# Patient Record
Sex: FEMALE | Race: WHITE | NOT HISPANIC OR LATINO | Employment: PART TIME | RURAL
[De-identification: names, ages, dates, MRNs, and addresses within clinical notes are randomized per-mention and may not be internally consistent; named-entity substitution may affect disease eponyms.]

---

## 2019-01-31 ENCOUNTER — HISTORICAL (OUTPATIENT)
Dept: ADMINISTRATIVE | Facility: HOSPITAL | Age: 64
End: 2019-01-31

## 2019-02-01 LAB
LAB AP CLINICAL INFORMATION: NORMAL
LAB AP COMMENTS: NORMAL
LAB AP DIAGNOSIS - HISTORICAL: NORMAL
LAB AP GROSS PATHOLOGY - HISTORICAL: NORMAL
LAB AP SPECIMEN SUBMITTED - HISTORICAL: NORMAL

## 2019-04-12 LAB — CRC RECOMMENDATION EXT: NORMAL

## 2019-08-07 ENCOUNTER — HISTORICAL (OUTPATIENT)
Dept: ADMINISTRATIVE | Facility: HOSPITAL | Age: 64
End: 2019-08-07

## 2019-08-08 LAB
LAB AP CLINICAL INFORMATION: NORMAL
LAB AP DIAGNOSIS - HISTORICAL: NORMAL
LAB AP GROSS PATHOLOGY - HISTORICAL: NORMAL
LAB AP SPECIMEN SUBMITTED - HISTORICAL: NORMAL

## 2020-06-24 ENCOUNTER — HISTORICAL (OUTPATIENT)
Dept: ADMINISTRATIVE | Facility: HOSPITAL | Age: 65
End: 2020-06-24

## 2020-09-17 ENCOUNTER — HISTORICAL (OUTPATIENT)
Dept: ADMINISTRATIVE | Facility: HOSPITAL | Age: 65
End: 2020-09-17

## 2020-12-20 ENCOUNTER — HISTORICAL (OUTPATIENT)
Dept: ADMINISTRATIVE | Facility: HOSPITAL | Age: 65
End: 2020-12-20

## 2021-02-13 ENCOUNTER — HISTORICAL (OUTPATIENT)
Dept: ADMINISTRATIVE | Facility: HOSPITAL | Age: 66
End: 2021-02-13

## 2021-03-31 ENCOUNTER — TELEPHONE (OUTPATIENT)
Dept: FAMILY MEDICINE | Facility: CLINIC | Age: 66
End: 2021-03-31

## 2021-03-31 RX ORDER — MECLIZINE HYDROCHLORIDE 25 MG/1
25 TABLET ORAL 3 TIMES DAILY PRN
Qty: 90 TABLET | Refills: 5 | Status: SHIPPED | OUTPATIENT
Start: 2021-03-31 | End: 2021-10-21 | Stop reason: SDUPTHER

## 2021-04-06 DIAGNOSIS — M54.9 DORSALGIA, UNSPECIFIED: ICD-10-CM

## 2021-04-07 ENCOUNTER — HOSPITAL ENCOUNTER (OUTPATIENT)
Dept: RADIOLOGY | Facility: HOSPITAL | Age: 66
Discharge: HOME | End: 2021-04-07
Attending: ORTHOPAEDIC SURGERY
Payer: MEDICARE

## 2021-04-07 ENCOUNTER — OFFICE VISIT (OUTPATIENT)
Dept: SPINE | Facility: CLINIC | Age: 66
End: 2021-04-07
Payer: MEDICARE

## 2021-04-07 VITALS — WEIGHT: 165 LBS | HEIGHT: 64 IN | BODY MASS INDEX: 28.17 KG/M2

## 2021-04-07 DIAGNOSIS — M54.9 DORSALGIA, UNSPECIFIED: ICD-10-CM

## 2021-04-07 DIAGNOSIS — M43.16 SPONDYLOLISTHESIS, LUMBAR REGION: ICD-10-CM

## 2021-04-07 DIAGNOSIS — M54.12 RADICULOPATHY, CERVICAL REGION: ICD-10-CM

## 2021-04-07 DIAGNOSIS — M50.30 DDD (DEGENERATIVE DISC DISEASE), CERVICAL: Primary | ICD-10-CM

## 2021-04-07 DIAGNOSIS — M51.36 DDD (DEGENERATIVE DISC DISEASE), LUMBAR: ICD-10-CM

## 2021-04-07 DIAGNOSIS — S32.010D CLOSED WEDGE COMPRESSION FRACTURE OF L1 VERTEBRA WITH ROUTINE HEALING, SUBSEQUENT ENCOUNTER: ICD-10-CM

## 2021-04-07 PROCEDURE — 72100 XR LUMBAR SPINE AP AND LATERAL: ICD-10-PCS | Mod: 26,,, | Performed by: ORTHOPAEDIC SURGERY

## 2021-04-07 PROCEDURE — 99214 PR OFFICE/OUTPT VISIT, EST, LEVL IV, 30-39 MIN: ICD-10-PCS | Mod: S$PBB,,, | Performed by: ORTHOPAEDIC SURGERY

## 2021-04-07 PROCEDURE — 72050 X-RAY EXAM NECK SPINE 4/5VWS: CPT | Mod: PBBFAC | Performed by: ORTHOPAEDIC SURGERY

## 2021-04-07 PROCEDURE — 99999 PR PBB SHADOW E&M-EST. PATIENT-LVL III: ICD-10-PCS | Mod: PBBFAC,,, | Performed by: ORTHOPAEDIC SURGERY

## 2021-04-07 PROCEDURE — 72100 X-RAY EXAM L-S SPINE 2/3 VWS: CPT | Mod: PBBFAC | Performed by: ORTHOPAEDIC SURGERY

## 2021-04-07 PROCEDURE — 99214 OFFICE O/P EST MOD 30 MIN: CPT | Mod: S$PBB,,, | Performed by: ORTHOPAEDIC SURGERY

## 2021-04-07 PROCEDURE — 72100 X-RAY EXAM L-S SPINE 2/3 VWS: CPT | Mod: 26,,, | Performed by: ORTHOPAEDIC SURGERY

## 2021-04-07 PROCEDURE — 72050 X-RAY EXAM NECK SPINE 4/5VWS: CPT | Mod: 26,,, | Performed by: ORTHOPAEDIC SURGERY

## 2021-04-07 PROCEDURE — 99213 OFFICE O/P EST LOW 20 MIN: CPT | Mod: PBBFAC,25 | Performed by: ORTHOPAEDIC SURGERY

## 2021-04-07 PROCEDURE — 72050 XR CERVICAL SPINE AP LAT WITH FLEX EXTEN: ICD-10-PCS | Mod: 26,,, | Performed by: ORTHOPAEDIC SURGERY

## 2021-04-07 PROCEDURE — 99999 PR PBB SHADOW E&M-EST. PATIENT-LVL III: CPT | Mod: PBBFAC,,, | Performed by: ORTHOPAEDIC SURGERY

## 2021-04-07 PROCEDURE — 72100 X-RAY EXAM L-S SPINE 2/3 VWS: CPT | Mod: TC

## 2021-04-07 PROCEDURE — 72050 X-RAY EXAM NECK SPINE 4/5VWS: CPT | Mod: TC

## 2021-04-13 RX ORDER — GLIMEPIRIDE 4 MG/1
4 TABLET ORAL DAILY
COMMUNITY
Start: 2021-02-03 | End: 2021-04-29 | Stop reason: SDUPTHER

## 2021-04-13 RX ORDER — CLOPIDOGREL BISULFATE 75 MG/1
75 TABLET ORAL DAILY
COMMUNITY
Start: 2021-02-03 | End: 2021-10-01 | Stop reason: SDUPTHER

## 2021-04-13 RX ORDER — CLOPIDOGREL BISULFATE 75 MG/1
75 TABLET ORAL
COMMUNITY
Start: 2020-09-09 | End: 2021-09-15

## 2021-04-13 RX ORDER — ASPIRIN 81 MG/1
81 TABLET ORAL
COMMUNITY

## 2021-04-13 RX ORDER — TRAMADOL HYDROCHLORIDE 50 MG/1
50 TABLET ORAL
COMMUNITY
Start: 2020-07-09 | End: 2021-11-19

## 2021-04-13 RX ORDER — PAROXETINE HYDROCHLORIDE 20 MG/1
20 TABLET, FILM COATED ORAL DAILY
COMMUNITY
Start: 2021-02-03 | End: 2021-04-29 | Stop reason: SDUPTHER

## 2021-04-13 RX ORDER — SIMVASTATIN 20 MG/1
20 TABLET, FILM COATED ORAL DAILY
COMMUNITY
Start: 2021-02-03 | End: 2021-04-29

## 2021-04-13 RX ORDER — MONTELUKAST SODIUM 4 MG/1
1 TABLET, CHEWABLE ORAL
COMMUNITY
End: 2022-02-14

## 2021-04-13 RX ORDER — PROPRANOLOL HYDROCHLORIDE 80 MG/1
80 CAPSULE, EXTENDED RELEASE ORAL DAILY
COMMUNITY
Start: 2021-02-03 | End: 2021-10-01 | Stop reason: SDUPTHER

## 2021-04-13 RX ORDER — SIMVASTATIN 20 MG/1
20 TABLET, FILM COATED ORAL
COMMUNITY
End: 2021-04-29

## 2021-04-13 RX ORDER — ISOSORBIDE MONONITRATE 30 MG/1
30 TABLET, EXTENDED RELEASE ORAL DAILY
COMMUNITY
Start: 2021-02-03 | End: 2021-04-29 | Stop reason: SDUPTHER

## 2021-04-13 RX ORDER — FUROSEMIDE 20 MG/1
20 TABLET ORAL DAILY
COMMUNITY
Start: 2021-02-03 | End: 2021-08-25 | Stop reason: SDUPTHER

## 2021-04-14 ENCOUNTER — OFFICE VISIT (OUTPATIENT)
Dept: SPINE | Facility: CLINIC | Age: 66
End: 2021-04-14
Payer: MEDICARE

## 2021-04-14 DIAGNOSIS — M51.36 DDD (DEGENERATIVE DISC DISEASE), LUMBAR: ICD-10-CM

## 2021-04-14 DIAGNOSIS — S32.010D CLOSED WEDGE COMPRESSION FRACTURE OF L1 VERTEBRA WITH ROUTINE HEALING, SUBSEQUENT ENCOUNTER: ICD-10-CM

## 2021-04-14 DIAGNOSIS — M50.30 DDD (DEGENERATIVE DISC DISEASE), CERVICAL: ICD-10-CM

## 2021-04-14 DIAGNOSIS — M54.12 RADICULOPATHY, CERVICAL REGION: Primary | ICD-10-CM

## 2021-04-14 DIAGNOSIS — M43.16 SPONDYLOLISTHESIS, LUMBAR REGION: ICD-10-CM

## 2021-04-14 PROCEDURE — 99213 OFFICE O/P EST LOW 20 MIN: CPT | Mod: PBBFAC | Performed by: ORTHOPAEDIC SURGERY

## 2021-04-14 PROCEDURE — 99214 PR OFFICE/OUTPT VISIT, EST, LEVL IV, 30-39 MIN: ICD-10-PCS | Mod: S$PBB,,, | Performed by: ORTHOPAEDIC SURGERY

## 2021-04-14 PROCEDURE — 99999 PR PBB SHADOW E&M-EST. PATIENT-LVL III: ICD-10-PCS | Mod: PBBFAC,,, | Performed by: ORTHOPAEDIC SURGERY

## 2021-04-14 PROCEDURE — 99999 PR PBB SHADOW E&M-EST. PATIENT-LVL III: CPT | Mod: PBBFAC,,, | Performed by: ORTHOPAEDIC SURGERY

## 2021-04-14 PROCEDURE — 99214 OFFICE O/P EST MOD 30 MIN: CPT | Mod: S$PBB,,, | Performed by: ORTHOPAEDIC SURGERY

## 2021-04-21 ENCOUNTER — OFFICE VISIT (OUTPATIENT)
Dept: INTERNAL MEDICINE | Facility: CLINIC | Age: 66
End: 2021-04-21
Payer: MEDICARE

## 2021-04-21 ENCOUNTER — HOSPITAL ENCOUNTER (OUTPATIENT)
Dept: RADIOLOGY | Facility: HOSPITAL | Age: 66
Discharge: HOME | End: 2021-04-21
Attending: NURSE PRACTITIONER
Payer: MEDICARE

## 2021-04-21 VITALS
RESPIRATION RATE: 16 BRPM | DIASTOLIC BLOOD PRESSURE: 80 MMHG | HEIGHT: 63 IN | HEART RATE: 67 BPM | WEIGHT: 173 LBS | BODY MASS INDEX: 30.65 KG/M2 | SYSTOLIC BLOOD PRESSURE: 140 MMHG | OXYGEN SATURATION: 94 %

## 2021-04-21 DIAGNOSIS — M80.00XS AGE-RELATED OSTEOPOROSIS WITH CURRENT PATHOLOGICAL FRACTURE, SEQUELA: ICD-10-CM

## 2021-04-21 DIAGNOSIS — Z13.820 ENCOUNTER FOR SCREENING FOR OSTEOPOROSIS: ICD-10-CM

## 2021-04-21 DIAGNOSIS — S32.009A UNSPECIFIED FRACTURE OF UNSPECIFIED LUMBAR VERTEBRA, INITIAL ENCOUNTER FOR CLOSED FRACTURE: ICD-10-CM

## 2021-04-21 DIAGNOSIS — M81.0 OSTEOPOROSIS, UNSPECIFIED OSTEOPOROSIS TYPE, UNSPECIFIED PATHOLOGICAL FRACTURE PRESENCE: Primary | ICD-10-CM

## 2021-04-21 DIAGNOSIS — M54.50 LOW BACK PAIN: ICD-10-CM

## 2021-04-21 PROBLEM — M80.00XA AGE-RELATED OSTEOPOROSIS WITH CURRENT PATHOLOGICAL FRACTURE: Status: ACTIVE | Noted: 2021-04-21

## 2021-04-21 PROCEDURE — 99213 PR OFFICE/OUTPT VISIT, EST, LEVL III, 20-29 MIN: ICD-10-PCS | Mod: S$PBB,,, | Performed by: NURSE PRACTITIONER

## 2021-04-21 PROCEDURE — 99999 PR PBB SHADOW E&M-EST. PATIENT-LVL V: CPT | Mod: PBBFAC,,, | Performed by: NURSE PRACTITIONER

## 2021-04-21 PROCEDURE — 99999 PR PBB SHADOW E&M-EST. PATIENT-LVL V: ICD-10-PCS | Mod: PBBFAC,,, | Performed by: NURSE PRACTITIONER

## 2021-04-21 PROCEDURE — 99213 OFFICE O/P EST LOW 20 MIN: CPT | Mod: S$PBB,,, | Performed by: NURSE PRACTITIONER

## 2021-04-21 PROCEDURE — 77080 DXA BONE DENSITY AXIAL: CPT | Mod: TC

## 2021-04-21 PROCEDURE — 77080 DEXA BONE DENSITY SPINE HIP: ICD-10-PCS | Mod: 26,,, | Performed by: RADIOLOGY

## 2021-04-21 PROCEDURE — 77080 DXA BONE DENSITY AXIAL: CPT | Mod: 26,,, | Performed by: RADIOLOGY

## 2021-04-21 PROCEDURE — 99215 OFFICE O/P EST HI 40 MIN: CPT | Mod: PBBFAC,25 | Performed by: NURSE PRACTITIONER

## 2021-04-21 RX ORDER — GABAPENTIN 300 MG/1
300 CAPSULE ORAL 3 TIMES DAILY
COMMUNITY
End: 2021-08-11 | Stop reason: SDUPTHER

## 2021-04-22 DIAGNOSIS — E55.9 VITAMIN D DEFICIENCY: Primary | ICD-10-CM

## 2021-04-22 RX ORDER — METFORMIN HYDROCHLORIDE 1000 MG/1
1000 TABLET ORAL 2 TIMES DAILY
COMMUNITY
End: 2022-02-10 | Stop reason: ALTCHOICE

## 2021-04-22 RX ORDER — ORAL SEMAGLUTIDE 3 MG/1
3 TABLET ORAL DAILY
COMMUNITY
End: 2021-11-19

## 2021-04-22 RX ORDER — PANTOPRAZOLE SODIUM 40 MG/1
40 TABLET, DELAYED RELEASE ORAL DAILY
COMMUNITY
End: 2022-05-26 | Stop reason: SDUPTHER

## 2021-04-22 RX ORDER — ERGOCALCIFEROL 1.25 MG/1
50000 CAPSULE ORAL
Qty: 4 CAPSULE | Refills: 1 | Status: SHIPPED | OUTPATIENT
Start: 2021-04-22 | End: 2022-10-27

## 2021-04-22 RX ORDER — ICOSAPENT ETHYL 1000 MG/1
2 CAPSULE ORAL DAILY
COMMUNITY
End: 2022-02-14

## 2021-04-22 RX ORDER — FLUTICASONE PROPIONATE 50 MCG
2 SPRAY, SUSPENSION (ML) NASAL DAILY
COMMUNITY

## 2021-04-22 RX ORDER — CYCLOBENZAPRINE HCL 10 MG
10 TABLET ORAL EVERY 12 HOURS PRN
COMMUNITY
End: 2022-10-27 | Stop reason: SDUPTHER

## 2021-04-29 ENCOUNTER — OFFICE VISIT (OUTPATIENT)
Dept: FAMILY MEDICINE | Facility: CLINIC | Age: 66
End: 2021-04-29
Payer: MEDICARE

## 2021-04-29 VITALS
HEART RATE: 74 BPM | DIASTOLIC BLOOD PRESSURE: 78 MMHG | RESPIRATION RATE: 18 BRPM | WEIGHT: 170 LBS | SYSTOLIC BLOOD PRESSURE: 136 MMHG | HEIGHT: 64 IN | TEMPERATURE: 97 F | BODY MASS INDEX: 29.02 KG/M2

## 2021-04-29 DIAGNOSIS — E11.9 TYPE 2 DIABETES MELLITUS WITHOUT COMPLICATION, WITHOUT LONG-TERM CURRENT USE OF INSULIN: Primary | ICD-10-CM

## 2021-04-29 DIAGNOSIS — I10 ESSENTIAL HYPERTENSION: ICD-10-CM

## 2021-04-29 DIAGNOSIS — I25.10 CORONARY ARTERIOSCLEROSIS: ICD-10-CM

## 2021-04-29 DIAGNOSIS — Z79.899 ENCOUNTER FOR LONG-TERM (CURRENT) USE OF OTHER MEDICATIONS: ICD-10-CM

## 2021-04-29 DIAGNOSIS — E55.9 VITAMIN D DEFICIENCY: ICD-10-CM

## 2021-04-29 DIAGNOSIS — I63.9 CEREBROVASCULAR ACCIDENT (CVA), UNSPECIFIED MECHANISM: ICD-10-CM

## 2021-04-29 LAB
ALBUMIN SERPL BCP-MCNC: 3.7 G/DL (ref 3.5–5)
ALBUMIN/GLOB SERPL: 1 {RATIO}
ALP SERPL-CCNC: 72 U/L (ref 55–142)
ALT SERPL W P-5'-P-CCNC: 29 U/L (ref 13–56)
ANION GAP SERPL CALCULATED.3IONS-SCNC: 10 MMOL/L (ref 7–16)
AST SERPL W P-5'-P-CCNC: 24 U/L (ref 15–37)
BASOPHILS # BLD AUTO: 0.12 K/UL (ref 0–0.2)
BASOPHILS NFR BLD AUTO: 1.3 % (ref 0–1)
BILIRUB SERPL-MCNC: 0.3 MG/DL (ref 0–1.2)
BUN SERPL-MCNC: 16 MG/DL (ref 7–18)
BUN/CREAT SERPL: 12 (ref 6–20)
CALCIUM SERPL-MCNC: 9.9 MG/DL (ref 8.5–10.1)
CHLORIDE SERPL-SCNC: 107 MMOL/L (ref 98–107)
CO2 SERPL-SCNC: 30 MMOL/L (ref 21–32)
CREAT SERPL-MCNC: 1.3 MG/DL (ref 0.55–1.02)
DIFFERENTIAL METHOD BLD: ABNORMAL
EOSINOPHIL # BLD AUTO: 0.42 K/UL (ref 0–0.5)
EOSINOPHIL NFR BLD AUTO: 4.4 % (ref 1–4)
ERYTHROCYTE [DISTWIDTH] IN BLOOD BY AUTOMATED COUNT: 14 % (ref 11.5–14.5)
EST. AVERAGE GLUCOSE BLD GHB EST-MCNC: 157 MG/DL
GLOBULIN SER-MCNC: 3.6 G/DL (ref 2–4)
GLUCOSE SERPL-MCNC: 89 MG/DL (ref 74–106)
HBA1C MFR BLD HPLC: 7.3 % (ref 4.5–6.6)
HCT VFR BLD AUTO: 38.3 % (ref 38–47)
HGB BLD-MCNC: 11.9 G/DL (ref 12–16)
IMM GRANULOCYTES # BLD AUTO: 0.05 K/UL (ref 0–0.04)
IMM GRANULOCYTES NFR BLD: 0.5 % (ref 0–0.4)
LYMPHOCYTES # BLD AUTO: 2.34 K/UL (ref 1–4.8)
LYMPHOCYTES NFR BLD AUTO: 24.4 % (ref 27–41)
MCH RBC QN AUTO: 27.5 PG (ref 27–31)
MCHC RBC AUTO-ENTMCNC: 31.1 G/DL (ref 32–36)
MCV RBC AUTO: 88.5 FL (ref 80–96)
MONOCYTES # BLD AUTO: 0.72 K/UL (ref 0–0.8)
MONOCYTES NFR BLD AUTO: 7.5 % (ref 2–6)
MPC BLD CALC-MCNC: 12 FL (ref 9.4–12.4)
NEUTROPHILS # BLD AUTO: 5.93 K/UL (ref 1.8–7.7)
NEUTROPHILS NFR BLD AUTO: 61.9 % (ref 53–65)
NRBC # BLD AUTO: 0 X10E3/UL
NRBC, AUTO (.00): 0 %
PLATELET # BLD AUTO: 326 K/UL (ref 150–400)
POTASSIUM SERPL-SCNC: 4.8 MMOL/L (ref 3.5–5.1)
PROT SERPL-MCNC: 7.3 G/DL (ref 6.4–8.2)
RBC # BLD AUTO: 4.33 M/UL (ref 4.2–5.4)
SODIUM SERPL-SCNC: 142 MMOL/L (ref 136–145)
WBC # BLD AUTO: 9.58 K/UL (ref 4.5–11)

## 2021-04-29 PROCEDURE — 80053 COMPREHENSIVE METABOLIC PANEL: ICD-10-PCS | Mod: ,,, | Performed by: CLINICAL MEDICAL LABORATORY

## 2021-04-29 PROCEDURE — 85025 COMPLETE CBC W/AUTO DIFF WBC: CPT | Mod: ,,, | Performed by: CLINICAL MEDICAL LABORATORY

## 2021-04-29 PROCEDURE — 83036 HEMOGLOBIN GLYCOSYLATED A1C: CPT | Mod: ,,, | Performed by: CLINICAL MEDICAL LABORATORY

## 2021-04-29 PROCEDURE — 80053 COMPREHEN METABOLIC PANEL: CPT | Mod: ,,, | Performed by: CLINICAL MEDICAL LABORATORY

## 2021-04-29 PROCEDURE — 99214 PR OFFICE/OUTPT VISIT, EST, LEVL IV, 30-39 MIN: ICD-10-PCS | Mod: ,,, | Performed by: FAMILY MEDICINE

## 2021-04-29 PROCEDURE — 99214 OFFICE O/P EST MOD 30 MIN: CPT | Mod: ,,, | Performed by: FAMILY MEDICINE

## 2021-04-29 PROCEDURE — 85025 CBC WITH DIFFERENTIAL: ICD-10-PCS | Mod: ,,, | Performed by: CLINICAL MEDICAL LABORATORY

## 2021-04-29 PROCEDURE — 83036 HEMOGLOBIN A1C: ICD-10-PCS | Mod: ,,, | Performed by: CLINICAL MEDICAL LABORATORY

## 2021-04-29 RX ORDER — PAROXETINE HYDROCHLORIDE 20 MG/1
10 TABLET, FILM COATED ORAL DAILY
Qty: 30 TABLET | Refills: 11 | Status: SHIPPED | OUTPATIENT
Start: 2021-04-29 | End: 2022-02-14 | Stop reason: ALTCHOICE

## 2021-04-29 RX ORDER — ISOSORBIDE MONONITRATE 30 MG/1
30 TABLET, EXTENDED RELEASE ORAL DAILY
Qty: 30 TABLET | Refills: 11 | Status: SHIPPED | OUTPATIENT
Start: 2021-04-29 | End: 2022-05-26 | Stop reason: SDUPTHER

## 2021-04-29 RX ORDER — SIMVASTATIN 20 MG/1
20 TABLET, FILM COATED ORAL DAILY
Qty: 30 TABLET | Refills: 11 | Status: SHIPPED | OUTPATIENT
Start: 2021-04-29 | End: 2023-12-08

## 2021-04-29 RX ORDER — GLIMEPIRIDE 4 MG/1
TABLET ORAL
Qty: 60 TABLET | Refills: 11 | Status: SHIPPED | OUTPATIENT
Start: 2021-04-29 | End: 2022-05-23 | Stop reason: SDUPTHER

## 2021-05-21 ENCOUNTER — TELEPHONE (OUTPATIENT)
Dept: FAMILY MEDICINE | Facility: CLINIC | Age: 66
End: 2021-05-21

## 2021-05-21 DIAGNOSIS — R11.2 NAUSEA AND VOMITING, INTRACTABILITY OF VOMITING NOT SPECIFIED, UNSPECIFIED VOMITING TYPE: Primary | ICD-10-CM

## 2021-05-21 DIAGNOSIS — R19.7 DIARRHEA, UNSPECIFIED TYPE: ICD-10-CM

## 2021-06-30 ENCOUNTER — HOSPITAL ENCOUNTER (OUTPATIENT)
Dept: RADIOLOGY | Facility: HOSPITAL | Age: 66
Discharge: HOME | End: 2021-06-30
Attending: RADIOLOGY
Payer: MEDICARE

## 2021-06-30 VITALS — WEIGHT: 170 LBS | BODY MASS INDEX: 29.02 KG/M2 | HEIGHT: 64 IN

## 2021-06-30 DIAGNOSIS — Z12.31 BREAST CANCER SCREENING BY MAMMOGRAM: ICD-10-CM

## 2021-06-30 PROCEDURE — 77067 SCR MAMMO BI INCL CAD: CPT | Mod: TC

## 2021-07-14 ENCOUNTER — OFFICE VISIT (OUTPATIENT)
Dept: SPINE | Facility: CLINIC | Age: 66
End: 2021-07-14
Payer: MEDICARE

## 2021-07-14 DIAGNOSIS — M54.12 RADICULOPATHY, CERVICAL REGION: Primary | ICD-10-CM

## 2021-07-14 DIAGNOSIS — M50.30 DDD (DEGENERATIVE DISC DISEASE), CERVICAL: ICD-10-CM

## 2021-07-14 DIAGNOSIS — S32.010D CLOSED WEDGE COMPRESSION FRACTURE OF L1 VERTEBRA WITH ROUTINE HEALING, SUBSEQUENT ENCOUNTER: ICD-10-CM

## 2021-07-14 DIAGNOSIS — M43.16 SPONDYLOLISTHESIS, LUMBAR REGION: ICD-10-CM

## 2021-07-14 DIAGNOSIS — M51.36 DDD (DEGENERATIVE DISC DISEASE), LUMBAR: ICD-10-CM

## 2021-07-14 PROCEDURE — 99213 OFFICE O/P EST LOW 20 MIN: CPT | Mod: PBBFAC | Performed by: ORTHOPAEDIC SURGERY

## 2021-07-14 PROCEDURE — 99214 OFFICE O/P EST MOD 30 MIN: CPT | Mod: S$PBB,,, | Performed by: ORTHOPAEDIC SURGERY

## 2021-07-14 PROCEDURE — 99214 PR OFFICE/OUTPT VISIT, EST, LEVL IV, 30-39 MIN: ICD-10-PCS | Mod: S$PBB,,, | Performed by: ORTHOPAEDIC SURGERY

## 2021-08-11 RX ORDER — GABAPENTIN 300 MG/1
300 CAPSULE ORAL 3 TIMES DAILY
Qty: 90 CAPSULE | Refills: 11 | Status: SHIPPED | OUTPATIENT
Start: 2021-08-11 | End: 2022-05-26 | Stop reason: SDUPTHER

## 2021-08-13 ENCOUNTER — TELEPHONE (OUTPATIENT)
Dept: FAMILY MEDICINE | Facility: CLINIC | Age: 66
End: 2021-08-13

## 2021-08-13 RX ORDER — PROMETHAZINE HYDROCHLORIDE 25 MG/1
25 TABLET ORAL EVERY 6 HOURS PRN
Qty: 30 TABLET | Refills: 2 | Status: SHIPPED | OUTPATIENT
Start: 2021-08-13 | End: 2023-02-17 | Stop reason: SDUPTHER

## 2021-08-25 ENCOUNTER — TELEPHONE (OUTPATIENT)
Dept: FAMILY MEDICINE | Facility: CLINIC | Age: 66
End: 2021-08-25

## 2021-08-25 RX ORDER — FUROSEMIDE 20 MG/1
20 TABLET ORAL DAILY
Qty: 30 TABLET | Refills: 11 | Status: SHIPPED | OUTPATIENT
Start: 2021-08-25 | End: 2022-02-14

## 2021-08-25 RX ORDER — SULFAMETHOXAZOLE AND TRIMETHOPRIM 800; 160 MG/1; MG/1
1 TABLET ORAL 2 TIMES DAILY
Qty: 14 TABLET | Refills: 0 | Status: SHIPPED | OUTPATIENT
Start: 2021-08-25 | End: 2021-11-19

## 2021-10-01 RX ORDER — PROPRANOLOL HYDROCHLORIDE 80 MG/1
80 CAPSULE, EXTENDED RELEASE ORAL DAILY
Qty: 90 CAPSULE | Refills: 3 | Status: SHIPPED | OUTPATIENT
Start: 2021-10-01 | End: 2022-05-26 | Stop reason: SDUPTHER

## 2021-10-01 RX ORDER — CLOPIDOGREL BISULFATE 75 MG/1
75 TABLET ORAL DAILY
Qty: 90 TABLET | Refills: 3 | Status: SHIPPED | OUTPATIENT
Start: 2021-10-01 | End: 2022-10-27

## 2021-10-21 ENCOUNTER — CLINICAL SUPPORT (OUTPATIENT)
Dept: FAMILY MEDICINE | Facility: CLINIC | Age: 66
End: 2021-10-21
Payer: MEDICARE

## 2021-10-21 DIAGNOSIS — Z23 NEED FOR VACCINATION: Primary | ICD-10-CM

## 2021-10-21 PROCEDURE — G0008 ADMIN INFLUENZA VIRUS VAC: HCPCS | Mod: ,,, | Performed by: FAMILY MEDICINE

## 2021-10-21 PROCEDURE — G0008 FLU VACCINE - QUADRIVALENT - HIGH DOSE (65+) PRESERVATIVE FREE IM: ICD-10-PCS | Mod: ,,, | Performed by: FAMILY MEDICINE

## 2021-10-21 PROCEDURE — 90662 FLU VACCINE - QUADRIVALENT - HIGH DOSE (65+) PRESERVATIVE FREE IM: ICD-10-PCS | Mod: ,,, | Performed by: FAMILY MEDICINE

## 2021-10-21 PROCEDURE — 90662 IIV NO PRSV INCREASED AG IM: CPT | Mod: ,,, | Performed by: FAMILY MEDICINE

## 2021-10-21 RX ORDER — MECLIZINE HYDROCHLORIDE 25 MG/1
25 TABLET ORAL 3 TIMES DAILY PRN
Qty: 90 TABLET | Refills: 5 | Status: SHIPPED | OUTPATIENT
Start: 2021-10-21 | End: 2021-12-10 | Stop reason: SDUPTHER

## 2021-11-17 DIAGNOSIS — Z79.899 ENCOUNTER FOR LONG-TERM (CURRENT) USE OF OTHER MEDICATIONS: ICD-10-CM

## 2021-11-17 DIAGNOSIS — E11.9 TYPE 2 DIABETES MELLITUS WITHOUT COMPLICATION, WITHOUT LONG-TERM CURRENT USE OF INSULIN: ICD-10-CM

## 2021-11-17 DIAGNOSIS — I25.10 CORONARY ARTERIOSCLEROSIS: ICD-10-CM

## 2021-11-17 DIAGNOSIS — I10 ESSENTIAL HYPERTENSION: Primary | ICD-10-CM

## 2021-11-18 ENCOUNTER — OFFICE VISIT (OUTPATIENT)
Dept: FAMILY MEDICINE | Facility: CLINIC | Age: 66
End: 2021-11-18
Payer: MEDICARE

## 2021-11-18 VITALS
HEIGHT: 64 IN | RESPIRATION RATE: 16 BRPM | TEMPERATURE: 97 F | HEART RATE: 80 BPM | WEIGHT: 181 LBS | SYSTOLIC BLOOD PRESSURE: 128 MMHG | DIASTOLIC BLOOD PRESSURE: 80 MMHG | BODY MASS INDEX: 30.9 KG/M2 | OXYGEN SATURATION: 95 %

## 2021-11-18 DIAGNOSIS — Z79.899 ENCOUNTER FOR LONG-TERM (CURRENT) USE OF OTHER MEDICATIONS: ICD-10-CM

## 2021-11-18 DIAGNOSIS — I10 PRIMARY HYPERTENSION: ICD-10-CM

## 2021-11-18 DIAGNOSIS — I25.10 CORONARY ARTERIOSCLEROSIS: ICD-10-CM

## 2021-11-18 DIAGNOSIS — E11.9 TYPE 2 DIABETES MELLITUS WITHOUT COMPLICATION, WITHOUT LONG-TERM CURRENT USE OF INSULIN: Primary | ICD-10-CM

## 2021-11-18 PROCEDURE — 99213 PR OFFICE/OUTPT VISIT, EST, LEVL III, 20-29 MIN: ICD-10-PCS | Mod: ,,, | Performed by: FAMILY MEDICINE

## 2021-11-18 PROCEDURE — 99213 OFFICE O/P EST LOW 20 MIN: CPT | Mod: ,,, | Performed by: FAMILY MEDICINE

## 2021-12-04 ENCOUNTER — OFFICE VISIT (OUTPATIENT)
Dept: FAMILY MEDICINE | Facility: CLINIC | Age: 66
End: 2021-12-04
Payer: MEDICARE

## 2021-12-04 VITALS
HEIGHT: 64 IN | BODY MASS INDEX: 30.9 KG/M2 | TEMPERATURE: 97 F | HEART RATE: 70 BPM | WEIGHT: 181 LBS | OXYGEN SATURATION: 96 %

## 2021-12-04 DIAGNOSIS — J20.9 ACUTE BRONCHITIS, UNSPECIFIED ORGANISM: ICD-10-CM

## 2021-12-04 DIAGNOSIS — Z20.828 CONTACT WITH AND (SUSPECTED) EXPOSURE TO OTHER VIRAL COMMUNICABLE DISEASES: Primary | ICD-10-CM

## 2021-12-04 LAB
CTP QC/QA: YES
FLUAV AG NPH QL: NEGATIVE
FLUBV AG NPH QL: NEGATIVE
SARS-COV-2 AG RESP QL IA.RAPID: NEGATIVE

## 2021-12-04 PROCEDURE — 99213 PR OFFICE/OUTPT VISIT, EST, LEVL III, 20-29 MIN: ICD-10-PCS | Mod: 25,,, | Performed by: FAMILY MEDICINE

## 2021-12-04 PROCEDURE — 99213 OFFICE O/P EST LOW 20 MIN: CPT | Mod: 25,,, | Performed by: FAMILY MEDICINE

## 2021-12-04 PROCEDURE — 87428 SARSCOV & INF VIR A&B AG IA: CPT | Mod: RHCUB | Performed by: FAMILY MEDICINE

## 2021-12-04 PROCEDURE — 96372 PR INJECTION,THERAP/PROPH/DIAG2ST, IM OR SUBCUT: ICD-10-PCS | Mod: ,,, | Performed by: FAMILY MEDICINE

## 2021-12-04 PROCEDURE — 96372 THER/PROPH/DIAG INJ SC/IM: CPT | Mod: ,,, | Performed by: FAMILY MEDICINE

## 2021-12-04 RX ORDER — AZITHROMYCIN 250 MG/1
TABLET, FILM COATED ORAL
Qty: 6 TABLET | Refills: 0 | Status: SHIPPED | OUTPATIENT
Start: 2021-12-04 | End: 2022-02-10

## 2021-12-04 RX ORDER — DEXAMETHASONE SODIUM PHOSPHATE 4 MG/ML
6 INJECTION, SOLUTION INTRA-ARTICULAR; INTRALESIONAL; INTRAMUSCULAR; INTRAVENOUS; SOFT TISSUE
Status: COMPLETED | OUTPATIENT
Start: 2021-12-04 | End: 2021-12-04

## 2021-12-04 RX ADMIN — DEXAMETHASONE SODIUM PHOSPHATE 6 MG: 4 INJECTION, SOLUTION INTRA-ARTICULAR; INTRALESIONAL; INTRAMUSCULAR; INTRAVENOUS; SOFT TISSUE at 01:12

## 2021-12-07 ENCOUNTER — OFFICE VISIT (OUTPATIENT)
Dept: NEUROLOGY | Facility: CLINIC | Age: 66
End: 2021-12-07
Payer: MEDICARE

## 2021-12-07 VITALS
HEIGHT: 64 IN | SYSTOLIC BLOOD PRESSURE: 130 MMHG | WEIGHT: 180 LBS | BODY MASS INDEX: 30.73 KG/M2 | RESPIRATION RATE: 18 BRPM | OXYGEN SATURATION: 98 % | HEART RATE: 83 BPM | DIASTOLIC BLOOD PRESSURE: 82 MMHG

## 2021-12-07 DIAGNOSIS — I63.9 CEREBROVASCULAR ACCIDENT (CVA), UNSPECIFIED MECHANISM: Primary | ICD-10-CM

## 2021-12-07 DIAGNOSIS — R26.9 GAIT DIFFICULTY: ICD-10-CM

## 2021-12-07 DIAGNOSIS — I10 PRIMARY HYPERTENSION: ICD-10-CM

## 2021-12-07 DIAGNOSIS — R29.6 FALLS FREQUENTLY: ICD-10-CM

## 2021-12-07 PROCEDURE — 99214 OFFICE O/P EST MOD 30 MIN: CPT | Mod: S$PBB,,, | Performed by: NURSE PRACTITIONER

## 2021-12-07 PROCEDURE — 99215 OFFICE O/P EST HI 40 MIN: CPT | Mod: PBBFAC | Performed by: NURSE PRACTITIONER

## 2021-12-07 PROCEDURE — 99214 PR OFFICE/OUTPT VISIT, EST, LEVL IV, 30-39 MIN: ICD-10-PCS | Mod: S$PBB,,, | Performed by: NURSE PRACTITIONER

## 2021-12-10 RX ORDER — MECLIZINE HYDROCHLORIDE 25 MG/1
25 TABLET ORAL 3 TIMES DAILY PRN
Qty: 90 TABLET | Refills: 5 | Status: SHIPPED | OUTPATIENT
Start: 2021-12-10 | End: 2022-05-26 | Stop reason: SDUPTHER

## 2021-12-16 ENCOUNTER — CLINICAL SUPPORT (OUTPATIENT)
Dept: REHABILITATION | Facility: HOSPITAL | Age: 66
End: 2021-12-16
Payer: MEDICARE

## 2021-12-16 DIAGNOSIS — R29.6 FALLS FREQUENTLY: ICD-10-CM

## 2021-12-16 DIAGNOSIS — R26.9 GAIT DIFFICULTY: ICD-10-CM

## 2021-12-16 PROCEDURE — 97161 PT EVAL LOW COMPLEX 20 MIN: CPT

## 2022-01-05 ENCOUNTER — CLINICAL SUPPORT (OUTPATIENT)
Dept: REHABILITATION | Facility: HOSPITAL | Age: 67
End: 2022-01-05
Payer: MEDICARE

## 2022-01-05 DIAGNOSIS — R29.6 FALLS FREQUENTLY: ICD-10-CM

## 2022-01-05 DIAGNOSIS — R26.9 GAIT DIFFICULTY: ICD-10-CM

## 2022-01-05 PROCEDURE — 97110 THERAPEUTIC EXERCISES: CPT

## 2022-01-05 NOTE — PROGRESS NOTES
Physical Therapy Treatment Note     Name: Karen Renteria  Clinic Number: [id number]    Therapy Diagnosis: frequent falls; gait difficulty  Physician: Douglas Fountain FNP    Visit Date: 1/5/2022    Physician Orders: PT Eval and Treat  Medical Diagnosis from Referral: R29.6 (ICD-10-CM) - Falls frequently; R26.9 (ICD-10-CM) - Gait difficulty  Evaluation Date: 12/16/2021  Authorization Period Expiration: 12/31/2021  Plan of Care Expiration: 12/16/2021 to 1/28/2021  Updated Plan of Care Due: 1/15/2021  Visit # / Visits authorized: 2/$2,110 Medicare physical therapy allowance    Time In: 0703  Time Out: 0750  Total Billable Time: 47 minutes    Precautions: Standard, Diabetes, blood thinners, Fall and CHF  Functional Level Prior to Evaluation: independent with all functional mobility without assistive device    Subjective     Pt reports: She has fallen once (off of a step stool at home) since her physical therapy initial evaluation. Patient reports she has mild bruising to the left knee. Patient reports generalized soreness from moving into a new home over the past week.     Patient will be issued a home exercise program during today's treatment session.   Response to previous treatment: This is patient's first treatment session following initial evaluation.     Pain: 0/10  Location: N/A    Objective     Karen received therapeutic exercises to develop strength, endurance, flexibility, posture and core stabilization for 47 minutes including:    NuStep: level 5; 5 minutes  Calf stretch on slant board: 3 x 30 second holds  Hamstring stretch on step: 3 x 30 second holds  Bridges: x 20 reps  Straight leg raises: x 20 reps each  Standing hip abduction: x 20 reps each  Rail squats: x 20 reps each  Cybex rows: 2 plates; x 20 reps  Cybex shoulder press: 1 plate; x 20 reps    Home Exercises Provided and Patient Education Provided     Education provided: Patient educated on the importance of completing skilled physical therapy  treatment and home exercise program as prescribed to maximize functional gains.     Written Home Exercises Provided: yes.  Exercises were reviewed and Karen was able to demonstrate them prior to the end of the session.  Karen demonstrated good  understanding of the education provided.     See EMR under Patient Instructions for exercises provided 1/5/2022.    Assessment     Patient with good effort throughout treatment. Patient required minimal verbal/tactile/visual cues for proper completion of therapeutic exercise. Physical Therapist will continue to progress upper and lower extremity therapeutic exercise and neuromuscular as able.     Karen Is progressing well towards her goals.   Pt prognosis is Good.     Pt will continue to benefit from skilled outpatient physical therapy to address the deficits listed in the problem list box on initial evaluation, provide pt/family education, and to maximize pt's level of independence in the home and community environment.     Pt's spiritual, cultural, and educational needs considered and pt agreeable to plan of care and goals.     Anticipated barriers to physical therapy: compliance with home exercise program     Goals:    Short Term Goals:  1. Patient will demonstrate independence with home exercise program to ensure carryover of treatment.  2. Patient will perform sit to/from stand with standby assist without upper extremity support to improve independence and safety with transfers.  3. Patient will improve bilateral upper and lower extremity strength to 4/5 to improve independence and safety with bed mobility, transfers, and gait.  4. Patient will improve Campbell Balance Scale to 41/56 to improve dynamic standing balance and lower fall risk.   5. Patient will ambulate 150 feet without assistive device with standby assist with increased step lengths, decreased upper extremity guarding to improve independence and safety with gait.      Long Term Goals:  1. Patient will  perform sit to/from stand with independence without upper extremity support to improve independence and safety with transfers.  2. Patient will improve bilateral upper and lower extremity strength to 4+/5 to improve independence and safety with bed mobility, transfers, and gait.  3. Patient will improve Campbell Balance Scale to 45/56 to improve dynamic standing balance and lower fall risk.   4. Patient will ambulate 300 feet without assistive device with independence with increased step lengths and decreased upper extremity guarding including up/down curbs and ramps to improve independence and safety with community ambulation.  5. Patient will demonstrate ability to get up from the floor without upper extremity support to improve ability to interact with grandchildren and to get up from the ground should she experience a fall.       Plan     Patient will continue to benefit from skilled physical therapy treatment as prescribed working towards goals listed above to maximize functional potential.     Willie Dennison, PT, DPT  1/5/2022

## 2022-01-17 ENCOUNTER — CLINICAL SUPPORT (OUTPATIENT)
Dept: REHABILITATION | Facility: HOSPITAL | Age: 67
End: 2022-01-17
Payer: MEDICARE

## 2022-01-17 DIAGNOSIS — R29.6 FALLS FREQUENTLY: Primary | ICD-10-CM

## 2022-01-17 DIAGNOSIS — R26.9 GAIT DIFFICULTY: ICD-10-CM

## 2022-01-17 PROCEDURE — 97110 THERAPEUTIC EXERCISES: CPT | Mod: CQ

## 2022-01-17 NOTE — PROGRESS NOTES
Physical Therapy Treatment Note     Name: Karen Renteria  Clinic Number: [id number]    Therapy Diagnosis: frequent falls; gait difficulty  Physician: Douglas Fountain FNP    Visit Date: 1/17/2022    Physician Orders: PT Eval and Treat  Medical Diagnosis from Referral: R29.6 (ICD-10-CM) - Falls frequently; R26.9 (ICD-10-CM) - Gait difficulty  Evaluation Date: 12/16/2021  Authorization Period Expiration: 12/31/2021  Plan of Care Expiration: 12/16/2021 to 1/28/2021  Updated Plan of Care Due: 1/15/2021  Visit # / Visits authorized: 3/$2,110 Medicare physical therapy allowance    Time In: 0707  Time Out: 0749  Total Billable Time: 42 minutes    Precautions: Standard, Diabetes, blood thinners, Fall and CHF  Functional Level Prior to Evaluation: independent with all functional mobility without assistive device    Subjective     Pt reports: She is having trouble with her left knee since falling off a stool.      Patient will be issued a home exercise program during today's treatment session.   Response to previous treatment: This is patient's first treatment session following initial evaluation.     Pain: 0/10  Location: N/A    Objective     Case conference Bibi, PT, prior to first PTA visit.     Karen received therapeutic exercises to develop strength, endurance, flexibility, posture and core stabilization for 42 minutes including:    NuStep: level 5; 2 minutes (began but had to stop after 2 minutes so patient could complete check in process)   Calf stretch on slant board: 3 x 30 second holds  Hamstring stretch on step: 3 x 30 second holds  Bridges: x 30 reps  Straight leg raises: x 20 reps each  Standing hip abduction: x 20 reps each  Rail squats: x 20 reps each  Cybex rows: 2 plates; x 20 reps  Cybex shoulder press: 1 plate; x 20 reps    Home Exercises Provided and Patient Education Provided     Education provided: Patient educated on the importance of completing skilled physical therapy treatment and home  exercise program as prescribed to maximize functional gains.     Written Home Exercises Provided: yes.  Exercises were reviewed and Karen was able to demonstrate them prior to the end of the session.  Karen demonstrated good  understanding of the education provided.     See EMR under Patient Instructions for exercises provided 1/5/2022.    Assessment     Patient with good effort throughout treatment. Patient required minimal verbal/tactile/visual cues for proper completion of therapeutic exercise, including straight leg raises- she was initially confused on how to complete them. She complained of dizziness and swayed while grabbing for stairs to do hamstring stretches. Therapist will continue to progress upper and lower extremity therapeutic exercise and neuromuscular as able.     Karen Is progressing well towards her goals.   Pt prognosis is Good.     Pt will continue to benefit from skilled outpatient physical therapy to address the deficits listed in the problem list box on initial evaluation, provide pt/family education, and to maximize pt's level of independence in the home and community environment.     Pt's spiritual, cultural, and educational needs considered and pt agreeable to plan of care and goals.     Anticipated barriers to physical therapy: compliance with home exercise program     Goals:    Short Term Goals:  1. Patient will demonstrate independence with home exercise program to ensure carryover of treatment.  2. Patient will perform sit to/from stand with standby assist without upper extremity support to improve independence and safety with transfers.  3. Patient will improve bilateral upper and lower extremity strength to 4/5 to improve independence and safety with bed mobility, transfers, and gait.  4. Patient will improve Campbell Balance Scale to 41/56 to improve dynamic standing balance and lower fall risk.   5. Patient will ambulate 150 feet without assistive device with standby assist with  increased step lengths, decreased upper extremity guarding to improve independence and safety with gait.      Long Term Goals:  1. Patient will perform sit to/from stand with independence without upper extremity support to improve independence and safety with transfers.  2. Patient will improve bilateral upper and lower extremity strength to 4+/5 to improve independence and safety with bed mobility, transfers, and gait.  3. Patient will improve Campbell Balance Scale to 45/56 to improve dynamic standing balance and lower fall risk.   4. Patient will ambulate 300 feet without assistive device with independence with increased step lengths and decreased upper extremity guarding including up/down curbs and ramps to improve independence and safety with community ambulation.  5. Patient will demonstrate ability to get up from the floor without upper extremity support to improve ability to interact with grandchildren and to get up from the ground should she experience a fall.       Plan     Patient will continue to benefit from skilled physical therapy treatment as prescribed working towards goals listed above to maximize functional potential.     Alexandrea Evans, PTA    1/17/2022

## 2022-01-19 ENCOUNTER — CLINICAL SUPPORT (OUTPATIENT)
Dept: REHABILITATION | Facility: HOSPITAL | Age: 67
End: 2022-01-19
Payer: MEDICARE

## 2022-01-19 DIAGNOSIS — R29.6 FALLS FREQUENTLY: ICD-10-CM

## 2022-01-19 DIAGNOSIS — R26.9 GAIT DIFFICULTY: Primary | ICD-10-CM

## 2022-01-19 PROCEDURE — 97014 ELECTRIC STIMULATION THERAPY: CPT

## 2022-01-19 PROCEDURE — 97010 HOT OR COLD PACKS THERAPY: CPT

## 2022-01-19 PROCEDURE — 97110 THERAPEUTIC EXERCISES: CPT

## 2022-01-19 NOTE — PLAN OF CARE
Physical Therapy Updated Plan of Care     Name: Karen Renteria  Clinic Number: [id number]    Therapy Diagnosis: frequent falls; gait difficulty  Physician: Douglas Fountain FNP    Visit Date: 1/19/2022    Physician Orders: PT Eval and Treat  Medical Diagnosis from Referral: R29.6 (ICD-10-CM) - Falls frequently; R26.9 (ICD-10-CM) - Gait difficulty  Evaluation Date: 12/16/2021  Authorization Period Expiration: 12/31/2022  Plan of Care Expiration: 1/19/2022 to 2/25/2022  Updated Plan of Care Due: 2/18/2022  Visit # / Visits authorized: 4/$2,110 Medicare physical therapy allowance    Time In: 0707  Time Out: 0757  Total Billable Time: 50 minutes    Precautions: Standard, Diabetes, blood thinners, Fall and CHF  Functional Level Prior to Evaluation: independent with all functional mobility without assistive device    Subjective     Pt reports: She has not fallen again since her last physical therapy treatment session. Her left knee has continued to bother her, especially feeling like it is going to give way while walking and negotiating steps.     Patient will be issued a home exercise program during today's treatment session.   Response to previous treatment: This is patient's first treatment session following initial evaluation.     Pain: 0/10  Location: N/A    Objective     Karen received therapeutic exercises to develop strength, endurance, flexibility, posture and core stabilization for 40 minutes including:    NuStep: level 5; 2 minutes (began but had to stop after 2 minutes so patient could complete check in process)   Calf stretch on slant board: 3 x 30 second holds  Hamstring stretch on step: 3 x 30 second holds  Leg press: 4 plates; x 15 reps  Cybex hamstring curls: 4 plates; x 15 reps  Cybex knee extensions: 1 plates; x 15 reps  Cybex rows: 2 plates; x 20 reps  Cybex shoulder press: 1 plate; x 20 reps    NOT PERFORMED THIS TREATMENT SESSION:  Bridges: x 30 reps  Straight leg raises: x 20 reps each  Standing  "hip abduction: x 20 reps each  Rail squats: x 20 reps each    Karen received pre-modulated electrical stimulation to the left knee x 10 minutes in conjunction with a cold pack to decrease pain and inflammation.    Home Exercises Provided and Patient Education Provided     Education provided: Patient educated on the importance of completing skilled physical therapy treatment and home exercise program as prescribed to maximize functional gains.     Written Home Exercises Provided: yes.  Exercises were reviewed and Karen was able to demonstrate them prior to the end of the session.  Karen demonstrated good  understanding of the education provided.     See EMR under Patient Instructions for exercises provided 1/5/2022.    Assessment     Patient with good effort throughout treatment but slow performance of all therapeutic exercise. Patient complains of mild left knee pain with all resisted lower extremity therapeutic exercise. Patient reports a "painful shaking" sensation distal to the left patella. No significant localized edema or joint effusion noted to the left knee. Pre-modulated IFC in conjunction with a cold pack applied to patient's left knee post-treatment for pain/inflammation reduction. No balance/dizziness issues reported or observed this treatment. Therapist will continue to progress upper and lower extremity therapeutic exercise and neuromuscular as able.    Karen Is progressing well towards her goals.   Pt prognosis is Good.     Pt will continue to benefit from skilled outpatient physical therapy to address the deficits listed in the problem list box on initial evaluation, provide pt/family education, and to maximize pt's level of independence in the home and community environment.     Pt's spiritual, cultural, and educational needs considered and pt agreeable to plan of care and goals.     Anticipated barriers to physical therapy: compliance with home exercise program     Goals:    Short Term " Goals:  1. Patient will demonstrate independence with home exercise program to ensure carryover of treatment. [met]  2. Patient will perform sit to/from stand with standby assist without upper extremity support to improve independence and safety with transfers. [not met]  3. Patient will improve bilateral upper and lower extremity strength to 4/5 to improve independence and safety with bed mobility, transfers, and gait. [not met]  4. Patient will improve Campbell Balance Scale to 41/56 to improve dynamic standing balance and lower fall risk. [not met]  5. Patient will ambulate 150 feet without assistive device with standby assist with increased step lengths, decreased upper extremity guarding to improve independence and safety with gait. [not met]     Long Term Goals:  1. Patient will perform sit to/from stand with independence without upper extremity support to improve independence and safety with transfers. [not met]  2. Patient will improve bilateral upper and lower extremity strength to 4+/5 to improve independence and safety with bed mobility, transfers, and gait. [not met]  3. Patient will improve Campbell Balance Scale to 45/56 to improve dynamic standing balance and lower fall risk. [not met]  4. Patient will ambulate 300 feet without assistive device with independence with increased step lengths and decreased upper extremity guarding including up/down curbs and ramps to improve independence and safety with community ambulation. [not met]  5. Patient will demonstrate ability to get up from the floor without upper extremity support to improve ability to interact with grandchildren and to get up from the ground should she experience a fall. [not met]    Reasons for Recertification of Therapy: Patient has not met all goals.     Plan     Updated Certification Period: 1/19/2022 to 2/25/2022  Recommended Treatment Plan: 2 times per week for 5 weeks: Electrical Stimulation (IFC/pre-modulated IFC), Gait Training, Manual  Therapy, Moist Heat/ Ice, Neuromuscular Re-ed, Patient Education, Therapeutic Exercise and Ultrasound  Other Recommendations: N/A    Willie Dennison, PT, DPT  1/19/2022      I CERTIFY THE NEED FOR THESE SERVICES FURNISHED UNDER THIS PLAN OF TREATMENT AND WHILE UNDER MY CARE.    Physician's comments:

## 2022-01-20 ENCOUNTER — OFFICE VISIT (OUTPATIENT)
Dept: NEUROLOGY | Facility: CLINIC | Age: 67
End: 2022-01-20
Payer: MEDICARE

## 2022-01-20 VITALS
RESPIRATION RATE: 18 BRPM | BODY MASS INDEX: 30.73 KG/M2 | SYSTOLIC BLOOD PRESSURE: 140 MMHG | HEIGHT: 64 IN | WEIGHT: 180 LBS | DIASTOLIC BLOOD PRESSURE: 76 MMHG

## 2022-01-20 DIAGNOSIS — I63.9 CEREBROVASCULAR ACCIDENT (CVA), UNSPECIFIED MECHANISM: ICD-10-CM

## 2022-01-20 DIAGNOSIS — R42 DIZZINESS AND GIDDINESS: ICD-10-CM

## 2022-01-20 DIAGNOSIS — R26.9 GAIT DIFFICULTY: Primary | ICD-10-CM

## 2022-01-20 DIAGNOSIS — R29.6 FALLS FREQUENTLY: ICD-10-CM

## 2022-01-20 DIAGNOSIS — G56.03 CARPAL TUNNEL SYNDROME ON BOTH SIDES: ICD-10-CM

## 2022-01-20 DIAGNOSIS — M48.02 SPINAL STENOSIS, CERVICAL REGION: ICD-10-CM

## 2022-01-20 DIAGNOSIS — G25.0 ESSENTIAL TREMOR: ICD-10-CM

## 2022-01-20 PROCEDURE — 99213 PR OFFICE/OUTPT VISIT, EST, LEVL III, 20-29 MIN: ICD-10-PCS | Mod: S$PBB,,, | Performed by: NURSE PRACTITIONER

## 2022-01-20 PROCEDURE — 99213 OFFICE O/P EST LOW 20 MIN: CPT | Mod: S$PBB,,, | Performed by: NURSE PRACTITIONER

## 2022-01-20 PROCEDURE — 99215 OFFICE O/P EST HI 40 MIN: CPT | Mod: PBBFAC | Performed by: NURSE PRACTITIONER

## 2022-01-20 NOTE — PATIENT INSTRUCTIONS
1. MRI brain w/o due to continued balance and gait difficulty    2. Continue current medications as directed    3. Recommend wearing bilateral wrist splints for carpal tunnel symptoms    4. EMG/NCS of both upper extremities    Stroke risk modifiers:  1. Good sleep habits, recommend at least 7 hours total sleep daily  2. Continue management of blood pressure and cholesterol including medications, exercise, and good eating habits  3. Exercise as much as possible, recommend 5 days of the week for 30 minutes each day  4. Avoid smoking and alcohol  5. Go to the nearest emergency department immediately if any new or worsening weakness, speech difficulty, or numbness

## 2022-01-20 NOTE — PROGRESS NOTES
Subjective:       Patient ID: Karen Renteria is a 66 y.o. female     Chief Complaint:    Chief Complaint   Patient presents with    Follow-up    Stroke        Allergies:  Cephalexin and Bactrim [sulfamethoxazole-trimethoprim]    Current Medications:    Outpatient Encounter Medications as of 1/20/2022   Medication Sig Dispense Refill    aspirin (ECOTRIN) 81 MG EC tablet Take 81 mg by mouth.      clopidogreL (PLAVIX) 75 mg tablet Take 1 tablet (75 mg total) by mouth once daily. 90 tablet 3    colestipoL (COLESTID) 1 gram Tab Take 1 g by mouth.      cyclobenzaprine (FLEXERIL) 10 MG tablet Take 10 mg by mouth every 12 (twelve) hours as needed for Muscle spasms.      ergocalciferol (ERGOCALCIFEROL) 50,000 unit Cap Take 1 capsule (50,000 Units total) by mouth every 7 days. 4 capsule 1    fluticasone propionate (FLONASE) 50 mcg/actuation nasal spray 2 sprays by Each Nostril route once daily.      furosemide (LASIX) 20 MG tablet Take 1 tablet (20 mg total) by mouth once daily. 30 tablet 11    gabapentin (NEURONTIN) 300 MG capsule Take 1 capsule (300 mg total) by mouth 3 (three) times daily. 90 capsule 11    glimepiride (AMARYL) 4 MG tablet One twice a day with food 60 tablet 11    icosapent ethyL (VASCEPA) 1 gram Cap Take 2 capsules by mouth once daily.      isosorbide mononitrate (IMDUR) 30 MG 24 hr tablet Take 1 tablet (30 mg total) by mouth once daily. 30 tablet 11    meclizine (ANTIVERT) 25 mg tablet Take 1 tablet (25 mg total) by mouth 3 (three) times daily as needed for Dizziness. 90 tablet 5    metFORMIN (GLUCOPHAGE) 1000 MG tablet Take 1,000 mg by mouth 2 (two) times a day.      pantoprazole (PROTONIX) 40 MG tablet Take 40 mg by mouth once daily.      paroxetine (PAXIL) 20 MG tablet Take 0.5 tablets (10 mg total) by mouth once daily. 30 tablet 11    promethazine (PHENERGAN) 25 MG tablet Take 1 tablet (25 mg total) by mouth every 6 (six) hours as needed for Nausea. 30 tablet 2    propranoloL  (INDERAL LA) 80 MG 24 hr capsule Take 1 capsule (80 mg total) by mouth once daily. 90 capsule 3    simvastatin (ZOCOR) 20 MG tablet Take 1 tablet (20 mg total) by mouth once daily. 30 tablet 11    azithromycin (ZITHROMAX Z-JASON) 250 MG tablet Two today then 1 daily (Patient not taking: Reported on 1/20/2022) 6 tablet 0     No facility-administered encounter medications on file as of 1/20/2022.       History of Present Illness  65 y/o RH WF followed in clinic for acute right thalamic stroke, bilateral CTS, and essential tremor.    No new CVA symptoms are reported.  She continues on plavix, statin, and BP management.    Her CTS symptoms have been in the past reported as improved.  She had prior CTS release by Dr. Gorman and Dr. Valdivia with reported relief.  More recently she is again c/o finding her hands will get weak on her again, usually suddenly.  Examples is when holding phone or holding steering wheel while driving.  She is followed by Dr. Pino for cervical radiculopathy.  I do suggest since it has been quite some time since her last EMG and given her symptoms it would be best to obtain another one.      Last visit was reporting difficulty with balance and gait and dizziness.  I did have her do some PT but she denies it has really helped her symptoms much.  I would like to get MRI brain due to prior CVA and fact that the PT is not making her symptoms better to ensure not new CVA.    Denies any complications with tremor, which was actually only previously noted as minor.           Review of Systems  Review of Systems   Constitutional: Negative for diaphoresis and fever.   HENT: Negative for congestion, hearing loss and tinnitus.    Eyes: Negative for blurred vision, double vision, photophobia, discharge and redness.   Respiratory: Negative for cough and shortness of breath.    Cardiovascular: Negative for chest pain.   Gastrointestinal: Negative for abdominal pain, nausea and vomiting.   Musculoskeletal:  Positive for falls. Negative for back pain, joint pain, myalgias and neck pain.   Skin: Negative for itching and rash.   Neurological: Positive for dizziness, tingling, tremors, sensory change, weakness and headaches. Negative for speech change, focal weakness, seizures and loss of consciousness.   Psychiatric/Behavioral: Positive for depression. Negative for hallucinations and memory loss. The patient is nervous/anxious. The patient does not have insomnia.    All other systems reviewed and are negative.     Objective:     NEUROLOGICAL EXAMINATION:     MENTAL STATUS   Oriented to person, place, and time.   Registration: recalls 3 of 3 objects. Recall at 5 minutes: recalls 3 of 3 objects.   Attention: normal. Concentration: normal.   Speech: speech is normal   Level of consciousness: alert  Knowledge: good and consistent with education.   Normal comprehension.     CRANIAL NERVES     CN II   Visual fields full to confrontation.   Visual acuity: normal  Right visual field deficit: none  Left visual field deficit: none     CN III, IV, VI   Pupils are equal, round, and reactive to light.  Extraocular motions are normal.   Right pupil: Size: 3 mm. Shape: regular. Reactivity: brisk. Consensual response: intact. Accommodation: intact.   Left pupil: Size: 3 mm. Shape: regular. Reactivity: brisk. Consensual response: intact. Accommodation: intact.   CN III: no CN III palsy  CN VI: no CN VI palsy  Nystagmus: none   Diplopia: none  Upgaze: normal  Downgaze: normal  Conjugate gaze: present  Vestibulo-ocular reflex: present    CN V   Facial sensation intact.   Right facial sensation deficit: none  Left facial sensation deficit: none  Right corneal reflex: normal  Left corneal reflex: normal    CN VII   Facial expression full, symmetric.   Right facial weakness: none  Left facial weakness: none  Right taste: normal  Left taste: normal    CN VIII   CN VIII normal.   Hearing: intact    CN IX, X   CN IX normal.   CN X normal.    Palate: symmetric    CN XI   CN XI normal.   Right sternocleidomastoid strength: normal  Left sternocleidomastoid strength: normal  Right trapezius strength: normal  Left trapezius strength: normal    CN XII   CN XII normal.   Tongue: not atrophic  Fasciculations: absent  Tongue deviation: none    MOTOR EXAM   Muscle bulk: normal  Overall muscle tone: normal  Right arm tone: normal  Left arm tone: normal  Right arm pronator drift: absent  Left arm pronator drift: absent  Right leg tone: normal  Left leg tone: normal    Strength   Right neck flexion: 5/5  Left neck flexion: 5/5  Right neck extension: 5/5  Left neck extension: 5/5  Right deltoid: 5/5  Left deltoid: 5/5  Right biceps: 5/5  Left biceps: 5/5  Right triceps: 5/5  Left triceps: 5/5  Right wrist flexion: 5/5  Left wrist flexion: 5/5  Right wrist extension: 5/5  Left wrist extension: 5/5  Right interossei: 5/5  Left interossei: 5/5  Right iliopsoas: 5/5  Left iliopsoas: 5/5  Right quadriceps: 5/5  Left quadriceps: 5/5  Right hamstrin/5  Left hamstrin/5  Right anterior tibial: 5/5  Left anterior tibial: 5/5  Right posterior tibial: 5/5  Left posterior tibial: 5/5  Right gastroc: 5/5  Left gastroc: 5/5    REFLEXES     Reflexes   Right brachioradialis: 2+  Left brachioradialis: 2+  Right biceps: 2+  Left biceps: 2+  Right triceps: 2+  Left triceps: 2+  Right patellar: 2+  Left patellar: 2+  Right achilles: 2+  Left achilles: 2+  Right plantar: normal  Left plantar: normal  Right Agrawal: absent  Left Agrawal: absent  Right ankle clonus: absent  Left ankle clonus: absent  Right pendular knee jerk: absent  Left pendular knee jerk: absent    SENSORY EXAM   Light touch normal.   Right arm light touch: normal  Left arm light touch: normal  Right leg light touch: normal  Left leg light touch: normal  Vibration normal.   Right arm vibration: normal  Left arm vibration: normal  Right leg vibration: normal  Left leg vibration: normal  Proprioception normal.    Right arm proprioception: normal  Left arm proprioception: normal  Right leg proprioception: normal  Left leg proprioception: normal  Pinprick normal.   Right arm pinprick: normal  Left arm pinprick: normal  Right leg pinprick: normal  Left leg pinprick: normal  Graphesthesia: normal  Romberg: negative  Stereognosis: normal    GAIT AND COORDINATION     Gait  Gait: normal     Coordination   Finger to nose coordination: normal  Heel to shin coordination: normal  Tandem walking coordination: normal    Tremor   Resting tremor: absent  Intention tremor: absent  Action tremor: absent       Physical Exam  Vitals and nursing note reviewed.   Constitutional:       Appearance: Normal appearance.   HENT:      Head: Normocephalic.   Eyes:      Extraocular Movements: Extraocular movements intact and EOM normal.      Pupils: Pupils are equal, round, and reactive to light.   Cardiovascular:      Rate and Rhythm: Normal rate and regular rhythm.   Pulmonary:      Effort: Pulmonary effort is normal.      Breath sounds: Normal breath sounds.   Musculoskeletal:         General: No swelling or tenderness. Normal range of motion.      Cervical back: Normal range of motion and neck supple.      Right lower leg: No edema.      Left lower leg: No edema.   Skin:     General: Skin is warm and dry.      Coloration: Skin is not jaundiced.      Findings: No rash.   Neurological:      General: No focal deficit present.      Mental Status: She is alert and oriented to person, place, and time.      GCS: GCS eye subscore is 4. GCS verbal subscore is 5. GCS motor subscore is 6.      Cranial Nerves: No cranial nerve deficit.      Sensory: No sensory deficit.      Motor: Motor function is intact. No weakness.      Coordination: Coordination is intact. Coordination normal. Finger-Nose-Finger Test, Heel to Shin Test and Romberg Test normal.      Gait: Gait is intact. Gait and tandem walk normal.      Deep Tendon Reflexes: Reflexes normal.      Reflex  Scores:       Tricep reflexes are 2+ on the right side and 2+ on the left side.       Bicep reflexes are 2+ on the right side and 2+ on the left side.       Brachioradialis reflexes are 2+ on the right side and 2+ on the left side.       Patellar reflexes are 2+ on the right side and 2+ on the left side.       Achilles reflexes are 2+ on the right side and 2+ on the left side.  Psychiatric:         Mood and Affect: Mood normal.         Speech: Speech normal.         Behavior: Behavior normal.          Assessment:     Problem List Items Addressed This Visit        Neuro    Cerebral vascular accident    Essential tremor       Other    Falls frequently    Gait difficulty - Primary      Other Visit Diagnoses     Dizziness and giddiness        Relevant Orders    MRI Brain Without Contrast    Carpal tunnel syndrome on both sides        Relevant Orders    EMG W/ ULTRASOUND AND NERVE CONDUCTION TEST 2 Extremities    Spinal stenosis, cervical region               Primary Diagnosis and ICD10  Gait difficulty [R26.9]    Plan:     Patient Instructions   1. MRI brain w/o due to continued balance and gait difficulty    2. Continue current medications as directed    3. Recommend wearing bilateral wrist splints for carpal tunnel symptoms      Stroke risk modifiers:  1. Good sleep habits, recommend at least 7 hours total sleep daily  2. Continue management of blood pressure and cholesterol including medications, exercise, and good eating habits  3. Exercise as much as possible, recommend 5 days of the week for 30 minutes each day  4. Avoid smoking and alcohol  5. Go to the nearest emergency department immediately if any new or worsening weakness, speech difficulty, or numbness      There are no discontinued medications.    Requested Prescriptions      No prescriptions requested or ordered in this encounter       Orders Placed This Encounter   Procedures    MRI Brain Without Contrast    EMG W/ ULTRASOUND AND NERVE CONDUCTION TEST 2  Extremities

## 2022-01-24 ENCOUNTER — CLINICAL SUPPORT (OUTPATIENT)
Dept: REHABILITATION | Facility: HOSPITAL | Age: 67
End: 2022-01-24
Payer: MEDICARE

## 2022-01-24 DIAGNOSIS — R26.9 GAIT DIFFICULTY: ICD-10-CM

## 2022-01-24 DIAGNOSIS — R29.6 FALLS FREQUENTLY: ICD-10-CM

## 2022-01-24 PROCEDURE — 97110 THERAPEUTIC EXERCISES: CPT | Mod: CQ

## 2022-01-24 NOTE — PROGRESS NOTES
Physical Therapy Treatment Note     Name: Karen Renteria  Clinic Number: [id number]    Therapy Diagnosis: frequent falls; gait difficulty  Physician: Douglas Fountain FNP    Visit Date: 1/24/2022    Physician Orders: PT Eval and Treat  Medical Diagnosis from Referral: R29.6 (ICD-10-CM) - Falls frequently; R26.9 (ICD-10-CM) - Gait difficulty  Evaluation Date: 12/16/2021  Authorization Period Expiration: 12/31/2021  Plan of Care Expiration: 12/16/2021 to 1/28/2021  Updated Plan of Care Due: 1/15/2021  Visit # / Visits authorized: 5/$2,110 Medicare physical therapy allowance    Time In: 0700  Time Out: 0749  Total Billable Time: 49 minutes    Precautions: Standard, Diabetes, blood thinners, Fall and CHF  Functional Level Prior to Evaluation: independent with all functional mobility without assistive device    Subjective     Pt reports: The neurologist has ordered a MRI. He believes she may be having TIAs.      Patient will be issued a home exercise program during today's treatment session.   Response to previous treatment: This is patient's first treatment session following initial evaluation.     Pain: 0/10  Location: N/A    Objective       Karen received therapeutic exercises to develop strength, endurance, flexibility, posture and core stabilization for 39 minutes including:    NuStep: level 5; 6 minutes   Calf stretch on slant board: 3 x 30 second holds  Hamstring stretch on step: 3 x 30 second holds (requires cues to stretch instead of going up and down stairs)  Bilateral leg press with 4 plates x 20  Bilateral leg curl with 4 plates x 20  Bilateral knee extension with 1 plate x 15  Cybex rows: 2 plates x 20 reps  Cybex shoulder press: 1 plate x 20 repetitions    (not performed today)   Straight leg raises: x 20 reps each  Standing hip abduction: x 20 reps each  Rail squats: x 20 reps each  Bridges x 20    Karen received pre-modulated electrical stimulation to the left knee x 10 minutes in conjunction with a  cold pack to decrease pain and inflammation.    Home Exercises Provided and Patient Education Provided     Education provided: Patient educated on the importance of completing skilled physical therapy treatment and home exercise program as prescribed to maximize functional gains.     Written Home Exercises Provided: yes.  Exercises were reviewed and Karen was able to demonstrate them prior to the end of the session.  Karen demonstrated good  understanding of the education provided.     See EMR under Patient Instructions for exercises provided 1/5/2022.    Assessment     Patient with good effort throughout treatment. She did not demonstrate any balance difficulties today but did continue to complain of pain in left knee. Repeated premod with ice to decrease pain. Therapist will continue to progress upper and lower extremity therapeutic exercise and neuromuscular activities as able.     Karen Is progressing well towards her goals.   Pt prognosis is Good.     Pt will continue to benefit from skilled outpatient physical therapy to address the deficits listed in the problem list box on initial evaluation, provide pt/family education, and to maximize pt's level of independence in the home and community environment.     Pt's spiritual, cultural, and educational needs considered and pt agreeable to plan of care and goals.     Anticipated barriers to physical therapy: compliance with home exercise program     Goals:    Short Term Goals:  1. Patient will demonstrate independence with home exercise program to ensure carryover of treatment.  2. Patient will perform sit to/from stand with standby assist without upper extremity support to improve independence and safety with transfers.  3. Patient will improve bilateral upper and lower extremity strength to 4/5 to improve independence and safety with bed mobility, transfers, and gait.  4. Patient will improve Campbell Balance Scale to 41/56 to improve dynamic standing balance and  lower fall risk.   5. Patient will ambulate 150 feet without assistive device with standby assist with increased step lengths, decreased upper extremity guarding to improve independence and safety with gait.      Long Term Goals:  1. Patient will perform sit to/from stand with independence without upper extremity support to improve independence and safety with transfers.  2. Patient will improve bilateral upper and lower extremity strength to 4+/5 to improve independence and safety with bed mobility, transfers, and gait.  3. Patient will improve Campbell Balance Scale to 45/56 to improve dynamic standing balance and lower fall risk.   4. Patient will ambulate 300 feet without assistive device with independence with increased step lengths and decreased upper extremity guarding including up/down curbs and ramps to improve independence and safety with community ambulation.  5. Patient will demonstrate ability to get up from the floor without upper extremity support to improve ability to interact with grandchildren and to get up from the ground should she experience a fall.       Plan     Patient will continue to benefit from skilled physical therapy treatment as prescribed working towards goals listed above to maximize functional potential.     Alexandrea Evans, PTA    1/24/2022

## 2022-01-26 ENCOUNTER — CLINICAL SUPPORT (OUTPATIENT)
Dept: REHABILITATION | Facility: HOSPITAL | Age: 67
End: 2022-01-26
Payer: MEDICARE

## 2022-01-26 DIAGNOSIS — R29.6 FALLS FREQUENTLY: ICD-10-CM

## 2022-01-26 DIAGNOSIS — R26.9 GAIT DIFFICULTY: ICD-10-CM

## 2022-01-26 PROCEDURE — 97110 THERAPEUTIC EXERCISES: CPT | Mod: KX

## 2022-01-26 NOTE — PROGRESS NOTES
Physical Therapy Treatment Note     Name: Karen Renteria   Clinic Number: [id number]    Therapy Diagnosis: frequent falls; gait difficulty  Physician: Douglas Fountain FNP    Visit Date: 1/26/2022    Physician Orders: PT Eval and Treat  Medical Diagnosis from Referral: R29.6 (ICD-10-CM) - Falls frequently; R26.9 (ICD-10-CM) - Gait difficulty  Evaluation Date: 12/16/2021  Authorization Period Expiration: 12/31/2022  Plan of Care Expiration: 1/19/2022 to 2/25/2022  Updated Plan of Care Due: 2/18/2022  Visit # / Visits authorized: 6/$2,150 Medicare physical therapy allowance    Time In: 0700  Time Out: 0754  Total Billable Time: 54 minutes    Precautions: Standard, Diabetes, blood thinners, Fall and CHF  Functional Level Prior to Evaluation: independent with all functional mobility without assistive device    Subjective     Pt reports: She is scheduled for a MRI tomorrow that has been ordered by her neurologist due to his concern that she may be having TIAs. Patient continues to report balance deficits. Patient reports cramping in her lower extremities.    She was compliant with her home exercise program.   Response to previous treatment: good    Pain: 0/10  Location: N/A    Objective     Karen received therapeutic exercises to develop strength, endurance, flexibility, posture and core stabilization for 51 minutes including:    NuStep: level 5; 7 minutes   Calf stretch on slant board: 3 x 30 second holds  Hamstring stretch on step: 3 x 30 second holds  Bilateral leg press: 5 plates; x 23 reps  Cybex hamstring curls: 4 plates; x 20 reps  Cybex knee extensions: 2 plate; x 20 reps  Cybex hip abduction: 1 plate; x 20 reps  Cybex rows: 2 plates; x 20 reps  Cybex shoulder press: 2 plate; x 20 repetitions    Karen participated in neuromuscular re-education activities to improve: Balance, Coordination, Proprioception and Posture for 3 minutes. The following activities were included:    Double limb support on BOSU (ball  down)    Home Exercises Provided and Patient Education Provided     Education provided: Patient educated on the importance of completing skilled physical therapy treatment and home exercise program as prescribed to maximize functional gains.     Written Home Exercises Provided: yes.  Exercises were reviewed and Karen was able to demonstrate them prior to the end of the session.  Karen demonstrated good  understanding of the education provided.     See EMR under Patient Instructions for exercises provided 1/5/2022.    Assessment     Patient with good effort throughout treatment. She did not demonstrate any balance difficulties today or pain in left knee with therapeutic exercise. Patient with good tolerance of addition of neuromuscular re-education but with minimal assist required to maintain balance. Physical Therapist will continue to progress upper and lower extremity therapeutic exercise and neuromuscular activities as able.    Karen Is progressing well towards her goals.   Pt prognosis is Good.     Pt will continue to benefit from skilled outpatient physical therapy to address the deficits listed in the problem list box on initial evaluation, provide pt/family education, and to maximize pt's level of independence in the home and community environment.     Pt's spiritual, cultural, and educational needs considered and pt agreeable to plan of care and goals.     Anticipated barriers to physical therapy: compliance with home exercise program     Goals:    Short Term Goals:  1. Patient will demonstrate independence with home exercise program to ensure carryover of treatment. [met]  2. Patient will perform sit to/from stand with standby assist without upper extremity support to improve independence and safety with transfers. [not met]  3. Patient will improve bilateral upper and lower extremity strength to 4/5 to improve independence and safety with bed mobility, transfers, and gait. [not met]  4. Patient will  improve Campbell Balance Scale to 41/56 to improve dynamic standing balance and lower fall risk. [not met]  5. Patient will ambulate 150 feet without assistive device with standby assist with increased step lengths, decreased upper extremity guarding to improve independence and safety with gait. [not met]     Long Term Goals:  1. Patient will perform sit to/from stand with independence without upper extremity support to improve independence and safety with transfers. [not met]  2. Patient will improve bilateral upper and lower extremity strength to 4+/5 to improve independence and safety with bed mobility, transfers, and gait. [not met]  3. Patient will improve Campbell Balance Scale to 45/56 to improve dynamic standing balance and lower fall risk. [not met]  4. Patient will ambulate 300 feet without assistive device with independence with increased step lengths and decreased upper extremity guarding including up/down curbs and ramps to improve independence and safety with community ambulation. [not met]  5. Patient will demonstrate ability to get up from the floor without upper extremity support to improve ability to interact with grandchildren and to get up from the ground should she experience a fall. [not met]    Plan     Patient will continue to benefit from skilled physical therapy treatment as prescribed working towards goals listed above to maximize functional potential.     Willie Dennison, PT, DPT  1/26/2022

## 2022-01-28 ENCOUNTER — TELEPHONE (OUTPATIENT)
Dept: NEUROLOGY | Facility: CLINIC | Age: 67
End: 2022-01-28
Payer: MEDICARE

## 2022-01-28 NOTE — TELEPHONE ENCOUNTER
----- Message from JOHN Peñaloza sent at 1/28/2022  7:06 AM CST -----  No acute findings on the MRI, it did show the old strokes, but no new infarcts are noted

## 2022-02-02 ENCOUNTER — CLINICAL SUPPORT (OUTPATIENT)
Dept: REHABILITATION | Facility: HOSPITAL | Age: 67
End: 2022-02-02
Payer: MEDICARE

## 2022-02-02 DIAGNOSIS — R26.9 GAIT DIFFICULTY: ICD-10-CM

## 2022-02-02 DIAGNOSIS — R29.6 FALLS FREQUENTLY: ICD-10-CM

## 2022-02-02 PROCEDURE — 97110 THERAPEUTIC EXERCISES: CPT | Mod: KX

## 2022-02-02 NOTE — PROGRESS NOTES
Physical Therapy Treatment Note     Name: Karen Renteria   Clinic Number: [id number]    Therapy Diagnosis: frequent falls; gait difficulty  Physician: Douglas Fountain FNP    Visit Date: 2/2/2022    Physician Orders: PT Eval and Treat  Medical Diagnosis from Referral: R29.6 (ICD-10-CM) - Falls frequently; R26.9 (ICD-10-CM) - Gait difficulty  Evaluation Date: 12/16/2021  Authorization Period Expiration: 12/31/2022  Plan of Care Expiration: 1/19/2022 to 2/25/2022  Updated Plan of Care Due: 2/18/2022  Visit # / Visits authorized: 7/$2,150 Medicare physical therapy allowance    Time In: 0703  Time Out: 0745  Total Billable Time: 42 minutes    Precautions: Standard, Diabetes, blood thinners, Fall and CHF  Functional Level Prior to Evaluation: independent with all functional mobility without assistive device    Subjective     Pt reports: She had the MRI of her brain on 1/27/22 which showed no acute pathology. Patient reports she is beginning to feel more confident in her mobility and has noticed improved performance on the steps. Patient reports her left knee pain has subsided.     She was compliant with her home exercise program.   Response to previous treatment: good    Pain: 0/10  Location: N/A    Objective     Karen received therapeutic exercises to develop strength, endurance, flexibility, posture and core stabilization for 42 minutes including:    NuStep: level 5; 7 minutes   Calf stretch on slant board: 3 x 30 second holds  Hamstring stretch on step: 3 x 30 second holds  Leg press: 5 plates; x 30 reps  Cybex hamstring curls: 4.5 plates; x 20 reps  Cybex knee extensions: 2 plate; x 30 reps  Cybex hip abduction: 1 plate; x 20 reps  Cybex rows: 2 plates; x 20 reps  Cybex shoulder press: 2 plates; x 15 reps each   Biceps curls at Cybex tower: 2 plates; x 30 reps  Triceps extensions at Cybex tower: 3 plates; x 30 reps    NOT PERFORMED THIS TREATMENT SESSION:  Karen participated in neuromuscular re-education  activities to improve: Balance, Coordination, Proprioception and Posture for 0 minutes. The following activities were included:    Double limb support on BOSU (ball down)    Home Exercises Provided and Patient Education Provided     Education provided: Patient educated on the importance of completing skilled physical therapy treatment and home exercise program as prescribed to maximize functional gains.     Written Home Exercises Provided: yes.  Exercises were reviewed and Karen was able to demonstrate them prior to the end of the session.  Karen demonstrated good  understanding of the education provided.     See EMR under Patient Instructions for exercises provided 1/5/2022.    Assessment     Patient with good effort throughout treatment. Patient able to increase weight with Cybex hamstring curls and Cybex hip abduciton and reps with leg press and Cybex knee extensions. Patient with good tolerance of addition of biceps curls and triceps extensions at the Cybex tower. She did not demonstrate any balance difficulties today or pain in left knee with therapeutic exercise. Physical Therapist will continue to progress upper and lower extremity therapeutic exercise and neuromuscular activities as able.    Karen Is progressing well towards her goals.   Pt prognosis is Good.     Pt will continue to benefit from skilled outpatient physical therapy to address the deficits listed in the problem list box on initial evaluation, provide pt/family education, and to maximize pt's level of independence in the home and community environment.     Pt's spiritual, cultural, and educational needs considered and pt agreeable to plan of care and goals.     Anticipated barriers to physical therapy: compliance with home exercise program     Goals:    Short Term Goals:  1. Patient will demonstrate independence with home exercise program to ensure carryover of treatment. [met]  2. Patient will perform sit to/from stand with standby assist  without upper extremity support to improve independence and safety with transfers. [not met]  3. Patient will improve bilateral upper and lower extremity strength to 4/5 to improve independence and safety with bed mobility, transfers, and gait. [not met]  4. Patient will improve Campbell Balance Scale to 41/56 to improve dynamic standing balance and lower fall risk. [not met]  5. Patient will ambulate 150 feet without assistive device with standby assist with increased step lengths, decreased upper extremity guarding to improve independence and safety with gait. [not met]     Long Term Goals:  1. Patient will perform sit to/from stand with independence without upper extremity support to improve independence and safety with transfers. [not met]  2. Patient will improve bilateral upper and lower extremity strength to 4+/5 to improve independence and safety with bed mobility, transfers, and gait. [not met]  3. Patient will improve Campbell Balance Scale to 45/56 to improve dynamic standing balance and lower fall risk. [not met]  4. Patient will ambulate 300 feet without assistive device with independence with increased step lengths and decreased upper extremity guarding including up/down curbs and ramps to improve independence and safety with community ambulation. [not met]  5. Patient will demonstrate ability to get up from the floor without upper extremity support to improve ability to interact with grandchildren and to get up from the ground should she experience a fall. [not met]    Plan     Patient will continue to benefit from skilled physical therapy treatment as prescribed working towards goals listed above to maximize functional potential.     Willie Dennison, PT, DPT  2/2/2022

## 2022-02-07 ENCOUNTER — CLINICAL SUPPORT (OUTPATIENT)
Dept: REHABILITATION | Facility: HOSPITAL | Age: 67
End: 2022-02-07
Payer: MEDICARE

## 2022-02-07 DIAGNOSIS — R29.6 FALLS FREQUENTLY: Primary | ICD-10-CM

## 2022-02-07 DIAGNOSIS — R26.9 GAIT DIFFICULTY: ICD-10-CM

## 2022-02-07 PROCEDURE — 97110 THERAPEUTIC EXERCISES: CPT | Mod: CQ

## 2022-02-07 NOTE — PROGRESS NOTES
Physical Therapy Treatment Note     Name: Karen Renteria   Clinic Number: [id number]    Therapy Diagnosis: frequent falls; gait difficulty  Physician: Douglas Fountain FNP    Visit Date: 2/7/2022    Physician Orders: PT Eval and Treat  Medical Diagnosis from Referral: R29.6 (ICD-10-CM) - Falls frequently; R26.9 (ICD-10-CM) - Gait difficulty  Evaluation Date: 12/16/2021  Authorization Period Expiration: 12/31/2022  Plan of Care Expiration: 1/19/2022 to 2/25/2022  Updated Plan of Care Due: 2/18/2022  Visit # / Visits authorized: 8/$2,150 Medicare physical therapy allowance    Time In: 07:01   Time Out: 07:49  Total Billable Time: 45 minutes    Precautions: Standard, Diabetes, blood thinners, Fall and CHF  Functional Level Prior to Evaluation: independent with all functional mobility without assistive device    Subjective     Pt reports: Her knee is doing really good. She hasn't fallen recently but she has caught herself several times. She is trying to do her stretches before she goes to bed and it is helping a little, but she is still having bad leg cramps at night.  She is trying to be more aware of her surroundings and not trip over things.    She was compliant with her home exercise program.   Response to previous treatment: good    Pain: 0/10  Location: N/A    Objective     Karen received therapeutic exercises to develop strength, endurance, flexibility, posture and core stabilization for 48 minutes including:    NuStep: level 5; 6 minutes   Calf stretch on slant board: 3 x 30 second holds  Hamstring stretch on step: 3 x 30 second holds  Leg press: 5 plates; x 30 repetitions   Cybex hamstring curls: 4.5 plates; x 30 reps  Cybex knee extensions: 2 plates; x 30 reps  Cybex hip abduction: 1 plate; x 20 reps  Cybex rows: 2 plates; x 60 reps  Cybex shoulder press: 2 plates; x 30 repetitions front   Biceps curls at Cybex tower: 2 plates; x 30 repetitions (not performed today)   Triceps extensions at Cybex tower:  3 plates; x 30 repetitions (not performed today)     NOT PERFORMED THIS TREATMENT SESSION:  Karen participated in neuromuscular re-education activities to improve: Balance, Coordination, Proprioception and Posture for 0 minutes. The following activities were included:    Double limb support on BOSU (ball down)    Home Exercises Provided and Patient Education Provided     Education provided: Patient educated on the importance of completing skilled physical therapy treatment and home exercise program as prescribed to maximize functional gains.     Written Home Exercises Provided: yes.  Exercises were reviewed and Karen was able to demonstrate them prior to the end of the session.  Karen demonstrated good  understanding of the education provided.     See EMR under Patient Instructions for exercises provided 1/5/2022.    Assessment     Patient with good effort throughout treatment. Patient did more repetitions than asked with rows and leg curl. She complained of pain with shoulder press with second  so she did all 30 with front . She did not have time to complete all the exercises from last visit, due to shorter appointment time. Physical Therapist will continue to progress upper and lower extremity therapeutic exercise and neuromuscular activities as able.    Karen Is progressing well towards her goals.   Pt prognosis is Good.     Pt will continue to benefit from skilled outpatient physical therapy to address the deficits listed in the problem list box on initial evaluation, provide pt/family education, and to maximize pt's level of independence in the home and community environment.     Pt's spiritual, cultural, and educational needs considered and pt agreeable to plan of care and goals.     Anticipated barriers to physical therapy: compliance with home exercise program     Goals:    Short Term Goals:  1. Patient will demonstrate independence with home exercise program to ensure carryover of treatment.  [met]  2. Patient will perform sit to/from stand with standby assist without upper extremity support to improve independence and safety with transfers. [not met]  3. Patient will improve bilateral upper and lower extremity strength to 4/5 to improve independence and safety with bed mobility, transfers, and gait. [not met]  4. Patient will improve Campbell Balance Scale to 41/56 to improve dynamic standing balance and lower fall risk. [not met]  5. Patient will ambulate 150 feet without assistive device with standby assist with increased step lengths, decreased upper extremity guarding to improve independence and safety with gait. [not met]     Long Term Goals:  1. Patient will perform sit to/from stand with independence without upper extremity support to improve independence and safety with transfers. [not met]  2. Patient will improve bilateral upper and lower extremity strength to 4+/5 to improve independence and safety with bed mobility, transfers, and gait. [not met]  3. Patient will improve Campbell Balance Scale to 45/56 to improve dynamic standing balance and lower fall risk. [not met]  4. Patient will ambulate 300 feet without assistive device with independence with increased step lengths and decreased upper extremity guarding including up/down curbs and ramps to improve independence and safety with community ambulation. [not met]  5. Patient will demonstrate ability to get up from the floor without upper extremity support to improve ability to interact with grandchildren and to get up from the ground should she experience a fall. [not met]    Plan     Patient will continue to benefit from skilled physical therapy treatment as prescribed working towards goals listed above to maximize functional potential.     Alexandrea Evans, PTA    2/7/2022

## 2022-02-10 ENCOUNTER — OFFICE VISIT (OUTPATIENT)
Dept: FAMILY MEDICINE | Facility: CLINIC | Age: 67
End: 2022-02-10
Payer: MEDICARE

## 2022-02-10 VITALS
OXYGEN SATURATION: 96 % | BODY MASS INDEX: 30.73 KG/M2 | HEIGHT: 64 IN | WEIGHT: 180 LBS | TEMPERATURE: 97 F | SYSTOLIC BLOOD PRESSURE: 128 MMHG | DIASTOLIC BLOOD PRESSURE: 68 MMHG | RESPIRATION RATE: 16 BRPM | HEART RATE: 52 BPM

## 2022-02-10 DIAGNOSIS — H91.93 BILATERAL HEARING LOSS, UNSPECIFIED HEARING LOSS TYPE: ICD-10-CM

## 2022-02-10 DIAGNOSIS — I10 PRIMARY HYPERTENSION: ICD-10-CM

## 2022-02-10 DIAGNOSIS — I25.10 CORONARY ARTERIOSCLEROSIS: ICD-10-CM

## 2022-02-10 DIAGNOSIS — E11.9 TYPE 2 DIABETES MELLITUS WITHOUT COMPLICATION, WITHOUT LONG-TERM CURRENT USE OF INSULIN: Primary | ICD-10-CM

## 2022-02-10 DIAGNOSIS — R49.0 HOARSENESS: ICD-10-CM

## 2022-02-10 DIAGNOSIS — R13.14 PHARYNGOESOPHAGEAL DYSPHAGIA: ICD-10-CM

## 2022-02-10 DIAGNOSIS — E78.2 MIXED HYPERLIPIDEMIA: ICD-10-CM

## 2022-02-10 DIAGNOSIS — Z79.899 ENCOUNTER FOR LONG-TERM (CURRENT) USE OF OTHER MEDICATIONS: ICD-10-CM

## 2022-02-10 LAB
ALBUMIN SERPL BCP-MCNC: 3.5 G/DL (ref 3.5–5)
ALBUMIN/GLOB SERPL: 0.8 {RATIO}
ALP SERPL-CCNC: 102 U/L (ref 55–142)
ALT SERPL W P-5'-P-CCNC: 66 U/L (ref 13–56)
ANION GAP SERPL CALCULATED.3IONS-SCNC: 11 MMOL/L (ref 7–16)
ANISOCYTOSIS BLD QL SMEAR: ABNORMAL
AST SERPL W P-5'-P-CCNC: 44 U/L (ref 15–37)
BASOPHILS # BLD AUTO: 0.14 K/UL (ref 0–0.2)
BASOPHILS NFR BLD AUTO: 1.6 % (ref 0–1)
BILIRUB SERPL-MCNC: 0.6 MG/DL (ref 0–1.2)
BUN SERPL-MCNC: 29 MG/DL (ref 7–18)
BUN/CREAT SERPL: 17 (ref 6–20)
CALCIUM SERPL-MCNC: 9.5 MG/DL (ref 8.5–10.1)
CHLORIDE SERPL-SCNC: 98 MMOL/L (ref 98–107)
CHOLEST SERPL-MCNC: 280 MG/DL (ref 0–200)
CHOLEST/HDLC SERPL: 7.4 {RATIO}
CO2 SERPL-SCNC: 31 MMOL/L (ref 21–32)
CREAT SERPL-MCNC: 1.72 MG/DL (ref 0.55–1.02)
DIFFERENTIAL METHOD BLD: ABNORMAL
EOSINOPHIL # BLD AUTO: 0.22 K/UL (ref 0–0.5)
EOSINOPHIL NFR BLD AUTO: 2.4 % (ref 1–4)
ERYTHROCYTE [DISTWIDTH] IN BLOOD BY AUTOMATED COUNT: 13.4 % (ref 11.5–14.5)
EST. AVERAGE GLUCOSE BLD GHB EST-MCNC: 284 MG/DL
GLOBULIN SER-MCNC: 4.2 G/DL (ref 2–4)
GLUCOSE SERPL-MCNC: 466 MG/DL (ref 74–106)
HBA1C MFR BLD HPLC: 11.1 % (ref 4.5–6.6)
HCT VFR BLD AUTO: 41.2 % (ref 38–47)
HDLC SERPL-MCNC: 38 MG/DL (ref 40–60)
HGB BLD-MCNC: 13.3 G/DL (ref 12–16)
IMM GRANULOCYTES # BLD AUTO: 0.04 K/UL (ref 0–0.04)
IMM GRANULOCYTES NFR BLD: 0.4 % (ref 0–0.4)
LDLC SERPL CALC-MCNC: 174 MG/DL
LDLC/HDLC SERPL: 4.6 {RATIO}
LYMPHOCYTES # BLD AUTO: 1.77 K/UL (ref 1–4.8)
LYMPHOCYTES NFR BLD AUTO: 19.7 % (ref 27–41)
MCH RBC QN AUTO: 28.6 PG (ref 27–31)
MCHC RBC AUTO-ENTMCNC: 32.3 G/DL (ref 32–36)
MCV RBC AUTO: 88.6 FL (ref 80–96)
MONOCYTES # BLD AUTO: 0.64 K/UL (ref 0–0.8)
MONOCYTES NFR BLD AUTO: 7.1 % (ref 2–6)
MPC BLD CALC-MCNC: 13.1 FL (ref 9.4–12.4)
NEUTROPHILS # BLD AUTO: 6.18 K/UL (ref 1.8–7.7)
NEUTROPHILS NFR BLD AUTO: 68.8 % (ref 53–65)
NONHDLC SERPL-MCNC: 242 MG/DL
NRBC # BLD AUTO: 0 X10E3/UL
NRBC, AUTO (.00): 0 %
PLATELET # BLD AUTO: 248 K/UL (ref 150–400)
PLATELET MORPHOLOGY: ABNORMAL
POTASSIUM SERPL-SCNC: 5.2 MMOL/L (ref 3.5–5.1)
PROT SERPL-MCNC: 7.7 G/DL (ref 6.4–8.2)
RBC # BLD AUTO: 4.65 M/UL (ref 4.2–5.4)
SODIUM SERPL-SCNC: 135 MMOL/L (ref 136–145)
T4 FREE SERPL-MCNC: 0.97 NG/DL (ref 0.76–1.46)
TRIGL SERPL-MCNC: 341 MG/DL (ref 35–150)
TSH SERPL DL<=0.005 MIU/L-ACNC: 3.82 UIU/ML (ref 0.36–3.74)
VLDLC SERPL-MCNC: 68 MG/DL
WBC # BLD AUTO: 8.99 K/UL (ref 4.5–11)

## 2022-02-10 PROCEDURE — 85025 CBC WITH DIFFERENTIAL: ICD-10-PCS | Mod: ,,, | Performed by: CLINICAL MEDICAL LABORATORY

## 2022-02-10 PROCEDURE — 80053 COMPREHENSIVE METABOLIC PANEL: ICD-10-PCS | Mod: ,,, | Performed by: CLINICAL MEDICAL LABORATORY

## 2022-02-10 PROCEDURE — 85025 COMPLETE CBC W/AUTO DIFF WBC: CPT | Mod: ,,, | Performed by: CLINICAL MEDICAL LABORATORY

## 2022-02-10 PROCEDURE — 84439 ASSAY OF FREE THYROXINE: CPT | Mod: ,,, | Performed by: CLINICAL MEDICAL LABORATORY

## 2022-02-10 PROCEDURE — 83036 HEMOGLOBIN GLYCOSYLATED A1C: CPT | Mod: ,,, | Performed by: CLINICAL MEDICAL LABORATORY

## 2022-02-10 PROCEDURE — 84443 ASSAY THYROID STIM HORMONE: CPT | Mod: ,,, | Performed by: CLINICAL MEDICAL LABORATORY

## 2022-02-10 PROCEDURE — 84443 TSH: ICD-10-PCS | Mod: ,,, | Performed by: CLINICAL MEDICAL LABORATORY

## 2022-02-10 PROCEDURE — 83036 HEMOGLOBIN A1C: ICD-10-PCS | Mod: ,,, | Performed by: CLINICAL MEDICAL LABORATORY

## 2022-02-10 PROCEDURE — 80053 COMPREHEN METABOLIC PANEL: CPT | Mod: ,,, | Performed by: CLINICAL MEDICAL LABORATORY

## 2022-02-10 PROCEDURE — 99213 PR OFFICE/OUTPT VISIT, EST, LEVL III, 20-29 MIN: ICD-10-PCS | Mod: ,,, | Performed by: FAMILY MEDICINE

## 2022-02-10 PROCEDURE — 80061 LIPID PANEL: CPT | Mod: ,,, | Performed by: CLINICAL MEDICAL LABORATORY

## 2022-02-10 PROCEDURE — 99213 OFFICE O/P EST LOW 20 MIN: CPT | Mod: ,,, | Performed by: FAMILY MEDICINE

## 2022-02-10 PROCEDURE — 84439 T4, FREE: ICD-10-PCS | Mod: ,,, | Performed by: CLINICAL MEDICAL LABORATORY

## 2022-02-10 PROCEDURE — 80061 LIPID PANEL: ICD-10-PCS | Mod: ,,, | Performed by: CLINICAL MEDICAL LABORATORY

## 2022-02-10 RX ORDER — HYDROCHLOROTHIAZIDE 25 MG/1
25 TABLET ORAL DAILY
COMMUNITY
End: 2022-10-12 | Stop reason: SDUPTHER

## 2022-02-10 NOTE — PROGRESS NOTES
"Subjective:       Patient ID: Karen Renteria is a 66 y.o. female.    Chief Complaint: Sinus Problem (Discuss MRI results)    Hasn't been doing well.  MRI brain not helpful, but at least didn't have a stroke.  Still with tremors which are bothersome.  Hasn't been feeling well, just feels like she is "falling apart".  When ran out of samples we gave her, just stopped taking, didn't call us for rx or more samples.      Review of Systems   Constitutional: Positive for fatigue. Negative for appetite change, chills, fever and unexpected weight change.   HENT: Positive for hearing loss, trouble swallowing and voice change.    Respiratory: Negative for cough and shortness of breath.    Cardiovascular: Negative for chest pain and leg swelling.   Gastrointestinal: Negative for abdominal pain.        +dysphagia, feels like food gets stuck in upper esophagus   Musculoskeletal: Negative for arthralgias.   Integumentary:  Negative for rash.   Neurological: Positive for tremors. Negative for weakness.   Psychiatric/Behavioral: Positive for dysphoric mood. The patient is not nervous/anxious.          Objective:      Physical Exam  Constitutional:       General: She is not in acute distress.     Appearance: Normal appearance.   Cardiovascular:      Rate and Rhythm: Normal rate and regular rhythm.      Heart sounds: Normal heart sounds.   Pulmonary:      Breath sounds: Normal breath sounds.   Abdominal:      General: Abdomen is flat.      Palpations: Abdomen is soft.   Skin:     General: Skin is warm and dry.   Neurological:      Mental Status: She is alert. Mental status is at baseline.   Psychiatric:         Mood and Affect: Mood normal.         Behavior: Behavior normal.         Thought Content: Thought content normal.         Judgment: Judgment normal.         Assessment:       1. Type 2 diabetes mellitus without complication, without long-term current use of insulin  Comprehensive Metabolic Panel    Hemoglobin A1C    " Comprehensive Metabolic Panel    Hemoglobin A1C   2. Encounter for long-term (current) use of other medications  Comprehensive Metabolic Panel    Hemoglobin A1C    CBC Auto Differential    Lipid Panel    Comprehensive Metabolic Panel    Hemoglobin A1C    CBC Auto Differential    Lipid Panel    CBC Morphology   3. Primary hypertension  Comprehensive Metabolic Panel    T4, Free    TSH    CBC Auto Differential    Lipid Panel    Comprehensive Metabolic Panel    T4, Free    TSH    CBC Auto Differential    Lipid Panel    CBC Morphology   4. Coronary arteriosclerosis  CBC Auto Differential    Lipid Panel    CBC Auto Differential    Lipid Panel    CBC Morphology   5. Mixed hyperlipidemia  Comprehensive Metabolic Panel    T4, Free    TSH    CBC Auto Differential    Lipid Panel    Comprehensive Metabolic Panel    T4, Free    TSH    CBC Auto Differential    Lipid Panel    CBC Morphology   6. Bilateral hearing loss, unspecified hearing loss type  Ambulatory referral/consult to Audiology   7. Pharyngoesophageal dysphagia  Ambulatory referral/consult to Gastroenterology   8. Hoarseness  Ambulatory referral/consult to Gastroenterology       Plan:       Get back on tresiba, trulicity--samples given.  Ok to stop metformin (GI side effect and possibly needs to stop anyway due to kidney function).  Check with neuro, ok to try primidone for tremors?  Had covid vacc.  Got flu vacc.  Consider shingrix at her convenience.  [facility]  Audiology--hearing--Brian Santoro--EGD, dysphagia, hoarseness  Labs today

## 2022-02-14 DIAGNOSIS — G25.0 ESSENTIAL TREMOR: Primary | ICD-10-CM

## 2022-02-14 RX ORDER — SERTRALINE HYDROCHLORIDE 50 MG/1
50 TABLET, FILM COATED ORAL DAILY
Qty: 30 TABLET | Refills: 5 | Status: SHIPPED | OUTPATIENT
Start: 2022-02-14 | End: 2022-08-24

## 2022-02-14 RX ORDER — PRIMIDONE 50 MG/1
TABLET ORAL
Qty: 30 TABLET | Refills: 1 | Status: SHIPPED | OUTPATIENT
Start: 2022-02-14 | End: 2022-03-24

## 2022-02-16 ENCOUNTER — CLINICAL SUPPORT (OUTPATIENT)
Dept: REHABILITATION | Facility: HOSPITAL | Age: 67
End: 2022-02-16
Payer: MEDICARE

## 2022-02-16 DIAGNOSIS — R29.6 FALLS FREQUENTLY: ICD-10-CM

## 2022-02-16 DIAGNOSIS — R26.9 GAIT DIFFICULTY: ICD-10-CM

## 2022-02-16 DIAGNOSIS — R42 DIZZINESS AND GIDDINESS: Primary | ICD-10-CM

## 2022-02-16 PROCEDURE — 97110 THERAPEUTIC EXERCISES: CPT

## 2022-02-16 NOTE — PLAN OF CARE
Physical Therapy Updated Plan of Care     Name: Karen Renteria   Clinic Number: [id number]    Therapy Diagnosis: frequent falls; gait difficulty  Physician: Douglas Fountain FNP    Visit Date: 2/16/2022    Physician Orders: PT Eval and Treat  Medical Diagnosis from Referral: R29.6 (ICD-10-CM) - Falls frequently; R26.9 (ICD-10-CM) - Gait difficulty  Evaluation Date: 12/16/2021  Authorization Period Expiration: 12/31/2022  Plan of Care Expiration: 1/19/2022 to 2/25/2022  Updated Plan of Care Due: 2/25/2022  Visit # / Visits authorized: 9/$2,150 Medicare physical therapy allowance    Time In: 0707  Time Out: 0750  Total Billable Time: 43 minutes    Precautions: Standard, Diabetes, blood thinners, Fall and CHF  Functional Level Prior to Evaluation: independent with all functional mobility without assistive device    Subjective     Pt reports: She is doing well this morning. Patient reports she had to do a fair amount of walking yesterday and felt as though she was stumbling. Patient reports frequent stumbles but no new falls. Patient reports she has an upper extremity nerve conduction study scheduled for 3/17/2022 in [place], [place]. Patient sees Douglas Fountain again on 3/24/2022.     She was compliant with her home exercise program.   Response to previous treatment: good    Pain: 0/10  Location: N/A    Objective     Karen received therapeutic exercises to develop strength, endurance, flexibility, posture and core stabilization for 43 minutes including:    NuStep: level 5; 5 minutes  Calf stretch on slant board: 3 x 30 second holds  Hamstring stretch on step: 3 x 30 second holds  Leg press: 5 plates; x 30 repetitions   Cybex hamstring curls: 4.5 plates; x 30 reps  Cybex knee extensions: 2 plates; x 30 reps  Cybex hip abduction: 1 plate; x 20 reps  Cybex rows: 2 plates; x 30 reps  Cybex shoulder press: 2 plates; x 30 repetitions front   Biceps curls at Cybex tower: 2 plates; x 30 repetitions (not performed today)    Triceps extensions at Cybex tower: 3 plates; x 30 repetitions (not performed today)     NOT PERFORMED THIS TREATMENT SESSION:  Karen participated in neuromuscular re-education activities to improve: Balance, Coordination, Proprioception and Posture for 0 minutes. The following activities were included:    Double limb support on BOSU (ball down)    Home Exercises Provided and Patient Education Provided     Education provided: Patient educated on the importance of completing skilled physical therapy treatment and home exercise program as prescribed to maximize functional gains.     Written Home Exercises Provided: yes.  Exercises were reviewed and Karen was able to demonstrate them prior to the end of the session.  Karen demonstrated good  understanding of the education provided.     See EMR under Patient Instructions for exercises provided 1/5/2022.    Assessment     Patient with good effort throughout treatment. Patient slow to complete all therapeutic exercise. Patient is progressing well towards her goals. Physical Therapist will continue to progress upper and lower extremity therapeutic exercise and neuromuscular activities as able. Patient with 2 treatment sessions remaining.     Karen Is progressing well towards her goals.   Pt prognosis is Good.     Pt will continue to benefit from skilled outpatient physical therapy to address the deficits listed in the problem list box on initial evaluation, provide pt/family education, and to maximize pt's level of independence in the home and community environment.     Pt's spiritual, cultural, and educational needs considered and pt agreeable to plan of care and goals.     Anticipated barriers to physical therapy: compliance with home exercise program     Goals:    Short Term Goals:  1. Patient will demonstrate independence with home exercise program to ensure carryover of treatment. [met]  2. Patient will perform sit to/from stand with standby assist without upper  extremity support to improve independence and safety with transfers. [met]  3. Patient will improve bilateral upper and lower extremity strength to 4/5 to improve independence and safety with bed mobility, transfers, and gait. [met]  4. Patient will improve Campbell Balance Scale to 41/56 to improve dynamic standing balance and lower fall risk. [met]  5. Patient will ambulate 150 feet without assistive device with standby assist with increased step lengths, decreased upper extremity guarding to improve independence and safety with gait. [met]     Long Term Goals:  1. Patient will perform sit to/from stand with independence without upper extremity support to improve independence and safety with transfers. [met]  2. Patient will improve bilateral upper and lower extremity strength to 4+/5 to improve independence and safety with bed mobility, transfers, and gait. [not met]  3. Patient will improve Campbell Balance Scale to 45/56 to improve dynamic standing balance and lower fall risk. [not met]  4. Patient will ambulate 300 feet without assistive device with independence with increased step lengths and decreased upper extremity guarding including up/down curbs and ramps to improve independence and safety with community ambulation. [not met]  5. Patient will demonstrate ability to get up from the floor without upper extremity support to improve ability to interact with grandchildren and to get up from the ground should she experience a fall. [not met]    Reasons for Recertification of Therapy: Patient has not met all goals.     Plan     Updated Certification Period: 2/16/2022 to 2/25/2022  Recommended Treatment Plan: 2 times per week for 1 week: Gait Training, Neuromuscular Re-ed, Patient Education and Therapeutic Exercise  Other Recommendations: N/A      Willie Dennison, PT, DPT  2/16/2022      I CERTIFY THE NEED FOR THESE SERVICES FURNISHED UNDER THIS PLAN OF TREATMENT AND WHILE UNDER MY CARE.    Physician's comments:

## 2022-02-18 DIAGNOSIS — R13.10 DYSPHAGIA: Primary | ICD-10-CM

## 2022-02-21 ENCOUNTER — CLINICAL SUPPORT (OUTPATIENT)
Dept: REHABILITATION | Facility: HOSPITAL | Age: 67
End: 2022-02-21
Payer: MEDICARE

## 2022-02-21 DIAGNOSIS — R26.9 GAIT DIFFICULTY: ICD-10-CM

## 2022-02-21 DIAGNOSIS — R29.6 FALLS FREQUENTLY: ICD-10-CM

## 2022-02-21 DIAGNOSIS — R42 DIZZINESS AND GIDDINESS: ICD-10-CM

## 2022-02-21 PROCEDURE — 97110 THERAPEUTIC EXERCISES: CPT | Mod: CQ

## 2022-02-21 NOTE — PROGRESS NOTES
Physical Therapy Treatment Note     Name: Karen Renteria   Clinic Number: [id number]    Therapy Diagnosis: frequent falls; gait difficulty  Physician: Douglas Fountain FNP    Visit Date: 2/21/2022    Physician Orders: PT Eval and Treat  Medical Diagnosis from Referral: R29.6 (ICD-10-CM) - Falls frequently; R26.9 (ICD-10-CM) - Gait difficulty  Evaluation Date: 12/16/2021  Authorization Period Expiration: 12/31/2022  Plan of Care Expiration: 1/19/2022 to 2/25/2022  Updated Plan of Care Due: 2/18/2022  Visit # / Visits authorized: 9/$2,150 Medicare physical therapy allowance    Time In: 07:04   Time Out: 07:49  Total Billable Time: 45 minutes    Precautions: Standard, Diabetes, blood thinners, Fall and CHF  Functional Level Prior to Evaluation: independent with all functional mobility without assistive device    Subjective     Pt reports: She is having inner ear trouble now. This is her last week for PT. Friday night she woke up with a bad cramp in her left leg and it is still sore.    She was compliant with her home exercise program.   Response to previous treatment: good    Pain: 0/10  Location: N/A    Objective     Karen received therapeutic exercises to develop strength, endurance, flexibility, posture and core stabilization for 45 minutes including:    NuStep: level 5; 6 minutes   Calf stretch on slant board: 3 x 30 second holds  Hamstring stretch on step: 3 x 30 second holds  Leg press: 5 plates; x 30 repetitions   Cybex hamstring curls: 4.5 plates; x 30 repetitions   Cybex knee extensions: 2 plates; x 30 repetitions   Cybex hip abduction: 1 plate; x 20 repetitions   Cybex rows: 2 plates; x 20 reps  Cybex shoulder press: 2 plates; x 20 repetitions front   Biceps curls at Cybex tower: 2 plates; x 30 repetitions (not performed today)   Triceps extensions at Cybex tower: 3 plates; x 30 repetitions (not performed today)     NOT PERFORMED THIS TREATMENT SESSION:  Karen participated in neuromuscular  re-education activities to improve: Balance, Coordination, Proprioception and Posture for 0 minutes. The following activities were included:    Double limb support on BOSU (ball down)    Home Exercises Provided and Patient Education Provided     Education provided: Patient educated on the importance of completing skilled physical therapy treatment and home exercise program as prescribed to maximize functional gains.     Written Home Exercises Provided: yes.  Exercises were reviewed and Karen was able to demonstrate them prior to the end of the session.  Karen demonstrated good  understanding of the education provided.     See EMR under Patient Instructions for exercises provided 1/5/2022.    Assessment     Patient did well with exercises overall. She completed them without complaint of increased pain. Physical Therapist will continue to progress upper and lower extremity therapeutic exercise and neuromuscular activities as able.    Karen Is progressing well towards her goals.   Pt prognosis is Good.     Pt will continue to benefit from skilled outpatient physical therapy to address the deficits listed in the problem list box on initial evaluation, provide pt/family education, and to maximize pt's level of independence in the home and community environment.     Pt's spiritual, cultural, and educational needs considered and pt agreeable to plan of care and goals.     Anticipated barriers to physical therapy: compliance with home exercise program     Goals:    Short Term Goals:  1. Patient will demonstrate independence with home exercise program to ensure carryover of treatment. [met]  2. Patient will perform sit to/from stand with standby assist without upper extremity support to improve independence and safety with transfers. [not met]  3. Patient will improve bilateral upper and lower extremity strength to 4/5 to improve independence and safety with bed mobility, transfers, and gait. [not met]  4. Patient will  improve Campbell Balance Scale to 41/56 to improve dynamic standing balance and lower fall risk. [not met]  5. Patient will ambulate 150 feet without assistive device with standby assist with increased step lengths, decreased upper extremity guarding to improve independence and safety with gait. [not met]     Long Term Goals:  1. Patient will perform sit to/from stand with independence without upper extremity support to improve independence and safety with transfers. [not met]  2. Patient will improve bilateral upper and lower extremity strength to 4+/5 to improve independence and safety with bed mobility, transfers, and gait. [not met]  3. Patient will improve Campbell Balance Scale to 45/56 to improve dynamic standing balance and lower fall risk. [not met]  4. Patient will ambulate 300 feet without assistive device with independence with increased step lengths and decreased upper extremity guarding including up/down curbs and ramps to improve independence and safety with community ambulation. [not met]  5. Patient will demonstrate ability to get up from the floor without upper extremity support to improve ability to interact with grandchildren and to get up from the ground should she experience a fall. [not met]    Plan     Patient will continue to benefit from skilled physical therapy treatment as prescribed working towards goals listed above to maximize functional potential.     Alexandrea Evans, PTA    2/21/2022

## 2022-02-23 ENCOUNTER — CLINICAL SUPPORT (OUTPATIENT)
Dept: REHABILITATION | Facility: HOSPITAL | Age: 67
End: 2022-02-23
Payer: MEDICARE

## 2022-02-23 DIAGNOSIS — R29.6 FALLS FREQUENTLY: ICD-10-CM

## 2022-02-23 DIAGNOSIS — R26.9 GAIT DIFFICULTY: ICD-10-CM

## 2022-02-23 DIAGNOSIS — R42 DIZZINESS AND GIDDINESS: Primary | ICD-10-CM

## 2022-02-23 PROCEDURE — 97110 THERAPEUTIC EXERCISES: CPT

## 2022-02-23 NOTE — PLAN OF CARE
Physical Therapy Treatment Note     Name: Kraen Renteria   Clinic Number: [id number]    Therapy Diagnosis: frequent falls; gait difficulty  Physician: Douglas Fountain FNP    Visit Date: 2/23/2022     Physician Orders: PT Eval and Treat  Medical Diagnosis from Referral: R29.6 (ICD-10-CM) - Falls frequently; R26.9 (ICD-10-CM) - Gait difficulty  Evaluation Date: 12/16/2021  Visit # / Visits authorized: 11/$2,150 Medicare physical therapy allowance    Time In: 0701  Time Out: 0749  Total Billable Time: 48 minutes    Precautions: Standard, Diabetes, blood thinners, Fall and CHF  Functional Level Prior to Evaluation: independent with all functional mobility without assistive device    Subjective     Pt reports: She is doing pretty good this morning. Patient reports she has an appointment with Dr. Silverio on 2/28/2022. Patient reports she is afraid she may have to get tubes in her ears again.  Patient reports she has an upper extremity nerve conduction study scheduled for 3/17/2022 in [place], [place]. Patient sees Douglas Fountain again on 3/24/2022.    She was compliant with her home exercise program.   Response to previous treatment: good    Pain: 0/10  Location: N/A    Objective     Karen received therapeutic exercises to develop strength, endurance, flexibility, posture and core stabilization for 48 minutes including:    NuStep: level 5; 5 minutes  Calf stretch on slant board: 3 x 30 second holds  Hamstring stretch on step: 3 x 30 second holds  Leg press: 6 plates; x 30 repetitions   Cybex hamstring curls: 4.5 plates; x 30 reps  Cybex knee extensions: 2 plates; x 30 reps  Cybex hip abduction: 1 plate; x 20 reps  Cybex rows: 2 plates; x 30 reps  Cybex shoulder press: 2 plates; x 30 repetitions front     Home Exercises Provided and Patient Education Provided     Education provided: Patient educated on the importance of completing home exercise program 3-4 times per week for strength maintenance.     Written Home  Exercises Provided: yes.  Exercises were reviewed and Karen was able to demonstrate them prior to the end of the session. Karen demonstrated good understanding of the education provided.     See EMR under Patient Instructions for exercises provided 2/23/2022.    CMS Impairment/Limitation/Restriction for FOTO Orthopedic Survey    Therapist reviewed FOTO scores for Karen Renteria on 2/23/2022.   FOTO documents entered into [employer] - see Media section.    Functional Score: 65% (26-point increase since initial evaluation)  Category: Mobility     Assessment     Patient with good effort throughout treatment. Patient able to increase weight with leg press and Cybex hamstring curls. Patient has made good progress towards her goals. Patient has achieved the maximum benefit from skilled physical therapy treatment at the present time. Patient issued an updated home exercise program for bilateral upper extremity and bilateral lower extremity strength maintenance.    Pt's spiritual, cultural, and educational needs considered and pt agreeable to plan of care and goals.     Anticipated barriers to physical therapy: compliance with home exercise program     Goals:    Short Term Goals:  1. Patient will demonstrate independence with home exercise program to ensure carryover of treatment. [met]  2. Patient will perform sit to/from stand with standby assist without upper extremity support to improve independence and safety with transfers. [met]  3. Patient will improve bilateral upper and lower extremity strength to 4/5 to improve independence and safety with bed mobility, transfers, and gait. [met]  4. Patient will improve Campbell Balance Scale to 41/56 to improve dynamic standing balance and lower fall risk. [met]  5. Patient will ambulate 150 feet without assistive device with standby assist with increased step lengths, decreased upper extremity guarding to improve independence and safety with gait. [met]     Long Term  Goals:  1. Patient will perform sit to/from stand with independence without upper extremity support to improve independence and safety with transfers. [met]  2. Patient will improve bilateral upper and lower extremity strength to 4+/5 to improve independence and safety with bed mobility, transfers, and gait. [not met]  3. Patient will improve Campbell Balance Scale to 45/56 to improve dynamic standing balance and lower fall risk. [not met]  4. Patient will ambulate 300 feet without assistive device with independence with increased step lengths and decreased upper extremity guarding including up/down curbs and ramps to improve independence and safety with community ambulation. [not met]  5. Patient will demonstrate ability to get up from the floor without upper extremity support to improve ability to interact with grandchildren and to get up from the ground should she experience a fall. [not met]    Discharge reason: Patient has completed the physician's prescription and has reached the maximum rehab potential for the present time.    Plan     This patient is discharged from Physical Therapy.    Willie Dennison, PT, DPT  2/23/2022            I CERTIFY THE NEED FOR THESE SERVICES FURNISHED UNDER THIS PLAN OF TREATMENT AND WHILE UNDER MY CARE.    Physician's comments:

## 2022-02-28 ENCOUNTER — CLINICAL SUPPORT (OUTPATIENT)
Dept: AUDIOLOGY | Facility: CLINIC | Age: 67
End: 2022-02-28
Payer: MEDICARE

## 2022-02-28 ENCOUNTER — OFFICE VISIT (OUTPATIENT)
Dept: OTOLARYNGOLOGY | Facility: CLINIC | Age: 67
End: 2022-02-28
Payer: MEDICARE

## 2022-02-28 VITALS — WEIGHT: 180 LBS | BODY MASS INDEX: 30.73 KG/M2 | HEIGHT: 64 IN

## 2022-02-28 DIAGNOSIS — R42 VERTIGO: Primary | ICD-10-CM

## 2022-02-28 DIAGNOSIS — H91.93 BILATERAL HEARING LOSS, UNSPECIFIED HEARING LOSS TYPE: ICD-10-CM

## 2022-02-28 DIAGNOSIS — Z11.59 SPECIAL SCREENING EXAMINATION FOR VIRAL DISEASE: Primary | ICD-10-CM

## 2022-02-28 DIAGNOSIS — H90.3 SENSORINEURAL HEARING LOSS, BILATERAL: Primary | ICD-10-CM

## 2022-02-28 DIAGNOSIS — H90.3 SENSORINEURAL HEARING LOSS (SNHL) OF BOTH EARS: ICD-10-CM

## 2022-02-28 PROCEDURE — 99204 PR OFFICE/OUTPT VISIT, NEW, LEVL IV, 45-59 MIN: ICD-10-PCS | Mod: S$PBB,,, | Performed by: OTOLARYNGOLOGY

## 2022-02-28 PROCEDURE — 92567 TYMPANOMETRY: CPT | Mod: PBBFAC | Performed by: AUDIOLOGIST

## 2022-02-28 PROCEDURE — 99204 OFFICE O/P NEW MOD 45 MIN: CPT | Mod: S$PBB,,, | Performed by: OTOLARYNGOLOGY

## 2022-02-28 PROCEDURE — 99212 OFFICE O/P EST SF 10 MIN: CPT | Mod: PBBFAC | Performed by: AUDIOLOGIST

## 2022-02-28 PROCEDURE — 92557 COMPREHENSIVE HEARING TEST: CPT | Mod: PBBFAC | Performed by: AUDIOLOGIST

## 2022-02-28 PROCEDURE — 99214 OFFICE O/P EST MOD 30 MIN: CPT | Mod: PBBFAC,25 | Performed by: OTOLARYNGOLOGY

## 2022-02-28 NOTE — PROGRESS NOTES
Subjective:       Patient ID: Karen Renteria is a 66 y.o. female.    Chief Complaint: Hearing Loss (Bilateral hearing loss. Hearing test done. )  dizziness on antivert no improvement MRI negative  HPI  Review of Systems   HENT: Positive for hearing loss and tinnitus.    Neurological: Positive for dizziness.   All other systems reviewed and are negative.      Objective:      Physical Exam  General: NAD  Head: Normocephalic, atraumatic, no facial asymmetry/normal strength,  Ears: Both auricules normal in appearance, w/o deformities tympanic membranes scarring left  external auditory canals normal  Nose: External nose w/o deformities normal turbinates no drainage or inflammation  Oral Cavity: Lips, gums, floor of mouth, tongue hard palate, and buccal mucosa without mass/lesion  Oropharynx: Mucosa pink and moist, soft palate, posterior pharynx and oropharyngeal wall without mass/lesion  Neck: Supple, symmetric, trachea midline, no palpable mass/lesion, no palpable cervical lymphadenopathy  Skin: Warm and dry, no concerning lesions  Respiratory: Respirations even, unlabored    Assessment:       1. Vertigo    2. Sensorineural hearing loss (SNHL) of both ears        Plan:       PT for vertigo no help with antivert    discussed audio in detail and chronic TM scarring

## 2022-03-03 ENCOUNTER — HOSPITAL ENCOUNTER (OUTPATIENT)
Dept: GASTROENTEROLOGY | Facility: HOSPITAL | Age: 67
Discharge: HOME | End: 2022-03-03
Attending: INTERNAL MEDICINE
Payer: MEDICARE

## 2022-03-03 ENCOUNTER — ANESTHESIA (OUTPATIENT)
Dept: GASTROENTEROLOGY | Facility: HOSPITAL | Age: 67
End: 2022-03-03
Payer: MEDICARE

## 2022-03-03 ENCOUNTER — ANESTHESIA EVENT (OUTPATIENT)
Dept: GASTROENTEROLOGY | Facility: HOSPITAL | Age: 67
End: 2022-03-03
Payer: MEDICARE

## 2022-03-03 VITALS
SYSTOLIC BLOOD PRESSURE: 134 MMHG | HEIGHT: 64 IN | RESPIRATION RATE: 17 BRPM | HEART RATE: 54 BPM | TEMPERATURE: 98 F | WEIGHT: 180 LBS | OXYGEN SATURATION: 92 % | BODY MASS INDEX: 30.73 KG/M2 | DIASTOLIC BLOOD PRESSURE: 53 MMHG

## 2022-03-03 DIAGNOSIS — R13.19 ESOPHAGEAL DYSPHAGIA: ICD-10-CM

## 2022-03-03 DIAGNOSIS — K29.00 ACUTE SUPERFICIAL GASTRITIS WITHOUT HEMORRHAGE: ICD-10-CM

## 2022-03-03 DIAGNOSIS — R13.10 DYSPHAGIA: ICD-10-CM

## 2022-03-03 DIAGNOSIS — K25.3 ACUTE GASTRIC ULCER WITHOUT HEMORRHAGE OR PERFORATION: ICD-10-CM

## 2022-03-03 PROCEDURE — 88342 SURGICAL PATHOLOGY: ICD-10-PCS | Mod: 26,,, | Performed by: PATHOLOGY

## 2022-03-03 PROCEDURE — 25000003 PHARM REV CODE 250

## 2022-03-03 PROCEDURE — 43450 DILATE ESOPHAGUS 1/MULT PASS: CPT

## 2022-03-03 PROCEDURE — 88305 TISSUE EXAM BY PATHOLOGIST: CPT | Mod: SUR,59 | Performed by: INTERNAL MEDICINE

## 2022-03-03 PROCEDURE — 43239 EGD BIOPSY SINGLE/MULTIPLE: CPT

## 2022-03-03 PROCEDURE — 88342 IMHCHEM/IMCYTCHM 1ST ANTB: CPT | Mod: 26,,, | Performed by: PATHOLOGY

## 2022-03-03 PROCEDURE — D9220A PRA ANESTHESIA: Mod: ,,,

## 2022-03-03 PROCEDURE — 37000009 HC ANESTHESIA EA ADD 15 MINS

## 2022-03-03 PROCEDURE — 88305 SURGICAL PATHOLOGY: ICD-10-PCS | Mod: 26,,, | Performed by: PATHOLOGY

## 2022-03-03 PROCEDURE — 27201423 OPTIME MED/SURG SUP & DEVICES STERILE SUPPLY

## 2022-03-03 PROCEDURE — 63600175 PHARM REV CODE 636 W HCPCS

## 2022-03-03 PROCEDURE — 88305 TISSUE EXAM BY PATHOLOGIST: CPT | Mod: 26,,, | Performed by: PATHOLOGY

## 2022-03-03 PROCEDURE — 37000008 HC ANESTHESIA 1ST 15 MINUTES

## 2022-03-03 PROCEDURE — D9220A PRA ANESTHESIA: ICD-10-PCS | Mod: ,,,

## 2022-03-03 PROCEDURE — C1889 IMPLANT/INSERT DEVICE, NOC: HCPCS

## 2022-03-03 RX ORDER — PROPOFOL 10 MG/ML
VIAL (ML) INTRAVENOUS
Status: DISCONTINUED | OUTPATIENT
Start: 2022-03-03 | End: 2022-03-03

## 2022-03-03 RX ORDER — LIDOCAINE HYDROCHLORIDE 20 MG/ML
INJECTION, SOLUTION EPIDURAL; INFILTRATION; INTRACAUDAL; PERINEURAL
Status: DISCONTINUED | OUTPATIENT
Start: 2022-03-03 | End: 2022-03-03

## 2022-03-03 RX ORDER — SODIUM CHLORIDE 9 MG/ML
INJECTION, SOLUTION INTRAVENOUS CONTINUOUS PRN
Status: DISCONTINUED | OUTPATIENT
Start: 2022-03-03 | End: 2022-03-03

## 2022-03-03 RX ORDER — SODIUM CHLORIDE 0.9 % (FLUSH) 0.9 %
10 SYRINGE (ML) INJECTION
Status: DISCONTINUED | OUTPATIENT
Start: 2022-03-03 | End: 2022-03-04 | Stop reason: HOSPADM

## 2022-03-03 RX ADMIN — PROPOFOL 40 MG: 10 INJECTION, EMULSION INTRAVENOUS at 01:03

## 2022-03-03 RX ADMIN — LIDOCAINE HYDROCHLORIDE 100 MG: 20 INJECTION, SOLUTION EPIDURAL; INFILTRATION; INTRACAUDAL; PERINEURAL at 01:03

## 2022-03-03 RX ADMIN — SODIUM CHLORIDE: 9 INJECTION, SOLUTION INTRAVENOUS at 12:03

## 2022-03-03 RX ADMIN — PROPOFOL 50 MG: 10 INJECTION, EMULSION INTRAVENOUS at 01:03

## 2022-03-03 NOTE — ANESTHESIA PREPROCEDURE EVALUATION
03/03/2022  Karen Renteria is a 66 y.o., female.      Pre-op Assessment    I have reviewed the Patient Summary Reports.     I have reviewed the Nursing Notes. I have reviewed the NPO Status.   I have reviewed the Medications.     Review of Systems  Anesthesia Hx:  No problems with previous Anesthesia    Social:  Non-Smoker, No Alcohol Use    Hematology/Oncology:  Hematology Normal   Oncology Normal     EENT/Dental:EENT/Dental Normal   Cardiovascular:   Hypertension CAD  CABG/stent  hyperlipidemia    Pulmonary:  Pulmonary Normal    Renal/:  Renal/ Normal     Hepatic/GI:   GERD    Musculoskeletal:  Musculoskeletal Normal    Neurological:   CVA Neuromuscular Disease,    Endocrine:   Diabetes, type 2  Diabetes  Metabolic Disorders, Obesity / BMI > 30  Dermatological:  Skin Normal    Psych:   Psychiatric History anxiety depression          Physical Exam  General: Well nourished, Cooperative, Alert and Oriented    Airway:  Mallampati: II   Mouth Opening: Normal  TM Distance: Normal  Tongue: Normal  Neck ROM: Normal ROM    Dental:  Partial Dentures    Chest/Lungs:  Clear to auscultation, Normal Respiratory Rate    Heart:  Rate: Normal  Rhythm: Regular Rhythm  Sounds: Normal    Abdomen:  Normal, Soft, Nontender        Anesthesia Plan  Type of Anesthesia, risks & benefits discussed:    Anesthesia Type: Gen Natural Airway, MAC  Intra-op Monitoring Plan: Standard ASA Monitors  Post Op Pain Control Plan: multimodal analgesia and IV/PO Opioids PRN  Induction:  IV  Informed Consent: Informed consent signed with the Patient and all parties understand the risks and agree with anesthesia plan.  All questions answered.   ASA Score: 3  Day of Surgery Review of History & Physical: I have interviewed and examined the patient. I have reviewed the patient's H&P dated:     Ready For Surgery From Anesthesia Perspective.      .

## 2022-03-03 NOTE — H&P
[facility]  Gastroenterology  H&P    Patient Name: Karen Renteria  MRN: [id number]  Admission Date: 3/3/2022  Code Status: Full Code    Attending Provider: Francisco Javier Santoro MD  Primary Care Physician: Dayan Sommers MD  Principal Problem:<principal problem not specified>    Subjective:     History of Present Illness: Pt has esophageal dysphagia; for egd with dilation.    Past Medical History:   Diagnosis Date    Anxiety     Carpal tunnel syndrome     Cerebral vascular accident     Coronary arteriosclerosis     S/P CABG 2011    COVID-19 12/20/2020    Depression     Diabetes mellitus, type 2     Encounter for long-term (current) use of other medications     Eye exam, routine 02/03/2021    GERD (gastroesophageal reflux disease)     History of esophagogastroduodenoscopy (EGD) 08/07/2019    Hx of colonoscopy 04/12/2019    Hx of mammogram 06/24/2020    SCREENING/BILATERAL    Hyperlipidemia     Hypertension     Insomnia     Low back pain     Lumbar radiculopathy     Osteoporosis     Tremor     Vitamin D deficiency        Past Surgical History:   Procedure Laterality Date    APPENDECTOMY      ASCENDING AORTIC ANEURYSM REPAIR W/ TISSUE AORTIC VALVE REPLACEMENT      CHOLECYSTECTOMY      CORONARY ARTERY BYPASS GRAFT (CABG)      HYSTERECTOMY      TONSILLECTOMY         Review of patient's allergies indicates:   Allergen Reactions    Cephalexin Rash    Bactrim [sulfamethoxazole-trimethoprim]      Family History     Problem Relation (Age of Onset)    Breast cancer Mother    Cancer Mother    Heart disease Father        Tobacco Use    Smoking status: Former Smoker     Packs/day: 0.50     Types: Cigarettes     Start date: 1994     Quit date: 4/21/2011     Years since quitting: 10.8    Smokeless tobacco: Never Used   Substance and Sexual Activity    Alcohol use: Never    Drug use: Never    Sexual activity: Not on file     Review of Systems   Respiratory: Negative.    Cardiovascular:  Negative.    Gastrointestinal: Negative.      Objective:     Vital Signs (Most Recent):  Temp: 97.3 °F (36.3 °C) (03/03/22 1202)  Pulse: 78 (03/03/22 1202)  Resp: 18 (03/03/22 1202)  BP: (!) 164/46 (03/03/22 1202)  SpO2: 98 % (03/03/22 1202) Vital Signs (24h Range):  Temp:  [97.3 °F (36.3 °C)] 97.3 °F (36.3 °C)  Pulse:  [78] 78  Resp:  [18] 18  SpO2:  [98 %] 98 %  BP: (164)/(46) 164/46     Weight: 81.6 kg (180 lb) (03/03/22 1202)  Body mass index is 30.9 kg/m².    No intake or output data in the 24 hours ending 03/03/22 1309    Lines/Drains/Airways     Peripheral Intravenous Line  Duration                Peripheral IV - Single Lumen 03/03/22 1201 22 G Anterior;Right Forearm <1 day                Physical Exam  Vitals reviewed.   Constitutional:       General: She is not in acute distress.     Appearance: Normal appearance. She is well-developed. She is not ill-appearing.   HENT:      Head: Normocephalic and atraumatic.      Nose: Nose normal.   Eyes:      Pupils: Pupils are equal, round, and reactive to light.   Cardiovascular:      Rate and Rhythm: Normal rate and regular rhythm.   Pulmonary:      Effort: Pulmonary effort is normal.      Breath sounds: Normal breath sounds. No wheezing.   Abdominal:      General: Abdomen is flat. Bowel sounds are normal. There is no distension.      Palpations: Abdomen is soft.      Tenderness: There is no abdominal tenderness. There is no guarding.   Skin:     General: Skin is warm and dry.      Coloration: Skin is not jaundiced.   Neurological:      Mental Status: She is alert.   Psychiatric:         Attention and Perception: Attention normal.         Mood and Affect: Affect normal.         Speech: Speech normal.         Behavior: Behavior is cooperative.      Comments: Pt was calm while speaking.         Significant Labs:  CBC: No results for input(s): WBC, HGB, HCT, PLT in the last 48 hours.  CMP: No results for input(s): GLU, CALCIUM, ALBUMIN, PROT, NA, K, CO2, CL, BUN,  CREATININE, ALKPHOS, ALT, AST, BILITOT in the last 48 hours.    Significant Imaging:  Imaging results within the past 24 hours have been reviewed.    Assessment/Plan:     There are no hospital problems to display for this patient.        Imp: esophageal dysphagia  Plan: egd with dilation    Francisco Javier Santoro MD  Gastroenterology  [facility]

## 2022-03-03 NOTE — DISCHARGE INSTRUCTIONS
Procedure Date  3/3/22     Impression  Overall Impression: Mucosa of the esophagus is normal. Distal esophageal biopsies were obtained and the esophagus was dilated with a 48F Nick. The stomach has gastritis and a non-bleeding ulcer is present in the fundus, biopsies were obtained. Mucosa of the duodenum is normal.     Recommendation  Await pathology results; avoid nsaids; ppi 1/AM; repeat dilation  prn.     Indication  Dysphagia

## 2022-03-03 NOTE — ANESTHESIA POSTPROCEDURE EVALUATION
Anesthesia Post Evaluation    Patient: Karen Renteria    Procedure(s) Performed: EGD    Final Anesthesia Type: general      Patient location during evaluation: GI PACU  Patient participation: Yes- Able to Participate  Level of consciousness: awake and alert  Post-procedure vital signs: reviewed and stable  Pain management: adequate  Airway patency: patent    PONV status at discharge: No PONV  Anesthetic complications: no      Cardiovascular status: blood pressure returned to baseline  Respiratory status: unassisted and spontaneous ventilation  Hydration status: euvolemic  Follow-up not needed.          Vitals Value Taken Time   /53 03/03/22 1340   Temp 97.8f 03/03/22 1341   Pulse 54 03/03/22 1340   Resp 17 03/03/22 1340   SpO2 92 % 03/03/22 1340   Vitals shown include unvalidated device data.      No case tracking events are documented in the log.      Pain/Jonathon Score: Jonathon Score: 8 (3/3/2022  1:20 PM)

## 2022-03-03 NOTE — TRANSFER OF CARE
"Anesthesia Transfer of Care Note    Patient: Karen Renteria    Procedure(s) Performed: EGD    Patient location: GI    Anesthesia Type: general    Transport from OR: Transported from OR on room air with adequate spontaneous ventilation    Post pain: adequate analgesia    Post assessment: no apparent anesthetic complications    Post vital signs: stable    Level of consciousness: responds to stimulation    Nausea/Vomiting: no nausea/vomiting    Complications: none    Transfer of care protocol was followed      Last vitals:   Visit Vitals  BP (!) 127/53   Pulse (!) 54   Temp 36.8 °C (98.2 °F)   Resp 10   Ht 5' 4" (1.626 m)   Wt 81.6 kg (180 lb)   SpO2 100%   Breastfeeding No   BMI 30.90 kg/m²     "

## 2022-03-04 LAB
ESTROGEN SERPL-MCNC: NORMAL PG/ML
INSULIN SERPL-ACNC: NORMAL U[IU]/ML
LAB AP GROSS DESCRIPTION: NORMAL
LAB AP LABORATORY NOTES: NORMAL
T3RU NFR SERPL: NORMAL %

## 2022-03-11 DIAGNOSIS — Z71.89 COMPLEX CARE COORDINATION: ICD-10-CM

## 2022-03-14 DIAGNOSIS — R26.9 GAIT DIFFICULTY: ICD-10-CM

## 2022-03-14 DIAGNOSIS — R42 DIZZINESS AND GIDDINESS: Primary | ICD-10-CM

## 2022-03-14 DIAGNOSIS — H91.93 BILATERAL HEARING LOSS, UNSPECIFIED HEARING LOSS TYPE: ICD-10-CM

## 2022-03-16 ENCOUNTER — TELEPHONE (OUTPATIENT)
Dept: GASTROENTEROLOGY | Facility: CLINIC | Age: 67
End: 2022-03-16
Payer: MEDICARE

## 2022-03-16 NOTE — TELEPHONE ENCOUNTER
Called EGD path results and recommendations. Patient verbalized good understanding.      ----- Message from Francisco Javier Santoro MD sent at 3/4/2022  4:02 PM CST -----  Egd bx shows gastritis w/o H.pylori. A small ulcer was present on endoscopy, so pt should take ppi x at least 8 weeks and avoid nsaids.

## 2022-03-24 ENCOUNTER — OFFICE VISIT (OUTPATIENT)
Dept: NEUROLOGY | Facility: CLINIC | Age: 67
End: 2022-03-24
Payer: MEDICARE

## 2022-03-24 VITALS
HEIGHT: 64 IN | OXYGEN SATURATION: 96 % | RESPIRATION RATE: 18 BRPM | WEIGHT: 180 LBS | BODY MASS INDEX: 30.73 KG/M2 | HEART RATE: 62 BPM | DIASTOLIC BLOOD PRESSURE: 68 MMHG | SYSTOLIC BLOOD PRESSURE: 132 MMHG

## 2022-03-24 DIAGNOSIS — I63.9 CEREBROVASCULAR ACCIDENT (CVA), UNSPECIFIED MECHANISM: ICD-10-CM

## 2022-03-24 DIAGNOSIS — F32.A DEPRESSION, UNSPECIFIED DEPRESSION TYPE: ICD-10-CM

## 2022-03-24 DIAGNOSIS — G25.0 ESSENTIAL TREMOR: Primary | ICD-10-CM

## 2022-03-24 DIAGNOSIS — G56.03 CARPAL TUNNEL SYNDROME ON BOTH SIDES: ICD-10-CM

## 2022-03-24 DIAGNOSIS — R42 DIZZINESS AND GIDDINESS: ICD-10-CM

## 2022-03-24 DIAGNOSIS — R26.9 GAIT DIFFICULTY: ICD-10-CM

## 2022-03-24 PROCEDURE — 99213 OFFICE O/P EST LOW 20 MIN: CPT | Mod: S$PBB,,, | Performed by: NURSE PRACTITIONER

## 2022-03-24 PROCEDURE — 99215 OFFICE O/P EST HI 40 MIN: CPT | Mod: PBBFAC | Performed by: NURSE PRACTITIONER

## 2022-03-24 PROCEDURE — 99213 PR OFFICE/OUTPT VISIT, EST, LEVL III, 20-29 MIN: ICD-10-PCS | Mod: S$PBB,,, | Performed by: NURSE PRACTITIONER

## 2022-03-24 RX ORDER — PRIMIDONE 50 MG/1
50 TABLET ORAL NIGHTLY
Qty: 30 TABLET | Refills: 1 | Status: SHIPPED | OUTPATIENT
Start: 2022-03-24 | End: 2022-04-28

## 2022-03-24 NOTE — PATIENT INSTRUCTIONS
1. Reviewed recent EMG/NCS studies with patient    2. Increase the primidone to 50mg at night    3. Recommend wearing bilateral wrist splints for carpal tunnel symptoms    4. Declines followup with orthopedics at this time    5. Schedule HILDA scan in [place] to evaluate for parkinson's disease    Stroke risk modifiers:  1. Good sleep habits, recommend at least 7 hours total sleep daily  2. Continue management of blood pressure and cholesterol including medications, exercise, and good eating habits  3. Exercise as much as possible, recommend 5 days of the week for 30 minutes each day  4. Avoid smoking and alcohol  5. Go to the nearest emergency department immediately if any new or worsening weakness, speech difficulty, or numbness

## 2022-03-24 NOTE — PROGRESS NOTES
Subjective:       Patient ID: Karen Renteria is a 66 y.o. female     Chief Complaint:    Chief Complaint   Patient presents with    Follow-up    Stroke        Allergies:  Cephalexin and Bactrim [sulfamethoxazole-trimethoprim]    Current Medications:    Outpatient Encounter Medications as of 3/24/2022   Medication Sig Dispense Refill    aspirin (ECOTRIN) 81 MG EC tablet Take 81 mg by mouth.      clopidogreL (PLAVIX) 75 mg tablet Take 1 tablet (75 mg total) by mouth once daily. 90 tablet 3    cyclobenzaprine (FLEXERIL) 10 MG tablet Take 10 mg by mouth every 12 (twelve) hours as needed for Muscle spasms.      ergocalciferol (ERGOCALCIFEROL) 50,000 unit Cap Take 1 capsule (50,000 Units total) by mouth every 7 days. 4 capsule 1    fluticasone propionate (FLONASE) 50 mcg/actuation nasal spray 2 sprays by Each Nostril route once daily.      gabapentin (NEURONTIN) 300 MG capsule Take 1 capsule (300 mg total) by mouth 3 (three) times daily. 90 capsule 11    glimepiride (AMARYL) 4 MG tablet One twice a day with food 60 tablet 11    hydroCHLOROthiazide (HYDRODIURIL) 25 MG tablet Take 25 mg by mouth once daily.      isosorbide mononitrate (IMDUR) 30 MG 24 hr tablet Take 1 tablet (30 mg total) by mouth once daily. 30 tablet 11    meclizine (ANTIVERT) 25 mg tablet Take 1 tablet (25 mg total) by mouth 3 (three) times daily as needed for Dizziness. 90 tablet 5    pantoprazole (PROTONIX) 40 MG tablet Take 40 mg by mouth once daily.      promethazine (PHENERGAN) 25 MG tablet Take 1 tablet (25 mg total) by mouth every 6 (six) hours as needed for Nausea. 30 tablet 2    propranoloL (INDERAL LA) 80 MG 24 hr capsule Take 1 capsule (80 mg total) by mouth once daily. 90 capsule 3    sertraline (ZOLOFT) 50 MG tablet Take 1 tablet (50 mg total) by mouth once daily. 30 tablet 5    simvastatin (ZOCOR) 20 MG tablet Take 1 tablet (20 mg total) by mouth once daily. 30 tablet 11    [DISCONTINUED] primidone (MYSOLINE) 50 MG  Tab Take 1/2 tablet by mouth each night at bedtime 30 tablet 1    primidone (MYSOLINE) 50 MG Tab Take 1 tablet (50 mg total) by mouth every evening. 30 tablet 1     No facility-administered encounter medications on file as of 3/24/2022.       History of Present Illness  67 y/o RH WF followed in clinic for acute right thalamic stroke, bilateral CTS, and essential tremor.    No new CVA symptoms are reported.  She continues on plavix, statin, and BP management.    Her CTS symptoms have been in the past reported as improved.  She had prior CTS release by Dr. Gorman and Dr. Valdivia with reported relief.  More recently she was again c/o finding her hands will get weak on her again, usually suddenly.  Examples is when holding phone or holding steering wheel while driving.  I did refer to Dr. Jason Beauchamp for EMG/NCS of the BUE and he reported the CTS was present bilaterally, mild on the left and moderate to severe on the right.  She declines to have followup with ortho at this time.    She is followed by Dr. Pino for cervical radiculopathy.    Prior visits she was c/o balance and gait difficulty as well as dizziness.  We tried trial with PT but she denied any improvement thus we obtained MRI brain which did not indicate any acute abnormalities, showed the prior CVA only.  She has been evaluated by ENT as well, told no complications were found.    Last visit she continued to deny the tremor was causing her much difficulty, mainly only when she was upset.  However, after her visit her primary care had contacted me in February reporting patient was c/o tremor causing her much difficulty and inquired about starting primidone.  It is of note that in the EMG report from Dr. Beauchamp he did mention the tremor and agreed as we that it was most likely related to depression as it was not the characteristic presentation of true action tremor and she does not have the clear presentation of parkinson's.  I did discuss this with her but  "she denies that this has anything to do with depression or mood.  We did send in trial of primidone 25mg.  She reports today they tremor is "terrible" and that the medication is not helping.  She wants testing for parkinsons.  Says she has an aunt with parkinson's.  On exam I do not note cogwheel rigidity, on her ambulation she does not have shuffling gait and her arms swing through freely though she has off balance and wide stance, which we previously obtained the MRI brain and PT for.  She wants to increase the primidone to 50mg at night.  I do note during the exam and questioning that her hands in her lap do not have resting tremor, there is the tremor noted on action however, and I do note mild "yes, yes" head tremor.  No voice tremor.           Review of Systems  Review of Systems   Constitutional: Negative for diaphoresis and fever.   HENT: Negative for congestion, hearing loss and tinnitus.    Eyes: Negative for blurred vision, double vision, photophobia, discharge and redness.   Respiratory: Negative for cough and shortness of breath.    Cardiovascular: Negative for chest pain.   Gastrointestinal: Negative for abdominal pain, nausea and vomiting.   Musculoskeletal: Positive for falls. Negative for back pain, joint pain, myalgias and neck pain.   Skin: Negative for itching and rash.   Neurological: Positive for dizziness, tingling, tremors, sensory change, weakness and headaches. Negative for speech change, focal weakness, seizures and loss of consciousness.   Psychiatric/Behavioral: Positive for depression. Negative for hallucinations and memory loss. The patient is nervous/anxious. The patient does not have insomnia.    All other systems reviewed and are negative.     Objective:     NEUROLOGICAL EXAMINATION:     MENTAL STATUS   Oriented to person, place, and time.   Registration: recalls 3 of 3 objects. Recall at 5 minutes: recalls 3 of 3 objects.   Attention: normal. Concentration: normal.   Speech: speech " is normal   Level of consciousness: alert  Knowledge: good and consistent with education.   Normal comprehension.     CRANIAL NERVES     CN II   Visual fields full to confrontation.   Visual acuity: normal  Right visual field deficit: none  Left visual field deficit: none     CN III, IV, VI   Pupils are equal, round, and reactive to light.  Extraocular motions are normal.   Right pupil: Size: 3 mm. Shape: regular. Reactivity: brisk. Consensual response: intact. Accommodation: intact.   Left pupil: Size: 3 mm. Shape: regular. Reactivity: brisk. Consensual response: intact. Accommodation: intact.   CN III: no CN III palsy  CN VI: no CN VI palsy  Nystagmus: none   Diplopia: none  Upgaze: normal  Downgaze: normal  Conjugate gaze: present  Vestibulo-ocular reflex: present    CN V   Facial sensation intact.   Right facial sensation deficit: none  Left facial sensation deficit: none  Right corneal reflex: normal  Left corneal reflex: normal    CN VII   Facial expression full, symmetric.   Right facial weakness: none  Left facial weakness: none  Right taste: normal  Left taste: normal    CN VIII   CN VIII normal.   Hearing: intact    CN IX, X   CN IX normal.   CN X normal.   Palate: symmetric    CN XI   CN XI normal.   Right sternocleidomastoid strength: normal  Left sternocleidomastoid strength: normal  Right trapezius strength: normal  Left trapezius strength: normal    CN XII   CN XII normal.   Tongue: not atrophic  Fasciculations: absent  Tongue deviation: none    MOTOR EXAM   Muscle bulk: normal  Overall muscle tone: normal  Right arm tone: normal  Left arm tone: normal  Right arm pronator drift: absent  Left arm pronator drift: absent  Right leg tone: normal  Left leg tone: normal    Strength   Right neck flexion: 5/5  Left neck flexion: 5/5  Right neck extension: 5/5  Left neck extension: 5/5  Right deltoid: 5/5  Left deltoid: 5/5  Right biceps: 5/5  Left biceps: 5/5  Right triceps: 5/5  Left triceps: 5/5  Right  wrist flexion: 5/5  Left wrist flexion: 5/5  Right wrist extension: 5/5  Left wrist extension: 5/5  Right interossei: 5/5  Left interossei: 5/5  Right iliopsoas: 5/5  Left iliopsoas: 5/5  Right quadriceps: 5/5  Left quadriceps: 5/5  Right hamstrin/5  Left hamstrin/5  Right anterior tibial: 5/5  Left anterior tibial: 5/5  Right posterior tibial: 5/5  Left posterior tibial: 5/5  Right gastroc: 5/5  Left gastroc: 5/5    REFLEXES     Reflexes   Right brachioradialis: 2+  Left brachioradialis: 2+  Right biceps: 2+  Left biceps: 2+  Right triceps: 2+  Left triceps: 2+  Right patellar: 2+  Left patellar: 2+  Right achilles: 2+  Left achilles: 2+  Right plantar: normal  Left plantar: normal  Right Agrawal: absent  Left Agrawal: absent  Right ankle clonus: absent  Left ankle clonus: absent  Right pendular knee jerk: absent  Left pendular knee jerk: absent    SENSORY EXAM   Light touch normal.   Right arm light touch: normal  Left arm light touch: normal  Right leg light touch: normal  Left leg light touch: normal  Vibration normal.   Right arm vibration: normal  Left arm vibration: normal  Right leg vibration: normal  Left leg vibration: normal  Proprioception normal.   Right arm proprioception: normal  Left arm proprioception: normal  Right leg proprioception: normal  Left leg proprioception: normal  Pinprick normal.   Right arm pinprick: normal  Left arm pinprick: normal  Right leg pinprick: normal  Left leg pinprick: normal  Graphesthesia: normal  Romberg: negative  Stereognosis: normal    GAIT AND COORDINATION     Gait  Gait: normal     Coordination   Finger to nose coordination: normal  Heel to shin coordination: normal  Tandem walking coordination: normal    Tremor   Resting tremor: absent  Intention tremor: absent  Action tremor: absent       Physical Exam  Vitals and nursing note reviewed.   Constitutional:       Appearance: Normal appearance.   HENT:      Head: Normocephalic.   Eyes:      Extraocular  Movements: Extraocular movements intact and EOM normal.      Pupils: Pupils are equal, round, and reactive to light.   Cardiovascular:      Rate and Rhythm: Normal rate and regular rhythm.   Pulmonary:      Effort: Pulmonary effort is normal.      Breath sounds: Normal breath sounds.   Musculoskeletal:         General: No swelling or tenderness. Normal range of motion.      Cervical back: Normal range of motion and neck supple.      Right lower leg: No edema.      Left lower leg: No edema.   Skin:     General: Skin is warm and dry.      Coloration: Skin is not jaundiced.      Findings: No rash.   Neurological:      General: No focal deficit present.      Mental Status: She is alert and oriented to person, place, and time.      GCS: GCS eye subscore is 4. GCS verbal subscore is 5. GCS motor subscore is 6.      Cranial Nerves: No cranial nerve deficit.      Sensory: No sensory deficit.      Motor: Motor function is intact. No weakness.      Coordination: Coordination is intact. Coordination normal. Finger-Nose-Finger Test, Heel to Shin Test and Romberg Test normal.      Gait: Gait is intact. Gait and tandem walk normal.      Deep Tendon Reflexes: Reflexes normal.      Reflex Scores:       Tricep reflexes are 2+ on the right side and 2+ on the left side.       Bicep reflexes are 2+ on the right side and 2+ on the left side.       Brachioradialis reflexes are 2+ on the right side and 2+ on the left side.       Patellar reflexes are 2+ on the right side and 2+ on the left side.       Achilles reflexes are 2+ on the right side and 2+ on the left side.  Psychiatric:         Mood and Affect: Mood normal.         Speech: Speech normal.         Behavior: Behavior normal.          Assessment:     Problem List Items Addressed This Visit        Neuro    Cerebral vascular accident    Essential tremor - Primary    Relevant Medications    primidone (MYSOLINE) 50 MG Tab    Carpal tunnel syndrome on both sides       Psychiatric     Depression       Other    Gait difficulty    Dizziness and giddiness           Primary Diagnosis and ICD10  Essential tremor [G25.0]    Plan:     Patient Instructions   1. Reviewed recent EMG/NCS studies with patient    2. Increase the primidone to 50mg at night    3. Recommend wearing bilateral wrist splints for carpal tunnel symptoms    4. Declines followup with orthopedics at this time    5. Schedule HILDA scan in [place] to evaluate for parkinson's disease    Stroke risk modifiers:  1. Good sleep habits, recommend at least 7 hours total sleep daily  2. Continue management of blood pressure and cholesterol including medications, exercise, and good eating habits  3. Exercise as much as possible, recommend 5 days of the week for 30 minutes each day  4. Avoid smoking and alcohol  5. Go to the nearest emergency department immediately if any new or worsening weakness, speech difficulty, or numbness      Medications Discontinued During This Encounter   Medication Reason    primidone (MYSOLINE) 50 MG Tab        Requested Prescriptions     Signed Prescriptions Disp Refills    primidone (MYSOLINE) 50 MG Tab 30 tablet 1     Sig: Take 1 tablet (50 mg total) by mouth every evening.       No orders of the defined types were placed in this encounter.

## 2022-04-13 ENCOUNTER — TELEPHONE (OUTPATIENT)
Dept: FAMILY MEDICINE | Facility: CLINIC | Age: 67
End: 2022-04-13
Payer: MEDICARE

## 2022-04-28 ENCOUNTER — OFFICE VISIT (OUTPATIENT)
Dept: NEUROLOGY | Facility: CLINIC | Age: 67
End: 2022-04-28
Payer: MEDICARE

## 2022-04-28 VITALS
HEIGHT: 64 IN | SYSTOLIC BLOOD PRESSURE: 134 MMHG | DIASTOLIC BLOOD PRESSURE: 78 MMHG | BODY MASS INDEX: 30.73 KG/M2 | WEIGHT: 180 LBS | OXYGEN SATURATION: 99 % | HEART RATE: 81 BPM | RESPIRATION RATE: 18 BRPM

## 2022-04-28 DIAGNOSIS — E11.42 DIABETIC PERIPHERAL NEUROPATHY ASSOCIATED WITH TYPE 2 DIABETES MELLITUS: ICD-10-CM

## 2022-04-28 DIAGNOSIS — M48.02 SPINAL STENOSIS, CERVICAL REGION: ICD-10-CM

## 2022-04-28 DIAGNOSIS — I63.9 CEREBROVASCULAR ACCIDENT (CVA), UNSPECIFIED MECHANISM: Primary | ICD-10-CM

## 2022-04-28 DIAGNOSIS — G56.03 CARPAL TUNNEL SYNDROME ON BOTH SIDES: ICD-10-CM

## 2022-04-28 PROCEDURE — 99213 OFFICE O/P EST LOW 20 MIN: CPT | Mod: S$PBB,,, | Performed by: NURSE PRACTITIONER

## 2022-04-28 PROCEDURE — 99215 OFFICE O/P EST HI 40 MIN: CPT | Mod: PBBFAC | Performed by: NURSE PRACTITIONER

## 2022-04-28 PROCEDURE — 99213 PR OFFICE/OUTPT VISIT, EST, LEVL III, 20-29 MIN: ICD-10-PCS | Mod: S$PBB,,, | Performed by: NURSE PRACTITIONER

## 2022-04-28 NOTE — PROGRESS NOTES
Subjective:       Patient ID: Karen Renteria is a 66 y.o. female     Chief Complaint:    Chief Complaint   Patient presents with    Follow-up    Tremors        Allergies:  Cephalexin and Bactrim [sulfamethoxazole-trimethoprim]    Current Medications:    Outpatient Encounter Medications as of 4/28/2022   Medication Sig Dispense Refill    aspirin (ECOTRIN) 81 MG EC tablet Take 81 mg by mouth.      clopidogreL (PLAVIX) 75 mg tablet Take 1 tablet (75 mg total) by mouth once daily. 90 tablet 3    cyclobenzaprine (FLEXERIL) 10 MG tablet Take 10 mg by mouth every 12 (twelve) hours as needed for Muscle spasms.      ergocalciferol (ERGOCALCIFEROL) 50,000 unit Cap Take 1 capsule (50,000 Units total) by mouth every 7 days. 4 capsule 1    fluticasone propionate (FLONASE) 50 mcg/actuation nasal spray 2 sprays by Each Nostril route once daily.      gabapentin (NEURONTIN) 300 MG capsule Take 1 capsule (300 mg total) by mouth 3 (three) times daily. 90 capsule 11    glimepiride (AMARYL) 4 MG tablet One twice a day with food 60 tablet 11    hydroCHLOROthiazide (HYDRODIURIL) 25 MG tablet Take 25 mg by mouth once daily.      isosorbide mononitrate (IMDUR) 30 MG 24 hr tablet Take 1 tablet (30 mg total) by mouth once daily. 30 tablet 11    meclizine (ANTIVERT) 25 mg tablet Take 1 tablet (25 mg total) by mouth 3 (three) times daily as needed for Dizziness. 90 tablet 5    pantoprazole (PROTONIX) 40 MG tablet Take 40 mg by mouth once daily.      promethazine (PHENERGAN) 25 MG tablet Take 1 tablet (25 mg total) by mouth every 6 (six) hours as needed for Nausea. 30 tablet 2    propranoloL (INDERAL LA) 80 MG 24 hr capsule Take 1 capsule (80 mg total) by mouth once daily. 90 capsule 3    sertraline (ZOLOFT) 50 MG tablet Take 1 tablet (50 mg total) by mouth once daily. 30 tablet 5    simvastatin (ZOCOR) 20 MG tablet Take 1 tablet (20 mg total) by mouth once daily. 30 tablet 11    [DISCONTINUED] primidone (MYSOLINE) 50 MG  Tab Take 1 tablet (50 mg total) by mouth every evening. 30 tablet 1     No facility-administered encounter medications on file as of 4/28/2022.       History of Present Illness  65 y/o RH WF followed in clinic for acute right thalamic stroke, bilateral CTS, and reported tremor.    No new CVA symptoms are reported.  She continues on plavix, statin, and BP management.    Her CTS symptoms have been in the past reported as improved.  She had prior CTS release by Dr. Gorman and Dr. Valdivia with reported relief.  More recently she was again c/o finding her hands will get weak on her again, usually suddenly.  Examples is when holding phone or holding steering wheel while driving.  I did refer to Dr. Jason Beauchamp for EMG/NCS of the BUE and he reported the CTS was present bilaterally, mild on the left and moderate to severe on the right.  She declines to have followup with ortho at last visit.    She is followed by Dr. Pino for cervical radiculopathy and lumbar radiculopathy.    Prior visits she was c/o balance and gait difficulty as well as dizziness.  We tried trial with PT but she denied any improvement thus we obtained MRI brain which did not indicate any acute abnormalities, showed the prior CVA only.  She has been evaluated by ENT as well, told no complications were found.  She is diabetic, known neuropathy with mild stenosis noted at multiple levels of the cervical and lumbar.  I did suggest could obtain EMG/NCS of the lower ext but she is already on the neurontin 300mg TID.    Prior visit she continued to deny the tremor was causing her much difficulty, mainly only when she was upset.  However, after her visit her primary care had contacted me in February reporting patient was c/o tremor causing her much difficulty and inquired about starting primidone.  It is of note that in the EMG report from Dr. Beauchamp he did mention the tremor and agreed as we that it was most likely related to depression as it was not the  "characteristic presentation of true action tremor and she does not have the clear presentation of parkinson's.  I did discuss this with her but she denied that this has anything to do with depression or mood.  We did send in trial of primidone 25mg.  On her followup she reported the tremor was "terrible" and then requested testing for Parkinson's as she reported she had an aunt that had parkinson's.  She did not have the characteristic s/s of parkinson's on exam but I did set her up for a HILDA scan in [place] which was done 4/26/22 and the results are negative, no parkinson's.  On exam today I really do not note tremor, she continues to report the primidone is not helping and thus I suggest she decrease the dosing to 25mg for a week then stop.  Already on propranolol 80mg, and again, feel tremor is not true action tremor and now note HILDA scan is negative.         Review of Systems  Review of Systems   Constitutional: Negative for diaphoresis and fever.   HENT: Negative for congestion, hearing loss and tinnitus.    Eyes: Negative for blurred vision, double vision, photophobia, discharge and redness.   Respiratory: Negative for cough and shortness of breath.    Cardiovascular: Negative for chest pain.   Gastrointestinal: Negative for abdominal pain, nausea and vomiting.   Musculoskeletal: Positive for falls. Negative for back pain, joint pain, myalgias and neck pain.   Skin: Negative for itching and rash.   Neurological: Positive for dizziness, tingling, tremors, sensory change, weakness and headaches. Negative for speech change, focal weakness, seizures and loss of consciousness.   Psychiatric/Behavioral: Positive for depression. Negative for hallucinations and memory loss. The patient is nervous/anxious. The patient does not have insomnia.    All other systems reviewed and are negative.     Objective:     NEUROLOGICAL EXAMINATION:     MENTAL STATUS   Oriented to person, place, and time.   Registration: recalls 3 " of 3 objects. Recall at 5 minutes: recalls 3 of 3 objects.   Attention: normal. Concentration: normal.   Speech: speech is normal   Level of consciousness: alert  Knowledge: good and consistent with education.   Normal comprehension.     CRANIAL NERVES     CN II   Visual fields full to confrontation.   Visual acuity: normal  Right visual field deficit: none  Left visual field deficit: none     CN III, IV, VI   Pupils are equal, round, and reactive to light.  Extraocular motions are normal.   Right pupil: Size: 3 mm. Shape: regular. Reactivity: brisk. Consensual response: intact. Accommodation: intact.   Left pupil: Size: 3 mm. Shape: regular. Reactivity: brisk. Consensual response: intact. Accommodation: intact.   CN III: no CN III palsy  CN VI: no CN VI palsy  Nystagmus: none   Diplopia: none  Upgaze: normal  Downgaze: normal  Conjugate gaze: present  Vestibulo-ocular reflex: present    CN V   Facial sensation intact.   Right facial sensation deficit: none  Left facial sensation deficit: none  Right corneal reflex: normal  Left corneal reflex: normal    CN VII   Facial expression full, symmetric.   Right facial weakness: none  Left facial weakness: none  Right taste: normal  Left taste: normal    CN VIII   CN VIII normal.   Hearing: intact    CN IX, X   CN IX normal.   CN X normal.   Palate: symmetric    CN XI   CN XI normal.   Right sternocleidomastoid strength: normal  Left sternocleidomastoid strength: normal  Right trapezius strength: normal  Left trapezius strength: normal    CN XII   CN XII normal.   Tongue: not atrophic  Fasciculations: absent  Tongue deviation: none    MOTOR EXAM   Muscle bulk: normal  Overall muscle tone: normal  Right arm tone: normal  Left arm tone: normal  Right arm pronator drift: absent  Left arm pronator drift: absent  Right leg tone: normal  Left leg tone: normal    Strength   Right neck flexion: 5/5  Left neck flexion: 5/5  Right neck extension: 5/5  Left neck extension: 5/5  Right  deltoid:   Left deltoid:   Right biceps:   Left biceps:   Right triceps:   Left triceps:   Right wrist flexion:   Left wrist flexion:   Right wrist extension:   Left wrist extension:   Right interossei:   Left interossei:   Right iliopsoas:   Left iliopsoas:   Right quadriceps:   Left quadriceps:   Right hamstrin/5  Left hamstrin/5  Right anterior tibial:   Left anterior tibial:   Right posterior tibial:   Left posterior tibial:   Right gastroc:   Left gastroc:     REFLEXES     Reflexes   Right brachioradialis: 1+  Left brachioradialis: 1+  Right biceps: 1+  Left biceps: 1+  Right triceps: 1+  Left triceps: 1+  Right patellar: 1+  Left patellar: 1+  Right achilles: 1+  Left achilles: 1+  Right : 2+  Left : 1+  Right plantar: normal  Left plantar: normal  Right Agrawal: absent  Left Agrawal: absent  Right ankle clonus: absent  Left ankle clonus: absent    SENSORY EXAM   Right arm light touch: decreased from fingers  Left arm light touch: decreased from fingers  Right leg light touch: decreased from knee  Left leg light touch: decreased from knee  Right arm vibration: normal  Left arm vibration: normal  Right leg vibration: decreased from knee  Left leg vibration: decreased from knee  Right arm proprioception: normal  Left arm proprioception: normal  Right leg proprioception: decreased from knee  Left leg proprioception: decreased from knee  Right arm pinprick: decreased from fingers  Left arm pinprick: decreased from fingers  Right leg pinprick: decreased from knee  Left leg pinprick: decreased from knee    GAIT AND COORDINATION     Gait  Gait: wide-based     Coordination   Finger to nose coordination: normal  Heel to shin coordination: normal  Tandem walking coordination: abnormal    Tremor   Resting tremor: absent  Intention tremor: absent  Action tremor: absent       Using cane for ambualtion        Physical Exam  Vitals and nursing  note reviewed.   Constitutional:       Appearance: Normal appearance.   HENT:      Head: Normocephalic.   Eyes:      Extraocular Movements: Extraocular movements intact and EOM normal.      Pupils: Pupils are equal, round, and reactive to light.   Cardiovascular:      Rate and Rhythm: Normal rate and regular rhythm.   Pulmonary:      Effort: Pulmonary effort is normal.      Breath sounds: Normal breath sounds.   Musculoskeletal:         General: No swelling or tenderness. Normal range of motion.      Cervical back: Normal range of motion and neck supple.      Right lower leg: No edema.      Left lower leg: No edema.   Skin:     General: Skin is warm and dry.      Coloration: Skin is not jaundiced.      Findings: No rash.   Neurological:      General: No focal deficit present.      Mental Status: She is alert and oriented to person, place, and time.      GCS: GCS eye subscore is 4. GCS verbal subscore is 5. GCS motor subscore is 6.      Cranial Nerves: No cranial nerve deficit.      Sensory: No sensory deficit.      Motor: Motor function is intact. No weakness.      Coordination: Coordination is intact. Coordination normal. Finger-Nose-Finger Test and Heel to Shin Test normal.      Gait: Tandem walk abnormal. Gait normal.      Deep Tendon Reflexes: Reflexes normal.      Reflex Scores:       Tricep reflexes are 1+ on the right side and 1+ on the left side.       Bicep reflexes are 1+ on the right side and 1+ on the left side.       Brachioradialis reflexes are 1+ on the right side and 1+ on the left side.       Patellar reflexes are 1+ on the right side and 1+ on the left side.       Achilles reflexes are 1+ on the right side and 1+ on the left side.  Psychiatric:         Mood and Affect: Mood normal.         Speech: Speech normal.         Behavior: Behavior normal.          Assessment:     Problem List Items Addressed This Visit        Neuro    Cerebrovascular accident (CVA) - Primary    Carpal tunnel syndrome on  both sides    Spinal stenosis, cervical region    Diabetic peripheral neuropathy associated with type 2 diabetes mellitus           Primary Diagnosis and ICD10  Cerebrovascular accident (CVA), unspecified mechanism [I63.9]    Plan:     Patient Instructions   1. Reviewed the recent HILDA scan, found negative    2. Decrease primidone to 25mg daily for a week then stop    3. Recommend wearing bilateral wrist splints for carpal tunnel symptoms    4. Suggest can get EMG/NCS of the lower ext, patient desires to wait for now to finish out physical therapy    Stroke risk modifiers:  1. Good sleep habits, recommend at least 7 hours total sleep daily  2. Continue management of blood pressure and cholesterol including medications, exercise, and good eating habits  3. Exercise as much as possible, recommend 5 days of the week for 30 minutes each day  4. Avoid smoking and alcohol  5. Go to the nearest emergency department immediately if any new or worsening weakness, speech difficulty, or numbness      Medications Discontinued During This Encounter   Medication Reason    primidone (MYSOLINE) 50 MG Tab        Requested Prescriptions      No prescriptions requested or ordered in this encounter       No orders of the defined types were placed in this encounter.

## 2022-04-28 NOTE — PATIENT INSTRUCTIONS
1. Reviewed the recent HILDA scan, found negative    2. Decrease primidone to 25mg daily for a week then stop    3. Recommend wearing bilateral wrist splints for carpal tunnel symptoms    4. Suggest can get EMG/NCS of the lower ext, patient desires to wait for now to finish out physical therapy    Stroke risk modifiers:  1. Good sleep habits, recommend at least 7 hours total sleep daily  2. Continue management of blood pressure and cholesterol including medications, exercise, and good eating habits  3. Exercise as much as possible, recommend 5 days of the week for 30 minutes each day  4. Avoid smoking and alcohol  5. Go to the nearest emergency department immediately if any new or worsening weakness, speech difficulty, or numbness

## 2022-05-05 ENCOUNTER — OFFICE VISIT (OUTPATIENT)
Dept: FAMILY MEDICINE | Facility: CLINIC | Age: 67
End: 2022-05-05
Payer: MEDICARE

## 2022-05-05 VITALS
WEIGHT: 187 LBS | HEIGHT: 64 IN | TEMPERATURE: 98 F | RESPIRATION RATE: 16 BRPM | SYSTOLIC BLOOD PRESSURE: 128 MMHG | HEART RATE: 54 BPM | DIASTOLIC BLOOD PRESSURE: 78 MMHG | BODY MASS INDEX: 31.92 KG/M2 | OXYGEN SATURATION: 96 %

## 2022-05-05 DIAGNOSIS — Z79.899 ENCOUNTER FOR LONG-TERM (CURRENT) USE OF OTHER MEDICATIONS: ICD-10-CM

## 2022-05-05 DIAGNOSIS — E11.9 TYPE 2 DIABETES MELLITUS WITHOUT COMPLICATION, WITHOUT LONG-TERM CURRENT USE OF INSULIN: Primary | ICD-10-CM

## 2022-05-05 PROCEDURE — 99212 PR OFFICE/OUTPT VISIT, EST, LEVL II, 10-19 MIN: ICD-10-PCS | Mod: ,,, | Performed by: FAMILY MEDICINE

## 2022-05-05 PROCEDURE — 99212 OFFICE O/P EST SF 10 MIN: CPT | Mod: ,,, | Performed by: FAMILY MEDICINE

## 2022-05-05 NOTE — PROGRESS NOTES
Subjective:       Patient ID: Karen Renteria is a 66 y.o. female.    Chief Complaint: Diabetes    Sugars been running high.  Fasting in the upper 200's.  Here to discuss tx options.    Review of Systems   Constitutional: Negative for appetite change, chills, fatigue, fever and unexpected weight change.   Respiratory: Negative for cough and shortness of breath.    Cardiovascular: Negative for chest pain and leg swelling.   Gastrointestinal: Negative for abdominal pain.   Musculoskeletal: Negative for arthralgias.   Integumentary:  Negative for rash.   Neurological: Negative for weakness.   Psychiatric/Behavioral: The patient is not nervous/anxious.          Objective:      Physical Exam  Constitutional:       General: She is not in acute distress.     Appearance: Normal appearance.   Cardiovascular:      Rate and Rhythm: Normal rate and regular rhythm.      Heart sounds: Normal heart sounds.   Pulmonary:      Breath sounds: Normal breath sounds.   Abdominal:      General: Abdomen is flat.      Palpations: Abdomen is soft.   Skin:     General: Skin is warm and dry.   Neurological:      Mental Status: She is alert. Mental status is at baseline.   Psychiatric:         Mood and Affect: Mood normal.         Behavior: Behavior normal.         Thought Content: Thought content normal.         Judgment: Judgment normal.         Assessment:       1. Type 2 diabetes mellitus without complication, without long-term current use of insulin     2. Encounter for long-term (current) use of other medications         Plan:       Discussed how to go up on tresiba by 2u Q2 days until FBG consistently <200 and preferably around 150.  Gave samples and also trulicity 1.5.

## 2022-05-07 RX ORDER — INSULIN DEGLUDEC 100 U/ML
50 INJECTION, SOLUTION SUBCUTANEOUS DAILY
COMMUNITY
End: 2022-05-26

## 2022-05-23 ENCOUNTER — TELEPHONE (OUTPATIENT)
Dept: FAMILY MEDICINE | Facility: CLINIC | Age: 67
End: 2022-05-23
Payer: MEDICARE

## 2022-05-23 DIAGNOSIS — E11.9 TYPE 2 DIABETES MELLITUS WITHOUT COMPLICATION, WITHOUT LONG-TERM CURRENT USE OF INSULIN: Primary | ICD-10-CM

## 2022-05-23 RX ORDER — GLIMEPIRIDE 4 MG/1
TABLET ORAL
Qty: 60 TABLET | Refills: 0 | Status: SHIPPED | OUTPATIENT
Start: 2022-05-23 | End: 2022-05-26 | Stop reason: SDUPTHER

## 2022-05-26 ENCOUNTER — OFFICE VISIT (OUTPATIENT)
Dept: FAMILY MEDICINE | Facility: CLINIC | Age: 67
End: 2022-05-26
Payer: MEDICARE

## 2022-05-26 VITALS
HEIGHT: 64 IN | TEMPERATURE: 98 F | OXYGEN SATURATION: 95 % | DIASTOLIC BLOOD PRESSURE: 60 MMHG | SYSTOLIC BLOOD PRESSURE: 118 MMHG | HEART RATE: 56 BPM | BODY MASS INDEX: 31.58 KG/M2 | WEIGHT: 185 LBS | RESPIRATION RATE: 16 BRPM

## 2022-05-26 DIAGNOSIS — R49.0 HOARSENESS: ICD-10-CM

## 2022-05-26 DIAGNOSIS — Z79.899 ENCOUNTER FOR LONG-TERM (CURRENT) USE OF OTHER MEDICATIONS: ICD-10-CM

## 2022-05-26 DIAGNOSIS — E11.9 TYPE 2 DIABETES MELLITUS WITHOUT COMPLICATION, WITHOUT LONG-TERM CURRENT USE OF INSULIN: Primary | ICD-10-CM

## 2022-05-26 DIAGNOSIS — I10 PRIMARY HYPERTENSION: ICD-10-CM

## 2022-05-26 DIAGNOSIS — E78.2 MIXED HYPERLIPIDEMIA: ICD-10-CM

## 2022-05-26 LAB
ALBUMIN SERPL BCP-MCNC: 3.8 G/DL (ref 3.5–5)
ALBUMIN/GLOB SERPL: 1 {RATIO}
ALP SERPL-CCNC: 95 U/L (ref 55–142)
ALT SERPL W P-5'-P-CCNC: 74 U/L (ref 13–56)
ANION GAP SERPL CALCULATED.3IONS-SCNC: 12 MMOL/L (ref 7–16)
AST SERPL W P-5'-P-CCNC: 95 U/L (ref 15–37)
BILIRUB SERPL-MCNC: 0.4 MG/DL (ref 0–1.2)
BUN SERPL-MCNC: 29 MG/DL (ref 7–18)
BUN/CREAT SERPL: 20 (ref 6–20)
CALCIUM SERPL-MCNC: 9.2 MG/DL (ref 8.5–10.1)
CHLORIDE SERPL-SCNC: 104 MMOL/L (ref 98–107)
CO2 SERPL-SCNC: 27 MMOL/L (ref 21–32)
CREAT SERPL-MCNC: 1.48 MG/DL (ref 0.55–1.02)
EST. AVERAGE GLUCOSE BLD GHB EST-MCNC: 270 MG/DL
GLOBULIN SER-MCNC: 3.7 G/DL (ref 2–4)
GLUCOSE SERPL-MCNC: 216 MG/DL (ref 74–106)
HBA1C MFR BLD HPLC: 10.7 % (ref 4.5–6.6)
POTASSIUM SERPL-SCNC: 5 MMOL/L (ref 3.5–5.1)
PROT SERPL-MCNC: 7.5 G/DL (ref 6.4–8.2)
SODIUM SERPL-SCNC: 138 MMOL/L (ref 136–145)
T4 FREE SERPL-MCNC: 0.89 NG/DL (ref 0.76–1.46)
TSH SERPL DL<=0.005 MIU/L-ACNC: 5.8 UIU/ML (ref 0.36–3.74)

## 2022-05-26 PROCEDURE — 80053 COMPREHENSIVE METABOLIC PANEL: ICD-10-PCS | Mod: ,,, | Performed by: CLINICAL MEDICAL LABORATORY

## 2022-05-26 PROCEDURE — 84443 ASSAY THYROID STIM HORMONE: CPT | Mod: ,,, | Performed by: CLINICAL MEDICAL LABORATORY

## 2022-05-26 PROCEDURE — 84443 TSH: ICD-10-PCS | Mod: ,,, | Performed by: CLINICAL MEDICAL LABORATORY

## 2022-05-26 PROCEDURE — 84439 T4, FREE: ICD-10-PCS | Mod: ,,, | Performed by: CLINICAL MEDICAL LABORATORY

## 2022-05-26 PROCEDURE — 80053 COMPREHEN METABOLIC PANEL: CPT | Mod: ,,, | Performed by: CLINICAL MEDICAL LABORATORY

## 2022-05-26 PROCEDURE — 83036 HEMOGLOBIN A1C: ICD-10-PCS | Mod: ,,, | Performed by: CLINICAL MEDICAL LABORATORY

## 2022-05-26 PROCEDURE — 83036 HEMOGLOBIN GLYCOSYLATED A1C: CPT | Mod: ,,, | Performed by: CLINICAL MEDICAL LABORATORY

## 2022-05-26 PROCEDURE — 99214 PR OFFICE/OUTPT VISIT, EST, LEVL IV, 30-39 MIN: ICD-10-PCS | Mod: ,,, | Performed by: FAMILY MEDICINE

## 2022-05-26 PROCEDURE — 84439 ASSAY OF FREE THYROXINE: CPT | Mod: ,,, | Performed by: CLINICAL MEDICAL LABORATORY

## 2022-05-26 PROCEDURE — 99214 OFFICE O/P EST MOD 30 MIN: CPT | Mod: ,,, | Performed by: FAMILY MEDICINE

## 2022-05-26 RX ORDER — PRIMIDONE 50 MG/1
TABLET ORAL
Qty: 15 TABLET | Refills: 11 | Status: SHIPPED | OUTPATIENT
Start: 2022-05-26 | End: 2023-06-05 | Stop reason: SDUPTHER

## 2022-05-26 RX ORDER — INSULIN ASPART 100 [IU]/ML
20 INJECTION, SOLUTION INTRAVENOUS; SUBCUTANEOUS
Qty: 12 EACH | Refills: 11 | Status: SHIPPED | OUTPATIENT
Start: 2022-05-26 | End: 2022-10-28 | Stop reason: SDUPTHER

## 2022-05-26 RX ORDER — ISOSORBIDE MONONITRATE 30 MG/1
30 TABLET, EXTENDED RELEASE ORAL DAILY
Qty: 30 TABLET | Refills: 11 | Status: SHIPPED | OUTPATIENT
Start: 2022-05-26 | End: 2023-06-05 | Stop reason: SDUPTHER

## 2022-05-26 RX ORDER — PROPRANOLOL HYDROCHLORIDE 80 MG/1
80 CAPSULE, EXTENDED RELEASE ORAL DAILY
Qty: 30 CAPSULE | Refills: 11 | Status: SHIPPED | OUTPATIENT
Start: 2022-05-26 | End: 2022-10-27

## 2022-05-26 RX ORDER — PANTOPRAZOLE SODIUM 40 MG/1
40 TABLET, DELAYED RELEASE ORAL DAILY
Qty: 30 TABLET | Refills: 11 | Status: ON HOLD | OUTPATIENT
Start: 2022-05-26 | End: 2023-12-13 | Stop reason: HOSPADM

## 2022-05-26 RX ORDER — GLIMEPIRIDE 4 MG/1
TABLET ORAL
Qty: 60 TABLET | Refills: 11 | Status: SHIPPED | OUTPATIENT
Start: 2022-05-26 | End: 2023-12-08

## 2022-05-26 RX ORDER — SEMAGLUTIDE 1.34 MG/ML
0.5 INJECTION, SOLUTION SUBCUTANEOUS
Qty: 4 PEN | Refills: 11 | Status: SHIPPED | OUTPATIENT
Start: 2022-05-26 | End: 2022-10-27 | Stop reason: SDUPTHER

## 2022-05-26 RX ORDER — MECLIZINE HYDROCHLORIDE 25 MG/1
25 TABLET ORAL 3 TIMES DAILY PRN
Qty: 90 TABLET | Refills: 11 | Status: SHIPPED | OUTPATIENT
Start: 2022-05-26 | End: 2022-10-27 | Stop reason: SDUPTHER

## 2022-05-26 RX ORDER — GABAPENTIN 300 MG/1
300 CAPSULE ORAL 3 TIMES DAILY
Qty: 90 CAPSULE | Refills: 11 | Status: SHIPPED | OUTPATIENT
Start: 2022-05-26 | End: 2022-08-31

## 2022-05-26 RX ORDER — INSULIN DEGLUDEC 200 U/ML
100 INJECTION, SOLUTION SUBCUTANEOUS DAILY
Qty: 12 PEN | Refills: 11 | Status: SHIPPED | OUTPATIENT
Start: 2022-05-26 | End: 2023-05-19

## 2022-05-26 NOTE — PROGRESS NOTES
Subjective:       Patient ID: Karen Renteria is a 66 y.o. female.    Chief Complaint: Follow-up and Sinusitis    Still having high BGs.  Needs some refills and wondering if can stay on primidone since she feels that it does help with tremor.  Having persistent hoarseness, wondering what is next step.    Diabetes  She has type 2 diabetes mellitus. No MedicAlert identification noted. Hypoglycemia symptoms include dizziness, headaches, nervousness/anxiousness and sleepiness. Pertinent negatives for hypoglycemia include no confusion, hunger, mood changes, pallor, seizures, speech difficulty, sweats or tremors. Pertinent negatives for diabetes include no blurred vision, no chest pain, no fatigue, no foot paresthesias, no foot ulcerations, no polydipsia, no polyphagia, no polyuria, no visual change, no weakness and no weight loss. Pertinent negatives for hypoglycemia complications include no blackouts, no hospitalization, no nocturnal hypoglycemia, no required assistance and no required glucagon injection. Symptoms are improving. Diabetic complications include autonomic neuropathy, a CVA, heart disease, peripheral neuropathy and retinopathy. Pertinent negatives for diabetic complications include no nephropathy or PVD. Risk factors for coronary artery disease include family history, obesity and stress. Current diabetic treatment includes insulin injections and oral agent (monotherapy). She is compliant with treatment all of the time. She is currently taking insulin pre-breakfast and at bedtime. Insulin injections are given by patient. Rotation sites for injection include the abdominal wall. Her weight is increasing rapidly. She is following a generally healthy diet. Meal planning includes avoidance of concentrated sweets. She has not had a previous visit with a dietitian. She participates in exercise three times a week. She monitors blood glucose at home 1-2 x per day. Blood glucose monitoring compliance is adequate.  She does not see a podiatrist.Eye exam is not current.     Review of Systems   Constitutional: Negative for appetite change, chills, fatigue, fever, unexpected weight change and weight loss.   HENT: Positive for voice change.    Eyes: Negative for blurred vision.   Respiratory: Negative for cough and shortness of breath.    Cardiovascular: Negative for chest pain and leg swelling.   Gastrointestinal: Negative for abdominal pain.   Endocrine: Negative for polydipsia, polyphagia and polyuria.   Musculoskeletal: Negative for arthralgias.   Integumentary:  Negative for pallor and rash.   Neurological: Positive for dizziness and headaches. Negative for tremors, seizures, speech difficulty and weakness.   Psychiatric/Behavioral: Negative for confusion. The patient is nervous/anxious.          Objective:      Physical Exam  Constitutional:       General: She is not in acute distress.     Appearance: Normal appearance.   Cardiovascular:      Rate and Rhythm: Normal rate and regular rhythm.      Heart sounds: Normal heart sounds.   Pulmonary:      Breath sounds: Normal breath sounds.   Abdominal:      General: Abdomen is flat.      Palpations: Abdomen is soft.   Skin:     General: Skin is warm and dry.   Neurological:      Mental Status: She is alert. Mental status is at baseline.   Psychiatric:         Mood and Affect: Mood normal.         Behavior: Behavior normal.         Thought Content: Thought content normal.         Judgment: Judgment normal.         Assessment:       1. Type 2 diabetes mellitus without complication, without long-term current use of insulin  Comprehensive Metabolic Panel    Hemoglobin A1C    Comprehensive Metabolic Panel    Hemoglobin A1C    glimepiride (AMARYL) 4 MG tablet   2. Encounter for long-term (current) use of other medications  Comprehensive Metabolic Panel    Hemoglobin A1C    Comprehensive Metabolic Panel    Hemoglobin A1C   3. Primary hypertension  Comprehensive Metabolic Panel    TSH     T4, Free    Comprehensive Metabolic Panel    TSH    T4, Free   4. Mixed hyperlipidemia  Comprehensive Metabolic Panel    TSH    T4, Free    Comprehensive Metabolic Panel    TSH    T4, Free   5. Hoarseness  Ambulatory referral/consult to ENT       Plan:       Keep going up on tresiba, applying for pt assistance so will switch her to [facility]  ENT--persistent hoarseness  She is getting PT for dizziness and says it is helpful.

## 2022-05-26 NOTE — PROGRESS NOTES
Answers for HPI/ROS submitted by the patient on 5/19/2022  Diabetes type: type 2  MedicAlert ID: No  blurred vision: No  chest pain: No  fatigue: No  foot paresthesias: No  foot ulcerations: No  polydipsia: No  polyphagia: No  polyuria: No  visual change: No  weakness: No  weight loss: No  Symptom course: improving  confusion: No  dizziness: Yes  headaches: Yes  hunger: No  mood changes: No  nervous/anxious: Yes  pallor: No  seizures: No  sleepiness: Yes  speech difficulty: No  sweats: No  tremors: No  blackouts: No  hospitalization: No  nocturnal hypoglycemia: No  required assistance: No  required glucagon: No  CVA: Yes  heart disease: Yes  nephropathy: No  peripheral neuropathy: Yes  PVD: No  retinopathy: Yes  autonomic neuropathy: Yes  CAD risks: family history, obesity, stress  Current treatments: insulin injections, oral agent (monotherapy)  Treatment compliance: all of the time  Dose schedule: pre-breakfast, at bedtime  Given by: patient  Injection sites: abdominal wall  Home blood tests: 1-2 x per day  Monitoring compliance: adequate  Weight trend: increasing rapidly  Current diet: generally healthy  Meal planning: avoidance of concentrated sweets  Exercise: three times a week  Dietitian visit: No  Eye exam current: No  Sees podiatrist: No

## 2022-06-03 DIAGNOSIS — R49.0 HOARSENESS: Primary | ICD-10-CM

## 2022-06-03 DIAGNOSIS — I25.10 CORONARY ARTERIOSCLEROSIS: ICD-10-CM

## 2022-06-03 DIAGNOSIS — R55 SYNCOPE, UNSPECIFIED SYNCOPE TYPE: ICD-10-CM

## 2022-06-03 DIAGNOSIS — R79.89 ABNORMAL THYROID BLOOD TEST: ICD-10-CM

## 2022-06-08 DIAGNOSIS — R49.0 HOARSENESS: Primary | ICD-10-CM

## 2022-06-09 ENCOUNTER — TELEPHONE (OUTPATIENT)
Dept: FAMILY MEDICINE | Facility: CLINIC | Age: 67
End: 2022-06-09
Payer: MEDICARE

## 2022-06-16 ENCOUNTER — OFFICE VISIT (OUTPATIENT)
Dept: OTOLARYNGOLOGY | Facility: CLINIC | Age: 67
End: 2022-06-16
Payer: MEDICARE

## 2022-06-16 ENCOUNTER — HOSPITAL ENCOUNTER (OUTPATIENT)
Dept: RADIOLOGY | Facility: HOSPITAL | Age: 67
Discharge: HOME | End: 2022-06-16
Attending: FAMILY MEDICINE
Payer: MEDICARE

## 2022-06-16 VITALS — BODY MASS INDEX: 31.58 KG/M2 | WEIGHT: 185 LBS | HEIGHT: 64 IN

## 2022-06-16 DIAGNOSIS — R79.89 ABNORMAL THYROID BLOOD TEST: ICD-10-CM

## 2022-06-16 DIAGNOSIS — R49.0 HOARSENESS: ICD-10-CM

## 2022-06-16 DIAGNOSIS — J38.1 VOCAL CORD POLYPS: Primary | ICD-10-CM

## 2022-06-16 PROCEDURE — 99214 PR OFFICE/OUTPT VISIT, EST, LEVL IV, 30-39 MIN: ICD-10-PCS | Mod: S$PBB,,, | Performed by: OTOLARYNGOLOGY

## 2022-06-16 PROCEDURE — 99214 OFFICE O/P EST MOD 30 MIN: CPT | Mod: S$PBB,,, | Performed by: OTOLARYNGOLOGY

## 2022-06-16 PROCEDURE — 76536 US THYROID: ICD-10-PCS | Mod: 26,,, | Performed by: RADIOLOGY

## 2022-06-16 PROCEDURE — 99214 OFFICE O/P EST MOD 30 MIN: CPT | Mod: PBBFAC | Performed by: OTOLARYNGOLOGY

## 2022-06-16 PROCEDURE — 76536 US EXAM OF HEAD AND NECK: CPT | Mod: TC

## 2022-06-16 PROCEDURE — 76536 US EXAM OF HEAD AND NECK: CPT | Mod: 26,,, | Performed by: RADIOLOGY

## 2022-06-16 NOTE — PROGRESS NOTES
Subjective:       Patient ID: Karen Renteria is a 66 y.o. female.    Chief Complaint: Hoarse (Pt states she has been hoarse for a couple of months. ) and Sore Throat (Pt states throat gets sore and scratchy feeling from clearing throat frequently. )    HPI  Review of Systems   HENT: Positive for voice change.    All other systems reviewed and are negative.      Objective:      Physical Exam  General: NAD  Head: Normocephalic, atraumatic, no facial asymmetry/normal strength,  Ears: Both auricules normal in appearance, w/o deformities tympanic membranes normal external auditory canals normal  Nose: External nose w/o deformities normal turbinates no drainage or inflammation  Oral Cavity: Lips, gums, floor of mouth, tongue hard palate, and buccal mucosa without mass/lesion  Oropharynx: Mucosa pink and moist, soft palate, posterior pharynx and oropharyngeal wall without mass/lesion  Neck: Supple, symmetric, trachea midline, no palpable mass/lesion, no palpable cervical lymphadenopathy  Skin: Warm and dry, no concerning lesions  Respiratory: Respirations even, unlabored    Nasal/Nasopharyngo/Laryn/Hypopharyngoscopy Procedures    Procedure:  Diagnostic nasal, nasopharyngoscopy, laryngoscopy and hypopharyngoscopy.    Routine preparation with local atomizer with 1% neosynephrine and lidocaine . With customary flexible endoscope.     NOSE:   External:  No gross deformity   Intranasal:    Mucosa:  No polyps, ulcers or lesions.    Septum:  No gross deformity.    Turbinates:  Not enlarged.    Nasopharynx:  No lesions.   Mucosa:  No lesions..   Posterior Choanae:  Patent.   Eustachian Tubes:  Patent.  Larynx/hypopharynx:   Epiglottis:  No lesions, without edema.   AE Folds:  No lesions.   Vocal cords:   Polyp left ; nl mobility   Subglottis: No obvious stenosis   Hypopharynx:  No lesions.   Piriform sinus:  No pooling or lesions.   Post Cricoid:  No edema or erythema    Assessment:       1. Vocal cord polyps        Plan:        Watch   F/u 6 weeks May need DL

## 2022-07-05 ENCOUNTER — HOSPITAL ENCOUNTER (OUTPATIENT)
Dept: RADIOLOGY | Facility: HOSPITAL | Age: 67
Discharge: HOME | End: 2022-07-05
Attending: RADIOLOGY
Payer: MEDICARE

## 2022-07-05 DIAGNOSIS — Z12.31 VISIT FOR SCREENING MAMMOGRAM: ICD-10-CM

## 2022-07-05 PROCEDURE — 77067 SCR MAMMO BI INCL CAD: CPT | Mod: TC

## 2022-07-13 LAB — HBA1C MFR BLD: 9.2 %

## 2022-07-21 ENCOUNTER — OFFICE VISIT (OUTPATIENT)
Dept: FAMILY MEDICINE | Facility: CLINIC | Age: 67
End: 2022-07-21
Payer: MEDICARE

## 2022-07-21 VITALS
HEIGHT: 64 IN | HEART RATE: 120 BPM | DIASTOLIC BLOOD PRESSURE: 70 MMHG | RESPIRATION RATE: 18 BRPM | OXYGEN SATURATION: 94 % | BODY MASS INDEX: 32.27 KG/M2 | SYSTOLIC BLOOD PRESSURE: 120 MMHG | WEIGHT: 189 LBS | TEMPERATURE: 98 F

## 2022-07-21 DIAGNOSIS — I10 PRIMARY HYPERTENSION: ICD-10-CM

## 2022-07-21 DIAGNOSIS — E11.9 TYPE 2 DIABETES MELLITUS WITHOUT COMPLICATION, WITHOUT LONG-TERM CURRENT USE OF INSULIN: ICD-10-CM

## 2022-07-21 DIAGNOSIS — R25.2 CRAMPS, MUSCLE, GENERAL: Primary | ICD-10-CM

## 2022-07-21 DIAGNOSIS — R79.89 ABNORMAL THYROID BLOOD TEST: ICD-10-CM

## 2022-07-21 DIAGNOSIS — Z79.899 ENCOUNTER FOR LONG-TERM (CURRENT) USE OF OTHER MEDICATIONS: ICD-10-CM

## 2022-07-21 LAB
ALBUMIN SERPL BCP-MCNC: 3.6 G/DL (ref 3.5–5)
ALBUMIN/GLOB SERPL: 0.9 {RATIO}
ALP SERPL-CCNC: 105 U/L (ref 55–142)
ALT SERPL W P-5'-P-CCNC: 83 U/L (ref 13–56)
ANION GAP SERPL CALCULATED.3IONS-SCNC: 15 MMOL/L (ref 7–16)
AST SERPL W P-5'-P-CCNC: 127 U/L (ref 15–37)
BASOPHILS # BLD AUTO: 0.13 K/UL (ref 0–0.2)
BASOPHILS NFR BLD AUTO: 1.3 % (ref 0–1)
BILIRUB SERPL-MCNC: 0.5 MG/DL (ref 0–1.2)
BUN SERPL-MCNC: 23 MG/DL (ref 7–18)
BUN/CREAT SERPL: 14 (ref 6–20)
CALCIUM SERPL-MCNC: 8.8 MG/DL (ref 8.5–10.1)
CHLORIDE SERPL-SCNC: 102 MMOL/L (ref 98–107)
CO2 SERPL-SCNC: 21 MMOL/L (ref 21–32)
CREAT SERPL-MCNC: 1.66 MG/DL (ref 0.55–1.02)
DIFFERENTIAL METHOD BLD: ABNORMAL
EOSINOPHIL # BLD AUTO: 0.42 K/UL (ref 0–0.5)
EOSINOPHIL NFR BLD AUTO: 4.3 % (ref 1–4)
ERYTHROCYTE [DISTWIDTH] IN BLOOD BY AUTOMATED COUNT: 14.5 % (ref 11.5–14.5)
GLOBULIN SER-MCNC: 4.2 G/DL (ref 2–4)
GLUCOSE SERPL-MCNC: 289 MG/DL (ref 74–106)
HCT VFR BLD AUTO: 41 % (ref 38–47)
HGB BLD-MCNC: 13.5 G/DL (ref 12–16)
IMM GRANULOCYTES # BLD AUTO: 0.04 K/UL (ref 0–0.04)
IMM GRANULOCYTES NFR BLD: 0.4 % (ref 0–0.4)
LYMPHOCYTES # BLD AUTO: 1.88 K/UL (ref 1–4.8)
LYMPHOCYTES NFR BLD AUTO: 19.4 % (ref 27–41)
MAGNESIUM SERPL-MCNC: 2.1 MG/DL (ref 1.7–2.3)
MCH RBC QN AUTO: 27.7 PG (ref 27–31)
MCHC RBC AUTO-ENTMCNC: 32.9 G/DL (ref 32–36)
MCV RBC AUTO: 84.2 FL (ref 80–96)
MONOCYTES # BLD AUTO: 0.79 K/UL (ref 0–0.8)
MONOCYTES NFR BLD AUTO: 8.2 % (ref 2–6)
MPC BLD CALC-MCNC: 11.8 FL (ref 9.4–12.4)
NEUTROPHILS # BLD AUTO: 6.43 K/UL (ref 1.8–7.7)
NEUTROPHILS NFR BLD AUTO: 66.4 % (ref 53–65)
NRBC # BLD AUTO: 0 X10E3/UL
NRBC, AUTO (.00): 0 %
PLATELET # BLD AUTO: 327 K/UL (ref 150–400)
POTASSIUM SERPL-SCNC: 5 MMOL/L (ref 3.5–5.1)
PROT SERPL-MCNC: 7.8 G/DL (ref 6.4–8.2)
RBC # BLD AUTO: 4.87 M/UL (ref 4.2–5.4)
SODIUM SERPL-SCNC: 133 MMOL/L (ref 136–145)
T4 FREE SERPL-MCNC: 0.88 NG/DL (ref 0.76–1.46)
TSH SERPL DL<=0.005 MIU/L-ACNC: 3.74 UIU/ML (ref 0.36–3.74)
WBC # BLD AUTO: 9.69 K/UL (ref 4.5–11)

## 2022-07-21 PROCEDURE — 80053 COMPREHEN METABOLIC PANEL: CPT | Mod: ,,, | Performed by: CLINICAL MEDICAL LABORATORY

## 2022-07-21 PROCEDURE — 85025 CBC WITH DIFFERENTIAL: ICD-10-PCS | Mod: ,,, | Performed by: CLINICAL MEDICAL LABORATORY

## 2022-07-21 PROCEDURE — 84443 TSH: ICD-10-PCS | Mod: ,,, | Performed by: CLINICAL MEDICAL LABORATORY

## 2022-07-21 PROCEDURE — 99213 OFFICE O/P EST LOW 20 MIN: CPT | Mod: ,,, | Performed by: FAMILY MEDICINE

## 2022-07-21 PROCEDURE — 80053 COMPREHENSIVE METABOLIC PANEL: ICD-10-PCS | Mod: ,,, | Performed by: CLINICAL MEDICAL LABORATORY

## 2022-07-21 PROCEDURE — 83735 MAGNESIUM: ICD-10-PCS | Mod: ,,, | Performed by: CLINICAL MEDICAL LABORATORY

## 2022-07-21 PROCEDURE — 85025 COMPLETE CBC W/AUTO DIFF WBC: CPT | Mod: ,,, | Performed by: CLINICAL MEDICAL LABORATORY

## 2022-07-21 PROCEDURE — 99213 PR OFFICE/OUTPT VISIT, EST, LEVL III, 20-29 MIN: ICD-10-PCS | Mod: ,,, | Performed by: FAMILY MEDICINE

## 2022-07-21 PROCEDURE — 83735 ASSAY OF MAGNESIUM: CPT | Mod: ,,, | Performed by: CLINICAL MEDICAL LABORATORY

## 2022-07-21 PROCEDURE — 84439 T4, FREE: ICD-10-PCS | Mod: ,,, | Performed by: CLINICAL MEDICAL LABORATORY

## 2022-07-21 PROCEDURE — 84443 ASSAY THYROID STIM HORMONE: CPT | Mod: ,,, | Performed by: CLINICAL MEDICAL LABORATORY

## 2022-07-21 PROCEDURE — 84439 ASSAY OF FREE THYROXINE: CPT | Mod: ,,, | Performed by: CLINICAL MEDICAL LABORATORY

## 2022-07-21 NOTE — PROGRESS NOTES
Subjective:       Patient ID: Karen Renteria is a 66 y.o. female.    Chief Complaint: Follow-up    Felt great after pacemaker until 2 days ago.  Had bad leg cramp in middle of night.  Got it better eventually but then when laid down, got cramp in the other leg.  Having quivering, feels weak and swollen.  Started a new insulin last week (we gave her a sample).  Having stomach cramps.      Review of Systems   Constitutional: Positive for fatigue. Negative for appetite change, chills, fever and unexpected weight change.   HENT: Positive for voice change.    Respiratory: Negative for cough and shortness of breath.    Cardiovascular: Negative for chest pain and leg swelling.   Gastrointestinal: Positive for abdominal pain.   Musculoskeletal: Positive for arthralgias and myalgias.   Integumentary:  Negative for rash.   Neurological: Positive for tremors and weakness.   Psychiatric/Behavioral: Positive for sleep disturbance. The patient is not nervous/anxious.          Objective:      Physical Exam  Constitutional:       General: She is not in acute distress.     Appearance: Normal appearance.   Cardiovascular:      Rate and Rhythm: Normal rate and regular rhythm.      Heart sounds: Normal heart sounds.   Pulmonary:      Breath sounds: Normal breath sounds.   Abdominal:      General: Abdomen is flat.      Palpations: Abdomen is soft.   Skin:     General: Skin is warm and dry.   Neurological:      Mental Status: She is alert. Mental status is at baseline.   Psychiatric:         Mood and Affect: Mood normal.         Behavior: Behavior normal.         Thought Content: Thought content normal.         Judgment: Judgment normal.         Assessment:       1. Cramps, muscle, general  CBC Auto Differential    Comprehensive Metabolic Panel    Magnesium    T4, Free    TSH    CBC Auto Differential    Comprehensive Metabolic Panel    Magnesium    T4, Free    TSH   2. Abnormal thyroid blood test  CBC Auto Differential    T4, Free     TSH    CBC Auto Differential    T4, Free    TSH   3. Type 2 diabetes mellitus without complication, without long-term current use of insulin  Comprehensive Metabolic Panel    Comprehensive Metabolic Panel   4. Encounter for long-term (current) use of other medications  CBC Auto Differential    Comprehensive Metabolic Panel    Magnesium    CBC Auto Differential    Comprehensive Metabolic Panel    Magnesium   5. Primary hypertension  CBC Auto Differential    Comprehensive Metabolic Panel    Magnesium    CBC Auto Differential    Comprehensive Metabolic Panel    Magnesium       Plan:       Checking labs.  Stop lantus (?).  Rep bringing sample of tresiba tomorrow, she will .  Stay hydrated with water, consider compression socks.

## 2022-07-27 DIAGNOSIS — I10 PRIMARY HYPERTENSION: ICD-10-CM

## 2022-07-27 DIAGNOSIS — Z79.899 ENCOUNTER FOR LONG-TERM (CURRENT) USE OF OTHER MEDICATIONS: ICD-10-CM

## 2022-07-27 DIAGNOSIS — E11.9 TYPE 2 DIABETES MELLITUS WITHOUT COMPLICATION, WITHOUT LONG-TERM CURRENT USE OF INSULIN: Primary | ICD-10-CM

## 2022-07-27 DIAGNOSIS — R74.8 ELEVATED LIVER ENZYMES: ICD-10-CM

## 2022-07-28 ENCOUNTER — OFFICE VISIT (OUTPATIENT)
Dept: OTOLARYNGOLOGY | Facility: CLINIC | Age: 67
End: 2022-07-28
Payer: MEDICARE

## 2022-07-28 VITALS — HEIGHT: 64 IN | WEIGHT: 189 LBS | BODY MASS INDEX: 32.27 KG/M2

## 2022-07-28 DIAGNOSIS — J38.1 VOCAL CORD POLYPS: Primary | ICD-10-CM

## 2022-07-28 PROCEDURE — 99214 OFFICE O/P EST MOD 30 MIN: CPT | Mod: S$PBB,,, | Performed by: OTOLARYNGOLOGY

## 2022-07-28 PROCEDURE — 99215 OFFICE O/P EST HI 40 MIN: CPT | Mod: PBBFAC | Performed by: OTOLARYNGOLOGY

## 2022-07-28 PROCEDURE — 99214 PR OFFICE/OUTPT VISIT, EST, LEVL IV, 30-39 MIN: ICD-10-PCS | Mod: S$PBB,,, | Performed by: OTOLARYNGOLOGY

## 2022-07-28 NOTE — H&P (VIEW-ONLY)
Subjective:       Patient ID: Karen Renteria is a 66 y.o. female.    Chief Complaint: Sore Throat (Pt states throat gets sore with coughing.) and Hoarse  still hoarse especially in the PM   HPI  Review of Systems   HENT: Positive for voice change.    Respiratory: Positive for cough.    All other systems reviewed and are negative.      Objective:      Physical Exam  General: NAD Hoarse   Head: Normocephalic, atraumatic, no facial asymmetry/normal strength,  Ears: Both auricules normal in appearance, w/o deformities tympanic membranes normal external auditory canals normal  Nose: External nose w/o deformities normal turbinates no drainage or inflammation  Oral Cavity: Lips, gums, floor of mouth, tongue hard palate, and buccal mucosa without mass/lesion  Oropharynx: Mucosa pink and moist, soft palate, posterior pharynx and oropharyngeal wall without mass/lesion  Neck: Supple, symmetric, trachea midline, no palpable mass/lesion, no palpable cervical lymphadenopathy  Skin: Warm and dry, no concerning lesions  Respiratory: Respirations even, unlabored    Assessment:       1. Vocal cord polyps        Plan:       DL with excision VC polyps

## 2022-08-09 ENCOUNTER — TELEPHONE (OUTPATIENT)
Dept: OTOLARYNGOLOGY | Facility: CLINIC | Age: 67
End: 2022-08-09
Payer: MEDICARE

## 2022-08-12 ENCOUNTER — ANESTHESIA EVENT (OUTPATIENT)
Dept: SURGERY | Facility: HOSPITAL | Age: 67
End: 2022-08-12
Payer: MEDICARE

## 2022-08-12 ENCOUNTER — HOSPITAL ENCOUNTER (OUTPATIENT)
Facility: HOSPITAL | Age: 67
Discharge: HOME | End: 2022-08-12
Attending: OTOLARYNGOLOGY | Admitting: OTOLARYNGOLOGY
Payer: MEDICARE

## 2022-08-12 ENCOUNTER — ANESTHESIA (OUTPATIENT)
Dept: SURGERY | Facility: HOSPITAL | Age: 67
End: 2022-08-12
Payer: MEDICARE

## 2022-08-12 VITALS
OXYGEN SATURATION: 98 % | BODY MASS INDEX: 31.58 KG/M2 | TEMPERATURE: 99 F | DIASTOLIC BLOOD PRESSURE: 65 MMHG | RESPIRATION RATE: 18 BRPM | SYSTOLIC BLOOD PRESSURE: 117 MMHG | WEIGHT: 185 LBS | HEART RATE: 61 BPM | HEIGHT: 64 IN

## 2022-08-12 DIAGNOSIS — J38.1 VOCAL CORD POLYPS: ICD-10-CM

## 2022-08-12 DIAGNOSIS — J38.3 LESION OF VOCAL CORD: Primary | ICD-10-CM

## 2022-08-12 LAB — GLUCOSE SERPL-MCNC: 185 MG/DL (ref 70–105)

## 2022-08-12 PROCEDURE — 25000003 PHARM REV CODE 250: Performed by: NURSE ANESTHETIST, CERTIFIED REGISTERED

## 2022-08-12 PROCEDURE — 71000015 HC POSTOP RECOV 1ST HR: Performed by: OTOLARYNGOLOGY

## 2022-08-12 PROCEDURE — D9220A PRA ANESTHESIA: Mod: CRNA,,, | Performed by: NURSE ANESTHETIST, CERTIFIED REGISTERED

## 2022-08-12 PROCEDURE — 37000008 HC ANESTHESIA 1ST 15 MINUTES: Performed by: OTOLARYNGOLOGY

## 2022-08-12 PROCEDURE — 31540 LARYNGOSCOPY W/EXC OF TUMOR: CPT | Mod: ,,, | Performed by: OTOLARYNGOLOGY

## 2022-08-12 PROCEDURE — 36000708 HC OR TIME LEV III 1ST 15 MIN: Performed by: OTOLARYNGOLOGY

## 2022-08-12 PROCEDURE — 27000510 HC BLANKET BAIR HUGGER ANY SIZE: Performed by: ANESTHESIOLOGY

## 2022-08-12 PROCEDURE — 36000709 HC OR TIME LEV III EA ADD 15 MIN: Performed by: OTOLARYNGOLOGY

## 2022-08-12 PROCEDURE — 27000716 HC OXISENSOR PROBE, ANY SIZE: Performed by: ANESTHESIOLOGY

## 2022-08-12 PROCEDURE — 27000689 HC BLADE LARYNGOSCOPE ANY SIZE: Performed by: ANESTHESIOLOGY

## 2022-08-12 PROCEDURE — D9220A PRA ANESTHESIA: ICD-10-PCS | Mod: ANES,,, | Performed by: ANESTHESIOLOGY

## 2022-08-12 PROCEDURE — D9220A PRA ANESTHESIA: ICD-10-PCS | Mod: CRNA,,, | Performed by: NURSE ANESTHETIST, CERTIFIED REGISTERED

## 2022-08-12 PROCEDURE — 37000009 HC ANESTHESIA EA ADD 15 MINS: Performed by: OTOLARYNGOLOGY

## 2022-08-12 PROCEDURE — 31540 PR LARYNGOSCOPY,DIRCT,OP,EXC TUMOR: ICD-10-PCS | Mod: ,,, | Performed by: OTOLARYNGOLOGY

## 2022-08-12 PROCEDURE — 71000033 HC RECOVERY, INTIAL HOUR: Performed by: OTOLARYNGOLOGY

## 2022-08-12 PROCEDURE — 27000655: Performed by: ANESTHESIOLOGY

## 2022-08-12 PROCEDURE — D9220A PRA ANESTHESIA: Mod: ANES,,, | Performed by: ANESTHESIOLOGY

## 2022-08-12 PROCEDURE — 82962 GLUCOSE BLOOD TEST: CPT

## 2022-08-12 PROCEDURE — 27000165 HC TUBE, ETT CUFFED: Performed by: ANESTHESIOLOGY

## 2022-08-12 PROCEDURE — 63600175 PHARM REV CODE 636 W HCPCS: Performed by: NURSE ANESTHETIST, CERTIFIED REGISTERED

## 2022-08-12 RX ORDER — HYDROCODONE BITARTRATE AND ACETAMINOPHEN 7.5; 325 MG/1; MG/1
1 TABLET ORAL EVERY 6 HOURS PRN
Status: DISCONTINUED | OUTPATIENT
Start: 2022-08-12 | End: 2022-08-12 | Stop reason: HOSPADM

## 2022-08-12 RX ORDER — SODIUM CHLORIDE, SODIUM LACTATE, POTASSIUM CHLORIDE, CALCIUM CHLORIDE 600; 310; 30; 20 MG/100ML; MG/100ML; MG/100ML; MG/100ML
125 INJECTION, SOLUTION INTRAVENOUS CONTINUOUS
Status: DISCONTINUED | OUTPATIENT
Start: 2022-08-12 | End: 2022-08-12 | Stop reason: HOSPADM

## 2022-08-12 RX ORDER — FENTANYL CITRATE 50 UG/ML
INJECTION, SOLUTION INTRAMUSCULAR; INTRAVENOUS
Status: DISCONTINUED | OUTPATIENT
Start: 2022-08-12 | End: 2022-08-12

## 2022-08-12 RX ORDER — PROPOFOL 10 MG/ML
VIAL (ML) INTRAVENOUS
Status: DISCONTINUED | OUTPATIENT
Start: 2022-08-12 | End: 2022-08-12

## 2022-08-12 RX ORDER — MEPERIDINE HYDROCHLORIDE 25 MG/ML
25 INJECTION INTRAMUSCULAR; INTRAVENOUS; SUBCUTANEOUS EVERY 10 MIN PRN
Status: DISCONTINUED | OUTPATIENT
Start: 2022-08-12 | End: 2022-08-12 | Stop reason: HOSPADM

## 2022-08-12 RX ORDER — DIPHENHYDRAMINE HYDROCHLORIDE 50 MG/ML
25 INJECTION INTRAMUSCULAR; INTRAVENOUS EVERY 6 HOURS PRN
Status: DISCONTINUED | OUTPATIENT
Start: 2022-08-12 | End: 2022-08-12 | Stop reason: HOSPADM

## 2022-08-12 RX ORDER — PHENYLEPHRINE HYDROCHLORIDE 10 MG/ML
INJECTION INTRAVENOUS
Status: DISCONTINUED | OUTPATIENT
Start: 2022-08-12 | End: 2022-08-12

## 2022-08-12 RX ORDER — ONDANSETRON 2 MG/ML
4 INJECTION INTRAMUSCULAR; INTRAVENOUS DAILY PRN
Status: DISCONTINUED | OUTPATIENT
Start: 2022-08-12 | End: 2022-08-12 | Stop reason: HOSPADM

## 2022-08-12 RX ORDER — SODIUM CHLORIDE, SODIUM LACTATE, POTASSIUM CHLORIDE, CALCIUM CHLORIDE 600; 310; 30; 20 MG/100ML; MG/100ML; MG/100ML; MG/100ML
INJECTION, SOLUTION INTRAVENOUS CONTINUOUS
Status: DISCONTINUED | OUTPATIENT
Start: 2022-08-12 | End: 2022-08-12 | Stop reason: HOSPADM

## 2022-08-12 RX ORDER — MORPHINE SULFATE 10 MG/ML
4 INJECTION INTRAMUSCULAR; INTRAVENOUS; SUBCUTANEOUS EVERY 5 MIN PRN
Status: DISCONTINUED | OUTPATIENT
Start: 2022-08-12 | End: 2022-08-12 | Stop reason: HOSPADM

## 2022-08-12 RX ORDER — OXYCODONE HYDROCHLORIDE 5 MG/1
5 TABLET ORAL
Status: DISCONTINUED | OUTPATIENT
Start: 2022-08-12 | End: 2022-08-12 | Stop reason: HOSPADM

## 2022-08-12 RX ORDER — HYDROMORPHONE HYDROCHLORIDE 2 MG/ML
0.5 INJECTION, SOLUTION INTRAMUSCULAR; INTRAVENOUS; SUBCUTANEOUS EVERY 5 MIN PRN
Status: DISCONTINUED | OUTPATIENT
Start: 2022-08-12 | End: 2022-08-12 | Stop reason: HOSPADM

## 2022-08-12 RX ORDER — MIDAZOLAM HYDROCHLORIDE 1 MG/ML
INJECTION INTRAMUSCULAR; INTRAVENOUS
Status: DISCONTINUED | OUTPATIENT
Start: 2022-08-12 | End: 2022-08-12

## 2022-08-12 RX ORDER — ONDANSETRON 2 MG/ML
INJECTION INTRAMUSCULAR; INTRAVENOUS
Status: DISCONTINUED | OUTPATIENT
Start: 2022-08-12 | End: 2022-08-12

## 2022-08-12 RX ORDER — LIDOCAINE HYDROCHLORIDE 20 MG/ML
INJECTION, SOLUTION EPIDURAL; INFILTRATION; INTRACAUDAL; PERINEURAL
Status: DISCONTINUED | OUTPATIENT
Start: 2022-08-12 | End: 2022-08-12

## 2022-08-12 RX ADMIN — PROPOFOL 200 MG: 10 INJECTION, EMULSION INTRAVENOUS at 07:08

## 2022-08-12 RX ADMIN — MIDAZOLAM 2 MG: 1 INJECTION INTRAMUSCULAR; INTRAVENOUS at 07:08

## 2022-08-12 RX ADMIN — LIDOCAINE HYDROCHLORIDE 50 MG: 20 INJECTION, SOLUTION EPIDURAL; INFILTRATION; INTRACAUDAL; PERINEURAL at 07:08

## 2022-08-12 RX ADMIN — ONDANSETRON 4 MG: 2 INJECTION INTRAMUSCULAR; INTRAVENOUS at 07:08

## 2022-08-12 RX ADMIN — PHENYLEPHRINE HYDROCHLORIDE 100 MCG: 10 INJECTION INTRAVENOUS at 07:08

## 2022-08-12 RX ADMIN — FENTANYL CITRATE 100 MCG: 50 INJECTION INTRAMUSCULAR; INTRAVENOUS at 07:08

## 2022-08-12 RX ADMIN — SODIUM CHLORIDE: 9 INJECTION, SOLUTION INTRAVENOUS at 07:08

## 2022-08-12 NOTE — ANESTHESIA RELEASE NOTE
"Anesthesia Release from PACU Note    Patient: Karen Renteria    Procedure(s) Performed: Procedure(s) (LRB):  LARYNGOSCOPY, DIRECT (Bilateral)  EXCISION, LESION, VOCAL CORD (Bilateral)    Anesthesia type: general    Post pain: Adequate analgesia    Post assessment: no apparent anesthetic complications, tolerated procedure well and no evidence of recall    Last Vitals:   Visit Vitals  /60 (BP Location: Right arm, Patient Position: Lying)   Pulse 65   Temp 36.6 °C (97.8 °F) (Axillary)   Resp 14   Ht 5' 4" (1.626 m)   Wt 83.9 kg (185 lb)   SpO2 (!) 94%   Breastfeeding No   BMI 31.76 kg/m²       Post vital signs: stable    Level of consciousness: awake    Nausea/Vomiting: no nausea/no vomiting    Complications: none    Airway Patency: patent    Respiratory: unassisted    Cardiovascular: stable and blood pressure at baseline    Hydration: euvolemic  "

## 2022-08-12 NOTE — ANESTHESIA POSTPROCEDURE EVALUATION
Anesthesia Post Evaluation    Patient: Karen Renteria    Procedure(s) Performed: Procedure(s) (LRB):  LARYNGOSCOPY, DIRECT (Bilateral)  EXCISION, LESION, VOCAL CORD (Bilateral)    Final Anesthesia Type: general      Patient location during evaluation: PACU  Patient participation: Yes- Able to Participate  Level of consciousness: awake and alert and oriented  Post-procedure vital signs: reviewed and stable  Pain management: adequate  Airway patency: patent  JUSTINA mitigation strategies: Multimodal analgesia  PONV status at discharge: No PONV  Anesthetic complications: no      Cardiovascular status: hemodynamically stable  Respiratory status: unassisted and spontaneous ventilation  Hydration status: euvolemic  Follow-up not needed.          Vitals Value Taken Time   /60 08/12/22 0807   Temp 36.6 °C (97.8 °F) 08/12/22 0807   Pulse 65 08/12/22 0807   Resp 14 08/12/22 0807   SpO2 94 % 08/12/22 0807         No case tracking events are documented in the log.      Pain/Jonathon Score: Jonathon Score: 4 (8/12/2022  8:04 AM)

## 2022-08-12 NOTE — TRANSFER OF CARE
"Anesthesia Transfer of Care Note    Patient: Karen Renteria    Procedure(s) Performed: Procedure(s) (LRB):  LARYNGOSCOPY, DIRECT (Bilateral)  EXCISION, LESION, VOCAL CORD (Bilateral)    Patient location: PACU    Anesthesia Type: general    Transport from OR: Transported from OR on room air with adequate spontaneous ventilation    Post pain: adequate analgesia    Post assessment: no apparent anesthetic complications    Post vital signs: stable    Level of consciousness: responds to stimulation, awake and sedated    Nausea/Vomiting: no nausea/vomiting    Complications: none    Transfer of care protocol was followed      Last vitals:   Visit Vitals  /60 (BP Location: Right arm, Patient Position: Lying)   Pulse 65   Temp 36.6 °C (97.8 °F) (Axillary)   Resp 14   Ht 5' 4" (1.626 m)   Wt 83.9 kg (185 lb)   SpO2 (!) 94%   Breastfeeding No   BMI 31.76 kg/m²     "

## 2022-08-12 NOTE — ANESTHESIA PREPROCEDURE EVALUATION
08/12/2022  Karen Renteria is a 66 y.o., female.      Pre-op Assessment    I have reviewed the Patient Summary Reports.     I have reviewed the Nursing Notes. I have reviewed the NPO Status.   I have reviewed the Medications.     Review of Systems  Anesthesia Hx:  No problems with previous Anesthesia    Social:  Non-Smoker, No Alcohol Use, Former Smoker    Hematology/Oncology:  Hematology Normal   Oncology Normal     EENT/Dental:EENT/Dental Normal   Cardiovascular:   Hypertension CAD      Pulmonary:  Pulmonary Normal    Renal/:  Renal/ Normal     Hepatic/GI:   PUD, GERD    Musculoskeletal:  Musculoskeletal Normal    Neurological:   CVA    Endocrine:   Diabetes, well controlled, type 2  Obesity / BMI > 30  Dermatological:  Skin Normal    Psych:  Psychiatric Normal           Physical Exam  General: Well nourished    Airway:  Mallampati: III / III  Mouth Opening: Normal  TM Distance: > 6 cm  Tongue: Normal  Neck ROM: Normal ROM    Chest/Lungs:  Clear to auscultation, Normal Respiratory Rate    Heart:  Rate: Normal  Rhythm: Regular Rhythm        Anesthesia Plan  Type of Anesthesia, risks & benefits discussed:    Anesthesia Type: Gen ETT  Intra-op Monitoring Plan: Standard ASA Monitors  Post Op Pain Control Plan: multimodal analgesia  Induction:  IV  Informed Consent: Informed consent signed with the Patient and all parties understand the risks and agree with anesthesia plan.  All questions answered.   ASA Score: 3  Day of Surgery Review of History & Physical: H&P Update referred to the surgeon/provider.I have interviewed and examined the patient. I have reviewed the patient's H&P dated: There are no significant changes. H&P completed by Anesthesiologist.    Ready For Surgery From Anesthesia Perspective.     .

## 2022-08-12 NOTE — OP NOTE
After general ET anesthesia a rigid laryngoscope was inserted atraumatically. With magnification the vocal cords were examined with the cyst on the left side was removed with biting forceps with care not to damage underlying muscle. The specimens were sent to pathology for permanent section There was no further bleeding the patient was then reversed and taken to RR in stable condition.

## 2022-08-12 NOTE — OR NURSING
0804 Pt arrived from OR. VSS. No bleeding from mouth.    0840 Pt stable. Transferred to OP.   0845 Handof given to ALEJANDRO Manrique RN.  HR 68 O2 sat 99% on 2L /66.   Family in room.

## 2022-08-12 NOTE — ANESTHESIA PROCEDURE NOTES
Intubation    Date/Time: 8/12/2022 7:53 AM  Performed by: Sid Hodge CRNA  Authorized by: Rodri Walter     Intubation:     Induction:  Intravenous    Intubated:  Postinduction    Mask Ventilation:  Easy mask    Attempts:  1    Attempted By:  CRNA    Method of Intubation:  Direct    Blade:  Moses 4    Laryngeal View Grade: Grade I - full view of cords      Difficult Airway Encountered?: No      Complications:  None    Airway Device:  Oral endotracheal tube    Airway Device Size:  6.0    Style/Cuff Inflation:  Cuffed (inflated to minimal occlusive pressure)    Tube secured:  21    Secured at:  The lips    Placement Verified By:  Capnometry and Revisualization with laryngoscopy    Complicating Factors:  None    Findings Post-Intubation:  BS equal bilateral and atraumatic/condition of teeth unchanged

## 2022-08-25 ENCOUNTER — OFFICE VISIT (OUTPATIENT)
Dept: OTOLARYNGOLOGY | Facility: CLINIC | Age: 67
End: 2022-08-25
Payer: MEDICARE

## 2022-08-25 VITALS — BODY MASS INDEX: 31.58 KG/M2 | WEIGHT: 185 LBS | HEIGHT: 64 IN

## 2022-08-25 DIAGNOSIS — J38.1 VOCAL CORD POLYPS: Primary | ICD-10-CM

## 2022-08-25 PROCEDURE — 99213 PR OFFICE/OUTPT VISIT, EST, LEVL III, 20-29 MIN: ICD-10-PCS | Mod: S$PBB,,, | Performed by: OTOLARYNGOLOGY

## 2022-08-25 PROCEDURE — 99214 OFFICE O/P EST MOD 30 MIN: CPT | Mod: PBBFAC | Performed by: OTOLARYNGOLOGY

## 2022-08-25 PROCEDURE — 99213 OFFICE O/P EST LOW 20 MIN: CPT | Mod: S$PBB,,, | Performed by: OTOLARYNGOLOGY

## 2022-08-25 NOTE — PROGRESS NOTES
Subjective:       Patient ID: Karen Renteria is a 66 y.o. female.    Chief Complaint: Follow-up (10 day post-op on Direct laryngoscopy w/excision of vocal cord lesion. Pt states her throat is still a little sore. )    HPI  Review of Systems    Objective:      Physical Exam  healing well Path benign   Assessment:       1. Vocal cord polyps        Plan:       F/u 6 weeks

## 2022-10-09 DIAGNOSIS — Z71.89 COMPLEX CARE COORDINATION: ICD-10-CM

## 2022-10-12 RX ORDER — HYDROCHLOROTHIAZIDE 25 MG/1
25 TABLET ORAL DAILY
Qty: 90 TABLET | Refills: 3 | Status: SHIPPED | OUTPATIENT
Start: 2022-10-12 | End: 2023-02-14

## 2022-10-27 ENCOUNTER — OFFICE VISIT (OUTPATIENT)
Dept: OTOLARYNGOLOGY | Facility: CLINIC | Age: 67
End: 2022-10-27
Payer: MEDICARE

## 2022-10-27 ENCOUNTER — OFFICE VISIT (OUTPATIENT)
Dept: FAMILY MEDICINE | Facility: CLINIC | Age: 67
End: 2022-10-27
Payer: MEDICARE

## 2022-10-27 VITALS
HEART RATE: 77 BPM | DIASTOLIC BLOOD PRESSURE: 64 MMHG | BODY MASS INDEX: 31.41 KG/M2 | RESPIRATION RATE: 16 BRPM | SYSTOLIC BLOOD PRESSURE: 124 MMHG | HEIGHT: 64 IN | TEMPERATURE: 99 F | OXYGEN SATURATION: 95 % | WEIGHT: 184 LBS

## 2022-10-27 DIAGNOSIS — M25.552 LEFT HIP PAIN: ICD-10-CM

## 2022-10-27 DIAGNOSIS — J02.9 PHARYNGITIS, UNSPECIFIED ETIOLOGY: ICD-10-CM

## 2022-10-27 DIAGNOSIS — R79.89 ABNORMAL THYROID BLOOD TEST: ICD-10-CM

## 2022-10-27 DIAGNOSIS — E11.9 TYPE 2 DIABETES MELLITUS WITHOUT COMPLICATION, WITHOUT LONG-TERM CURRENT USE OF INSULIN: Primary | ICD-10-CM

## 2022-10-27 DIAGNOSIS — E78.2 MIXED HYPERLIPIDEMIA: ICD-10-CM

## 2022-10-27 DIAGNOSIS — Z23 NEED FOR VACCINATION: ICD-10-CM

## 2022-10-27 DIAGNOSIS — Z79.899 ENCOUNTER FOR LONG-TERM (CURRENT) USE OF OTHER MEDICATIONS: ICD-10-CM

## 2022-10-27 DIAGNOSIS — I10 PRIMARY HYPERTENSION: ICD-10-CM

## 2022-10-27 DIAGNOSIS — R74.8 ELEVATED LIVER ENZYMES: ICD-10-CM

## 2022-10-27 DIAGNOSIS — J32.8 OTHER CHRONIC SINUSITIS: Primary | ICD-10-CM

## 2022-10-27 LAB
ALBUMIN SERPL BCP-MCNC: 3.7 G/DL (ref 3.5–5)
ALBUMIN/GLOB SERPL: 1 {RATIO}
ALP SERPL-CCNC: 105 U/L (ref 55–142)
ALT SERPL W P-5'-P-CCNC: 46 U/L (ref 13–56)
ANION GAP SERPL CALCULATED.3IONS-SCNC: 12 MMOL/L (ref 7–16)
AST SERPL W P-5'-P-CCNC: 46 U/L (ref 15–37)
BACTERIA #/AREA URNS HPF: ABNORMAL /HPF
BILIRUB SERPL-MCNC: 0.5 MG/DL (ref ?–1.2)
BILIRUB UR QL STRIP: NEGATIVE
BUN SERPL-MCNC: 23 MG/DL (ref 7–18)
BUN/CREAT SERPL: 15 (ref 6–20)
CALCIUM SERPL-MCNC: 9 MG/DL (ref 8.5–10.1)
CHLORIDE SERPL-SCNC: 101 MMOL/L (ref 98–107)
CHOLEST SERPL-MCNC: 237 MG/DL (ref 0–200)
CHOLEST/HDLC SERPL: 7 {RATIO}
CLARITY UR: ABNORMAL
CO2 SERPL-SCNC: 26 MMOL/L (ref 21–32)
COLOR UR: YELLOW
CREAT SERPL-MCNC: 1.57 MG/DL (ref 0.55–1.02)
CREAT UR-MCNC: 129 MG/DL (ref 28–219)
EGFR (NO RACE VARIABLE) (RUSH/TITUS): 36 ML/MIN/1.73M²
EST. AVERAGE GLUCOSE BLD GHB EST-MCNC: 270 MG/DL
GLOBULIN SER-MCNC: 3.7 G/DL (ref 2–4)
GLUCOSE SERPL-MCNC: 189 MG/DL (ref 74–106)
GLUCOSE UR STRIP-MCNC: NORMAL MG/DL
HBA1C MFR BLD HPLC: 10.7 % (ref 4.5–6.6)
HDLC SERPL-MCNC: 34 MG/DL (ref 40–60)
KETONES UR STRIP-SCNC: 10 MG/DL
LDLC SERPL CALC-MCNC: 128 MG/DL
LDLC/HDLC SERPL: 3.8 {RATIO}
LEUKOCYTE ESTERASE UR QL STRIP: ABNORMAL
MICROALBUMIN UR-MCNC: 6.4 MG/DL (ref 0–2.8)
MICROALBUMIN/CREAT RATIO PNL UR: 49.6 MG/G (ref 0–30)
MUCOUS, UA: ABNORMAL /LPF
NITRITE UR QL STRIP: POSITIVE
NONHDLC SERPL-MCNC: 203 MG/DL
PH UR STRIP: 5.5 PH UNITS
POTASSIUM SERPL-SCNC: 4.1 MMOL/L (ref 3.5–5.1)
PROT SERPL-MCNC: 7.4 G/DL (ref 6.4–8.2)
PROT UR QL STRIP: 10
RBC # UR STRIP: ABNORMAL /UL
RBC #/AREA URNS HPF: 17 /HPF
SODIUM SERPL-SCNC: 135 MMOL/L (ref 136–145)
SP GR UR STRIP: 1.01
SQUAMOUS #/AREA URNS LPF: ABNORMAL /HPF
T4 FREE SERPL-MCNC: 0.88 NG/DL (ref 0.76–1.46)
TRIGL SERPL-MCNC: 375 MG/DL (ref 35–150)
TSH SERPL DL<=0.005 MIU/L-ACNC: 4.27 UIU/ML (ref 0.36–3.74)
UROBILINOGEN UR STRIP-ACNC: NORMAL MG/DL
VLDLC SERPL-MCNC: 75 MG/DL
WBC #/AREA URNS HPF: >182 /HPF
WBC CLUMPS, UA: ABNORMAL /HPF

## 2022-10-27 PROCEDURE — G0008 FLU VACCINE - QUADRIVALENT - ADJUVANTED: ICD-10-PCS | Mod: ,,, | Performed by: FAMILY MEDICINE

## 2022-10-27 PROCEDURE — 90694 FLU VACCINE - QUADRIVALENT - ADJUVANTED: ICD-10-PCS | Mod: ,,, | Performed by: FAMILY MEDICINE

## 2022-10-27 PROCEDURE — 87077 CULTURE, URINE: ICD-10-PCS | Mod: ,,, | Performed by: CLINICAL MEDICAL LABORATORY

## 2022-10-27 PROCEDURE — 99214 PR OFFICE/OUTPT VISIT, EST, LEVL IV, 30-39 MIN: ICD-10-PCS | Mod: S$PBB,,, | Performed by: OTOLARYNGOLOGY

## 2022-10-27 PROCEDURE — 84439 T4, FREE: ICD-10-PCS | Mod: ,,, | Performed by: CLINICAL MEDICAL LABORATORY

## 2022-10-27 PROCEDURE — 87186 CULTURE, URINE: ICD-10-PCS | Mod: ,,, | Performed by: CLINICAL MEDICAL LABORATORY

## 2022-10-27 PROCEDURE — 99214 OFFICE O/P EST MOD 30 MIN: CPT | Mod: ,,, | Performed by: FAMILY MEDICINE

## 2022-10-27 PROCEDURE — 80053 COMPREHENSIVE METABOLIC PANEL: ICD-10-PCS | Mod: ,,, | Performed by: CLINICAL MEDICAL LABORATORY

## 2022-10-27 PROCEDURE — 83036 HEMOGLOBIN A1C: ICD-10-PCS | Mod: ,,, | Performed by: CLINICAL MEDICAL LABORATORY

## 2022-10-27 PROCEDURE — 80061 LIPID PANEL: ICD-10-PCS | Mod: ,,, | Performed by: CLINICAL MEDICAL LABORATORY

## 2022-10-27 PROCEDURE — 99213 OFFICE O/P EST LOW 20 MIN: CPT | Mod: PBBFAC | Performed by: OTOLARYNGOLOGY

## 2022-10-27 PROCEDURE — G0008 ADMIN INFLUENZA VIRUS VAC: HCPCS | Mod: ,,, | Performed by: FAMILY MEDICINE

## 2022-10-27 PROCEDURE — 84443 TSH: ICD-10-PCS | Mod: ,,, | Performed by: CLINICAL MEDICAL LABORATORY

## 2022-10-27 PROCEDURE — 82570 ASSAY OF URINE CREATININE: CPT | Mod: ,,, | Performed by: CLINICAL MEDICAL LABORATORY

## 2022-10-27 PROCEDURE — 80053 COMPREHEN METABOLIC PANEL: CPT | Mod: ,,, | Performed by: CLINICAL MEDICAL LABORATORY

## 2022-10-27 PROCEDURE — 82043 UR ALBUMIN QUANTITATIVE: CPT | Mod: ,,, | Performed by: CLINICAL MEDICAL LABORATORY

## 2022-10-27 PROCEDURE — 84439 ASSAY OF FREE THYROXINE: CPT | Mod: ,,, | Performed by: CLINICAL MEDICAL LABORATORY

## 2022-10-27 PROCEDURE — 82043 MICROALBUMIN / CREATININE RATIO URINE: ICD-10-PCS | Mod: ,,, | Performed by: CLINICAL MEDICAL LABORATORY

## 2022-10-27 PROCEDURE — 90694 VACC AIIV4 NO PRSRV 0.5ML IM: CPT | Mod: ,,, | Performed by: FAMILY MEDICINE

## 2022-10-27 PROCEDURE — 87086 URINE CULTURE/COLONY COUNT: CPT | Mod: ,,, | Performed by: CLINICAL MEDICAL LABORATORY

## 2022-10-27 PROCEDURE — 81001 URINALYSIS AUTO W/SCOPE: CPT | Mod: ,,, | Performed by: CLINICAL MEDICAL LABORATORY

## 2022-10-27 PROCEDURE — 99214 OFFICE O/P EST MOD 30 MIN: CPT | Mod: S$PBB,,, | Performed by: OTOLARYNGOLOGY

## 2022-10-27 PROCEDURE — 82570 MICROALBUMIN / CREATININE RATIO URINE: ICD-10-PCS | Mod: ,,, | Performed by: CLINICAL MEDICAL LABORATORY

## 2022-10-27 PROCEDURE — 87086 CULTURE, URINE: ICD-10-PCS | Mod: ,,, | Performed by: CLINICAL MEDICAL LABORATORY

## 2022-10-27 PROCEDURE — 83036 HEMOGLOBIN GLYCOSYLATED A1C: CPT | Mod: ,,, | Performed by: CLINICAL MEDICAL LABORATORY

## 2022-10-27 PROCEDURE — 81001 URINALYSIS: ICD-10-PCS | Mod: ,,, | Performed by: CLINICAL MEDICAL LABORATORY

## 2022-10-27 PROCEDURE — 87186 SC STD MICRODIL/AGAR DIL: CPT | Mod: ,,, | Performed by: CLINICAL MEDICAL LABORATORY

## 2022-10-27 PROCEDURE — 84443 ASSAY THYROID STIM HORMONE: CPT | Mod: ,,, | Performed by: CLINICAL MEDICAL LABORATORY

## 2022-10-27 PROCEDURE — 80061 LIPID PANEL: CPT | Mod: ,,, | Performed by: CLINICAL MEDICAL LABORATORY

## 2022-10-27 PROCEDURE — 99214 PR OFFICE/OUTPT VISIT, EST, LEVL IV, 30-39 MIN: ICD-10-PCS | Mod: ,,, | Performed by: FAMILY MEDICINE

## 2022-10-27 PROCEDURE — 87077 CULTURE AEROBIC IDENTIFY: CPT | Mod: ,,, | Performed by: CLINICAL MEDICAL LABORATORY

## 2022-10-27 RX ORDER — AMOXICILLIN 500 MG/1
500 CAPSULE ORAL 2 TIMES DAILY
Qty: 28 CAPSULE | Refills: 0 | Status: SHIPPED | OUTPATIENT
Start: 2022-10-27 | End: 2023-02-14

## 2022-10-27 RX ORDER — METOPROLOL SUCCINATE 50 MG/1
25 TABLET, EXTENDED RELEASE ORAL
COMMUNITY
Start: 2022-08-19 | End: 2023-12-08

## 2022-10-27 RX ORDER — CYCLOBENZAPRINE HCL 10 MG
10 TABLET ORAL EVERY 12 HOURS PRN
Qty: 60 TABLET | Refills: 11 | Status: ON HOLD | OUTPATIENT
Start: 2022-10-27 | End: 2023-12-13

## 2022-10-27 RX ORDER — MECLIZINE HYDROCHLORIDE 25 MG/1
25 TABLET ORAL 3 TIMES DAILY PRN
Qty: 90 TABLET | Refills: 11 | Status: SHIPPED | OUTPATIENT
Start: 2022-10-27

## 2022-10-27 RX ORDER — SEMAGLUTIDE 1.34 MG/ML
0.5 INJECTION, SOLUTION SUBCUTANEOUS
Qty: 1 PEN | Refills: 11 | Status: SHIPPED | OUTPATIENT
Start: 2022-10-27 | End: 2023-10-27

## 2022-10-27 NOTE — PROGRESS NOTES
Subjective:       Patient ID: Karen Renteria is a 67 y.o. female.    Chief Complaint: Follow-up and Medication Refill (3 month)    Diabetes checkup.  Had polyp removed from vocal cord, is much better now.    Review of Systems   Constitutional:  Negative for appetite change, chills, fatigue, fever and unexpected weight change.   Respiratory:  Negative for cough and shortness of breath.    Cardiovascular:  Negative for chest pain and leg swelling.   Gastrointestinal:  Negative for abdominal pain.   Musculoskeletal:  Positive for arthralgias.   Integumentary:  Negative for rash.   Neurological:  Negative for weakness.   Psychiatric/Behavioral:  The patient is not nervous/anxious.        Objective:      Physical Exam  Constitutional:       General: She is not in acute distress.     Appearance: Normal appearance.   Cardiovascular:      Rate and Rhythm: Normal rate and regular rhythm.      Heart sounds: Normal heart sounds.   Pulmonary:      Breath sounds: Normal breath sounds.   Abdominal:      General: Abdomen is flat.      Palpations: Abdomen is soft.   Skin:     General: Skin is warm and dry.   Neurological:      Mental Status: She is alert. Mental status is at baseline.   Psychiatric:         Mood and Affect: Mood normal.         Behavior: Behavior normal.         Thought Content: Thought content normal.         Judgment: Judgment normal.       Assessment:       1. Type 2 diabetes mellitus without complication, without long-term current use of insulin  Comprehensive Metabolic Panel    Hemoglobin A1C    Microalbumin/Creatinine Ratio, Urine    Urinalysis    Comprehensive Metabolic Panel    Hemoglobin A1C    Microalbumin/Creatinine Ratio, Urine    Urinalysis    Urinalysis, Microscopic    Urine culture      2. Encounter for long-term (current) use of other medications  Comprehensive Metabolic Panel    Hemoglobin A1C    Lipid Panel    Microalbumin/Creatinine Ratio, Urine    Urinalysis    Comprehensive Metabolic Panel     Hemoglobin A1C    Lipid Panel    Microalbumin/Creatinine Ratio, Urine    Urinalysis    Urinalysis, Microscopic    Urine culture      3. Elevated liver enzymes  Comprehensive Metabolic Panel    Comprehensive Metabolic Panel      4. Primary hypertension  T4, Free    TSH    Urinalysis    T4, Free    TSH    Urinalysis    Urinalysis, Microscopic    Urine culture      5. Mixed hyperlipidemia  Comprehensive Metabolic Panel    Lipid Panel    T4, Free    TSH    Comprehensive Metabolic Panel    Lipid Panel    T4, Free    TSH      6. Abnormal thyroid blood test  T4, Free    TSH    T4, Free    TSH      7. Need for vaccination  Influenza (FLUAD) - Quadrivalent (Adjuvanted) *Preferred* (65+) (PF)      8. Left hip pain  Ambulatory referral/consult to Orthopedics          Plan:       Appt with Peña Ott---left hip pain, prefers Thursdays  Waiting on osteoporosis tx until gets teeth fixed  UTD katharine  Look up cscope in [facility]  UTD eye exam  Flu vacc today  Consider restarting metformin  Go up on ozempic  Discuss LDCT next visit

## 2022-10-27 NOTE — PROGRESS NOTES
Subjective:       Patient ID: Karen Renteria is a 67 y.o. female.    Chief Complaint: Sore Throat  Worse since yesterday voice better since polyp removal   Sore Throat   Associated symptoms include congestion.   Review of Systems   HENT:  Positive for congestion, postnasal drip, rhinorrhea and sore throat.    All other systems reviewed and are negative.    Objective:      Physical Exam  General: NAD  Head: Normocephalic, atraumatic, no facial asymmetry/normal strength,  Ears: Both auricules normal in appearance, w/o deformities tympanic membranes normal external auditory canals normal  Nose: External nose w/o deformities normal turbinates no drainage or inflammation  Oral Cavity: Lips, gums, floor of mouth, tongue hard palate, and buccal mucosa without mass/lesion  Oropharynx: Mucosa pink and moist, soft palate, posterior pharynx and oropharyngeal wall without mass/lesion but red swollen   Neck: Supple, symmetric, trachea midline, no palpable mass/lesion, no palpable cervical lymphadenopathy  Skin: Warm and dry, no concerning lesions  Respiratory: Respirations even, unlabored   Assessment:       1. Other chronic sinusitis    2. Pharyngitis, unspecified etiology        Plan:       Amoxil  discussed fol in 6 months or sooner if worsens

## 2022-10-28 DIAGNOSIS — M25.552 LEFT HIP PAIN: Primary | ICD-10-CM

## 2022-10-28 RX ORDER — METFORMIN HYDROCHLORIDE 500 MG/1
500 TABLET ORAL 2 TIMES DAILY WITH MEALS
Qty: 180 TABLET | Refills: 3 | Status: SHIPPED | OUTPATIENT
Start: 2022-10-28 | End: 2023-12-08

## 2022-10-28 RX ORDER — INSULIN ASPART 100 [IU]/ML
INJECTION, SOLUTION INTRAVENOUS; SUBCUTANEOUS
Qty: 3 EACH | Refills: 11 | Status: SHIPPED | OUTPATIENT
Start: 2022-10-28 | End: 2023-02-14

## 2022-10-29 LAB — UA COMPLETE W REFLEX CULTURE PNL UR: ABNORMAL

## 2022-11-03 ENCOUNTER — HOSPITAL ENCOUNTER (OUTPATIENT)
Dept: RADIOLOGY | Facility: HOSPITAL | Age: 67
Discharge: HOME | End: 2022-11-03
Attending: NURSE PRACTITIONER
Payer: MEDICARE

## 2022-11-03 ENCOUNTER — OFFICE VISIT (OUTPATIENT)
Dept: ORTHOPEDICS | Facility: CLINIC | Age: 67
End: 2022-11-03
Payer: MEDICARE

## 2022-11-03 DIAGNOSIS — M70.62 GREATER TROCHANTERIC BURSITIS OF LEFT HIP: Primary | ICD-10-CM

## 2022-11-03 DIAGNOSIS — M25.552 LEFT HIP PAIN: ICD-10-CM

## 2022-11-03 PROCEDURE — 73502 XR HIP WITH PELVIS WHEN PERFORMED, 2 OR 3 VIEWS LEFT: ICD-10-PCS | Mod: 26,LT,, | Performed by: RADIOLOGY

## 2022-11-03 PROCEDURE — 73502 X-RAY EXAM HIP UNI 2-3 VIEWS: CPT | Mod: 26,LT,, | Performed by: RADIOLOGY

## 2022-11-03 PROCEDURE — 99213 PR OFFICE/OUTPT VISIT, EST, LEVL III, 20-29 MIN: ICD-10-PCS | Mod: ,,, | Performed by: NURSE PRACTITIONER

## 2022-11-03 PROCEDURE — 99213 OFFICE O/P EST LOW 20 MIN: CPT | Mod: ,,, | Performed by: NURSE PRACTITIONER

## 2022-11-03 PROCEDURE — 73502 X-RAY EXAM HIP UNI 2-3 VIEWS: CPT | Mod: TC,LT

## 2022-11-03 RX ORDER — NAPROXEN 500 MG/1
500 TABLET ORAL 2 TIMES DAILY WITH MEALS
Qty: 60 TABLET | Refills: 0 | Status: SHIPPED | OUTPATIENT
Start: 2022-11-03 | End: 2023-12-08

## 2022-11-03 NOTE — PROGRESS NOTES
ASSESSMENT:      ICD-10-CM ICD-9-CM   1. Greater trochanteric bursitis of left hip  M70.62 726.5   2. Left hip pain  M25.552 719.45       PLAN:     -Findings and treatment options were discussed with the patient  -All questions answered  Natural history and expected course discussed. Questions answered.  Educational materials distributed.  NSAIDs per medication orders.  Steroid injection for trochanteric bursitis. See procedure note.  Left Greater troch injection  Naproxen  RTC in 6 weeks    There are no Patient Instructions on file for this visit.    IMAGING:  No results found.     FINDINGS:  There is no acute fracture or dislocation.  Left hip is well conjugated.  There is mild osteophyte formation of the left hip.  The joint space is well maintained.  Suspect osteopenia     Impression:     No acute process     Mild osteoarthritis left hip        Electronically signed by: Douglas Ojeda  Date:                                            11/03/2022  Time:                                           09:34           Exam Ended: 11/03/22 08:49 Last Resulted: 11/03/22 09:34             CC: Hip pain  67 y.o. Female who presents as a new patient to me for evaluation of left hip pain.  Patient is complaining of pain in her left groin and on the lateral side of her left hip. Pain started when she was in therapy. It is painful to walk and lay on her left side.   Pain has been present for 4 months  Mechanism of injury:   Location of the pain: groin and lateral  Occupation:   Mechanical symptoms, such as catching and popping of the hip are not present.    Symptoms are worsened with activity.  Better with rest. Treatment thus far has included rest, activity modifications, and oral medications.    she has  had formal physical therapy  she has not had previous advanced imaging such as MRI.   she has not  had previous hip injections.   she has not  had previous hip or back surgery.   Here today to discuss diagnosis and treatment  options.           REVIEW OF SYSTEMS:   Constitution: Negative. Negative for chills, fever and night sweats.    Hematologic/Lymphatic: Negative for bleeding problem. Does not bruise/bleed easily.   Skin: Negative for dry skin, itching and rash.   Musculoskeletal: Negative for falls. Positive for knee pain and muscle weakness.     All other review of symptoms were reviewed and found to be noncontributory.     PAST MEDICAL HISTORY:   Past Medical History:   Diagnosis Date    Acute gastric ulcer without hemorrhage or perforation 3/3/2022    Acute superficial gastritis without hemorrhage 3/3/2022    Anxiety     Carpal tunnel syndrome     Cerebral vascular accident     Coronary arteriosclerosis     S/P CABG 2011    COVID-19 12/20/2020    Depression     Diabetes mellitus, type 2     Encounter for long-term (current) use of other medications     Esophageal dysphagia 3/3/2022    Eye exam, routine 02/03/2021    GERD (gastroesophageal reflux disease)     History of esophagogastroduodenoscopy (EGD) 08/07/2019    Hx of colonoscopy 04/12/2019    Hx of mammogram 06/24/2020    SCREENING/BILATERAL    Hyperlipidemia     Hypertension     Insomnia     Low back pain     Lumbar radiculopathy     Osteoporosis     Tremor     Vitamin D deficiency        PAST SURGICAL HISTORY:   Past Surgical History:   Procedure Laterality Date    APPENDECTOMY      ASCENDING AORTIC ANEURYSM REPAIR W/ TISSUE AORTIC VALVE REPLACEMENT      CHOLECYSTECTOMY      CORONARY ARTERY BYPASS GRAFT (CABG)      DIRECT LARYNGOSCOPY Bilateral 8/12/2022    Procedure: LARYNGOSCOPY, DIRECT;  Surgeon: Iban Silverio MD;  Location: [facility] OR;  Service: ENT;  Laterality: Bilateral;    HYSTERECTOMY      TONSILLECTOMY      VOCAL FOLD LESION EXCISION Bilateral 8/12/2022    Procedure: EXCISION, LESION, VOCAL CORD;  Surgeon: Iban Silverio MD;  Location: [facility] OR;  Service: ENT;  Laterality: Bilateral;       FAMILY HISTORY:   Family History   Adopted: Yes   Problem  Relation Age of Onset    Cancer Mother     Breast cancer Mother     Heart disease Father        SOCIAL HISTORY:   Social History     Socioeconomic History    Marital status:      Spouse name: Cristo    Number of children: 2   Occupational History    Occupation: [employer]   Tobacco Use    Smoking status: Former     Packs/day: 0.50     Types: Cigarettes     Start date:      Quit date: 2011     Years since quittin.5    Smokeless tobacco: Never   Substance and Sexual Activity    Alcohol use: Never    Drug use: Never       MEDICATIONS:     Current Outpatient Medications:     amoxicillin (AMOXIL) 500 MG capsule, Take 1 capsule (500 mg total) by mouth 2 (two) times a day., Disp: 28 capsule, Rfl: 0    aspirin (ECOTRIN) 81 MG EC tablet, Take 81 mg by mouth., Disp: , Rfl:     cyclobenzaprine (FLEXERIL) 10 MG tablet, Take 1 tablet (10 mg total) by mouth every 12 (twelve) hours as needed for Muscle spasms., Disp: 60 tablet, Rfl: 11    fluticasone propionate (FLONASE) 50 mcg/actuation nasal spray, 2 sprays by Each Nostril route once daily., Disp: , Rfl:     gabapentin (NEURONTIN) 300 MG capsule, TAKE ONE CAPSULE BY MOUTH THREE TIMES DAILY, Disp: 90 capsule, Rfl: 11    glimepiride (AMARYL) 4 MG tablet, One twice a day with food, Disp: 60 tablet, Rfl: 11    hydroCHLOROthiazide (HYDRODIURIL) 25 MG tablet, Take 1 tablet (25 mg total) by mouth once daily., Disp: 90 tablet, Rfl: 3    insulin aspart U-100 (NOVOLOG FLEXPEN U-100 INSULIN) 100 unit/mL (3 mL) InPn pen, Inject 5-10units SC TID with meals prn, Disp: 3 each, Rfl: 11    insulin degludec (TRESIBA FLEXTOUCH U-200) 200 unit/mL (3 mL) insulin pen, Inject 100 Units into the skin once daily., Disp: 12 pen, Rfl: 11    isosorbide mononitrate (IMDUR) 30 MG 24 hr tablet, Take 1 tablet (30 mg total) by mouth once daily., Disp: 30 tablet, Rfl: 11    meclizine (ANTIVERT) 25 mg tablet, Take 1 tablet (25 mg total) by mouth 3 (three) times daily as needed for Dizziness.,  Disp: 90 tablet, Rfl: 11    metFORMIN (GLUCOPHAGE) 500 MG tablet, Take 1 tablet (500 mg total) by mouth 2 (two) times daily with meals., Disp: 180 tablet, Rfl: 3    metoprolol succinate (TOPROL-XL) 50 MG 24 hr tablet, Take 25 mg by mouth., Disp: , Rfl:     naproxen (NAPROSYN) 500 MG tablet, Take 1 tablet (500 mg total) by mouth 2 (two) times daily with meals., Disp: 60 tablet, Rfl: 0    pantoprazole (PROTONIX) 40 MG tablet, Take 1 tablet (40 mg total) by mouth once daily., Disp: 30 tablet, Rfl: 11    primidone (MYSOLINE) 50 MG Tab, 1/2 tab daily, Disp: 15 tablet, Rfl: 11    promethazine (PHENERGAN) 25 MG tablet, Take 1 tablet (25 mg total) by mouth every 6 (six) hours as needed for Nausea., Disp: 30 tablet, Rfl: 2    semaglutide (OZEMPIC) 0.25 mg or 0.5 mg(2 mg/1.5 mL) pen injector, Inject 0.5 mg into the skin every 7 days., Disp: 1 pen, Rfl: 11    sertraline (ZOLOFT) 50 MG tablet, TAKE ONE TABLET BY MOUTH EVERY DAY, Disp: 90 tablet, Rfl: 3    simvastatin (ZOCOR) 20 MG tablet, Take 1 tablet (20 mg total) by mouth once daily. (Patient not taking: Reported on 10/27/2022), Disp: 30 tablet, Rfl: 11    ZINC ORAL, Take 250 mg by mouth Daily., Disp: , Rfl:     ALLERGIES:   Review of patient's allergies indicates:   Allergen Reactions    Cephalexin Rash    Bactrim [sulfamethoxazole-trimethoprim]         PHYSICAL EXAMINATION:  There were no vitals taken for this visit.  General    Nursing note and vitals reviewed.  Constitutional: She is oriented to person, place, and time. She appears well-nourished.   HENT:   Head: Normocephalic and atraumatic.   Nose: Nose normal.   Eyes: EOM are normal. Pupils are equal, round, and reactive to light.   Neck: Neck supple.   Cardiovascular:  Normal rate and regular rhythm.            Pulmonary/Chest: Effort normal. No respiratory distress.   Abdominal: She exhibits no distension. There is no abdominal tenderness. There is no guarding.   Neurological: She is alert and oriented to person,  place, and time. She has normal reflexes.   Psychiatric: She has a normal mood and affect. Her behavior is normal. Judgment and thought content normal.     General Musculoskeletal Exam   Gait: Trendelenburg     Left Hip Exam     Inspection   Swelling: absent  Erythema: absent    Tenderness   The patient tender to palpation of the trochanteric bursa.    Range of Motion   Extension:  normal   Flexion:  normal   External rotation:  abnormal   Internal rotation: normal     Tests   Pain w/ forced internal rotation (CJ): present  Pain w/ forced external rotation (FADIR): present    Other   Sensation: normal          Muscle Strength   Left Lower Extremity   Hip Abduction: 4/5   Hip Adduction: 4/5     Vascular Exam       Left Pulses  Dorsalis Pedis:      2+  Posterior Tibial:      2+        No orders of the defined types were placed in this encounter.    Procedures

## 2022-12-02 ENCOUNTER — OFFICE VISIT (OUTPATIENT)
Dept: FAMILY MEDICINE | Facility: CLINIC | Age: 67
End: 2022-12-02
Payer: MEDICARE

## 2022-12-02 VITALS
HEART RATE: 81 BPM | TEMPERATURE: 99 F | HEIGHT: 64 IN | SYSTOLIC BLOOD PRESSURE: 117 MMHG | OXYGEN SATURATION: 94 % | BODY MASS INDEX: 31.41 KG/M2 | RESPIRATION RATE: 18 BRPM | DIASTOLIC BLOOD PRESSURE: 75 MMHG | WEIGHT: 184 LBS

## 2022-12-02 DIAGNOSIS — J11.1 INFLUENZA-LIKE ILLNESS: Primary | ICD-10-CM

## 2022-12-02 DIAGNOSIS — R68.89 FLU-LIKE SYMPTOMS: ICD-10-CM

## 2022-12-02 PROBLEM — I35.0 NON-RHEUMATIC AORTIC STENOSIS: Status: ACTIVE | Noted: 2018-08-28

## 2022-12-02 PROBLEM — E11.9 DIABETES MELLITUS: Status: ACTIVE | Noted: 2021-01-13

## 2022-12-02 PROBLEM — R55 SYNCOPE: Status: ACTIVE | Noted: 2022-06-07

## 2022-12-02 PROBLEM — R00.1 BRADYCARDIA: Status: ACTIVE | Noted: 2022-07-12

## 2022-12-02 PROBLEM — I77.1 SUBCLAVIAN ARTERY STENOSIS: Status: ACTIVE | Noted: 2018-08-28

## 2022-12-02 PROBLEM — I48.3 TYPICAL ATRIAL FLUTTER: Status: ACTIVE | Noted: 2022-08-18

## 2022-12-02 PROBLEM — E78.00 PURE HYPERCHOLESTEROLEMIA: Status: ACTIVE | Noted: 2018-08-28

## 2022-12-02 PROBLEM — R19.7 DIARRHEA, UNSPECIFIED: Status: ACTIVE | Noted: 2022-12-02

## 2022-12-02 PROBLEM — I45.10 RBBB: Status: ACTIVE | Noted: 2021-09-08

## 2022-12-02 PROBLEM — R25.1 TREMOR: Status: ACTIVE | Noted: 2019-09-05

## 2022-12-02 PROBLEM — I49.5 SSS (SICK SINUS SYNDROME): Status: ACTIVE | Noted: 2022-07-12

## 2022-12-02 PROBLEM — I25.10 CORONARY ARTERY DISEASE INVOLVING NATIVE CORONARY ARTERY OF NATIVE HEART WITHOUT ANGINA PECTORIS: Status: ACTIVE | Noted: 2018-08-28

## 2022-12-02 PROBLEM — I49.9 CARDIAC ARRHYTHMIA, UNSPECIFIED: Status: ACTIVE | Noted: 2022-06-07

## 2022-12-02 PROBLEM — I42.9 CARDIOMYOPATHY, UNSPECIFIED: Status: ACTIVE | Noted: 2022-06-07

## 2022-12-02 PROBLEM — Z95.1 S/P CABG (CORONARY ARTERY BYPASS GRAFT): Status: ACTIVE | Noted: 2022-07-12

## 2022-12-02 PROBLEM — I10 BENIGN ESSENTIAL HYPERTENSION: Status: ACTIVE | Noted: 2018-08-28

## 2022-12-02 PROBLEM — I48.0 PAROXYSMAL ATRIAL FIBRILLATION: Status: ACTIVE | Noted: 2022-08-18

## 2022-12-02 PROBLEM — N28.9 RENAL INSUFFICIENCY: Status: ACTIVE | Noted: 2021-09-08

## 2022-12-02 LAB
CTP QC/QA: YES
FLUAV AG NPH QL: NEGATIVE
FLUBV AG NPH QL: NEGATIVE

## 2022-12-02 PROCEDURE — 99213 OFFICE O/P EST LOW 20 MIN: CPT | Mod: ,,, | Performed by: NURSE PRACTITIONER

## 2022-12-02 PROCEDURE — 99213 PR OFFICE/OUTPT VISIT, EST, LEVL III, 20-29 MIN: ICD-10-PCS | Mod: ,,, | Performed by: NURSE PRACTITIONER

## 2022-12-02 PROCEDURE — 87804 INFLUENZA ASSAY W/OPTIC: CPT | Mod: RHCUB | Performed by: NURSE PRACTITIONER

## 2022-12-02 RX ORDER — BENZONATATE 200 MG/1
200 CAPSULE ORAL 3 TIMES DAILY PRN
Qty: 30 CAPSULE | Refills: 0 | Status: SHIPPED | OUTPATIENT
Start: 2022-12-02 | End: 2022-12-12

## 2022-12-02 RX ORDER — OSELTAMIVIR PHOSPHATE 75 MG/1
75 CAPSULE ORAL 2 TIMES DAILY
Qty: 10 CAPSULE | Refills: 0 | Status: SHIPPED | OUTPATIENT
Start: 2022-12-02 | End: 2022-12-07

## 2022-12-02 NOTE — PROGRESS NOTES
"Subjective:       Patient ID: Karen Renteria is a 67 y.o. female.    Chief Complaint: Headache, Diarrhea, Nausea, and Fever (X 1 day)    Presents to clinic with  as above. S/S began yesterday. No vomiting today. Still having some diarrhea. Drinking well and voiding.     Review of Systems   Constitutional:  Positive for chills, fever and malaise/fatigue.   HENT:  Positive for congestion, sinus pain and sore throat.    Respiratory:  Positive for cough.    Cardiovascular: Negative.    Gastrointestinal:  Positive for abdominal pain, diarrhea, nausea and vomiting.   Musculoskeletal:  Positive for myalgias.   Neurological:  Positive for headaches.        Reviewed family, medical, surgical, and social history.    Objective:      /75   Pulse 81   Temp 98.6 °F (37 °C) (Oral)   Resp 18   Ht 5' 4" (1.626 m)   Wt 83.5 kg (184 lb)   SpO2 (!) 94%   BMI 31.58 kg/m²   Physical Exam  Vitals and nursing note reviewed.   Constitutional:       General: She is not in acute distress.     Appearance: Normal appearance. She is not ill-appearing, toxic-appearing or diaphoretic.   HENT:      Head: Normocephalic.      Right Ear: Hearing, tympanic membrane, ear canal and external ear normal.      Left Ear: Hearing, tympanic membrane, ear canal and external ear normal.      Nose: Mucosal edema, congestion and rhinorrhea present. Rhinorrhea is clear.      Right Turbinates: Enlarged and swollen.      Left Turbinates: Enlarged and swollen.      Right Sinus: No maxillary sinus tenderness or frontal sinus tenderness.      Left Sinus: No maxillary sinus tenderness or frontal sinus tenderness.      Mouth/Throat:      Lips: Pink.      Mouth: Mucous membranes are moist.      Pharynx: Uvula midline. Posterior oropharyngeal erythema present. No pharyngeal swelling, oropharyngeal exudate or uvula swelling.      Tonsils: No tonsillar exudate or tonsillar abscesses.   Cardiovascular:      Rate and Rhythm: Normal rate and regular " rhythm.      Heart sounds: Normal heart sounds.   Pulmonary:      Effort: Pulmonary effort is normal.      Breath sounds: Normal breath sounds.   Abdominal:      General: Abdomen is flat. Bowel sounds are normal. There is no distension.      Palpations: Abdomen is soft. There is no mass.      Tenderness: There is no abdominal tenderness. There is no right CVA tenderness, left CVA tenderness, guarding or rebound.      Hernia: No hernia is present.   Skin:     General: Skin is warm and dry.   Neurological:      Mental Status: She is alert.   Psychiatric:         Mood and Affect: Mood normal.         Behavior: Behavior normal.         Thought Content: Thought content normal.         Judgment: Judgment normal.          Office Visit on 12/02/2022   Component Date Value Ref Range Status    Rapid Influenza A Ag 12/02/2022 Negative  Negative Final    Rapid Influenza B Ag 12/02/2022 Negative  Negative Final     Acceptable 12/02/2022 Yes   Final      Assessment:       1. Influenza-like illness    2. Flu-like symptoms          Plan:       Influenza-like illness  -     oseltamivir (TAMIFLU) 75 MG capsule; Take 1 capsule (75 mg total) by mouth 2 (two) times daily. for 5 days  Dispense: 10 capsule; Refill: 0  -     benzonatate (TESSALON) 200 MG capsule; Take 1 capsule (200 mg total) by mouth 3 (three) times daily as needed for Cough.  Dispense: 30 capsule; Refill: 0    Flu-like symptoms  -     POCT Influenza A/B  -     oseltamivir (TAMIFLU) 75 MG capsule; Take 1 capsule (75 mg total) by mouth 2 (two) times daily. for 5 days  Dispense: 10 capsule; Refill: 0  -     benzonatate (TESSALON) 200 MG capsule; Take 1 capsule (200 mg total) by mouth 3 (three) times daily as needed for Cough.  Dispense: 30 capsule; Refill: 0  Keep hydrated with gatorade as long as having diarrhea  OTC meds for s/s  Retest PRN  RTC PRN          Risks, benefits, and side effects were discussed with the patient. All questions were answered to  the fullest satisfaction of the patient, and pt verbalized understanding and agreement to treatment plan. Pt was to call with any new or worsening symptoms, or present to the ER.

## 2022-12-02 NOTE — LETTER
December 2, 2022      [facility]  [address] [facility] [address]  Phone: [phone]  Fax: [phone]       Patient: Karen Renteria   YOB: 1955  Date of Visit: 12/02/2022    To Whom It May Concern:    Kym Renteria  was at [facility] on 12/02/2022. The patient may return to work/school on 12/7/2022 with no restrictions. If you have any questions or concerns, or if I can be of further assistance, please do not hesitate to contact me.    Sincerely,        CHET Olmos RN

## 2022-12-15 ENCOUNTER — OFFICE VISIT (OUTPATIENT)
Dept: ORTHOPEDICS | Facility: CLINIC | Age: 67
End: 2022-12-15
Payer: MEDICARE

## 2022-12-15 DIAGNOSIS — M70.62 GREATER TROCHANTERIC BURSITIS OF LEFT HIP: Primary | ICD-10-CM

## 2022-12-15 PROCEDURE — 99212 PR OFFICE/OUTPT VISIT, EST, LEVL II, 10-19 MIN: ICD-10-PCS | Mod: 25,,, | Performed by: NURSE PRACTITIONER

## 2022-12-15 PROCEDURE — 20610 LARGE JOINT ASPIRATION/INJECTION: L GLENOHUMERAL: ICD-10-PCS | Mod: LT,,, | Performed by: NURSE PRACTITIONER

## 2022-12-15 PROCEDURE — 20610 DRAIN/INJ JOINT/BURSA W/O US: CPT | Mod: LT,,, | Performed by: NURSE PRACTITIONER

## 2022-12-15 PROCEDURE — 99212 OFFICE O/P EST SF 10 MIN: CPT | Mod: 25,,, | Performed by: NURSE PRACTITIONER

## 2022-12-15 RX ORDER — TRIAMCINOLONE ACETONIDE 40 MG/ML
40 INJECTION, SUSPENSION INTRA-ARTICULAR; INTRAMUSCULAR
Status: DISCONTINUED | OUTPATIENT
Start: 2022-12-15 | End: 2022-12-15 | Stop reason: HOSPADM

## 2022-12-15 RX ADMIN — TRIAMCINOLONE ACETONIDE 40 MG: 40 INJECTION, SUSPENSION INTRA-ARTICULAR; INTRAMUSCULAR at 08:12

## 2022-12-15 NOTE — PROGRESS NOTES
67 y.o. Female returns to clinic for a follow up visit regarding     ICD-10-CM ICD-9-CM   1. Greater trochanteric bursitis of left hip  M70.62 726.5        Patient is here for recheck of left hip pain. She had greater troch injection last visit which helped tremendously for 2-3 weeks then the pain returned. Most of the pain is still on the lateral side of the hip.        Past Medical History:   Diagnosis Date    Acute gastric ulcer without hemorrhage or perforation 3/3/2022    Acute superficial gastritis without hemorrhage 3/3/2022    Anxiety     Carpal tunnel syndrome     Cerebral vascular accident     Coronary arteriosclerosis     S/P CABG 2011    COVID-19 12/20/2020    Depression     Diabetes mellitus, type 2     Encounter for long-term (current) use of other medications     Esophageal dysphagia 3/3/2022    Eye exam, routine 02/03/2021    GERD (gastroesophageal reflux disease)     History of esophagogastroduodenoscopy (EGD) 08/07/2019    Hx of colonoscopy 04/12/2019    Hx of mammogram 06/24/2020    SCREENING/BILATERAL    Hyperlipidemia     Hypertension     Insomnia     Low back pain     Lumbar radiculopathy     Osteoporosis     Tremor     Vitamin D deficiency      Past Surgical History:   Procedure Laterality Date    APPENDECTOMY      ASCENDING AORTIC ANEURYSM REPAIR W/ TISSUE AORTIC VALVE REPLACEMENT      CHOLECYSTECTOMY      CORONARY ARTERY BYPASS GRAFT (CABG)      DIRECT LARYNGOSCOPY Bilateral 8/12/2022    Procedure: LARYNGOSCOPY, DIRECT;  Surgeon: Iban Silverio MD;  Location: [facility] OR;  Service: ENT;  Laterality: Bilateral;    HYSTERECTOMY      TONSILLECTOMY      VOCAL FOLD LESION EXCISION Bilateral 8/12/2022    Procedure: EXCISION, LESION, VOCAL CORD;  Surgeon: Iban Silverio MD;  Location: [facility] OR;  Service: ENT;  Laterality: Bilateral;         PHYSICAL EXAMINATION:    General    Nursing note and vitals reviewed.  Constitutional: She is oriented to person, place, and time. She appears  well-developed and well-nourished.   HENT:   Head: Normocephalic and atraumatic.   Nose: Nose normal.   Eyes: EOM are normal. Pupils are equal, round, and reactive to light.   Neck: Neck supple.   Cardiovascular:  Normal rate and intact distal pulses.            Pulmonary/Chest: Effort normal. No respiratory distress. She exhibits no tenderness.   Abdominal: Soft. She exhibits no distension. There is no abdominal tenderness.   Neurological: She is alert and oriented to person, place, and time. She has normal reflexes.   Psychiatric: She has a normal mood and affect. Her behavior is normal. Judgment and thought content normal.         Left Hip Exam     Range of Motion   Abduction:  normal   Adduction:  normal   Extension:  normal   Flexion:  normal   External rotation:  normal   Internal rotation: normal     Tests   Pain w/ forced internal rotation (CJ): absent  Pain w/ forced external rotation (FADIR): absent    Other   Sensation: normal          Muscle Strength   Left Lower Extremity   Hip Abduction: 4/5       IMAGING:  No results found.     ASSESSMENT:      ICD-10-CM ICD-9-CM   1. Greater trochanteric bursitis of left hip  M70.62 726.5       PLAN:     -Findings and treatment options were discussed with the patient  -All questions answered      Left greater troch injection today  If no better in 6 weeks, will consider intraarticular  She is unable to take NSAIDs    There are no Patient Instructions on file for this visit.      No orders of the defined types were placed in this encounter.        Large Joint Aspiration/Injection: L glenohumeral    Date/Time: 12/15/2022 8:00 AM  Performed by: JOHN Lopez  Authorized by: JOHN Lopez     Consent Done?:  Yes (Verbal)  Indications:  Pain  Timeout: prior to procedure the correct patient, procedure, and site was verified      Local anesthesia used?: Yes    Local anesthetic:  Bupivacaine 0.25% without epinephrine    Details:  Needle Size:  22  G  Approach:  Anterior  Location:  Shoulder  Site:  R glenohumeral  Medications:  40 mg triamcinolone acetonide 40 mg/mL  Patient tolerance:  Patient tolerated the procedure well with no immediate complications

## 2022-12-23 ENCOUNTER — TELEPHONE (OUTPATIENT)
Dept: FAMILY MEDICINE | Facility: CLINIC | Age: 67
End: 2022-12-23
Payer: MEDICARE

## 2022-12-23 RX ORDER — PREGABALIN 75 MG/1
75 CAPSULE ORAL 2 TIMES DAILY
Qty: 60 CAPSULE | Refills: 0 | Status: SHIPPED | OUTPATIENT
Start: 2022-12-23 | End: 2023-02-17

## 2022-12-23 NOTE — TELEPHONE ENCOUNTER
She is unable to get her gabapentin from the pharmacy,it is on back order,has been out. Wanted to know is there anything else that she can take for her fibromyalgia until she can get her gabapentin?

## 2023-01-19 ENCOUNTER — OFFICE VISIT (OUTPATIENT)
Dept: ORTHOPEDICS | Facility: CLINIC | Age: 68
End: 2023-01-19
Payer: MEDICARE

## 2023-01-19 ENCOUNTER — HOSPITAL ENCOUNTER (OUTPATIENT)
Dept: RADIOLOGY | Facility: HOSPITAL | Age: 68
Discharge: HOME | End: 2023-01-19
Attending: NURSE PRACTITIONER
Payer: MEDICARE

## 2023-01-19 DIAGNOSIS — M25.521 RIGHT ELBOW PAIN: Primary | ICD-10-CM

## 2023-01-19 DIAGNOSIS — M79.642 LEFT HAND PAIN: ICD-10-CM

## 2023-01-19 DIAGNOSIS — M25.521 RIGHT ELBOW PAIN: ICD-10-CM

## 2023-01-19 DIAGNOSIS — M25.552 LEFT HIP PAIN: ICD-10-CM

## 2023-01-19 DIAGNOSIS — S62.112A TRIQUETRAL CHIP FRACTURE, LEFT, CLOSED, INITIAL ENCOUNTER: ICD-10-CM

## 2023-01-19 PROCEDURE — 73130 X-RAY EXAM OF HAND: CPT | Mod: TC,LT

## 2023-01-19 PROCEDURE — 99214 OFFICE O/P EST MOD 30 MIN: CPT | Mod: PBBFAC | Performed by: NURSE PRACTITIONER

## 2023-01-19 PROCEDURE — 73130 XR HAND COMPLETE 3 VIEW LEFT: ICD-10-PCS | Mod: 26,LT,, | Performed by: RADIOLOGY

## 2023-01-19 PROCEDURE — 99214 OFFICE O/P EST MOD 30 MIN: CPT | Mod: S$PBB,,, | Performed by: NURSE PRACTITIONER

## 2023-01-19 PROCEDURE — 73070 X-RAY EXAM OF ELBOW: CPT | Mod: 26,RT,, | Performed by: RADIOLOGY

## 2023-01-19 PROCEDURE — 73070 X-RAY EXAM OF ELBOW: CPT | Mod: TC,RT

## 2023-01-19 PROCEDURE — 99214 PR OFFICE/OUTPT VISIT, EST, LEVL IV, 30-39 MIN: ICD-10-PCS | Mod: S$PBB,,, | Performed by: NURSE PRACTITIONER

## 2023-01-19 PROCEDURE — 73070 XR ELBOW 2 VIEWS RIGHT: ICD-10-PCS | Mod: 26,RT,, | Performed by: RADIOLOGY

## 2023-01-19 PROCEDURE — 73130 X-RAY EXAM OF HAND: CPT | Mod: 26,LT,, | Performed by: RADIOLOGY

## 2023-01-19 RX ORDER — HYDROCODONE BITARTRATE AND ACETAMINOPHEN 7.5; 325 MG/1; MG/1
1 TABLET ORAL EVERY 6 HOURS PRN
Qty: 30 TABLET | Refills: 0 | Status: SHIPPED | OUTPATIENT
Start: 2023-01-19 | End: 2023-02-14

## 2023-01-19 NOTE — PROGRESS NOTES
67 y.o. Female returns to clinic for a follow up visit regarding     ICD-10-CM ICD-9-CM   1. Right elbow pain  M25.521 719.42   2. Triquetral chip fracture, left, closed, initial encounter  S62.112A 814.03   3. Left hip pain  M25.552 719.45     Patient is here for follow-up left hip pain today.  Upon arrival, she complains of left hand and wrist pain as well as right elbow pain.  Patient reports she had a fall 2 days ago and went to [facility] ED. She has a lot of swelling and bruising on the right elbow as well as the left hand.  She was told she does not have any fractures.          Past Medical History:   Diagnosis Date    Acute gastric ulcer without hemorrhage or perforation 3/3/2022    Acute superficial gastritis without hemorrhage 3/3/2022    Anxiety     Carpal tunnel syndrome     Cerebral vascular accident     Coronary arteriosclerosis     S/P CABG 2011    COVID-19 12/20/2020    Depression     Diabetes mellitus, type 2     Encounter for long-term (current) use of other medications     Esophageal dysphagia 3/3/2022    Eye exam, routine 02/03/2021    GERD (gastroesophageal reflux disease)     History of esophagogastroduodenoscopy (EGD) 08/07/2019    Hx of colonoscopy 04/12/2019    Hx of mammogram 06/24/2020    SCREENING/BILATERAL    Hyperlipidemia     Hypertension     Insomnia     Low back pain     Lumbar radiculopathy     Osteoporosis     Tremor     Vitamin D deficiency      Past Surgical History:   Procedure Laterality Date    APPENDECTOMY      ASCENDING AORTIC ANEURYSM REPAIR W/ TISSUE AORTIC VALVE REPLACEMENT      CHOLECYSTECTOMY      CORONARY ARTERY BYPASS GRAFT (CABG)      DIRECT LARYNGOSCOPY Bilateral 8/12/2022    Procedure: LARYNGOSCOPY, DIRECT;  Surgeon: Iban Silverio MD;  Location: [facility];  Service: ENT;  Laterality: Bilateral;    HYSTERECTOMY      TONSILLECTOMY      VOCAL FOLD LESION EXCISION Bilateral 8/12/2022    Procedure: EXCISION, LESION, VOCAL CORD;  Surgeon: Iban Silverio MD;   Location: [facility];  Service: ENT;  Laterality: Bilateral;         PHYSICAL EXAMINATION:    General    Constitutional: She is oriented to person, place, and time. She appears well-nourished.   HENT:   Head: Normocephalic and atraumatic.   Eyes: Pupils are equal, round, and reactive to light.   Neck: Neck supple.   Cardiovascular:  Normal rate and regular rhythm.            Pulmonary/Chest: Effort normal. No respiratory distress.   Abdominal: There is no abdominal tenderness. There is no guarding.   Neurological: She is alert and oriented to person, place, and time. She has normal reflexes.   Psychiatric: She has a normal mood and affect. Her behavior is normal. Judgment and thought content normal.             Right Hand/Wrist Exam     Inspection   Scars: Wrist - absent   Effusion: Wrist - absent   Bruising: Wrist - absent       Left Hand/Wrist Exam     Inspection   Effusion: Wrist - present Hand -  present  Bruising: Wrist - absent Hand -  present    Range of Motion     Wrist   Extension:  abnormal   Flexion:  abnormal       Right Elbow Exam     Inspection   Scars: absent  Effusion: present  Bruising: present  Deformity: absent    Pain   The patient exhibits pain of the lateral epicondyle and olecranon    Swelling   The patient is swollen on the lateral epicondyle and medial epicondyle    Range of Motion   Extension:  abnormal   Flexion:  abnormal   Pronation:  abnormal   Supination:  abnormal     Other   Sensation: normal          Muscle Strength   Left Upper Extremity  :  3/5     Vascular Exam       Capillary Refill  Left Hand: normal capillary refill        IMAGING:  X-Ray Elbow 2 Views Right    Result Date: 1/19/2023  EXAMINATION: XR ELBOW 2 VIEWS RIGHT CLINICAL HISTORY: Pain in right elbow TECHNIQUE: AP and lateral views of the right elbow COMPARISON: 09/04/2015 FINDINGS: No fracture or dislocation identified.  Degenerative changes of the elbow were seen with osteophyte formation.     Degenerative  changes Electronically signed by: Lucien Joseph Date:    01/19/2023 Time:    09:45    X-Ray Hand 3 View Left    Result Date: 1/19/2023  EXAMINATION: XR HAND COMPLETE 3 VIEW LEFT CLINICAL HISTORY: . Pain in left hand TECHNIQUE: PA, lateral, and oblique views of the left hand were performed. COMPARISON: None FINDINGS: No definite acute fracture identified.  Degenerative changes are seen diffusely of the hand and wrist.  Surgical changes seen along the volar aspect of the distal forearm and wrist.     As above Electronically signed by: Lucien Joseph Date:    01/19/2023 Time:    09:47       ASSESSMENT:      ICD-10-CM ICD-9-CM   1. Right elbow pain  M25.521 719.42   2. Triquetral chip fracture, left, closed, initial encounter  S62.112A 814.03   3. Left hip pain  M25.552 719.45       PLAN:     -Findings and treatment options were discussed with the patient  -All questions answered      Removable wrist brace to the left at all times except for bathing.  Arm sling to right elbow.  Discontinue arm sling in 2 weeks and work on elbow motion.  Return to clinic in 4 weeks with x-rays.  Will we will address her hip pain at that time.  She is wanting to schedule an intra-articular hip injection with Radiology.  There are no Patient Instructions on file for this visit.      Orders Placed This Encounter   Procedures    X-Ray Elbow 2 Views Right    X-Ray Hand 3 View Left    FL Aspiration and/or Injection Major Joint with Fluoro, Left (XPD)         Procedures

## 2023-01-19 NOTE — LETTER
January 19, 2023      [facility]  [address] [place] [address]  Phone: [phone]  Fax: [phone]       Patient: Karen Renteria   YOB: 1955  Date of Visit: 01/19/2023    To Whom It May Concern:    Kym Renteria  was at [facility] on 01/19/2023. The patient may remain off work until re-evaluated in 4 weeks. If you have any questions or concerns, or if I can be of further assistance, please do not hesitate to contact me.    Sincerely,    Dr Peña Ott MD

## 2023-02-02 ENCOUNTER — OFFICE VISIT (OUTPATIENT)
Dept: OTOLARYNGOLOGY | Facility: CLINIC | Age: 68
End: 2023-02-02
Payer: MEDICARE

## 2023-02-02 VITALS — WEIGHT: 184 LBS | BODY MASS INDEX: 31.58 KG/M2

## 2023-02-02 DIAGNOSIS — R42 DIZZINESS AND GIDDINESS: Primary | ICD-10-CM

## 2023-02-02 PROCEDURE — 99214 OFFICE O/P EST MOD 30 MIN: CPT | Mod: S$PBB,,, | Performed by: OTOLARYNGOLOGY

## 2023-02-02 PROCEDURE — 99214 PR OFFICE/OUTPT VISIT, EST, LEVL IV, 30-39 MIN: ICD-10-PCS | Mod: S$PBB,,, | Performed by: OTOLARYNGOLOGY

## 2023-02-02 PROCEDURE — 99214 OFFICE O/P EST MOD 30 MIN: CPT | Mod: PBBFAC | Performed by: OTOLARYNGOLOGY

## 2023-02-02 NOTE — PROGRESS NOTES
Subjective:       Patient ID: Kaern Renteria is a 67 y.o. female.    Chief Complaint: Otalgia (Left ear perforation, pt states ER MD found 2 weeks ago, denies drainage from left ear. )  dysequilibrium  Otalgia     Review of Systems   HENT:  Positive for ear pain.    Neurological:  Positive for dizziness.   All other systems reviewed and are negative.    Objective:      Physical Exam  General: NAD  Head: Normocephalic, atraumatic, no facial asymmetry/normal strength,  Ears: Both auricules normal in appearance, w/o deformities tympanic membranes monomeric tm left external auditory canals normal  Nose: External nose w/o deformities normal turbinates no drainage or inflammation  Oral Cavity: Lips, gums, floor of mouth, tongue hard palate, and buccal mucosa without mass/lesion  Oropharynx: Mucosa pink and moist, soft palate, posterior pharynx and oropharyngeal wall without mass/lesion  Neck: Supple, symmetric, trachea midline, no palpable mass/lesion, no palpable cervical lymphadenopathy  Skin: Warm and dry, no concerning lesions  Respiratory: Respirations even, unlabored   Assessment:       1. Dizziness and giddiness          Plan:       Discussed ears and falling will get MRI

## 2023-02-08 ENCOUNTER — TELEPHONE (OUTPATIENT)
Dept: ORTHOPEDICS | Facility: CLINIC | Age: 68
End: 2023-02-08
Payer: MEDICARE

## 2023-02-08 NOTE — TELEPHONE ENCOUNTER
----- Message from Galilea Pino sent at 2/8/2023  1:02 PM CST -----  Calling to see if mri has been set up - call back # [phone]

## 2023-02-13 DIAGNOSIS — M25.511 RIGHT SHOULDER PAIN, UNSPECIFIED CHRONICITY: Primary | ICD-10-CM

## 2023-02-14 ENCOUNTER — OFFICE VISIT (OUTPATIENT)
Dept: FAMILY MEDICINE | Facility: CLINIC | Age: 68
End: 2023-02-14
Payer: MEDICARE

## 2023-02-14 VITALS
HEIGHT: 64 IN | RESPIRATION RATE: 18 BRPM | WEIGHT: 185.63 LBS | OXYGEN SATURATION: 92 % | SYSTOLIC BLOOD PRESSURE: 154 MMHG | BODY MASS INDEX: 31.69 KG/M2 | TEMPERATURE: 99 F | DIASTOLIC BLOOD PRESSURE: 85 MMHG | HEART RATE: 74 BPM

## 2023-02-14 DIAGNOSIS — R30.0 DYSURIA: ICD-10-CM

## 2023-02-14 DIAGNOSIS — R42 DIZZINESS: ICD-10-CM

## 2023-02-14 DIAGNOSIS — N39.0 URINARY TRACT INFECTION WITHOUT HEMATURIA, SITE UNSPECIFIED: Primary | ICD-10-CM

## 2023-02-14 PROBLEM — Z95.0 PRESENCE OF CARDIAC PACEMAKER: Status: ACTIVE | Noted: 2023-02-01

## 2023-02-14 LAB
BILIRUB SERPL-MCNC: NORMAL MG/DL
BLOOD URINE, POC: NORMAL
COLOR, POC UA: NORMAL
GLUCOSE UR QL STRIP: NORMAL
KETONES UR QL STRIP: NORMAL
LEUKOCYTE ESTERASE URINE, POC: NORMAL
NITRITE, POC UA: POSITIVE
PH, POC UA: 7.5
PROTEIN, POC: NORMAL
SPECIFIC GRAVITY, POC UA: 1.02
UROBILINOGEN, POC UA: 1

## 2023-02-14 PROCEDURE — 87077 CULTURE AEROBIC IDENTIFY: CPT | Mod: ,,, | Performed by: CLINICAL MEDICAL LABORATORY

## 2023-02-14 PROCEDURE — 87077 CULTURE, URINE: ICD-10-PCS | Mod: ,,, | Performed by: CLINICAL MEDICAL LABORATORY

## 2023-02-14 PROCEDURE — 81003 URINALYSIS AUTO W/O SCOPE: CPT | Mod: RHCUB | Performed by: NURSE PRACTITIONER

## 2023-02-14 PROCEDURE — 87186 CULTURE, URINE: ICD-10-PCS | Mod: ,,, | Performed by: CLINICAL MEDICAL LABORATORY

## 2023-02-14 PROCEDURE — 99213 OFFICE O/P EST LOW 20 MIN: CPT | Mod: ,,, | Performed by: NURSE PRACTITIONER

## 2023-02-14 PROCEDURE — 87186 SC STD MICRODIL/AGAR DIL: CPT | Mod: ,,, | Performed by: CLINICAL MEDICAL LABORATORY

## 2023-02-14 PROCEDURE — 87086 CULTURE, URINE: ICD-10-PCS | Mod: ,,, | Performed by: CLINICAL MEDICAL LABORATORY

## 2023-02-14 PROCEDURE — 87086 URINE CULTURE/COLONY COUNT: CPT | Mod: ,,, | Performed by: CLINICAL MEDICAL LABORATORY

## 2023-02-14 PROCEDURE — 99213 PR OFFICE/OUTPT VISIT, EST, LEVL III, 20-29 MIN: ICD-10-PCS | Mod: ,,, | Performed by: NURSE PRACTITIONER

## 2023-02-14 RX ORDER — NITROFURANTOIN 25; 75 MG/1; MG/1
100 CAPSULE ORAL 2 TIMES DAILY
Qty: 14 CAPSULE | Refills: 0 | Status: SHIPPED | OUTPATIENT
Start: 2023-02-14 | End: 2023-12-08

## 2023-02-14 NOTE — PROGRESS NOTES
Subjective:       Patient ID: Karen Renteria is a 67 y.o. female.    Chief Complaint: Urinary Tract Infection    Dysuria and suprapubic pain  Chronic Dizziness- wants referral to neurology  Review of Systems   Constitutional:  Negative for chills, fatigue and fever.   Genitourinary:  Positive for dysuria. Negative for flank pain, frequency, genital sores and urgency.   Neurological:  Positive for dizziness. Negative for headaches.       Objective:      Physical Exam  Vitals and nursing note reviewed.   Constitutional:       General: She is not in acute distress.     Appearance: Normal appearance. She is not ill-appearing, toxic-appearing or diaphoretic.   HENT:      Head: Normocephalic.      Right Ear: Tympanic membrane, ear canal and external ear normal.      Left Ear: Tympanic membrane, ear canal and external ear normal.      Nose: Nose normal. No congestion or rhinorrhea.      Mouth/Throat:      Mouth: Mucous membranes are moist.      Pharynx: No oropharyngeal exudate or posterior oropharyngeal erythema.   Eyes:      General: No scleral icterus.        Right eye: No discharge.         Left eye: No discharge.      Extraocular Movements: Extraocular movements intact.      Conjunctiva/sclera: Conjunctivae normal.      Pupils: Pupils are equal, round, and reactive to light.   Cardiovascular:      Rate and Rhythm: Normal rate and regular rhythm.      Pulses: Normal pulses.      Heart sounds: Normal heart sounds. No murmur heard.  Pulmonary:      Effort: Pulmonary effort is normal. No respiratory distress.      Breath sounds: Normal breath sounds. No wheezing, rhonchi or rales.   Abdominal:      General: Bowel sounds are normal.      Palpations: Abdomen is soft.      Tenderness: There is abdominal tenderness in the suprapubic area. There is no right CVA tenderness, left CVA tenderness, guarding or rebound.   Musculoskeletal:         General: Normal range of motion.      Cervical back: Neck supple. No tenderness.    Lymphadenopathy:      Cervical: No cervical adenopathy.   Skin:     General: Skin is warm and dry.      Capillary Refill: Capillary refill takes less than 2 seconds.      Findings: No rash.   Neurological:      Mental Status: She is alert and oriented to person, place, and time.      Motor: No weakness.      Gait: Gait normal.   Psychiatric:         Mood and Affect: Mood normal.         Behavior: Behavior normal.         Thought Content: Thought content normal.         Judgment: Judgment normal.          Assessment:       1. Dysuria    2. Urinary tract infection without hematuria, site unspecified        Plan:   Dysuria  -     POCT URINALYSIS W/O SCOPE    Urinary tract infection without hematuria, site unspecified  -     Urine culture; Future; Expected date: 02/14/2023  -     nitrofurantoin, macrocrystal-monohydrate, (MACROBID) 100 MG capsule; Take 1 capsule (100 mg total) by mouth 2 (two) times daily.  Dispense: 14 capsule; Refill: 0         Risks, benefits, and side effects were discussed with the patient. All questions were answered to the fullest satisfaction of the patient, and pt verbalized understanding and agreement to treatment plan. Pt was to call with any new or worsening symptoms, or present to the ER

## 2023-02-16 ENCOUNTER — OFFICE VISIT (OUTPATIENT)
Dept: ORTHOPEDICS | Facility: CLINIC | Age: 68
End: 2023-02-16
Payer: MEDICARE

## 2023-02-16 ENCOUNTER — HOSPITAL ENCOUNTER (OUTPATIENT)
Dept: RADIOLOGY | Facility: HOSPITAL | Age: 68
Discharge: HOME | End: 2023-02-16
Attending: ORTHOPAEDIC SURGERY
Payer: MEDICARE

## 2023-02-16 DIAGNOSIS — M25.511 RIGHT SHOULDER PAIN, UNSPECIFIED CHRONICITY: ICD-10-CM

## 2023-02-16 DIAGNOSIS — M25.552 LEFT HIP PAIN: ICD-10-CM

## 2023-02-16 DIAGNOSIS — M16.12 PRIMARY OSTEOARTHRITIS OF LEFT HIP: Primary | ICD-10-CM

## 2023-02-16 PROCEDURE — 73030 X-RAY EXAM OF SHOULDER: CPT | Mod: TC,RT

## 2023-02-16 PROCEDURE — 73030 XR SHOULDER COMPLETE 2 OR MORE VIEWS RIGHT: ICD-10-PCS | Mod: 26,RT,, | Performed by: ORTHOPAEDIC SURGERY

## 2023-02-16 PROCEDURE — 73030 X-RAY EXAM OF SHOULDER: CPT | Mod: 26,RT,, | Performed by: ORTHOPAEDIC SURGERY

## 2023-02-16 PROCEDURE — 99212 OFFICE O/P EST SF 10 MIN: CPT | Mod: PBBFAC | Performed by: ORTHOPAEDIC SURGERY

## 2023-02-16 PROCEDURE — 99213 PR OFFICE/OUTPT VISIT, EST, LEVL III, 20-29 MIN: ICD-10-PCS | Mod: S$PBB,,, | Performed by: ORTHOPAEDIC SURGERY

## 2023-02-16 PROCEDURE — 99213 OFFICE O/P EST LOW 20 MIN: CPT | Mod: S$PBB,,, | Performed by: ORTHOPAEDIC SURGERY

## 2023-02-17 LAB — UA COMPLETE W REFLEX CULTURE PNL UR: ABNORMAL

## 2023-02-17 RX ORDER — PREGABALIN 75 MG/1
75 CAPSULE ORAL 2 TIMES DAILY
Qty: 60 CAPSULE | Refills: 5 | Status: SHIPPED | OUTPATIENT
Start: 2023-02-17 | End: 2023-12-11

## 2023-02-17 RX ORDER — PROMETHAZINE HYDROCHLORIDE 25 MG/1
25 TABLET ORAL EVERY 6 HOURS PRN
Qty: 30 TABLET | Refills: 5 | Status: SHIPPED | OUTPATIENT
Start: 2023-02-17

## 2023-02-21 NOTE — PROGRESS NOTES
67 y.o. Female returns to clinic for a follow up visit regarding     ICD-10-CM ICD-9-CM   1. Primary osteoarthritis of left hip  M16.12 715.15   2. Left hip pain  M25.552 719.45        Complaining of left hip pain as well as right shoulder pain today.  We last saw her for an injury to her wrist and elbow.  The sling has been discontinued her elbow pain is better.  She is also discontinued her wrist brace as she is been dealing with a triquetral fracture there.  The main complaint however is left hip pain.  She did fall in her shoulder a few weeks ago was complaining of some mild soreness there and she wanted to make sure she had no fractures in that area       Past Medical History:   Diagnosis Date    Acute gastric ulcer without hemorrhage or perforation 3/3/2022    Acute superficial gastritis without hemorrhage 3/3/2022    Anxiety     Carpal tunnel syndrome     Cerebral vascular accident     Coronary arteriosclerosis     S/P CABG 2011    COVID-19 12/20/2020    Depression     Diabetes mellitus, type 2     Encounter for long-term (current) use of other medications     Esophageal dysphagia 3/3/2022    Eye exam, routine 02/03/2021    GERD (gastroesophageal reflux disease)     History of esophagogastroduodenoscopy (EGD) 08/07/2019    Hx of colonoscopy 04/12/2019    Hx of mammogram 06/24/2020    SCREENING/BILATERAL    Hyperlipidemia     Hypertension     Insomnia     Low back pain     Lumbar radiculopathy     Osteoporosis     Tremor     Vitamin D deficiency      Past Surgical History:   Procedure Laterality Date    APPENDECTOMY      ASCENDING AORTIC ANEURYSM REPAIR W/ TISSUE AORTIC VALVE REPLACEMENT      CHOLECYSTECTOMY      CORONARY ARTERY BYPASS GRAFT (CABG)      DIRECT LARYNGOSCOPY Bilateral 8/12/2022    Procedure: LARYNGOSCOPY, DIRECT;  Surgeon: Iban Silverio MD;  Location: [facility];  Service: ENT;  Laterality: Bilateral;    HYSTERECTOMY      TONSILLECTOMY      VOCAL FOLD LESION EXCISION Bilateral 8/12/2022     Procedure: EXCISION, LESION, VOCAL CORD;  Surgeon: Iban Silverio MD;  Location: [facility], [place];  Service: ENT;  Laterality: Bilateral;         PHYSICAL EXAMINATION:    General    Nursing note and vitals reviewed.  Constitutional: She is oriented to person, place, and time. She appears well-developed and well-nourished. No distress.   HENT:   Head: Normocephalic and atraumatic.   Nose: Nose normal.   Eyes: EOM are normal. Pupils are equal, round, and reactive to light.   Neck: Neck supple.   Cardiovascular:  Normal rate and intact distal pulses.            Pulmonary/Chest: Effort normal and breath sounds normal. No respiratory distress. She exhibits no tenderness.   Abdominal: Soft. Bowel sounds are normal. She exhibits no distension. There is no abdominal tenderness.   Neurological: She is alert and oriented to person, place, and time. She has normal reflexes. She displays normal reflexes. No cranial nerve deficit. She exhibits normal muscle tone.   Psychiatric: She has a normal mood and affect. Her behavior is normal. Judgment and thought content normal.             Right Hip Exam   Right hip exam is normal.   Left Hip Exam     Tenderness   The patient tender to palpation of the trochanteric bursa.    Range of Motion   Extension:  normal   Flexion:  normal   External rotation:  normal   Internal rotation: normal     Tests   Pain w/ forced internal rotation (CJ): present  Pain w/ forced external rotation (FADIR): present  Circumduction test: positive  Log Roll: positive    Other   Sensation: normal      Right Shoulder Exam     Inspection/Observation   Swelling: absent  Bruising: absent  Scapular Dyskinesia: positive    Tenderness   The patient is tender to palpation of the greater tuberosity, acromion and acromioclavicular joint.    Range of Motion   Active abduction:  abnormal   Passive abduction:  abnormal   Forward Flexion:  abnormal   Forward Elevation: abnormal  External Rotation 90 degrees:  normal    Tests & Signs   Apprehension: negative  Cross arm: positive  Drop arm: negative  Watts test: positive  Impingement: positive  Rotator Cuff Painful Arc/Range: mild  Lag Sign 90 degrees: negative  Lift Off Sign: positive  Belly Press: positive  Active Compression Test ([place]'s Sign): positive  Jerk Test: negative    Other   Sensation: normal    Comments:  Pain with dynamic labral shear test.  There is pain and weakness with Whipple testing.     Left Shoulder Exam   Left shoulder exam is normal.       Muscle Strength   Right Upper Extremity   Shoulder External Rotation: 4/5   Supraspinatus: 4/5   Subscapularis: 4/5   Biceps: 4/5     Reflexes     Right Side   Biceps:  2+  Right Agrawal's Sign:  absent    Vascular Exam     Right Pulses      Radial:                    2+      Left Pulses  Dorsalis Pedis:      2+  Posterior Tibial:      2+        Capillary Refill  Right Hand: normal capillary refill          IMAGING:  X-ray Shoulder 2 or More Views Right    Result Date: 2/16/2023  See Procedure Notes for results. IMPRESSION: Please see Ortho procedure notes for report.  This procedure was auto-finalized by: Virtual Radiologist  Three views right shoulder were obtained today demonstrating severe osteoarthritic changes of the right acromioclavicular joint mild degenerative changes seen of the glenohumeral joint  Also reviewed previous x-rays of her left hip demonstrating severe left hip osteoarthritis  ASSESSMENT:      ICD-10-CM ICD-9-CM   1. Primary osteoarthritis of left hip  M16.12 715.15   2. Left hip pain  M25.552 719.45       PLAN:     -Findings and treatment options were discussed with the patient  -All questions answered        Patient Instructions   Will set up for left hip intra-articular injection      Orders Placed This Encounter   Procedures    FL Aspiration and/or Injection Major Joint with Fluoro, Left (XPD)         Procedures

## 2023-02-22 DIAGNOSIS — R26.9 GAIT DIFFICULTY: ICD-10-CM

## 2023-02-22 DIAGNOSIS — R42 DIZZINESS: Primary | ICD-10-CM

## 2023-02-22 DIAGNOSIS — G25.0 ESSENTIAL TREMOR: ICD-10-CM

## 2023-02-23 ENCOUNTER — HOSPITAL ENCOUNTER (OUTPATIENT)
Dept: RADIOLOGY | Facility: HOSPITAL | Age: 68
Discharge: HOME | End: 2023-02-23
Attending: ORTHOPAEDIC SURGERY
Payer: MEDICARE

## 2023-02-23 DIAGNOSIS — M25.552 LEFT HIP PAIN: ICD-10-CM

## 2023-02-23 PROCEDURE — 27000525 FL ASPIRATION INJECTION MAJOR JOINT LEFT W FLUORO

## 2023-02-23 RX ORDER — BUPIVACAINE HYDROCHLORIDE AND EPINEPHRINE 5; 5 MG/ML; UG/ML
5 INJECTION, SOLUTION EPIDURAL; INTRACAUDAL; PERINEURAL ONCE
Status: DISCONTINUED | OUTPATIENT
Start: 2023-02-23 | End: 2023-02-24 | Stop reason: HOSPADM

## 2023-02-23 RX ORDER — TRIAMCINOLONE ACETONIDE 40 MG/ML
40 INJECTION, SUSPENSION INTRA-ARTICULAR; INTRAMUSCULAR ONCE
Status: DISCONTINUED | OUTPATIENT
Start: 2023-02-23 | End: 2023-02-24 | Stop reason: HOSPADM

## 2023-02-23 NOTE — PROGRESS NOTES
Steroid injection left hip   Performed by A Alex A  Consent obtained for steroid injection left hip.  A formal timeout was called all staff present agreed to patient and procedure.    The left hip was prepped with ChloraPrep and sterile field was established.  1% lidocaine was used as local anesthetic.  Under fluoroscopic guidance a 22 gauge needle was placed into the left hip joint from an anterior approach.  4 cc of Isovue-300 was injected to confirm intra-articular placement.  5 cc of bupivacaine and 40 mg Kenalog was then injected into the left hip joint.  The needle was removed and the puncture site was cleaned and bandaged.  The patient tolerated the procedure well there were no immediate postprocedure complications.  Total fluoroscopy time was 1 minutes 30 seconds.

## 2023-03-23 ENCOUNTER — OFFICE VISIT (OUTPATIENT)
Dept: ORTHOPEDICS | Facility: CLINIC | Age: 68
End: 2023-03-23
Payer: MEDICARE

## 2023-03-23 DIAGNOSIS — M25.859 FEMOROACETABULAR IMPINGEMENT: Primary | ICD-10-CM

## 2023-03-23 DIAGNOSIS — M54.12 CERVICAL RADICULOPATHY: ICD-10-CM

## 2023-03-23 PROCEDURE — 99214 PR OFFICE/OUTPT VISIT, EST, LEVL IV, 30-39 MIN: ICD-10-PCS | Mod: S$PBB,,, | Performed by: ORTHOPAEDIC SURGERY

## 2023-03-23 PROCEDURE — 99214 OFFICE O/P EST MOD 30 MIN: CPT | Mod: S$PBB,,, | Performed by: ORTHOPAEDIC SURGERY

## 2023-03-23 PROCEDURE — 99212 OFFICE O/P EST SF 10 MIN: CPT | Mod: PBBFAC | Performed by: ORTHOPAEDIC SURGERY

## 2023-03-23 RX ORDER — MELOXICAM 15 MG/1
15 TABLET ORAL DAILY
Qty: 30 TABLET | Refills: 6 | Status: ON HOLD | OUTPATIENT
Start: 2023-03-23 | End: 2023-12-13 | Stop reason: HOSPADM

## 2023-03-23 NOTE — PROGRESS NOTES
CC:  Hip pain  67 y.o. Female returns to clinic for a follow up visit regarding     ICD-10-CM ICD-9-CM   1. Femoroacetabular impingement  M25.859 719.85   2. Cervical radiculopathy  M54.12 723.4       Pt is doing much better since her left hip intra-articular injection last month. States she only has pain when she does a lot of walking.  Complains of neck pain with pain radiating down her left upper extremity.       PAST MEDICAL HISTORY:   Past Medical History:   Diagnosis Date    Acute gastric ulcer without hemorrhage or perforation 3/3/2022    Acute superficial gastritis without hemorrhage 3/3/2022    Anxiety     Carpal tunnel syndrome     Cerebral vascular accident     Coronary arteriosclerosis     S/P CABG 2011    COVID-19 12/20/2020    Depression     Diabetes mellitus, type 2     Encounter for long-term (current) use of other medications     Esophageal dysphagia 3/3/2022    Eye exam, routine 02/03/2021    GERD (gastroesophageal reflux disease)     History of esophagogastroduodenoscopy (EGD) 08/07/2019    Hx of colonoscopy 04/12/2019    Hx of mammogram 06/24/2020    SCREENING/BILATERAL    Hyperlipidemia     Hypertension     Insomnia     Low back pain     Lumbar radiculopathy     Osteoporosis     Tremor     Vitamin D deficiency        PAST SURGICAL HISTORY:   Past Surgical History:   Procedure Laterality Date    APPENDECTOMY      ASCENDING AORTIC ANEURYSM REPAIR W/ TISSUE AORTIC VALVE REPLACEMENT      CHOLECYSTECTOMY      CORONARY ARTERY BYPASS GRAFT (CABG)      DIRECT LARYNGOSCOPY Bilateral 8/12/2022    Procedure: LARYNGOSCOPY, DIRECT;  Surgeon: Iban Silverio MD;  Location: [facility];  Service: ENT;  Laterality: Bilateral;    HYSTERECTOMY      TONSILLECTOMY      VOCAL FOLD LESION EXCISION Bilateral 8/12/2022    Procedure: EXCISION, LESION, VOCAL CORD;  Surgeon: Iban Silverio MD;  Location: [facility];  Service: ENT;  Laterality: Bilateral;         PHYSICAL EXAMINATION:          Back (L-Spine &  T-Spine) / Neck (C-Spine) Exam     Neck (C-Spine) Range of Motion   Flexion:      Limited  Extension:  Limited    Neck (C-Spine) Tests   Spurling's Test   Left:  positive  Right: positive    Mild pain with CJ YIP    IMAGING:  FL Aspiration and/or Injection Major Joint with Fluoro, Left (XPD)    Result Date: 2/23/2023  EXAMINATION: FL ASPIRATION INJECTION MAJOR JOINT LEFT W FLUORO CLINICAL HISTORY: Pain in left hip TECHNIQUE: After discussing the risks, alternatives and benefits of the procedure, written and verbal informed consent was obtained. The risks discussed included allergic reaction, bleeding, infection, and possibility of damage to nearby structures. The patient was positioned supine on the fluoroscopy table. The skin over the hip joint was prepped and draped in the usual sterile fashion. Local anesthesia was achieved using 1% lidocaine  solution. Under fluoroscopic guidance, a 22-G 3.5  inch needle was inserted into the hip joint space without difficulty. Intra-articular placement of the needle was confirmed using less than 1 mL of water soluble contrast material. This was followed by injection of 40 mg of kenalog solution and 5 mL of bupivacaine solution into the joint. The needle was removed. The patient tolerated procedure well with no immediate postprocedure complications.  Procedure performed by Alex CRUMP. COMPARISON: None FINDINGS: Fluoroscopic images demonstrate degenerative changes in the hip joint.  Dilution of contrast material was demonstrated after injection of steroid and anesthetic into the joint.     Successful steroid and bupivacaine injection into the in left hip joint. Electronically signed by: Lucien Joseph Date:    02/23/2023 Time:    13:25         ASSESSMENT:      ICD-10-CM ICD-9-CM   1. Femoroacetabular impingement  M25.859 719.85   2. Cervical radiculopathy  M54.12 723.4       PLAN:     -Findings and treatment options were discussed with the patient  -All questions  answered  Natural history and expected course discussed. Questions answered.  Educational materials distributed.  Home exercises discussed.  NSAIDs per medication orders.  I ordered Mobic for her radiculopathy-- may need a return visit with Dr. Pino if problems persist. Otherwise doing well-- if pain returns in hip, may consider repeat injection or possible MRI of the left hip.     There are no Patient Instructions on file for this visit.    No orders of the defined types were placed in this encounter.      Procedures

## 2023-04-19 ENCOUNTER — TELEPHONE (OUTPATIENT)
Dept: ORTHOPEDICS | Facility: CLINIC | Age: 68
End: 2023-04-19
Payer: MEDICARE

## 2023-04-19 DIAGNOSIS — M25.859 FEMOROACETABULAR IMPINGEMENT: Primary | ICD-10-CM

## 2023-04-19 NOTE — TELEPHONE ENCOUNTER
I SPOKE TO THE PATIENT TO LET HER KNOW SHE IS  IS SCHEDULED FOR ROB ON 5/18 AT 10;00    ----- Message from Marion Leiva sent at 4/19/2023  2:04 PM CDT -----  Regarding: what time  Patient call to see what time you put her for 5/18 didn't see it scheduled she want to know what time. Please call her @ [phone]

## 2023-04-27 ENCOUNTER — OFFICE VISIT (OUTPATIENT)
Dept: OTOLARYNGOLOGY | Facility: CLINIC | Age: 68
End: 2023-04-27
Payer: MEDICARE

## 2023-04-27 VITALS — WEIGHT: 185 LBS | BODY MASS INDEX: 31.58 KG/M2 | HEIGHT: 64 IN

## 2023-04-27 DIAGNOSIS — R42 DIZZINESS AND GIDDINESS: ICD-10-CM

## 2023-04-27 DIAGNOSIS — H72.92 TYMPANIC MEMBRANE PERFORATION, LEFT: ICD-10-CM

## 2023-04-27 DIAGNOSIS — J38.1 VOCAL CORD POLYPS: Primary | ICD-10-CM

## 2023-04-27 PROCEDURE — 99214 OFFICE O/P EST MOD 30 MIN: CPT | Mod: PBBFAC | Performed by: OTOLARYNGOLOGY

## 2023-04-27 PROCEDURE — 99214 OFFICE O/P EST MOD 30 MIN: CPT | Mod: S$PBB,,, | Performed by: OTOLARYNGOLOGY

## 2023-04-27 PROCEDURE — 99214 PR OFFICE/OUTPT VISIT, EST, LEVL IV, 30-39 MIN: ICD-10-PCS | Mod: S$PBB,,, | Performed by: OTOLARYNGOLOGY

## 2023-04-27 NOTE — PROGRESS NOTES
Subjective:       Patient ID: Karen Renteria is a 67 y.o. female.    Chief Complaint: No chief complaint on file.  F/u dizziness off balance while walking   HPI  Review of Systems   HENT:  Positive for ear pain and hearing loss.    Neurological:  Positive for dizziness.   All other systems reviewed and are negative.    Objective:      Physical Exam  General: NAD  Head: Normocephalic, atraumatic, no facial asymmetry/normal strength,  Ears: Both auricules normal in appearance, w/o deformities tympanic membranes perf left external auditory canals normal  Nose: External nose w/o deformities normal turbinates no drainage or inflammation  Oral Cavity: Lips, gums, floor of mouth, tongue hard palate, and buccal mucosa without mass/lesion  Oropharynx: Mucosa pink and moist, soft palate, posterior pharynx and oropharyngeal wall without mass/lesion  Neck: Supple, symmetric, trachea midline, no palpable mass/lesion, no palpable cervical lymphadenopathy  Skin: Warm and dry, no concerning lesions  Respiratory: Respirations even, unlabored   Assessment:       1. Vocal cord polyps    2. Dizziness and giddiness    3. Tympanic membrane perforation, left        Plan:       Will continue to treat symptomatically   F/u 6 months Keep seeing neurology   May need FOL

## 2023-05-18 ENCOUNTER — HOSPITAL ENCOUNTER (OUTPATIENT)
Dept: RADIOLOGY | Facility: HOSPITAL | Age: 68
Discharge: HOME | End: 2023-05-18
Attending: ORTHOPAEDIC SURGERY
Payer: MEDICARE

## 2023-05-18 DIAGNOSIS — M25.859 FEMOROACETABULAR IMPINGEMENT: ICD-10-CM

## 2023-05-18 PROCEDURE — 25500020 PHARM REV CODE 255: Performed by: RADIOLOGY

## 2023-05-18 PROCEDURE — 27000525 FL ASPIRATION INJECTION MAJOR JOINT LEFT W FLUORO

## 2023-05-18 PROCEDURE — 63600175 PHARM REV CODE 636 W HCPCS

## 2023-05-18 PROCEDURE — 25000003 PHARM REV CODE 250: Performed by: RADIOLOGY

## 2023-05-18 RX ORDER — BUPIVACAINE HYDROCHLORIDE 5 MG/ML
2.5 INJECTION, SOLUTION EPIDURAL; INTRACAUDAL ONCE
Status: COMPLETED | OUTPATIENT
Start: 2023-05-18 | End: 2023-05-18

## 2023-05-18 RX ADMIN — IOPAMIDOL 4 ML: 612 INJECTION, SOLUTION INTRAVENOUS at 10:05

## 2023-05-18 RX ADMIN — BUPIVACAINE HYDROCHLORIDE 12.5 MG: 5 INJECTION, SOLUTION EPIDURAL; INTRACAUDAL; PERINEURAL at 11:05

## 2023-05-18 NOTE — PROCEDURES
Hip arthrogram injection performed Brock LEE under the supervision of Dr. Ojeda. Patient was prepped with ChloraPrep and draped in a sterile fashion.  Formal time-out was called with all present in agreement 7 cc of 1% lidocaine infused.  The left hip was accessed with a 22 gauge needle with 4 cc of Isovue-300 infused.  40 mg of Kenalog and 2.5 cc of bupivacaine (.5%) then infused.  Needle withdrawn pressure held on the puncture site.  Bandage then placed.  Patient tolerated procedure well with no immediate complication.

## 2023-05-19 ENCOUNTER — TELEPHONE (OUTPATIENT)
Dept: FAMILY MEDICINE | Facility: CLINIC | Age: 68
End: 2023-05-19
Payer: MEDICARE

## 2023-05-19 DIAGNOSIS — E11.22 TYPE 2 DIABETES MELLITUS WITH STAGE 3B CHRONIC KIDNEY DISEASE, WITH LONG-TERM CURRENT USE OF INSULIN: Primary | ICD-10-CM

## 2023-05-19 DIAGNOSIS — N18.32 TYPE 2 DIABETES MELLITUS WITH STAGE 3B CHRONIC KIDNEY DISEASE, WITH LONG-TERM CURRENT USE OF INSULIN: Primary | ICD-10-CM

## 2023-05-19 DIAGNOSIS — Z79.4 TYPE 2 DIABETES MELLITUS WITH STAGE 3B CHRONIC KIDNEY DISEASE, WITH LONG-TERM CURRENT USE OF INSULIN: Primary | ICD-10-CM

## 2023-05-19 RX ORDER — INSULIN GLARGINE 100 [IU]/ML
100 INJECTION, SOLUTION SUBCUTANEOUS DAILY
Qty: 30 ML | Refills: 11 | Status: ON HOLD | OUTPATIENT
Start: 2023-05-19 | End: 2023-12-13 | Stop reason: SDUPTHER

## 2023-05-19 NOTE — TELEPHONE ENCOUNTER
She is on tresiba and she can not find it/or afford it and we have been trying to give her samples,but we have not been getting any. I told her to check with her insurance to see if she could afford lantus,levemir or basaglar. She called back and the cheapest one is $300. I do not know what else to tell her.She called back and they found a savings card for the basaglar and wants a prescription sent to [facility].

## 2023-05-22 PROBLEM — N39.0 URINARY TRACT INFECTION WITHOUT HEMATURIA: Status: RESOLVED | Noted: 2023-02-14 | Resolved: 2023-05-22

## 2023-06-05 RX ORDER — ISOSORBIDE MONONITRATE 30 MG/1
30 TABLET, EXTENDED RELEASE ORAL DAILY
Qty: 30 TABLET | Refills: 5 | Status: SHIPPED | OUTPATIENT
Start: 2023-06-05

## 2023-06-05 RX ORDER — PRIMIDONE 50 MG/1
TABLET ORAL
Qty: 15 TABLET | Refills: 11 | Status: SHIPPED | OUTPATIENT
Start: 2023-06-05 | End: 2024-01-11 | Stop reason: SDUPTHER

## 2023-06-12 ENCOUNTER — TELEPHONE (OUTPATIENT)
Dept: NEUROLOGY | Facility: CLINIC | Age: 68
End: 2023-06-12
Payer: MEDICARE

## 2023-06-12 NOTE — TELEPHONE ENCOUNTER
Called pt told her has been over a year since she has been seen in clinic ROCIO Fountain NP unable to refill medications. She v/u.          ----- Message from Raquel Puentes sent at 6/12/2023  8:20 AM CDT -----  Patient called to see if [facility] Fountain can call in primidone (MYSOLINE) 50 MG Tab and isosorbide mononitrate (IMDUR) 30 MG 24 hr tablet. Please give her a call.

## 2023-07-10 ENCOUNTER — HOSPITAL ENCOUNTER (OUTPATIENT)
Dept: RADIOLOGY | Facility: HOSPITAL | Age: 68
Discharge: HOME | End: 2023-07-10
Attending: FAMILY MEDICINE
Payer: MEDICARE

## 2023-07-10 VITALS — WEIGHT: 182 LBS | BODY MASS INDEX: 31.24 KG/M2

## 2023-07-10 DIAGNOSIS — Z12.31 BREAST CANCER SCREENING BY MAMMOGRAM: ICD-10-CM

## 2023-07-10 PROCEDURE — 77067 SCR MAMMO BI INCL CAD: CPT | Mod: TC

## 2023-08-15 ENCOUNTER — PATIENT OUTREACH (OUTPATIENT)
Dept: ADMINISTRATIVE | Facility: HOSPITAL | Age: 68
End: 2023-08-15

## 2023-09-20 ENCOUNTER — PATIENT MESSAGE (OUTPATIENT)
Dept: ADMINISTRATIVE | Facility: HOSPITAL | Age: 68
End: 2023-09-20

## 2023-11-20 DIAGNOSIS — R25.1 TREMOR: Primary | ICD-10-CM

## 2023-12-08 ENCOUNTER — APPOINTMENT (OUTPATIENT)
Dept: RADIOLOGY | Facility: CLINIC | Age: 68
End: 2023-12-08
Attending: NURSE PRACTITIONER
Payer: MEDICARE

## 2023-12-08 ENCOUNTER — OFFICE VISIT (OUTPATIENT)
Dept: FAMILY MEDICINE | Facility: CLINIC | Age: 68
End: 2023-12-08
Payer: MEDICARE

## 2023-12-08 VITALS
DIASTOLIC BLOOD PRESSURE: 59 MMHG | WEIGHT: 178.63 LBS | HEART RATE: 79 BPM | BODY MASS INDEX: 30.5 KG/M2 | HEIGHT: 64 IN | TEMPERATURE: 98 F | RESPIRATION RATE: 17 BRPM | OXYGEN SATURATION: 90 % | SYSTOLIC BLOOD PRESSURE: 149 MMHG

## 2023-12-08 DIAGNOSIS — R11.2 NAUSEA AND VOMITING, UNSPECIFIED VOMITING TYPE: ICD-10-CM

## 2023-12-08 DIAGNOSIS — E11.9 DIABETES MELLITUS WITHOUT COMPLICATION: ICD-10-CM

## 2023-12-08 DIAGNOSIS — R82.998 URINE WBC INCREASED: ICD-10-CM

## 2023-12-08 DIAGNOSIS — J22 LOWER RESPIRATORY TRACT INFECTION: ICD-10-CM

## 2023-12-08 DIAGNOSIS — R10.9 ABDOMINAL PAIN, UNSPECIFIED ABDOMINAL LOCATION: ICD-10-CM

## 2023-12-08 DIAGNOSIS — Z11.59 ENCOUNTER FOR SCREENING FOR VIRAL DISEASE: ICD-10-CM

## 2023-12-08 DIAGNOSIS — R05.1 ACUTE COUGH: ICD-10-CM

## 2023-12-08 DIAGNOSIS — R10.9 ABDOMINAL PAIN, UNSPECIFIED ABDOMINAL LOCATION: Primary | ICD-10-CM

## 2023-12-08 PROBLEM — I47.19 ATRIAL TACHYCARDIA: Status: ACTIVE | Noted: 2023-09-12

## 2023-12-08 LAB
ALBUMIN SERPL BCP-MCNC: 4.4 G/DL (ref 3.5–5)
ALBUMIN/GLOB SERPL: 1.2 {RATIO}
ALP SERPL-CCNC: 74 U/L (ref 55–142)
ALT SERPL W P-5'-P-CCNC: 27 U/L (ref 13–56)
AMYLASE SERPL-CCNC: 76 U/L (ref 25–115)
ANION GAP SERPL CALCULATED.3IONS-SCNC: 11 MMOL/L (ref 7–16)
AST SERPL W P-5'-P-CCNC: 23 U/L (ref 15–37)
BASOPHILS # BLD AUTO: 0.12 K/UL (ref 0–0.2)
BASOPHILS NFR BLD AUTO: 0.9 % (ref 0–1)
BILIRUB SERPL-MCNC: 0.7 MG/DL (ref ?–1.2)
BILIRUB SERPL-MCNC: NEGATIVE MG/DL
BLOOD URINE, POC: NEGATIVE
BUN SERPL-MCNC: 43 MG/DL (ref 7–18)
BUN/CREAT SERPL: 10 (ref 6–20)
CALCIUM SERPL-MCNC: 12 MG/DL (ref 8.5–10.1)
CHLORIDE SERPL-SCNC: 103 MMOL/L (ref 98–107)
CO2 SERPL-SCNC: 30 MMOL/L (ref 21–32)
COLOR, POC UA: YELLOW
CREAT SERPL-MCNC: 4.21 MG/DL (ref 0.55–1.02)
CTP QC/QA: YES
CTP QC/QA: YES
DIFFERENTIAL METHOD BLD: ABNORMAL
EGFR (NO RACE VARIABLE) (RUSH/TITUS): 11 ML/MIN/1.73M2
EOSINOPHIL # BLD AUTO: 0.24 K/UL (ref 0–0.5)
EOSINOPHIL NFR BLD AUTO: 1.9 % (ref 1–4)
ERYTHROCYTE [DISTWIDTH] IN BLOOD BY AUTOMATED COUNT: 14.4 % (ref 11.5–14.5)
FLUAV AG NPH QL: NEGATIVE
FLUBV AG NPH QL: NEGATIVE
GLOBULIN SER-MCNC: 3.7 G/DL (ref 2–4)
GLUCOSE SERPL-MCNC: 206 MG/DL (ref 74–106)
GLUCOSE UR QL STRIP: NORMAL
HCT VFR BLD AUTO: 47 % (ref 38–47)
HGB BLD-MCNC: 15.4 G/DL (ref 12–16)
IMM GRANULOCYTES # BLD AUTO: 0.05 K/UL (ref 0–0.04)
IMM GRANULOCYTES NFR BLD: 0.4 % (ref 0–0.4)
KETONES UR QL STRIP: NEGATIVE
LEUKOCYTE ESTERASE URINE, POC: NORMAL
LIPASE SERPL-CCNC: 89 U/L (ref 16–77)
LYMPHOCYTES # BLD AUTO: 2.11 K/UL (ref 1–4.8)
LYMPHOCYTES NFR BLD AUTO: 16.4 % (ref 27–41)
MCH RBC QN AUTO: 27.7 PG (ref 27–31)
MCHC RBC AUTO-ENTMCNC: 32.8 G/DL (ref 32–36)
MCV RBC AUTO: 84.7 FL (ref 80–96)
MONOCYTES # BLD AUTO: 0.86 K/UL (ref 0–0.8)
MONOCYTES NFR BLD AUTO: 6.7 % (ref 2–6)
MPC BLD CALC-MCNC: 13.4 FL (ref 9.4–12.4)
NEUTROPHILS # BLD AUTO: 9.49 K/UL (ref 1.8–7.7)
NEUTROPHILS NFR BLD AUTO: 73.7 % (ref 53–65)
NITRITE, POC UA: NEGATIVE
NRBC # BLD AUTO: 0 X10E3/UL
NRBC, AUTO (.00): 0 %
PH, POC UA: 5.5
PLATELET # BLD AUTO: 219 K/UL (ref 150–400)
PLATELET MORPHOLOGY: ABNORMAL
POTASSIUM SERPL-SCNC: 4.5 MMOL/L (ref 3.5–5.1)
PROT SERPL-MCNC: 8.1 G/DL (ref 6.4–8.2)
PROTEIN, POC: NORMAL
RBC # BLD AUTO: 5.55 M/UL (ref 4.2–5.4)
RBC MORPH BLD: NORMAL
SARS-COV-2 RDRP RESP QL NAA+PROBE: NEGATIVE
SODIUM SERPL-SCNC: 139 MMOL/L (ref 136–145)
SPECIFIC GRAVITY, POC UA: 1.02
UROBILINOGEN, POC UA: 0.2
WBC # BLD AUTO: 12.87 K/UL (ref 4.5–11)

## 2023-12-08 PROCEDURE — 80053 COMPREHEN METABOLIC PANEL: CPT | Mod: ,,, | Performed by: CLINICAL MEDICAL LABORATORY

## 2023-12-08 PROCEDURE — 99214 OFFICE O/P EST MOD 30 MIN: CPT | Mod: ,,, | Performed by: NURSE PRACTITIONER

## 2023-12-08 PROCEDURE — 99214 PR OFFICE/OUTPT VISIT, EST, LEVL IV, 30-39 MIN: ICD-10-PCS | Mod: ,,, | Performed by: NURSE PRACTITIONER

## 2023-12-08 PROCEDURE — 82150 ASSAY OF AMYLASE: CPT | Mod: ,,, | Performed by: CLINICAL MEDICAL LABORATORY

## 2023-12-08 PROCEDURE — 74018 XR KUB: ICD-10-PCS | Mod: 26,,, | Performed by: RADIOLOGY

## 2023-12-08 PROCEDURE — 83690 LIPASE: ICD-10-PCS | Mod: ,,, | Performed by: CLINICAL MEDICAL LABORATORY

## 2023-12-08 PROCEDURE — 85025 CBC WITH DIFFERENTIAL: ICD-10-PCS | Mod: ,,, | Performed by: CLINICAL MEDICAL LABORATORY

## 2023-12-08 PROCEDURE — 85025 COMPLETE CBC W/AUTO DIFF WBC: CPT | Mod: ,,, | Performed by: CLINICAL MEDICAL LABORATORY

## 2023-12-08 PROCEDURE — 74018 RADEX ABDOMEN 1 VIEW: CPT | Mod: 26,,, | Performed by: RADIOLOGY

## 2023-12-08 PROCEDURE — S0119 ONDANSETRON 4 MG: HCPCS | Mod: ,,, | Performed by: NURSE PRACTITIONER

## 2023-12-08 PROCEDURE — 80053 COMPREHENSIVE METABOLIC PANEL: ICD-10-PCS | Mod: ,,, | Performed by: CLINICAL MEDICAL LABORATORY

## 2023-12-08 PROCEDURE — 74018 RADEX ABDOMEN 1 VIEW: CPT | Mod: TC,RHCUB | Performed by: NURSE PRACTITIONER

## 2023-12-08 PROCEDURE — 87077 CULTURE, URINE: ICD-10-PCS | Mod: ,,, | Performed by: CLINICAL MEDICAL LABORATORY

## 2023-12-08 PROCEDURE — 87804 INFLUENZA ASSAY W/OPTIC: CPT | Mod: 59,QW,RHCUB,91 | Performed by: NURSE PRACTITIONER

## 2023-12-08 PROCEDURE — 82150 AMYLASE: ICD-10-PCS | Mod: ,,, | Performed by: CLINICAL MEDICAL LABORATORY

## 2023-12-08 PROCEDURE — 87077 CULTURE AEROBIC IDENTIFY: CPT | Mod: ,,, | Performed by: CLINICAL MEDICAL LABORATORY

## 2023-12-08 PROCEDURE — 83690 ASSAY OF LIPASE: CPT | Mod: ,,, | Performed by: CLINICAL MEDICAL LABORATORY

## 2023-12-08 PROCEDURE — S0119 PR ONDANSETRON, ORAL, 4MG: ICD-10-PCS | Mod: ,,, | Performed by: NURSE PRACTITIONER

## 2023-12-08 PROCEDURE — 87635 SARS-COV-2 COVID-19 AMP PRB: CPT | Mod: RHCUB | Performed by: NURSE PRACTITIONER

## 2023-12-08 PROCEDURE — 81003 URINALYSIS AUTO W/O SCOPE: CPT | Mod: RHCUB | Performed by: NURSE PRACTITIONER

## 2023-12-08 PROCEDURE — 87086 CULTURE, URINE: ICD-10-PCS | Mod: ,,, | Performed by: CLINICAL MEDICAL LABORATORY

## 2023-12-08 PROCEDURE — 87086 URINE CULTURE/COLONY COUNT: CPT | Mod: ,,, | Performed by: CLINICAL MEDICAL LABORATORY

## 2023-12-08 RX ORDER — SEMAGLUTIDE 1.34 MG/ML
0.25 INJECTION, SOLUTION SUBCUTANEOUS
COMMUNITY

## 2023-12-08 RX ORDER — LEVOFLOXACIN 500 MG/1
500 TABLET, FILM COATED ORAL DAILY
Qty: 7 TABLET | Refills: 0 | Status: SHIPPED | OUTPATIENT
Start: 2023-12-08 | End: 2023-12-08 | Stop reason: DRUGHIGH

## 2023-12-08 RX ORDER — ONDANSETRON 4 MG/1
4 TABLET, ORALLY DISINTEGRATING ORAL
Status: COMPLETED | OUTPATIENT
Start: 2023-12-08 | End: 2023-12-08

## 2023-12-08 RX ORDER — ONDANSETRON 4 MG/1
4 TABLET, ORALLY DISINTEGRATING ORAL EVERY 8 HOURS PRN
Qty: 20 TABLET | Refills: 0 | Status: SHIPPED | OUTPATIENT
Start: 2023-12-08

## 2023-12-08 RX ORDER — METOPROLOL SUCCINATE 50 MG/1
50 TABLET, EXTENDED RELEASE ORAL
COMMUNITY
Start: 2023-07-26 | End: 2024-01-22

## 2023-12-08 RX ORDER — LEVOFLOXACIN 250 MG/1
250 TABLET ORAL DAILY
Qty: 7 TABLET | Refills: 0 | Status: ON HOLD | OUTPATIENT
Start: 2023-12-08 | End: 2023-12-13 | Stop reason: HOSPADM

## 2023-12-08 RX ADMIN — ONDANSETRON 4 MG: 4 TABLET, ORALLY DISINTEGRATING ORAL at 11:12

## 2023-12-08 NOTE — PROGRESS NOTES
Subjective:       Patient ID: Karen Renteria is a 68 y.o. female.    Chief Complaint: Diarrhea, Emesis (X3 since wed. States she has been taking phenergan with no relief. No appetite as well ), and Fatigue (Onset wed.)    Diarrhea, Emesis (X3 since wed. States she has been taking phenergan with no relief. No appetite as well ), and Fatigue (Onset wed.), fever and cough      Diarrhea   Associated symptoms include coughing, a fever and vomiting. Pertinent negatives include no abdominal pain or headaches.   Emesis   Associated symptoms include coughing, diarrhea and a fever. Pertinent negatives include no abdominal pain, chest pain, dizziness or headaches.   Fatigue  Associated symptoms include coughing, fatigue, a fever and vomiting. Pertinent negatives include no abdominal pain, change in bowel habit, chest pain, congestion, headaches, nausea, neck pain, rash, sore throat or weakness.   Review of Systems   Constitutional:  Positive for fatigue and fever. Negative for appetite change.   HENT:  Negative for nasal congestion, ear pain and sore throat.    Eyes:  Negative for pain, discharge and itching.   Respiratory:  Positive for cough. Negative for shortness of breath.    Cardiovascular:  Negative for chest pain and leg swelling.   Gastrointestinal:  Positive for diarrhea and vomiting. Negative for abdominal pain, change in bowel habit and nausea.   Musculoskeletal:  Negative for back pain, gait problem and neck pain.   Integumentary:  Negative for rash and wound.   Allergic/Immunologic: Negative for immunocompromised state.   Neurological:  Negative for dizziness, weakness and headaches.   All other systems reviewed and are negative.      Objective:      Physical Exam  Vitals and nursing note reviewed.   Constitutional:       General: She is not in acute distress.     Appearance: Normal appearance. She is not ill-appearing, toxic-appearing or diaphoretic.   HENT:      Head: Normocephalic.      Right Ear: Tympanic  membrane, ear canal and external ear normal.      Left Ear: Tympanic membrane, ear canal and external ear normal.      Nose: Congestion present. No rhinorrhea.      Mouth/Throat:      Mouth: Mucous membranes are moist.      Pharynx: Posterior oropharyngeal erythema present. No oropharyngeal exudate.   Eyes:      General: No scleral icterus.        Right eye: No discharge.         Left eye: No discharge.      Extraocular Movements: Extraocular movements intact.      Conjunctiva/sclera: Conjunctivae normal.      Pupils: Pupils are equal, round, and reactive to light.   Cardiovascular:      Rate and Rhythm: Normal rate and regular rhythm.      Pulses: Normal pulses.      Heart sounds: Normal heart sounds. No murmur heard.  Pulmonary:      Effort: Pulmonary effort is normal. No respiratory distress.      Breath sounds: No wheezing, rhonchi or rales.      Comments: Coarse breath sounds  Abdominal:      General: Bowel sounds are normal.      Palpations: Abdomen is soft.      Tenderness: There is abdominal tenderness in the right lower quadrant, suprapubic area and left lower quadrant. There is no right CVA tenderness, left CVA tenderness, guarding or rebound.   Musculoskeletal:         General: Normal range of motion.      Cervical back: Neck supple. No tenderness.   Lymphadenopathy:      Cervical: No cervical adenopathy.   Skin:     General: Skin is warm and dry.      Capillary Refill: Capillary refill takes less than 2 seconds.      Findings: No rash.   Neurological:      Mental Status: She is alert and oriented to person, place, and time.   Psychiatric:         Mood and Affect: Mood normal.         Behavior: Behavior normal.         Thought Content: Thought content normal.         Judgment: Judgment normal.          Assessment:       1. Encounter for screening for viral disease        Plan:   Encounter for screening for viral disease  -     POCT Influenza A/B  -     POCT COVID-19 Rapid Screening         Risks,  benefits, and side effects were discussed with the patient. All questions were answered to the fullest satisfaction of the patient, and pt verbalized understanding and agreement to treatment plan. Pt was to call with any new or worsening symptoms, or present to the ER

## 2023-12-10 LAB — UA COMPLETE W REFLEX CULTURE PNL UR: ABNORMAL

## 2023-12-11 ENCOUNTER — HOSPITAL ENCOUNTER (INPATIENT)
Facility: HOSPITAL | Age: 68
LOS: 2 days | Discharge: HOME | DRG: 682 | End: 2023-12-13
Attending: FAMILY MEDICINE | Admitting: FAMILY MEDICINE
Payer: MEDICARE

## 2023-12-11 DIAGNOSIS — I49.9 ABNORMAL HEART RHYTHM: ICD-10-CM

## 2023-12-11 DIAGNOSIS — N18.5 CKD (CHRONIC KIDNEY DISEASE) STAGE 5, GFR LESS THAN 15 ML/MIN: ICD-10-CM

## 2023-12-11 DIAGNOSIS — N17.9 ACUTE RENAL FAILURE: ICD-10-CM

## 2023-12-11 DIAGNOSIS — R07.9 CHEST PAIN: ICD-10-CM

## 2023-12-11 DIAGNOSIS — E11.42 DIABETIC PERIPHERAL NEUROPATHY ASSOCIATED WITH TYPE 2 DIABETES MELLITUS: ICD-10-CM

## 2023-12-11 DIAGNOSIS — I42.9 CARDIOMYOPATHY, UNSPECIFIED TYPE: ICD-10-CM

## 2023-12-11 DIAGNOSIS — R06.02 SOB (SHORTNESS OF BREATH): ICD-10-CM

## 2023-12-11 DIAGNOSIS — Z79.4 TYPE 2 DIABETES MELLITUS WITH STAGE 3B CHRONIC KIDNEY DISEASE, WITH LONG-TERM CURRENT USE OF INSULIN: ICD-10-CM

## 2023-12-11 DIAGNOSIS — E11.22 TYPE 2 DIABETES MELLITUS WITH STAGE 3B CHRONIC KIDNEY DISEASE, WITH LONG-TERM CURRENT USE OF INSULIN: ICD-10-CM

## 2023-12-11 DIAGNOSIS — N18.32 TYPE 2 DIABETES MELLITUS WITH STAGE 3B CHRONIC KIDNEY DISEASE, WITH LONG-TERM CURRENT USE OF INSULIN: ICD-10-CM

## 2023-12-11 DIAGNOSIS — I25.10 CAD (CORONARY ARTERY DISEASE): ICD-10-CM

## 2023-12-11 DIAGNOSIS — N17.9 AKI (ACUTE KIDNEY INJURY): Primary | ICD-10-CM

## 2023-12-11 PROBLEM — R63.0 LOSS OF APPETITE: Status: ACTIVE | Noted: 2023-12-11

## 2023-12-11 PROBLEM — N18.4 CKD (CHRONIC KIDNEY DISEASE), STAGE IV: Status: ACTIVE | Noted: 2023-12-11

## 2023-12-11 PROBLEM — J18.9 COMMUNITY ACQUIRED PNEUMONIA: Status: ACTIVE | Noted: 2023-12-11

## 2023-12-11 LAB
ALBUMIN SERPL BCP-MCNC: 4.2 G/DL (ref 3.5–5)
ALBUMIN/GLOB SERPL: 1 {RATIO}
ALP SERPL-CCNC: 62 U/L (ref 55–142)
ALT SERPL W P-5'-P-CCNC: 23 U/L (ref 13–56)
ANION GAP SERPL CALCULATED.3IONS-SCNC: 11 MMOL/L (ref 7–16)
AST SERPL W P-5'-P-CCNC: 17 U/L (ref 15–37)
BASOPHILS # BLD AUTO: 0.12 K/UL (ref 0–0.2)
BASOPHILS NFR BLD AUTO: 1.2 % (ref 0–1)
BILIRUB SERPL-MCNC: 0.8 MG/DL (ref ?–1.2)
BUN SERPL-MCNC: 46 MG/DL (ref 7–18)
BUN/CREAT SERPL: 11 (ref 6–20)
CALCIUM SERPL-MCNC: 10.7 MG/DL (ref 8.5–10.1)
CHLORIDE SERPL-SCNC: 104 MMOL/L (ref 98–107)
CO2 SERPL-SCNC: 27 MMOL/L (ref 21–32)
CREAT SERPL-MCNC: 4.13 MG/DL (ref 0.55–1.02)
DIFFERENTIAL METHOD BLD: ABNORMAL
EGFR (NO RACE VARIABLE) (RUSH/TITUS): 11 ML/MIN/1.73M2
EOSINOPHIL # BLD AUTO: 0.2 K/UL (ref 0–0.5)
EOSINOPHIL NFR BLD AUTO: 1.9 % (ref 1–4)
ERYTHROCYTE [DISTWIDTH] IN BLOOD BY AUTOMATED COUNT: 14.5 % (ref 11.5–14.5)
GLOBULIN SER-MCNC: 4.1 G/DL (ref 2–4)
GLUCOSE SERPL-MCNC: 170 MG/DL (ref 70–105)
GLUCOSE SERPL-MCNC: 188 MG/DL (ref 74–106)
HCT VFR BLD AUTO: 46.4 % (ref 38–47)
HGB BLD-MCNC: 15.1 G/DL (ref 12–16)
IMM GRANULOCYTES # BLD AUTO: 0.04 K/UL (ref 0–0.04)
IMM GRANULOCYTES NFR BLD: 0.4 % (ref 0–0.4)
LYMPHOCYTES # BLD AUTO: 1.78 K/UL (ref 1–4.8)
LYMPHOCYTES NFR BLD AUTO: 17.3 % (ref 27–41)
MAGNESIUM SERPL-MCNC: 2.5 MG/DL (ref 1.7–2.3)
MCH RBC QN AUTO: 27.6 PG (ref 27–31)
MCHC RBC AUTO-ENTMCNC: 32.5 G/DL (ref 32–36)
MCV RBC AUTO: 84.7 FL (ref 80–96)
MONOCYTES # BLD AUTO: 0.76 K/UL (ref 0–0.8)
MONOCYTES NFR BLD AUTO: 7.4 % (ref 2–6)
MPC BLD CALC-MCNC: 13.2 FL (ref 9.4–12.4)
NEUTROPHILS # BLD AUTO: 7.39 K/UL (ref 1.8–7.7)
NEUTROPHILS NFR BLD AUTO: 71.8 % (ref 53–65)
NRBC # BLD AUTO: 0 X10E3/UL
NRBC, AUTO (.00): 0 %
PLATELET # BLD AUTO: 204 K/UL (ref 150–400)
PLATELET MORPHOLOGY: ABNORMAL
POTASSIUM SERPL-SCNC: 3.5 MMOL/L (ref 3.5–5.1)
PROT SERPL-MCNC: 8.3 G/DL (ref 6.4–8.2)
RBC # BLD AUTO: 5.48 M/UL (ref 4.2–5.4)
RBC MORPH BLD: NORMAL
SODIUM SERPL-SCNC: 138 MMOL/L (ref 136–145)
WBC # BLD AUTO: 10.29 K/UL (ref 4.5–11)

## 2023-12-11 PROCEDURE — 25000242 PHARM REV CODE 250 ALT 637 W/ HCPCS

## 2023-12-11 PROCEDURE — 99285 EMERGENCY DEPT VISIT HI MDM: CPT | Mod: 25

## 2023-12-11 PROCEDURE — 82962 GLUCOSE BLOOD TEST: CPT

## 2023-12-11 PROCEDURE — 63600175 PHARM REV CODE 636 W HCPCS

## 2023-12-11 PROCEDURE — 96375 TX/PRO/DX INJ NEW DRUG ADDON: CPT

## 2023-12-11 PROCEDURE — 83735 ASSAY OF MAGNESIUM: CPT

## 2023-12-11 PROCEDURE — 80053 COMPREHEN METABOLIC PANEL: CPT | Performed by: FAMILY MEDICINE

## 2023-12-11 PROCEDURE — 25000003 PHARM REV CODE 250

## 2023-12-11 PROCEDURE — 96365 THER/PROPH/DIAG IV INF INIT: CPT

## 2023-12-11 PROCEDURE — 99223 PR INITIAL HOSPITAL CARE,LEVL III: ICD-10-PCS | Mod: GC,,, | Performed by: FAMILY MEDICINE

## 2023-12-11 PROCEDURE — 25000003 PHARM REV CODE 250: Performed by: FAMILY MEDICINE

## 2023-12-11 PROCEDURE — 99285 PR EMERGENCY DEPT VISIT,LEVEL V: ICD-10-PCS | Mod: ,,, | Performed by: FAMILY MEDICINE

## 2023-12-11 PROCEDURE — 99285 EMERGENCY DEPT VISIT HI MDM: CPT | Mod: ,,, | Performed by: FAMILY MEDICINE

## 2023-12-11 PROCEDURE — 85025 COMPLETE CBC W/AUTO DIFF WBC: CPT | Performed by: FAMILY MEDICINE

## 2023-12-11 PROCEDURE — 99223 1ST HOSP IP/OBS HIGH 75: CPT | Mod: GC,,, | Performed by: FAMILY MEDICINE

## 2023-12-11 PROCEDURE — 11000001 HC ACUTE MED/SURG PRIVATE ROOM

## 2023-12-11 PROCEDURE — 96361 HYDRATE IV INFUSION ADD-ON: CPT

## 2023-12-11 PROCEDURE — 63600175 PHARM REV CODE 636 W HCPCS: Performed by: FAMILY MEDICINE

## 2023-12-11 RX ORDER — ONDANSETRON 2 MG/ML
4 INJECTION INTRAMUSCULAR; INTRAVENOUS
Status: COMPLETED | OUTPATIENT
Start: 2023-12-11 | End: 2023-12-11

## 2023-12-11 RX ORDER — SODIUM CHLORIDE 450 MG/100ML
INJECTION, SOLUTION INTRAVENOUS CONTINUOUS
Status: DISCONTINUED | OUTPATIENT
Start: 2023-12-11 | End: 2023-12-13 | Stop reason: HOSPADM

## 2023-12-11 RX ORDER — NAPROXEN SODIUM 220 MG/1
81 TABLET, FILM COATED ORAL DAILY
Status: DISCONTINUED | OUTPATIENT
Start: 2023-12-12 | End: 2023-12-13 | Stop reason: HOSPADM

## 2023-12-11 RX ORDER — ONDANSETRON 2 MG/ML
4 INJECTION INTRAMUSCULAR; INTRAVENOUS EVERY 8 HOURS PRN
Status: DISCONTINUED | OUTPATIENT
Start: 2023-12-11 | End: 2023-12-13 | Stop reason: HOSPADM

## 2023-12-11 RX ORDER — IBUPROFEN 200 MG
24 TABLET ORAL
Status: DISCONTINUED | OUTPATIENT
Start: 2023-12-11 | End: 2023-12-13 | Stop reason: HOSPADM

## 2023-12-11 RX ORDER — METOPROLOL SUCCINATE 50 MG/1
50 TABLET, EXTENDED RELEASE ORAL DAILY
Status: DISCONTINUED | OUTPATIENT
Start: 2023-12-11 | End: 2023-12-13 | Stop reason: HOSPADM

## 2023-12-11 RX ORDER — INSULIN ASPART 100 [IU]/ML
0-5 INJECTION, SOLUTION INTRAVENOUS; SUBCUTANEOUS
Status: DISCONTINUED | OUTPATIENT
Start: 2023-12-11 | End: 2023-12-13 | Stop reason: HOSPADM

## 2023-12-11 RX ORDER — IBUPROFEN 200 MG
16 TABLET ORAL
Status: DISCONTINUED | OUTPATIENT
Start: 2023-12-11 | End: 2023-12-13 | Stop reason: HOSPADM

## 2023-12-11 RX ORDER — FLUTICASONE PROPIONATE 50 MCG
2 SPRAY, SUSPENSION (ML) NASAL DAILY
Status: DISCONTINUED | OUTPATIENT
Start: 2023-12-11 | End: 2023-12-13 | Stop reason: HOSPADM

## 2023-12-11 RX ORDER — ISOSORBIDE MONONITRATE 30 MG/1
30 TABLET, EXTENDED RELEASE ORAL DAILY
Status: DISCONTINUED | OUTPATIENT
Start: 2023-12-11 | End: 2023-12-13 | Stop reason: HOSPADM

## 2023-12-11 RX ORDER — SODIUM CHLORIDE 0.9 % (FLUSH) 0.9 %
10 SYRINGE (ML) INJECTION EVERY 12 HOURS PRN
Status: DISCONTINUED | OUTPATIENT
Start: 2023-12-11 | End: 2023-12-13 | Stop reason: HOSPADM

## 2023-12-11 RX ORDER — POLYETHYLENE GLYCOL 3350 17 G/17G
17 POWDER, FOR SOLUTION ORAL 2 TIMES DAILY PRN
Status: DISCONTINUED | OUTPATIENT
Start: 2023-12-11 | End: 2023-12-13 | Stop reason: HOSPADM

## 2023-12-11 RX ORDER — ASPIRIN 81 MG/1
81 TABLET ORAL ONCE
Status: COMPLETED | OUTPATIENT
Start: 2023-12-11 | End: 2023-12-11

## 2023-12-11 RX ORDER — SERTRALINE HYDROCHLORIDE 50 MG/1
50 TABLET, FILM COATED ORAL DAILY
Status: DISCONTINUED | OUTPATIENT
Start: 2023-12-12 | End: 2023-12-13 | Stop reason: HOSPADM

## 2023-12-11 RX ORDER — PANTOPRAZOLE SODIUM 40 MG/1
40 TABLET, DELAYED RELEASE ORAL DAILY
Status: DISCONTINUED | OUTPATIENT
Start: 2023-12-11 | End: 2023-12-13 | Stop reason: HOSPADM

## 2023-12-11 RX ORDER — GLUCAGON 1 MG
1 KIT INJECTION
Status: DISCONTINUED | OUTPATIENT
Start: 2023-12-11 | End: 2023-12-13 | Stop reason: HOSPADM

## 2023-12-11 RX ORDER — FOLIC ACID 1 MG/1
1 TABLET ORAL DAILY
Status: DISCONTINUED | OUTPATIENT
Start: 2023-12-11 | End: 2023-12-13 | Stop reason: HOSPADM

## 2023-12-11 RX ORDER — NALOXONE HCL 0.4 MG/ML
0.02 VIAL (ML) INJECTION
Status: DISCONTINUED | OUTPATIENT
Start: 2023-12-11 | End: 2023-12-13 | Stop reason: HOSPADM

## 2023-12-11 RX ORDER — HEPARIN SODIUM 5000 [USP'U]/ML
5000 INJECTION, SOLUTION INTRAVENOUS; SUBCUTANEOUS EVERY 8 HOURS
Status: DISCONTINUED | OUTPATIENT
Start: 2023-12-11 | End: 2023-12-13 | Stop reason: HOSPADM

## 2023-12-11 RX ORDER — ACETAMINOPHEN 325 MG/1
650 TABLET ORAL EVERY 4 HOURS PRN
Status: DISCONTINUED | OUTPATIENT
Start: 2023-12-11 | End: 2023-12-13 | Stop reason: HOSPADM

## 2023-12-11 RX ORDER — ATORVASTATIN CALCIUM 20 MG/1
20 TABLET, FILM COATED ORAL DAILY
Status: DISCONTINUED | OUTPATIENT
Start: 2023-12-11 | End: 2023-12-13 | Stop reason: HOSPADM

## 2023-12-11 RX ADMIN — ISOSORBIDE MONONITRATE 30 MG: 30 TABLET, EXTENDED RELEASE ORAL at 03:12

## 2023-12-11 RX ADMIN — SODIUM CHLORIDE: 4.5 INJECTION, SOLUTION INTRAVENOUS at 03:12

## 2023-12-11 RX ADMIN — TRAZODONE HYDROCHLORIDE 25 MG: 50 TABLET ORAL at 09:12

## 2023-12-11 RX ADMIN — INSULIN DETEMIR 5 UNITS: 100 INJECTION, SOLUTION SUBCUTANEOUS at 09:12

## 2023-12-11 RX ADMIN — ATORVASTATIN CALCIUM 20 MG: 20 TABLET, FILM COATED ORAL at 03:12

## 2023-12-11 RX ADMIN — METOPROLOL SUCCINATE 50 MG: 50 TABLET, EXTENDED RELEASE ORAL at 03:12

## 2023-12-11 RX ADMIN — PROMETHAZINE HYDROCHLORIDE 25 MG: 25 INJECTION INTRAMUSCULAR; INTRAVENOUS at 10:12

## 2023-12-11 RX ADMIN — PANTOPRAZOLE SODIUM 40 MG: 40 TABLET, DELAYED RELEASE ORAL at 03:12

## 2023-12-11 RX ADMIN — ONDANSETRON 4 MG: 2 INJECTION INTRAMUSCULAR; INTRAVENOUS at 09:12

## 2023-12-11 RX ADMIN — HEPARIN SODIUM 5000 UNITS: 5000 INJECTION, SOLUTION INTRAVENOUS; SUBCUTANEOUS at 09:12

## 2023-12-11 RX ADMIN — FOLIC ACID 1 MG: 1 TABLET ORAL at 03:12

## 2023-12-11 RX ADMIN — ACETAMINOPHEN 650 MG: 325 TABLET ORAL at 09:12

## 2023-12-11 RX ADMIN — ASPIRIN 81 MG: 81 TABLET, COATED ORAL at 03:12

## 2023-12-11 RX ADMIN — AZITHROMYCIN DIHYDRATE 500 MG: 500 INJECTION, POWDER, LYOPHILIZED, FOR SOLUTION INTRAVENOUS at 04:12

## 2023-12-11 RX ADMIN — FLUTICASONE PROPIONATE 100 MCG: 50 SPRAY, METERED NASAL at 03:12

## 2023-12-11 RX ADMIN — SODIUM CHLORIDE 1000 ML: 9 INJECTION, SOLUTION INTRAVENOUS at 09:12

## 2023-12-11 NOTE — H&P
[facility] - Emergency Department  Hospital Medicine  History & Physical    Patient Name: Karen Renteria  MRN: [id number]  Patient Class: IP- Inpatient  Admission Date: 12/11/2023  Attending Physician: Axel Hand Jr., MD   Primary Care Provider: Jennifer Primary Doctor         Patient information was obtained from patient, spouse/SO, and ER records.     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 Patient was recently seen in clinic for similar symptom, diagnosed as Pneumonia, UTI and was started  on Levofloxacin. As per patient, she could not tolerated per oral antibiotic for Levoflox ( took only 2-3 doses). Maximum temp recorded is 99.6 F. Has minimal headache. Denies chest pain, SOB.     Baseline functional status:  Moves around without roller or a wheelchair.  Lives with her  at home.    ED courses:   Vitals: /96mmHg, HR 90 bpm, RR 20bpm, Temp 97.9F, Oxygen saturation 94% in room air.  WBC 10.29, Hb/Hct: 15.1/46.4, Na 138, K 3.5, Creatinine 4.13, eGFR 11 , Glucose 188  Chest X ray- Lung volume increased with prominent bronchial markings, no lung infiltrates.    Patient received Ondem, Promethazine and 1 L of 0.9% Nacl at ED.    The patient was admitted to [facility] to Family Medicine Service under the direct supervision of Dr. Axel Hand for the continued care and medical management of this  patient.      Past Medical History:   Diagnosis Date    Acute gastric ulcer without hemorrhage or perforation 3/3/2022    Acute superficial gastritis without hemorrhage 3/3/2022    Anxiety     Carpal tunnel syndrome     Cerebral vascular accident     Coronary arteriosclerosis     S/P CABG 2011    COVID-19 12/20/2020    Depression     Diabetes mellitus, type 2     Encounter for long-term (current) use of other medications     Esophageal dysphagia 3/3/2022    Eye exam, routine 02/03/2021    GERD (gastroesophageal reflux disease)     History of esophagogastroduodenoscopy (EGD) 08/07/2019    Hx of colonoscopy 04/12/2019    Hx of mammogram 06/24/2020    SCREENING/BILATERAL    Hyperlipidemia     Hypertension     Insomnia     Low back pain     Lumbar radiculopathy     Osteoporosis     Tremor     Vitamin D deficiency        Past Surgical History:   Procedure Laterality Date    APPENDECTOMY      ASCENDING AORTIC ANEURYSM REPAIR W/ TISSUE AORTIC VALVE REPLACEMENT      CHOLECYSTECTOMY      CORONARY ARTERY BYPASS GRAFT (CABG)      DIRECT LARYNGOSCOPY Bilateral 08/12/2022    Procedure: LARYNGOSCOPY, DIRECT;  Surgeon: Iban Silverio MD;  Location: [facility] OR;  Service: ENT;  Laterality: Bilateral;    HYSTERECTOMY      OOPHORECTOMY      TONSILLECTOMY      VOCAL FOLD LESION EXCISION Bilateral 08/12/2022    Procedure: EXCISION, LESION, VOCAL CORD;  Surgeon: Iban Silverio MD;  Location: [facility] OR;  Service: ENT;  Laterality: Bilateral;       Review of patient's allergies indicates:   Allergen Reactions    Cephalexin Rash    Bactrim [sulfamethoxazole-trimethoprim]        No current facility-administered medications on file prior to encounter.     Current Outpatient Medications on File Prior to Encounter   Medication Sig    aspirin (ECOTRIN) 81 MG EC tablet Take 81 mg by mouth.    cholecalciferol, vitamin D3, (VITAMIN D3) 25 mcg (1,000 unit) capsule Take 1,000 Units by mouth once daily.    fluticasone propionate (FLONASE) 50  mcg/actuation nasal spray 2 sprays by Each Nostril route once daily.    folic acid (FOLVITE) 1 MG tablet Take 1 mg by mouth once daily.    hydroCHLOROthiazide (HYDRODIURIL) 25 MG tablet Take 25 mg by mouth once daily.    isosorbide mononitrate (IMDUR) 30 MG 24 hr tablet Take 1 tablet (30 mg total) by mouth once daily.    levoFLOXacin (LEVAQUIN) 250 MG tablet Take 1 tablet (250 mg total) by mouth once daily.    meclizine (ANTIVERT) 25 mg tablet Take 1 tablet (25 mg total) by mouth 3 (three) times daily as needed for Dizziness.    meloxicam (MOBIC) 15 MG tablet Take 1 tablet (15 mg total) by mouth once daily.    metoprolol succinate (TOPROL-XL) 50 MG 24 hr tablet Take 50 mg by mouth.    ondansetron (ZOFRAN-ODT) 4 MG TbDL Take 1 tablet (4 mg total) by mouth every 8 (eight) hours as needed (nausea).    pantoprazole (PROTONIX) 40 MG tablet Take 1 tablet (40 mg total) by mouth once daily.    pregabalin (LYRICA) 75 MG capsule Take 1 capsule (75 mg total) by mouth 2 (two) times daily.    rosuvastatin (CRESTOR) 20 MG tablet Take 20 mg by mouth every evening.    semaglutide (OZEMPIC) 0.25 mg or 0.5 mg(2 mg/1.5 mL) pen injector Inject 0.25 mg into the skin.    sertraline (ZOLOFT) 50 MG tablet TAKE ONE TABLET BY MOUTH EVERY DAY    cyclobenzaprine (FLEXERIL) 10 MG tablet Take 1 tablet (10 mg total) by mouth every 12 (twelve) hours as needed for Muscle spasms.    insulin (BASAGLAR KWIKPEN U-100 INSULIN) glargine 100 units/mL SubQ pen Inject 100 Units into the skin once daily. (Patient taking differently: Inject 100 Units into the skin once daily. Inject 44 units in the skin in the mornings and 52 units in the evenings)    magnesium 250 mg Tab Take by mouth.    primidone (MYSOLINE) 50 MG Tab 1/2 tab daily    promethazine (PHENERGAN) 25 MG tablet Take 1 tablet (25 mg total) by mouth every 6 (six) hours as needed for Nausea.    ZINC ORAL Take 250 mg by mouth Daily.     Family History       Problem Relation (Age of Onset)    Breast  cancer Mother, Sister    Cancer Mother    Heart disease Father          Tobacco Use    Smoking status: Former     Current packs/day: 0.00     Average packs/day: 0.5 packs/day for 17.3 years (8.7 ttl pk-yrs)     Types: Cigarettes     Start date:      Quit date: 2011     Years since quittin.6    Smokeless tobacco: Never   Substance and Sexual Activity    Alcohol use: Never    Drug use: Never    Sexual activity: Not on file     Review of Systems   Constitutional:  Positive for appetite change, chills and fever.   HENT:  Negative for congestion, rhinorrhea and sore throat.    Respiratory:  Positive for cough. Negative for shortness of breath.    Cardiovascular:  Negative for chest pain, palpitations and leg swelling.   Gastrointestinal:  Positive for nausea and vomiting. Negative for abdominal pain.   Genitourinary:  Negative for dysuria.   Neurological:  Positive for headaches. Negative for dizziness.   Psychiatric/Behavioral:  Negative for agitation and behavioral problems.    All other systems reviewed and are negative.    Objective:     Vital Signs (Most Recent):  Temp: 97.9 °F (36.6 °C) (23 0818)  Pulse: 86 (23 1517)  Resp: 20 (23 0818)  BP: 115/75 (23 1032)  SpO2: 97 % (23 1517) Vital Signs (24h Range):  Temp:  [97.9 °F (36.6 °C)] 97.9 °F (36.6 °C)  Pulse:  [53-90] 86  Resp:  [20] 20  SpO2:  [91 %-100 %] 97 %  BP: (115-152)/(68-96) 115/75     Weight: 78.5 kg (173 lb)  Body mass index is 29.7 kg/m².     Physical Exam  Vitals and nursing note reviewed.   Constitutional:       Appearance: Normal appearance. She is obese.   HENT:      Head: Normocephalic and atraumatic.      Nose: Nose normal.      Mouth/Throat:      Mouth: Mucous membranes are moist.   Eyes:      Extraocular Movements: Extraocular movements intact.      Conjunctiva/sclera: Conjunctivae normal.   Cardiovascular:      Rate and Rhythm: Normal rate and regular rhythm.      Pulses: Normal pulses.      Heart  sounds: Normal heart sounds.   Pulmonary:      Breath sounds: Normal breath sounds.   Abdominal:      General: Bowel sounds are normal.      Palpations: Abdomen is soft.          Comments: Diastasis recti   Musculoskeletal:      Cervical back: Neck supple.      Right lower leg: No edema.      Left lower leg: No edema.   Skin:     General: Skin is warm.      Capillary Refill: Capillary refill takes less than 2 seconds.   Neurological:      Mental Status: She is alert and oriented to person, place, and time.   Psychiatric:         Mood and Affect: Mood normal.         Behavior: Behavior normal.                Significant Labs: All pertinent labs within the past 24 hours have been reviewed.    Significant Imaging: I have reviewed all pertinent imaging results/findings within the past 24 hours.  Assessment/Plan:     * MARICEL (acute kidney injury)  Patient with acute kidney injury/acute renal failure likely due to pre-renal azotemia due to dehydration MARICEL is currently worsening. Baseline creatinine  1.5  - Labs reviewed- Renal function/electrolytes with Estimated Creatinine Clearance: 13 mL/min (A) (based on SCr of 4.13 mg/dL (H)). according to latest data. Monitor urine output and serial BMP and adjust therapy as needed. Avoid nephrotoxins and renally dose meds for GFR listed above.    Creatinine 4.13, eGFR 11  -Avoid nephrotoxic agents.  - Will continue with 0.45% Nacl @125ml/hr . Will follow up Q AM.    Community acquired pneumonia  Patient was seen 2-3 backs at the clinic as outpatient and was started on Levoflox for Pneumonia.Patient unable to tolerate PO medication due to nausea and vomiting.   Chest X ray- Lung volume increase with prominent bronchial markings, no lung infiltrates.  - Considering patient incomplete course of medication, will start on Azithromycin 500mg IV daily for 3 days.  - Patient is allergic to Cephalexin, so Ceftriaxone will not be given.  - Will monitor for symptoms.      Nausea and  vomiting  Will continue with Ondansetron 4 mg IV every 8 hrly as needed.      CKD (chronic kidney disease) stage 5, GFR less than 15 ml/min  Creatine stable for now. BMP reviewed- noted Estimated Creatinine Clearance: 13 mL/min (A) (based on SCr of 4.13 mg/dL (H)). according to latest data. Based on current GFR, CKD stage is stage 5 - GFR < 15.  Monitor UOP and serial BMP and adjust therapy as needed. Renally dose meds. Avoid nephrotoxic medications and procedures.    Loss of appetite  Continue with diabetic diet as tolerated.      Benign essential hypertension  Chronic, controlled. Latest blood pressure and vitals reviewed-     Temp:  [97.9 °F (36.6 °C)]   Pulse:  [53-93]   Resp:  [20]   BP: (115-152)/(59-96)   SpO2:  [91 %-100 %] .   Home meds for hypertension were reviewed and noted below.   Hypertension Medications               hydroCHLOROthiazide (HYDRODIURIL) 25 MG tablet Take 25 mg by mouth once daily.    isosorbide mononitrate (IMDUR) 30 MG 24 hr tablet Take 1 tablet (30 mg total) by mouth once daily.    metoprolol succinate (TOPROL-XL) 50 MG 24 hr tablet Take 50 mg by mouth.            While in the hospital, will manage blood pressure as follows; Continue home antihypertensive regimen. Hold HCTZ for now.    Will utilize p.r.n. blood pressure medication only if patient's blood pressure greater than 160/100 and she develops symptoms such as worsening chest pain or shortness of breath.    Depression  Patient has  .  depression which is unknown and is currently controlled. Will Continue anti-depressant medications. We will not consult psychiatry at this time. Patient does not display psychosis at this time. Continue to monitor closely and adjust plan of care as needed.  Will continue Sertraline.        Coronary artery disease involving native coronary artery of native heart without angina pectoris  Patient with known CAD s/p CABG, which is controlled Will continue ASA and Statin and monitor for S/Sx of  angina/ACS. Continue to monitor on telemetry.   Echo ordered today.    Type 2 diabetes mellitus, with long-term current use of insulin  Will hold oral hypoglycemic meds for now.  Last HbA1C 10.7 ( 1 year ago),   Bloos sugar 188.Ordered HbA1C today  - Will start with Ins Detemir 5 unti sc HS, Low ISS.        VTE Risk Mitigation (From admission, onward)           Ordered     heparin (porcine) injection 5,000 Units  Every 8 hours         12/11/23 1456     Place sequential compression device  Until discontinued         12/11/23 1456     IP VTE HIGH RISK PATIENT  Once         12/11/23 1456                                    María Degroot MD  Department of Hospital Medicine  [facility] - Emergency Department

## 2023-12-11 NOTE — ASSESSMENT & PLAN NOTE
Patient with known CAD s/p CABG, which is controlled Will continue ASA and Statin and monitor for S/Sx of angina/ACS. Continue to monitor on telemetry.   Echo ordered today.

## 2023-12-11 NOTE — ASSESSMENT & PLAN NOTE
Patient has  .  depression which is unknown and is currently controlled. Will Continue anti-depressant medications. We will not consult psychiatry at this time. Patient does not display psychosis at this time. Continue to monitor closely and adjust plan of care as needed.  Will continue Sertraline.

## 2023-12-11 NOTE — ED PROVIDER NOTES
Encounter Date: 12/11/2023       History     Chief Complaint   Patient presents with    Vomiting     Patient comes in after being diagnosed Friday with pneumonia she was put on medication but has nausea vomiting unable to tolerate liquids as not eat since last Thursday.  Long history of having stomach ulcers and gastritis.        Review of patient's allergies indicates:   Allergen Reactions    Cephalexin Rash    Bactrim [sulfamethoxazole-trimethoprim]      Past Medical History:   Diagnosis Date    Acute gastric ulcer without hemorrhage or perforation 3/3/2022    Acute superficial gastritis without hemorrhage 3/3/2022    Anxiety     Carpal tunnel syndrome     Cerebral vascular accident     Coronary arteriosclerosis     S/P CABG 2011    COVID-19 12/20/2020    Depression     Diabetes mellitus, type 2     Encounter for long-term (current) use of other medications     Esophageal dysphagia 3/3/2022    Eye exam, routine 02/03/2021    GERD (gastroesophageal reflux disease)     History of esophagogastroduodenoscopy (EGD) 08/07/2019    Hx of colonoscopy 04/12/2019    Hx of mammogram 06/24/2020    SCREENING/BILATERAL    Hyperlipidemia     Hypertension     Insomnia     Low back pain     Lumbar radiculopathy     Osteoporosis     Tremor     Vitamin D deficiency      Past Surgical History:   Procedure Laterality Date    APPENDECTOMY      ASCENDING AORTIC ANEURYSM REPAIR W/ TISSUE AORTIC VALVE REPLACEMENT      CHOLECYSTECTOMY      CORONARY ARTERY BYPASS GRAFT (CABG)      DIRECT LARYNGOSCOPY Bilateral 08/12/2022    Procedure: LARYNGOSCOPY, DIRECT;  Surgeon: Iban Silverio MD;  Location: [facility] OR;  Service: ENT;  Laterality: Bilateral;    HYSTERECTOMY      OOPHORECTOMY      TONSILLECTOMY      VOCAL FOLD LESION EXCISION Bilateral 08/12/2022    Procedure: EXCISION, LESION, VOCAL CORD;  Surgeon: Iban Silverio MD;  Location: [facility] OR;  Service: ENT;  Laterality: Bilateral;     Family History   Adopted: Yes   Problem Relation  Age of Onset    Cancer Mother     Breast cancer Mother     Heart disease Father     Breast cancer Sister      Social History     Tobacco Use    Smoking status: Former     Current packs/day: 0.00     Average packs/day: 0.5 packs/day for 17.3 years (8.7 ttl pk-yrs)     Types: Cigarettes     Start date:      Quit date: 2011     Years since quittin.6    Smokeless tobacco: Never   Substance Use Topics    Alcohol use: Never    Drug use: Never     Review of Systems   Constitutional: Negative.  Negative for fever.   HENT: Negative.  Negative for sore throat.    Eyes: Negative.    Respiratory:  Positive for cough. Negative for shortness of breath.    Cardiovascular: Negative.  Negative for chest pain.   Gastrointestinal:  Positive for vomiting. Negative for nausea.   Endocrine: Negative.    Genitourinary: Negative.  Negative for dysuria.   Musculoskeletal: Negative.  Negative for back pain.   Skin: Negative.  Negative for rash.   Allergic/Immunologic: Negative.    Neurological: Negative.  Negative for weakness.   Hematological: Negative.  Does not bruise/bleed easily.   Psychiatric/Behavioral: Negative.     All other systems reviewed and are negative.      Physical Exam     Initial Vitals   BP Pulse Resp Temp SpO2   23 0818 23 0818 23 0818 23 0818 23 0820   (!) 151/96 90 20 97.9 °F (36.6 °C) (!) 94 %      MAP       --                Physical Exam    Constitutional: She appears well-developed and well-nourished.   HENT:   Head: Normocephalic and atraumatic.   Right Ear: External ear normal.   Left Ear: External ear normal.   Nose: Nose normal.   Mouth/Throat: Oropharynx is clear and moist.   Eyes: Conjunctivae and EOM are normal. Pupils are equal, round, and reactive to light.   Neck: Neck supple.   Normal range of motion.  Cardiovascular:  Normal rate, regular rhythm, normal heart sounds and intact distal pulses.           Pulmonary/Chest: Breath sounds normal.   Abdominal: Abdomen  is soft. Bowel sounds are normal.   Genitourinary:    Vagina and uterus normal.     Musculoskeletal:         General: Normal range of motion.      Cervical back: Normal range of motion and neck supple.     Neurological: She is alert and oriented to person, place, and time. She has normal strength and normal reflexes.   Skin: Skin is warm. Capillary refill takes less than 2 seconds.   Psychiatric: She has a normal mood and affect. Her behavior is normal. Judgment and thought content normal.         Medical Screening Exam   See Full Note    ED Course   Procedures  Labs Reviewed   COMPREHENSIVE METABOLIC PANEL - Abnormal; Notable for the following components:       Result Value    Glucose 188 (*)     BUN 46 (*)     Creatinine 4.13 (*)     Calcium 10.7 (*)     Total Protein 8.3 (*)     Globulin 4.1 (*)     eGFR 11 (*)     All other components within normal limits   CBC WITH DIFFERENTIAL - Abnormal; Notable for the following components:    RBC 5.48 (*)     MPV 13.2 (*)     Neutrophils % 71.8 (*)     Lymphocytes % 17.3 (*)     Monocytes % 7.4 (*)     Basophils % 1.2 (*)     All other components within normal limits   CBC MORPHOLOGY - Abnormal; Notable for the following components:    Platelet Morphology Few Large Platelets (*)     All other components within normal limits   MAGNESIUM - Abnormal; Notable for the following components:    Magnesium 2.5 (*)     All other components within normal limits   CBC W/ AUTO DIFFERENTIAL    Narrative:     The following orders were created for panel order CBC Auto Differential.  Procedure                               Abnormality         Status                     ---------                               -----------         ------                     CBC with Differential[[id number]]       Abnormal            Final result                 Please view results for these tests on the individual orders.   POCT GLUCOSE, HAND-HELD DEVICE          Imaging Results              X-Ray Chest AP  Portable (Final result)  Result time 12/11/23 09:24:54   Procedure changed from X-Ray Chest PA And Lateral     Final result by Kwaku James II, MD (12/11/23 09:24:54)                   Impression:      Chronic lung and cardiac surgery changes.  No acute process or significant change.      Electronically signed by: Kwaku James  Date:    12/11/2023  Time:    09:24               Narrative:    EXAMINATION:  XR CHEST AP PORTABLE    CLINICAL HISTORY:  sob; Shortness of breath    COMPARISON:  8 December 2023    TECHNIQUE:  XR CHEST AP PORTABLE    FINDINGS:  The heart and mediastinum are stable in size and configuration with cardiac surgery changes.    Pacemaker device is unchanged in position.    The pulmonary vascularity is normal in caliber. Lung volumes are increased with prominent bronchial markings.  No lung infiltrates, effusions, pneumothorax or other abnormality is demonstrated.                                       Medications   fluticasone propionate 50 mcg/actuation nasal spray 100 mcg (100 mcg Each Nostril Given 12/11/23 1541)   folic acid tablet 1 mg (1 mg Oral Given 12/11/23 1544)   isosorbide mononitrate 24 hr tablet 30 mg (30 mg Oral Given 12/11/23 1543)   metoprolol succinate (TOPROL-XL) 24 hr tablet 50 mg (50 mg Oral Given 12/11/23 1542)   pantoprazole EC tablet 40 mg (40 mg Oral Given 12/11/23 1545)   atorvastatin tablet 20 mg (20 mg Oral Given 12/11/23 1544)   sertraline tablet 50 mg (has no administration in time range)   sodium chloride 0.9% flush 10 mL (has no administration in time range)   naloxone 0.4 mg/mL injection 0.02 mg (has no administration in time range)   glucose chewable tablet 16 g (has no administration in time range)   glucose chewable tablet 24 g (has no administration in time range)   glucagon (human recombinant) injection 1 mg (has no administration in time range)   heparin (porcine) injection 5,000 Units (5,000 Units Subcutaneous Given 12/12/23 6748)   acetaminophen  tablet 650 mg (650 mg Oral Given 12/11/23 2159)   polyethylene glycol packet 17 g (has no administration in time range)   ondansetron injection 4 mg (4 mg Intravenous Given 12/12/23 0318)   insulin aspart U-100 injection 0-5 Units (has no administration in time range)   insulin detemir U-100 injection 5 Units (5 Units Subcutaneous Given 12/11/23 2159)   trazodone split tablet 25 mg (25 mg Oral Given 12/11/23 2159)   dextrose 10% bolus 125 mL 125 mL (has no administration in time range)   dextrose 10% bolus 250 mL 250 mL (has no administration in time range)   azithromycin (ZITHROMAX) 500 mg in dextrose 5 % (D5W) 250 mL IVPB (0 mg Intravenous Stopped 12/11/23 1808)   0.45% NaCl infusion ( Intravenous New Bag 12/12/23 0101)   aspirin chewable tablet 81 mg (has no administration in time range)   loperamide capsule 2 mg (2 mg Oral Given 12/12/23 0534)   sodium chloride 0.9% bolus 1,000 mL 1,000 mL (0 mLs Intravenous Stopped 12/11/23 1029)   ondansetron injection 4 mg (4 mg Intravenous Given 12/11/23 0915)   promethazine (PHENERGAN) 25 mg in dextrose 5 % (D5W) 50 mL IVPB (0 mg Intravenous Stopped 12/11/23 1134)   aspirin EC tablet 81 mg (81 mg Oral Given 12/11/23 1545)     Medical Decision Making  Amount and/or Complexity of Data Reviewed  Labs: ordered.  Radiology: ordered.    Risk  Prescription drug management.  Decision regarding hospitalization.                          Medical Decision Making:   Initial Assessment:   Patient comes in with vomiting.  Diagnosed with pneumonia last Friday at present she is unable to hold food liquids for any type of medication in.             Clinical Impression:   Final diagnoses:  [R06.02] SOB (shortness of breath)  [N17.9] Acute renal failure  [N17.9] MARICEL (acute kidney injury) (Primary)        ED Disposition Condition    Admit                 Roberto Villareal, DO  12/12/23 9540

## 2023-12-11 NOTE — ASSESSMENT & PLAN NOTE
Will hold oral hypoglycemic meds for now.  Last HbA1C 10.7 ( 1 year ago),   Bloos sugar 188.Ordered HbA1C today  - Will start with Ins Detemir 5 unti sc HS, Low ISS.

## 2023-12-11 NOTE — ASSESSMENT & PLAN NOTE
Creatine stable for now. BMP reviewed- noted Estimated Creatinine Clearance: 13 mL/min (A) (based on SCr of 4.13 mg/dL (H)). according to latest data. Based on current GFR, CKD stage is stage 5 - GFR < 15.  Monitor UOP and serial BMP and adjust therapy as needed. Renally dose meds. Avoid nephrotoxic medications and procedures.

## 2023-12-11 NOTE — HPI
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 Patient was recently seen in clinic for similar symptom, diagnosed as Pneumonia, UTI and was started  on Levofloxacin. As per patient, she could not tolerated per oral antibiotic for Levoflox ( took only 2-3 doses). Maximum temp recorded is 99.6 F. Has minimal headache. Denies chest pain, SOB.     Baseline functional status:  Moves around without roller or a wheelchair.  Lives with her  at home.    ED courses:   Vitals: /96mmHg, HR 90 bpm, RR 20bpm, Temp 97.9F, Oxygen saturation 94% in room air.  WBC 10.29, Hb/Hct: 15.1/46.4, Na 138, K 3.5, Creatinine 4.13, eGFR 11 , Glucose 188  Chest X ray- Lung volume increased with prominent bronchial markings, no lung infiltrates.    Patient received Ondem, Promethazine and 1 L of 0.9% Nacl at ED.    The patient was admitted to [facility] to Family Medicine Service under the direct supervision of Dr. Axel Hand for the continued care and medical management of this patient.

## 2023-12-11 NOTE — SUBJECTIVE & OBJECTIVE
Past Medical History:   Diagnosis Date    Acute gastric ulcer without hemorrhage or perforation 3/3/2022    Acute superficial gastritis without hemorrhage 3/3/2022    Anxiety     Carpal tunnel syndrome     Cerebral vascular accident     Coronary arteriosclerosis     S/P CABG 2011    COVID-19 12/20/2020    Depression     Diabetes mellitus, type 2     Encounter for long-term (current) use of other medications     Esophageal dysphagia 3/3/2022    Eye exam, routine 02/03/2021    GERD (gastroesophageal reflux disease)     History of esophagogastroduodenoscopy (EGD) 08/07/2019    Hx of colonoscopy 04/12/2019    Hx of mammogram 06/24/2020    SCREENING/BILATERAL    Hyperlipidemia     Hypertension     Insomnia     Low back pain     Lumbar radiculopathy     Osteoporosis     Tremor     Vitamin D deficiency        Past Surgical History:   Procedure Laterality Date    APPENDECTOMY      ASCENDING AORTIC ANEURYSM REPAIR W/ TISSUE AORTIC VALVE REPLACEMENT      CHOLECYSTECTOMY      CORONARY ARTERY BYPASS GRAFT (CABG)      DIRECT LARYNGOSCOPY Bilateral 08/12/2022    Procedure: LARYNGOSCOPY, DIRECT;  Surgeon: Iban Silverio MD;  Location: [facility] OR;  Service: ENT;  Laterality: Bilateral;    HYSTERECTOMY      OOPHORECTOMY      TONSILLECTOMY      VOCAL FOLD LESION EXCISION Bilateral 08/12/2022    Procedure: EXCISION, LESION, VOCAL CORD;  Surgeon: Iban Silevrio MD;  Location: [facility] OR;  Service: ENT;  Laterality: Bilateral;       Review of patient's allergies indicates:   Allergen Reactions    Cephalexin Rash    Bactrim [sulfamethoxazole-trimethoprim]        No current facility-administered medications on file prior to encounter.     Current Outpatient Medications on File Prior to Encounter   Medication Sig    aspirin (ECOTRIN) 81 MG EC tablet Take 81 mg by mouth.    cholecalciferol, vitamin D3, (VITAMIN D3) 25 mcg (1,000 unit) capsule Take 1,000 Units by mouth once daily.    fluticasone propionate (FLONASE) 50 mcg/actuation  nasal spray 2 sprays by Each Nostril route once daily.    folic acid (FOLVITE) 1 MG tablet Take 1 mg by mouth once daily.    hydroCHLOROthiazide (HYDRODIURIL) 25 MG tablet Take 25 mg by mouth once daily.    isosorbide mononitrate (IMDUR) 30 MG 24 hr tablet Take 1 tablet (30 mg total) by mouth once daily.    levoFLOXacin (LEVAQUIN) 250 MG tablet Take 1 tablet (250 mg total) by mouth once daily.    meclizine (ANTIVERT) 25 mg tablet Take 1 tablet (25 mg total) by mouth 3 (three) times daily as needed for Dizziness.    meloxicam (MOBIC) 15 MG tablet Take 1 tablet (15 mg total) by mouth once daily.    metoprolol succinate (TOPROL-XL) 50 MG 24 hr tablet Take 50 mg by mouth.    ondansetron (ZOFRAN-ODT) 4 MG TbDL Take 1 tablet (4 mg total) by mouth every 8 (eight) hours as needed (nausea).    pantoprazole (PROTONIX) 40 MG tablet Take 1 tablet (40 mg total) by mouth once daily.    pregabalin (LYRICA) 75 MG capsule Take 1 capsule (75 mg total) by mouth 2 (two) times daily.    rosuvastatin (CRESTOR) 20 MG tablet Take 20 mg by mouth every evening.    semaglutide (OZEMPIC) 0.25 mg or 0.5 mg(2 mg/1.5 mL) pen injector Inject 0.25 mg into the skin.    sertraline (ZOLOFT) 50 MG tablet TAKE ONE TABLET BY MOUTH EVERY DAY    cyclobenzaprine (FLEXERIL) 10 MG tablet Take 1 tablet (10 mg total) by mouth every 12 (twelve) hours as needed for Muscle spasms.    insulin (BASAGLAR KWIKPEN U-100 INSULIN) glargine 100 units/mL SubQ pen Inject 100 Units into the skin once daily. (Patient taking differently: Inject 100 Units into the skin once daily. Inject 44 units in the skin in the mornings and 52 units in the evenings)    magnesium 250 mg Tab Take by mouth.    primidone (MYSOLINE) 50 MG Tab 1/2 tab daily    promethazine (PHENERGAN) 25 MG tablet Take 1 tablet (25 mg total) by mouth every 6 (six) hours as needed for Nausea.    ZINC ORAL Take 250 mg by mouth Daily.     Family History       Problem Relation (Age of Onset)    Breast cancer Mother,  Sister    Cancer Mother    Heart disease Father          Tobacco Use    Smoking status: Former     Current packs/day: 0.00     Average packs/day: 0.5 packs/day for 17.3 years (8.7 ttl pk-yrs)     Types: Cigarettes     Start date:      Quit date: 2011     Years since quittin.6    Smokeless tobacco: Never   Substance and Sexual Activity    Alcohol use: Never    Drug use: Never    Sexual activity: Not on file     Review of Systems   Constitutional:  Positive for appetite change, chills and fever.   HENT:  Negative for congestion, rhinorrhea and sore throat.    Respiratory:  Positive for cough. Negative for shortness of breath.    Cardiovascular:  Negative for chest pain, palpitations and leg swelling.   Gastrointestinal:  Positive for nausea and vomiting. Negative for abdominal pain.   Genitourinary:  Negative for dysuria.   Neurological:  Positive for headaches. Negative for dizziness.   Psychiatric/Behavioral:  Negative for agitation and behavioral problems.    All other systems reviewed and are negative.    Objective:     Vital Signs (Most Recent):  Temp: 97.9 °F (36.6 °C) (23 0818)  Pulse: 86 (23 1517)  Resp: 20 (23 0818)  BP: 115/75 (23 1032)  SpO2: 97 % (23 1517) Vital Signs (24h Range):  Temp:  [97.9 °F (36.6 °C)] 97.9 °F (36.6 °C)  Pulse:  [53-90] 86  Resp:  [20] 20  SpO2:  [91 %-100 %] 97 %  BP: (115-152)/(68-96) 115/75     Weight: 78.5 kg (173 lb)  Body mass index is 29.7 kg/m².     Physical Exam  Vitals and nursing note reviewed.   Constitutional:       Appearance: Normal appearance. She is obese.   HENT:      Head: Normocephalic and atraumatic.      Nose: Nose normal.      Mouth/Throat:      Mouth: Mucous membranes are moist.   Eyes:      Extraocular Movements: Extraocular movements intact.      Conjunctiva/sclera: Conjunctivae normal.   Cardiovascular:      Rate and Rhythm: Normal rate and regular rhythm.      Pulses: Normal pulses.      Heart sounds: Normal  heart sounds.   Pulmonary:      Breath sounds: Normal breath sounds.   Abdominal:      General: Bowel sounds are normal.      Palpations: Abdomen is soft.          Comments: Diastasis recti   Musculoskeletal:      Cervical back: Neck supple.      Right lower leg: No edema.      Left lower leg: No edema.   Skin:     General: Skin is warm.      Capillary Refill: Capillary refill takes less than 2 seconds.   Neurological:      Mental Status: She is alert and oriented to person, place, and time.   Psychiatric:         Mood and Affect: Mood normal.         Behavior: Behavior normal.                Significant Labs: All pertinent labs within the past 24 hours have been reviewed.    Significant Imaging: I have reviewed all pertinent imaging results/findings within the past 24 hours.

## 2023-12-11 NOTE — PHARMACY MED REC
"Admission Medication History     The home medication history was taken by Helen Galo.    You may go to "Admission" then "Reconcile Home Medications" tabs to review and/or act upon these items.     The home medication list has been updated by the Pharmacy department.   Please read ALL comments highlighted in yellow.   Please address this information as you see fit.    Feel free to contact us if you have any questions or require assistance.  Patient's  mentioned financial difficulties with prescriptions since [facility].  Patient stated she has not taken her daily insulin in over two weeks and is out.  She mentioned using Tresiba (possible sample), no prescription for Tresiba ever being used at pharmacy. But she stated she is out of Tresiba also.   Patient is currently taking levofloxacin 250 mg, last dose was yesterday 12/10/23, has not taken any daily medications since 12/05/23 except for nausea medications.       The medications listed below were removed from the home medication list. Please reorder if appropriate:  N/A  Patient reports no longer taking the following medication(s):  Primidone 50 mg        Medications listed below were obtained from: Patient/family, [employer] and Patient's pharmacy  (Not in a hospital admission)        Current Outpatient Medications on File Prior to Encounter   Medication Sig Dispense Refill Last Dose    aspirin (ECOTRIN) 81 MG EC tablet Take 81 mg by mouth.   12/5/2023    cholecalciferol, vitamin D3, (VITAMIN D3) 25 mcg (1,000 unit) capsule Take 1,000 Units by mouth once daily.   12/5/2023    fluticasone propionate (FLONASE) 50 mcg/actuation nasal spray 2 sprays by Each Nostril route once daily.   Past Month    folic acid (FOLVITE) 1 MG tablet Take 1 mg by mouth once daily.   12/5/2023    hydroCHLOROthiazide (HYDRODIURIL) 25 MG tablet Take 25 mg by mouth once daily.   12/5/2023    isosorbide mononitrate (IMDUR) 30 MG 24 hr tablet Take 1 tablet " (30 mg total) by mouth once daily. 30 tablet 5 12/5/2023    levoFLOXacin (LEVAQUIN) 250 MG tablet Take 1 tablet (250 mg total) by mouth once daily. 7 tablet 0 12/10/2023    meclizine (ANTIVERT) 25 mg tablet Take 1 tablet (25 mg total) by mouth 3 (three) times daily as needed for Dizziness. 90 tablet 11 Past Month    meloxicam (MOBIC) 15 MG tablet Take 1 tablet (15 mg total) by mouth once daily. 30 tablet 6 Past Week    metoprolol succinate (TOPROL-XL) 50 MG 24 hr tablet Take 50 mg by mouth.   12/5/2023    ondansetron (ZOFRAN-ODT) 4 MG TbDL Take 1 tablet (4 mg total) by mouth every 8 (eight) hours as needed (nausea). 20 tablet 0 12/8/2023    pantoprazole (PROTONIX) 40 MG tablet Take 1 tablet (40 mg total) by mouth once daily. 30 tablet 11 12/5/2023    pregabalin (LYRICA) 75 MG capsule Take 1 capsule (75 mg total) by mouth 2 (two) times daily. 60 capsule 5 12/5/2023    rosuvastatin (CRESTOR) 20 MG tablet Take 20 mg by mouth every evening.   12/5/2023    semaglutide (OZEMPIC) 0.25 mg or 0.5 mg(2 mg/1.5 mL) pen injector Inject 0.25 mg into the skin.   12/8/2023    sertraline (ZOLOFT) 50 MG tablet TAKE ONE TABLET BY MOUTH EVERY DAY 90 tablet 3 12/5/2023    cyclobenzaprine (FLEXERIL) 10 MG tablet Take 1 tablet (10 mg total) by mouth every 12 (twelve) hours as needed for Muscle spasms. 60 tablet 11 12/5/2023    insulin (BASAGLAR KWIKPEN U-100 INSULIN) glargine 100 units/mL SubQ pen Inject 100 Units into the skin once daily. (Patient taking differently: Inject 100 Units into the skin once daily. Inject 44 units in the skin in the mornings and 52 units in the evenings) 30 mL 11 12/1/2023    magnesium 250 mg Tab Take by mouth.   Unknown    primidone (MYSOLINE) 50 MG Tab 1/2 tab daily 15 tablet 11 Unknown    promethazine (PHENERGAN) 25 MG tablet Take 1 tablet (25 mg total) by mouth every 6 (six) hours as needed for Nausea. 30 tablet 5 12/9/2023    ZINC ORAL Take 250 mg by mouth Daily.   Unknown       Potential issues to be  addressed PRIOR TO DISCHARGE  Drug cost interfering with therapy: ( )      Helen Galo  EXT [id number]                 .

## 2023-12-11 NOTE — ASSESSMENT & PLAN NOTE
Patient was seen 2-3 backs at the clinic as outpatient and was started on Levoflox for Pneumonia.Patient unable to tolerate PO medication due to nausea and vomiting.   Chest X ray- Lung volume increase with prominent bronchial markings, no lung infiltrates.  - Considering patient incomplete course of medication, will start on Azithromycin 500mg IV daily for 3 days.  - Patient is allergic to Cephalexin, so Ceftriaxone will not be given.  - Will monitor for symptoms.

## 2023-12-11 NOTE — ASSESSMENT & PLAN NOTE
Patient with acute kidney injury/acute renal failure likely due to pre-renal azotemia due to dehydration MARICEL is currently worsening. Baseline creatinine  1.5  - Labs reviewed- Renal function/electrolytes with Estimated Creatinine Clearance: 13 mL/min (A) (based on SCr of 4.13 mg/dL (H)). according to latest data. Monitor urine output and serial BMP and adjust therapy as needed. Avoid nephrotoxins and renally dose meds for GFR listed above.    Creatinine 4.13, eGFR 11  -Avoid nephrotoxic agents.  - Will continue with 0.45% Nacl @125ml/hr . Will follow up Q AM.

## 2023-12-12 LAB
25(OH)D3 SERPL-MCNC: 42.3 NG/ML
ANION GAP SERPL CALCULATED.3IONS-SCNC: 9 MMOL/L (ref 7–16)
ANISOCYTOSIS BLD QL SMEAR: ABNORMAL
BASOPHILS # BLD AUTO: 0.12 K/UL (ref 0–0.2)
BASOPHILS NFR BLD AUTO: 1.2 % (ref 0–1)
BUN SERPL-MCNC: 39 MG/DL (ref 7–18)
BUN/CREAT SERPL: 11 (ref 6–20)
CALCIUM SERPL-MCNC: 9.1 MG/DL (ref 8.5–10.1)
CHLORIDE SERPL-SCNC: 107 MMOL/L (ref 98–107)
CO2 SERPL-SCNC: 28 MMOL/L (ref 21–32)
CREAT SERPL-MCNC: 3.52 MG/DL (ref 0.55–1.02)
DIFFERENTIAL METHOD BLD: ABNORMAL
EGFR (NO RACE VARIABLE) (RUSH/TITUS): 13 ML/MIN/1.73M2
EOSINOPHIL # BLD AUTO: 0.43 K/UL (ref 0–0.5)
EOSINOPHIL NFR BLD AUTO: 4.4 % (ref 1–4)
ERYTHROCYTE [DISTWIDTH] IN BLOOD BY AUTOMATED COUNT: 14.6 % (ref 11.5–14.5)
GLUCOSE SERPL-MCNC: 133 MG/DL (ref 70–105)
GLUCOSE SERPL-MCNC: 133 MG/DL (ref 74–106)
GLUCOSE SERPL-MCNC: 150 MG/DL (ref 70–105)
GLUCOSE SERPL-MCNC: 157 MG/DL (ref 70–105)
HCT VFR BLD AUTO: 38.9 % (ref 38–47)
HGB BLD-MCNC: 12.8 G/DL (ref 12–16)
IMM GRANULOCYTES # BLD AUTO: 0.03 K/UL (ref 0–0.04)
IMM GRANULOCYTES NFR BLD: 0.3 % (ref 0–0.4)
LYMPHOCYTES # BLD AUTO: 2.61 K/UL (ref 1–4.8)
LYMPHOCYTES NFR BLD AUTO: 27 % (ref 27–41)
MCH RBC QN AUTO: 27.4 PG (ref 27–31)
MCHC RBC AUTO-ENTMCNC: 32.9 G/DL (ref 32–36)
MCV RBC AUTO: 83.1 FL (ref 80–96)
MONOCYTES # BLD AUTO: 0.77 K/UL (ref 0–0.8)
MONOCYTES NFR BLD AUTO: 8 % (ref 2–6)
MPC BLD CALC-MCNC: 13.9 FL (ref 9.4–12.4)
NEUTROPHILS # BLD AUTO: 5.71 K/UL (ref 1.8–7.7)
NEUTROPHILS NFR BLD AUTO: 59.1 % (ref 53–65)
NRBC # BLD AUTO: 0 X10E3/UL
NRBC, AUTO (.00): 0 %
PLATELET # BLD AUTO: 182 K/UL (ref 150–400)
PLATELET MORPHOLOGY: ABNORMAL
POTASSIUM SERPL-SCNC: 4.1 MMOL/L (ref 3.5–5.1)
RBC # BLD AUTO: 4.68 M/UL (ref 4.2–5.4)
SODIUM SERPL-SCNC: 140 MMOL/L (ref 136–145)
TSH SERPL DL<=0.005 MIU/L-ACNC: 4.09 UIU/ML (ref 0.36–3.74)
WBC # BLD AUTO: 9.67 K/UL (ref 4.5–11)

## 2023-12-12 PROCEDURE — 99232 PR SUBSEQUENT HOSPITAL CARE,LEVL II: ICD-10-PCS | Mod: GC,,, | Performed by: INTERNAL MEDICINE

## 2023-12-12 PROCEDURE — 80048 BASIC METABOLIC PNL TOTAL CA: CPT

## 2023-12-12 PROCEDURE — 82306 VITAMIN D 25 HYDROXY: CPT

## 2023-12-12 PROCEDURE — 99232 SBSQ HOSP IP/OBS MODERATE 35: CPT | Mod: GC,,, | Performed by: INTERNAL MEDICINE

## 2023-12-12 PROCEDURE — 25000003 PHARM REV CODE 250

## 2023-12-12 PROCEDURE — 25000242 PHARM REV CODE 250 ALT 637 W/ HCPCS

## 2023-12-12 PROCEDURE — 84443 ASSAY THYROID STIM HORMONE: CPT

## 2023-12-12 PROCEDURE — 82962 GLUCOSE BLOOD TEST: CPT

## 2023-12-12 PROCEDURE — 93005 ELECTROCARDIOGRAM TRACING: CPT

## 2023-12-12 PROCEDURE — 93010 ELECTROCARDIOGRAM REPORT: CPT | Mod: ,,, | Performed by: STUDENT IN AN ORGANIZED HEALTH CARE EDUCATION/TRAINING PROGRAM

## 2023-12-12 PROCEDURE — 11000001 HC ACUTE MED/SURG PRIVATE ROOM

## 2023-12-12 PROCEDURE — 93010 EKG 12-LEAD: ICD-10-PCS | Mod: ,,, | Performed by: STUDENT IN AN ORGANIZED HEALTH CARE EDUCATION/TRAINING PROGRAM

## 2023-12-12 PROCEDURE — 25500020 PHARM REV CODE 255: Performed by: INTERNAL MEDICINE

## 2023-12-12 PROCEDURE — 85025 COMPLETE CBC W/AUTO DIFF WBC: CPT

## 2023-12-12 PROCEDURE — 63600175 PHARM REV CODE 636 W HCPCS

## 2023-12-12 RX ORDER — LOPERAMIDE HYDROCHLORIDE 2 MG/1
2 CAPSULE ORAL 4 TIMES DAILY PRN
Status: DISCONTINUED | OUTPATIENT
Start: 2023-12-12 | End: 2023-12-13 | Stop reason: HOSPADM

## 2023-12-12 RX ADMIN — TRAZODONE HYDROCHLORIDE 25 MG: 50 TABLET ORAL at 08:12

## 2023-12-12 RX ADMIN — HEPARIN SODIUM 5000 UNITS: 5000 INJECTION, SOLUTION INTRAVENOUS; SUBCUTANEOUS at 09:12

## 2023-12-12 RX ADMIN — SODIUM CHLORIDE: 4.5 INJECTION, SOLUTION INTRAVENOUS at 10:12

## 2023-12-12 RX ADMIN — HEPARIN SODIUM 5000 UNITS: 5000 INJECTION, SOLUTION INTRAVENOUS; SUBCUTANEOUS at 04:12

## 2023-12-12 RX ADMIN — ONDANSETRON 4 MG: 2 INJECTION INTRAMUSCULAR; INTRAVENOUS at 03:12

## 2023-12-12 RX ADMIN — INSULIN DETEMIR 5 UNITS: 100 INJECTION, SOLUTION SUBCUTANEOUS at 08:12

## 2023-12-12 RX ADMIN — LOPERAMIDE HYDROCHLORIDE 2 MG: 2 CAPSULE ORAL at 05:12

## 2023-12-12 RX ADMIN — SERTRALINE HYDROCHLORIDE 50 MG: 50 TABLET ORAL at 10:12

## 2023-12-12 RX ADMIN — HEPARIN SODIUM 5000 UNITS: 5000 INJECTION, SOLUTION INTRAVENOUS; SUBCUTANEOUS at 05:12

## 2023-12-12 RX ADMIN — PERFLUTREN 1.5 ML: 6.52 INJECTION, SUSPENSION INTRAVENOUS at 08:12

## 2023-12-12 RX ADMIN — FLUTICASONE PROPIONATE 100 MCG: 50 SPRAY, METERED NASAL at 10:12

## 2023-12-12 RX ADMIN — METOPROLOL SUCCINATE 50 MG: 50 TABLET, EXTENDED RELEASE ORAL at 10:12

## 2023-12-12 RX ADMIN — ATORVASTATIN CALCIUM 20 MG: 20 TABLET, FILM COATED ORAL at 10:12

## 2023-12-12 RX ADMIN — ISOSORBIDE MONONITRATE 30 MG: 30 TABLET, EXTENDED RELEASE ORAL at 10:12

## 2023-12-12 RX ADMIN — SODIUM CHLORIDE: 4.5 INJECTION, SOLUTION INTRAVENOUS at 01:12

## 2023-12-12 RX ADMIN — ONDANSETRON 4 MG: 2 INJECTION INTRAMUSCULAR; INTRAVENOUS at 05:12

## 2023-12-12 RX ADMIN — AZITHROMYCIN DIHYDRATE 500 MG: 500 INJECTION, POWDER, LYOPHILIZED, FOR SOLUTION INTRAVENOUS at 04:12

## 2023-12-12 RX ADMIN — ASPIRIN 81 MG CHEWABLE TABLET 81 MG: 81 TABLET CHEWABLE at 10:12

## 2023-12-12 RX ADMIN — FOLIC ACID 1 MG: 1 TABLET ORAL at 10:12

## 2023-12-12 RX ADMIN — PANTOPRAZOLE SODIUM 40 MG: 40 TABLET, DELAYED RELEASE ORAL at 10:12

## 2023-12-12 NOTE — SUBJECTIVE & OBJECTIVE
Interval History:   No significant events overnight. Patient appetite has improved. Cough and nausea is getting better.  Review of Systems   Constitutional:  Negative for appetite change and fever.   HENT:  Negative for congestion, rhinorrhea and sore throat.    Respiratory:  Positive for cough. Negative for shortness of breath.    Cardiovascular:  Negative for chest pain, palpitations and leg swelling.   Gastrointestinal:  Negative for abdominal pain, nausea and vomiting.   Genitourinary:  Negative for dysuria.   Neurological:  Negative for dizziness and headaches.   Psychiatric/Behavioral:  Negative for agitation and behavioral problems.    All other systems reviewed and are negative.    Objective:     Vital Signs (Most Recent):  Temp: 98 °F (36.7 °C) (12/12/23 1013)  Pulse: 73 (12/12/23 1013)  Resp: 17 (12/12/23 1013)  BP: (!) 148/65 (12/12/23 1013)  SpO2: 100 % (12/12/23 1013) Vital Signs (24h Range):  Temp:  [97.7 °F (36.5 °C)-98.1 °F (36.7 °C)] 98 °F (36.7 °C)  Pulse:  [59-93] 73  Resp:  [16-21] 17  SpO2:  [95 %-100 %] 100 %  BP: ()/(38-77) 148/65     Weight: 78.5 kg (173 lb)  Body mass index is 29.7 kg/m².    Intake/Output Summary (Last 24 hours) at 12/12/2023 1524  Last data filed at 12/12/2023 0641  Gross per 24 hour   Intake 2093.75 ml   Output --   Net 2093.75 ml         Physical Exam  Vitals and nursing note reviewed.   Constitutional:       Appearance: Normal appearance. She is obese.   HENT:      Head: Normocephalic and atraumatic.      Nose: Nose normal.      Mouth/Throat:      Mouth: Mucous membranes are moist.   Eyes:      Extraocular Movements: Extraocular movements intact.      Conjunctiva/sclera: Conjunctivae normal.   Cardiovascular:      Rate and Rhythm: Normal rate and regular rhythm.      Pulses: Normal pulses.      Heart sounds: Normal heart sounds.   Pulmonary:      Breath sounds: Rhonchi present.   Abdominal:      General: Bowel sounds are normal.      Palpations: Abdomen is soft.           Comments: Diastasis recti   Musculoskeletal:      Cervical back: Neck supple.      Right lower leg: No edema.      Left lower leg: No edema.   Skin:     General: Skin is warm.      Capillary Refill: Capillary refill takes less than 2 seconds.   Neurological:      Mental Status: She is alert and oriented to person, place, and time.   Psychiatric:         Mood and Affect: Mood normal.         Behavior: Behavior normal.             Significant Labs: All pertinent labs within the past 24 hours have been reviewed.    Significant Imaging: I have reviewed all pertinent imaging results/findings within the past 24 hours.

## 2023-12-12 NOTE — PLAN OF CARE
[facility]  Initial Discharge Assessment       Primary Care Provider: No, Primary Doctor    Admission Diagnosis: CAD (coronary artery disease) [I25.10]  SOB (shortness of breath) [R06.02]  Acute renal failure [N17.9]  Chest pain [R07.9]    Admission Date: 12/11/2023  Expected Discharge Date:     Transition of Care Barriers: None    Payor: MEDICARE / Plan: MEDICARE PART A & B / Product Type: Government /     Extended Emergency Contact Information  Primary Emergency Contact: Cristo Renteria  Mobile Phone: [phone]  Relation: Spouse  Preferred language: English   needed? No    Discharge Plan A: Home with family  Discharge Plan B: Home with family      [id number] - [place], [place] - [address]  [address]  [place] [address]  Phone: [phone] Fax: [phone]    [facility] [address] [facility], [place] - [address]  [address] [facility] [place] [address]  Phone: [phone] Fax: [phone]    [facility], [place] - [address]  [address]  [place] [address]  Phone: [phone] Fax: [phone]      Initial Assessment (most recent)       Adult Discharge Assessment - 12/12/23 1120          Discharge Assessment    Assessment Type Discharge Planning Assessment     Source of Information patient     Communicated VALERY with patient/caregiver Date not available/Unable to determine     People in Home spouse     Do you expect to return to your current living situation? Yes     Do you have help at home or someone to help you manage your care at home? Yes     Who are your caregiver(s) and their phone number(s)? Cristo Renteria- spouse     Prior to hospitilization cognitive status: Alert/Oriented     Current cognitive status: Alert/Oriented     Home Accessibility wheelchair accessible     Home Layout Able to live on 1st floor     Equipment Currently Used at Home none     Patient currently being followed by outpatient case management?  No     Do you currently have service(s) that help you manage your care at home? No     Do you take prescription medications? Yes     Do you have prescription coverage? Yes     Coverage Medicare     Do you have any problems affording any of your prescribed medications? TBD     Is the patient taking medications as prescribed? yes     Who is going to help you get home at discharge? spouse     How do you get to doctors appointments? car, drives self     Are you on dialysis? No     Do you take coumadin? No     Discharge Plan A Home with family     Discharge Plan B Home with family     DME Needed Upon Discharge  none     Discharge Plan discussed with: Patient     Transition of Care Barriers None        Physical Activity    On average, how many days per week do you engage in moderate to strenuous exercise (like a brisk walk)? 0 days     On average, how many minutes do you engage in exercise at this level? 0 min        Financial Resource Strain    How hard is it for you to pay for the very basics like food, housing, medical care, and heating? Not very hard        Housing Stability    In the last 12 months, was there a time when you were not able to pay the mortgage or rent on time? No     In the last 12 months, how many places have you lived? 1     In the last 12 months, was there a time when you did not have a steady place to sleep or slept in a shelter (including now)? No        Transportation Needs    In the past 12 months, has lack of transportation kept you from medical appointments or from getting medications? Yes     In the past 12 months, has lack of transportation kept you from meetings, work, or from getting things needed for daily living? Yes        Food Insecurity    Within the past 12 months, you worried that your food would run out before you got the money to buy more. Never true     Within the past 12 months, the food you bought just didn't last and you didn't have money to get more. Never true        Stress     Do you feel stress - tense, restless, nervous, or anxious, or unable to sleep at night because your mind is troubled all the time - these days? Not at all        Social Connections    In a typical week, how many times do you talk on the phone with family, friends, or neighbors? More than three times a week     How often do you get together with friends or relatives? More than three times a week     How often do you attend Yarsani or Muslim services? More than 4 times per year     Do you belong to any clubs or organizations such as Yarsani groups, unions, fraternal or athletic groups, or school groups? No     How often do you attend meetings of the clubs or organizations you belong to? Never     Are you , , , , never , or living with a partner?         Alcohol Use    Q1: How often do you have a drink containing alcohol? Never     Q2: How many drinks containing alcohol do you have on a typical day when you are drinking? Patient does not drink     Q3: How often do you have six or more drinks on one occasion? Never                        SS spoke with pt and her spouse in room. Pt lives at home with spouse. Pt is not current with home health, no dme pta. Discharge plan is to return home with no anticipated needs. SDOH completed. SS will follow for discharge needs as they arise.

## 2023-12-12 NOTE — ASSESSMENT & PLAN NOTE
Creatine stable for now. BMP reviewed- noted Estimated Creatinine Clearance: 15.3 mL/min (A) (based on SCr of 3.52 mg/dL (H)). according to latest data. Based on current GFR, CKD stage is stage 5 - GFR < 15.  Monitor UOP and serial BMP and adjust therapy as needed. Renally dose meds. Avoid nephrotoxic medications and procedures.

## 2023-12-12 NOTE — ASSESSMENT & PLAN NOTE
Patient with acute kidney injury/acute renal failure likely due to pre-renal azotemia due to dehydration MARICEL is currently worsening. Baseline creatinine  1.5  - Labs reviewed- Renal function/electrolytes with Estimated Creatinine Clearance: 15.3 mL/min (A) (based on SCr of 3.52 mg/dL (H)). according to latest data. Monitor urine output and serial BMP and adjust therapy as needed. Avoid nephrotoxins and renally dose meds for GFR listed above.    Creatinine 4.13, eGFR 11  -Avoid nephrotoxic agents.  - Will continue with 0.45% Nacl @125ml/hr . Will follow up Q AM.    12/12  MARICEL improving.  - Creatinine decreased from 4.13 to 3.52  - eGFR improved from 11 to 13.  - Will decrease IV fluids, 0.45% Nacl @ 75ml/hr. Will follow up Q AM.

## 2023-12-12 NOTE — ASSESSMENT & PLAN NOTE
Patient was seen 2-3 backs at the clinic as outpatient and was started on Levoflox for Pneumonia.Patient unable to tolerate PO medication due to nausea and vomiting.   Chest X ray- Lung volume increase with prominent bronchial markings, no lung infiltrates.  - Considering patient incomplete course of medication, will start on Azithromycin 500mg IV daily for 3 days.  - Patient is allergic to Cephalexin, so Ceftriaxone will not be given.  - Will monitor for symptoms.     12/12  - Improving  - Will continue with Azithromycin.

## 2023-12-12 NOTE — ASSESSMENT & PLAN NOTE
Will hold oral hypoglycemic meds for now.  Last HbA1C 10.7 ( 1 year ago),   Blood sugar 188.Ordered HbA1C today  - Will start with Ins Detemir 5 unti sc HS, Low ISS.    12/12  -Blood sugar 133.  HbA1c- Pending  -  Will continue with Ins Detemir 5 unti sc HS, Low ISS.

## 2023-12-12 NOTE — PROGRESS NOTES
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 Patient was recently seen in clinic for similar symptom, diagnosed as Pneumonia, UTI and was started  on Levofloxacin. As per patient, she could not tolerated per oral antibiotic for Levoflox ( took only 2-3 doses). Maximum temp recorded is 99.6 F. Has minimal headache. Denies chest pain, SOB.     Baseline functional status:  Moves around without roller or a wheelchair.  Lives with her  at home.    ED courses:   Vitals: /96mmHg, HR 90 bpm, RR 20bpm, Temp 97.9F, Oxygen saturation 94% in room air.  WBC 10.29, Hb/Hct: 15.1/46.4, Na 138, K 3.5, Creatinine 4.13, eGFR 11 , Glucose 188  Chest X ray- Lung volume increased with prominent bronchial markings, no lung infiltrates.    Patient received Ondem, Promethazine and 1 L of 0.9% Nacl at ED.    The patient was admitted to [facility] to Family Medicine Service under the direct supervision of Dr. Axel Hand for the continued care and medical management of this patient.      Overview/Hospital Course:  No notes on file    Interval History:   No significant events overnight. Patient appetite has improved. Cough and  nausea is getting better.  Review of Systems   Constitutional:  Negative for appetite change and fever.   HENT:  Negative for congestion, rhinorrhea and sore throat.    Respiratory:  Positive for cough. Negative for shortness of breath.    Cardiovascular:  Negative for chest pain, palpitations and leg swelling.   Gastrointestinal:  Negative for abdominal pain, nausea and vomiting.   Genitourinary:  Negative for dysuria.   Neurological:  Negative for dizziness and headaches.   Psychiatric/Behavioral:  Negative for agitation and behavioral problems.    All other systems reviewed and are negative.    Objective:     Vital Signs (Most Recent):  Temp: 98 °F (36.7 °C) (12/12/23 1013)  Pulse: 73 (12/12/23 1013)  Resp: 17 (12/12/23 1013)  BP: (!) 148/65 (12/12/23 1013)  SpO2: 100 % (12/12/23 1013) Vital Signs (24h Range):  Temp:  [97.7 °F (36.5 °C)-98.1 °F (36.7 °C)] 98 °F (36.7 °C)  Pulse:  [59-93] 73  Resp:  [16-21] 17  SpO2:  [95 %-100 %] 100 %  BP: ()/(38-77) 148/65     Weight: 78.5 kg (173 lb)  Body mass index is 29.7 kg/m².    Intake/Output Summary (Last 24 hours) at 12/12/2023 1524  Last data filed at 12/12/2023 0641  Gross per 24 hour   Intake 2093.75 ml   Output --   Net 2093.75 ml         Physical Exam  Vitals and nursing note reviewed.   Constitutional:       Appearance: Normal appearance. She is obese.   HENT:      Head: Normocephalic and atraumatic.      Nose: Nose normal.      Mouth/Throat:      Mouth: Mucous membranes are moist.   Eyes:      Extraocular Movements: Extraocular movements intact.      Conjunctiva/sclera: Conjunctivae normal.   Cardiovascular:      Rate and Rhythm: Normal rate and regular rhythm.      Pulses: Normal pulses.      Heart sounds: Normal heart sounds.   Pulmonary:      Breath sounds: Rhonchi present.   Abdominal:      General: Bowel sounds are normal.      Palpations: Abdomen is soft.          Comments: Diastasis recti   Musculoskeletal:      Cervical back: Neck supple.       Right lower leg: No edema.      Left lower leg: No edema.   Skin:     General: Skin is warm.      Capillary Refill: Capillary refill takes less than 2 seconds.   Neurological:      Mental Status: She is alert and oriented to person, place, and time.   Psychiatric:         Mood and Affect: Mood normal.         Behavior: Behavior normal.             Significant Labs: All pertinent labs within the past 24 hours have been reviewed.    Significant Imaging: I have reviewed all pertinent imaging results/findings within the past 24 hours.    Assessment/Plan:      * MARICEL (acute kidney injury)  Patient with acute kidney injury/acute renal failure likely due to pre-renal azotemia due to dehydration MARICEL is currently worsening. Baseline creatinine  1.5  - Labs reviewed- Renal function/electrolytes with Estimated Creatinine Clearance: 15.3 mL/min (A) (based on SCr of 3.52 mg/dL (H)). according to latest data. Monitor urine output and serial BMP and adjust therapy as needed. Avoid nephrotoxins and renally dose meds for GFR listed above.    Creatinine 4.13, eGFR 11  -Avoid nephrotoxic agents.  - Will continue with 0.45% Nacl @125ml/hr . Will follow up Q AM.    12/12  MARICEL improving.  - Creatinine decreased from 4.13 to 3.52  - eGFR improved from 11 to 13.  - Will decrease IV fluids, 0.45% Nacl @ 75ml/hr. Will follow up Q AM.    Community acquired pneumonia  Patient was seen 2-3 backs at the clinic as outpatient and was started on Levoflox for Pneumonia.Patient unable to tolerate PO medication due to nausea and vomiting.   Chest X ray- Lung volume increase with prominent bronchial markings, no lung infiltrates.  - Considering patient incomplete course of medication, will start on Azithromycin 500mg IV daily for 3 days.  - Patient is allergic to Cephalexin, so Ceftriaxone will not be given.  - Will monitor for symptoms.     12/12  - Improving  - Will continue with Azithromycin.    Nausea and vomiting  Improving  Will continue with  Ondansetron 4 mg IV every 8 hrly as needed.      CKD (chronic kidney disease) stage 5, GFR less than 15 ml/min  Creatine stable for now. BMP reviewed- noted Estimated Creatinine Clearance: 15.3 mL/min (A) (based on SCr of 3.52 mg/dL (H)). according to latest data. Based on current GFR, CKD stage is stage 5 - GFR < 15.  Monitor UOP and serial BMP and adjust therapy as needed. Renally dose meds. Avoid nephrotoxic medications and procedures.    Loss of appetite  Improving  Continue with diabetic diet as tolerated.      Benign essential hypertension  Chronic, controlled. Latest blood pressure and vitals reviewed-     Temp:  [97.7 °F (36.5 °C)-98.1 °F (36.7 °C)]   Pulse:  [59-93]   Resp:  [16-21]   BP: ()/(38-77)   SpO2:  [95 %-100 %] .   Home meds for hypertension were reviewed and noted below.   Hypertension Medications               hydroCHLOROthiazide (HYDRODIURIL) 25 MG tablet Take 25 mg by mouth once daily.    isosorbide mononitrate (IMDUR) 30 MG 24 hr tablet Take 1 tablet (30 mg total) by mouth once daily.    metoprolol succinate (TOPROL-XL) 50 MG 24 hr tablet Take 50 mg by mouth.            While in the hospital, will manage blood pressure as follows; Continue home antihypertensive regimen. Hold HCTZ for now.    Will utilize p.r.n. blood pressure medication only if patient's blood pressure greater than 160/100 and she develops symptoms such as worsening chest pain or shortness of breath.    Depression  Patient has  .  depression which is unknown and is currently controlled. Will Continue anti-depressant medications. We will not consult psychiatry at this time. Patient does not display psychosis at this time. Continue to monitor closely and adjust plan of care as needed.  Will continue Sertraline.        Coronary artery disease involving native coronary artery of native heart without angina pectoris  Patient with known CAD s/p CABG in 2011, which is controlled Will continue ASA and Statin and monitor for  S/Sx of angina/ACS. Continue to monitor on telemetry.   Echo ordered today.    12/12  Echo- Pending result  Will continue with Aspirin and Statin.      Type 2 diabetes mellitus, with long-term current use of insulin  Will hold oral hypoglycemic meds for now.  Last HbA1C 10.7 ( 1 year ago),   Blood sugar 188.Ordered HbA1C today  - Will start with Ins Detemir 5 unti sc HS, Low ISS.    12/12  -Blood sugar 133.  HbA1c- Pending  -  Will continue with Ins Detemir 5 unti sc HS, Low ISS.        VTE Risk Mitigation (From admission, onward)           Ordered     heparin (porcine) injection 5,000 Units  Every 8 hours         12/11/23 1456     Place sequential compression device  Until discontinued         12/11/23 1456     IP VTE HIGH RISK PATIENT  Once         12/11/23 1456                    Discharge Planning   VALERY:      Code Status: DNR   Is the patient medically ready for discharge?:     Reason for patient still in hospital (select all that apply): Treatment  Discharge Plan A: Home with family                  María Degroot MD  Department of Hospital Medicine   [facility]

## 2023-12-12 NOTE — ASSESSMENT & PLAN NOTE
Patient with known CAD s/p CABG in 2011, which is controlled Will continue ASA and Statin and monitor for S/Sx of angina/ACS. Continue to monitor on telemetry.   Echo ordered today.    12/12  Echo- Pending result  Will continue with Aspirin and Statin.

## 2023-12-12 NOTE — ASSESSMENT & PLAN NOTE
Chronic, controlled. Latest blood pressure and vitals reviewed-     Temp:  [97.7 °F (36.5 °C)-98.1 °F (36.7 °C)]   Pulse:  [59-93]   Resp:  [16-21]   BP: ()/(38-77)   SpO2:  [95 %-100 %] .   Home meds for hypertension were reviewed and noted below.   Hypertension Medications               hydroCHLOROthiazide (HYDRODIURIL) 25 MG tablet Take 25 mg by mouth once daily.    isosorbide mononitrate (IMDUR) 30 MG 24 hr tablet Take 1 tablet (30 mg total) by mouth once daily.    metoprolol succinate (TOPROL-XL) 50 MG 24 hr tablet Take 50 mg by mouth.            While in the hospital, will manage blood pressure as follows; Continue home antihypertensive regimen. Hold HCTZ for now.    Will utilize p.r.n. blood pressure medication only if patient's blood pressure greater than 160/100 and she develops symptoms such as worsening chest pain or shortness of breath.

## 2023-12-13 VITALS
HEIGHT: 64 IN | BODY MASS INDEX: 29.53 KG/M2 | OXYGEN SATURATION: 96 % | TEMPERATURE: 98 F | HEART RATE: 73 BPM | DIASTOLIC BLOOD PRESSURE: 58 MMHG | RESPIRATION RATE: 18 BRPM | WEIGHT: 173 LBS | SYSTOLIC BLOOD PRESSURE: 109 MMHG

## 2023-12-13 LAB
ANION GAP SERPL CALCULATED.3IONS-SCNC: 11 MMOL/L (ref 7–16)
BASOPHILS # BLD AUTO: 0.11 K/UL (ref 0–0.2)
BASOPHILS NFR BLD AUTO: 1.2 % (ref 0–1)
BUN SERPL-MCNC: 29 MG/DL (ref 7–18)
BUN/CREAT SERPL: 10 (ref 6–20)
CALCIUM SERPL-MCNC: 9.1 MG/DL (ref 8.5–10.1)
CHLORIDE SERPL-SCNC: 109 MMOL/L (ref 98–107)
CO2 SERPL-SCNC: 25 MMOL/L (ref 21–32)
CREAT SERPL-MCNC: 3.01 MG/DL (ref 0.55–1.02)
DIFFERENTIAL METHOD BLD: ABNORMAL
EGFR (NO RACE VARIABLE) (RUSH/TITUS): 16 ML/MIN/1.73M2
EOSINOPHIL # BLD AUTO: 0.53 K/UL (ref 0–0.5)
EOSINOPHIL NFR BLD AUTO: 5.9 % (ref 1–4)
ERYTHROCYTE [DISTWIDTH] IN BLOOD BY AUTOMATED COUNT: 14.7 % (ref 11.5–14.5)
EST. AVERAGE GLUCOSE BLD GHB EST-MCNC: 126 MG/DL
GLUCOSE SERPL-MCNC: 119 MG/DL (ref 70–105)
GLUCOSE SERPL-MCNC: 146 MG/DL (ref 74–106)
GLUCOSE SERPL-MCNC: 185 MG/DL (ref 70–105)
HBA1C MFR BLD HPLC: 6 % (ref 4.5–6.6)
HCT VFR BLD AUTO: 37.6 % (ref 38–47)
HGB BLD-MCNC: 12.5 G/DL (ref 12–16)
IMM GRANULOCYTES # BLD AUTO: 0.02 K/UL (ref 0–0.04)
IMM GRANULOCYTES NFR BLD: 0.2 % (ref 0–0.4)
LYMPHOCYTES # BLD AUTO: 2.12 K/UL (ref 1–4.8)
LYMPHOCYTES NFR BLD AUTO: 23.6 % (ref 27–41)
MCH RBC QN AUTO: 28 PG (ref 27–31)
MCHC RBC AUTO-ENTMCNC: 33.2 G/DL (ref 32–36)
MCV RBC AUTO: 84.1 FL (ref 80–96)
MONOCYTES # BLD AUTO: 0.57 K/UL (ref 0–0.8)
MONOCYTES NFR BLD AUTO: 6.4 % (ref 2–6)
MPC BLD CALC-MCNC: 13.9 FL (ref 9.4–12.4)
NEUTROPHILS # BLD AUTO: 5.62 K/UL (ref 1.8–7.7)
NEUTROPHILS NFR BLD AUTO: 62.7 % (ref 53–65)
NRBC # BLD AUTO: 0 X10E3/UL
NRBC, AUTO (.00): 0 %
PLATELET # BLD AUTO: 162 K/UL (ref 150–400)
PLATELET MORPHOLOGY: ABNORMAL
POTASSIUM SERPL-SCNC: 3.6 MMOL/L (ref 3.5–5.1)
RBC # BLD AUTO: 4.47 M/UL (ref 4.2–5.4)
RBC MORPH BLD: NORMAL
SODIUM SERPL-SCNC: 141 MMOL/L (ref 136–145)
WBC # BLD AUTO: 8.97 K/UL (ref 4.5–11)

## 2023-12-13 PROCEDURE — 99239 PR HOSPITAL DISCHARGE DAY,>30 MIN: ICD-10-PCS | Mod: GC,,, | Performed by: INTERNAL MEDICINE

## 2023-12-13 PROCEDURE — 25000242 PHARM REV CODE 250 ALT 637 W/ HCPCS

## 2023-12-13 PROCEDURE — 99239 HOSP IP/OBS DSCHRG MGMT >30: CPT | Mod: GC,,, | Performed by: INTERNAL MEDICINE

## 2023-12-13 PROCEDURE — 25000003 PHARM REV CODE 250

## 2023-12-13 PROCEDURE — 82962 GLUCOSE BLOOD TEST: CPT

## 2023-12-13 PROCEDURE — 85025 COMPLETE CBC W/AUTO DIFF WBC: CPT

## 2023-12-13 PROCEDURE — 80048 BASIC METABOLIC PNL TOTAL CA: CPT

## 2023-12-13 PROCEDURE — 83036 HEMOGLOBIN GLYCOSYLATED A1C: CPT | Performed by: INTERNAL MEDICINE

## 2023-12-13 PROCEDURE — 63600175 PHARM REV CODE 636 W HCPCS

## 2023-12-13 RX ORDER — INSULIN GLARGINE 100 [IU]/ML
INJECTION, SOLUTION SUBCUTANEOUS
Qty: 30 ML | Refills: 3 | Status: SHIPPED | OUTPATIENT
Start: 2023-12-13

## 2023-12-13 RX ORDER — FAMOTIDINE 20 MG/1
20 TABLET, FILM COATED ORAL DAILY
Qty: 90 TABLET | Refills: 0 | Status: SHIPPED | OUTPATIENT
Start: 2023-12-13

## 2023-12-13 RX ADMIN — HEPARIN SODIUM 5000 UNITS: 5000 INJECTION, SOLUTION INTRAVENOUS; SUBCUTANEOUS at 05:12

## 2023-12-13 RX ADMIN — ATORVASTATIN CALCIUM 20 MG: 20 TABLET, FILM COATED ORAL at 09:12

## 2023-12-13 RX ADMIN — ISOSORBIDE MONONITRATE 30 MG: 30 TABLET, EXTENDED RELEASE ORAL at 09:12

## 2023-12-13 RX ADMIN — SERTRALINE HYDROCHLORIDE 50 MG: 50 TABLET ORAL at 09:12

## 2023-12-13 RX ADMIN — PANTOPRAZOLE SODIUM 40 MG: 40 TABLET, DELAYED RELEASE ORAL at 09:12

## 2023-12-13 RX ADMIN — ASPIRIN 81 MG CHEWABLE TABLET 81 MG: 81 TABLET CHEWABLE at 09:12

## 2023-12-13 RX ADMIN — FLUTICASONE PROPIONATE 100 MCG: 50 SPRAY, METERED NASAL at 09:12

## 2023-12-13 RX ADMIN — METOPROLOL SUCCINATE 50 MG: 50 TABLET, EXTENDED RELEASE ORAL at 09:12

## 2023-12-13 RX ADMIN — FOLIC ACID 1 MG: 1 TABLET ORAL at 09:12

## 2023-12-13 NOTE — DISCHARGE INSTRUCTIONS
Hospitalist Discharge orders  *Notify your healthcare provider if you experience any of the following: temperature >100.4  * Notify your healthcare provider if you experience any of the following: redness, tenderness.    *Notify your healthcare provider if you experience any of the following: difficulty breathing or     increased cough.  *Notify your physician if you experience any persistent nausea, vomiting, or diarrhea or headache  *Notify your physician if you experience any of the following:severe uncontrolled pain;worsening rash, increased confusion or weakness; dizziness, lightheadedness or visual disturbances

## 2023-12-13 NOTE — ASSESSMENT & PLAN NOTE
Chronic, controlled. Latest blood pressure and vitals reviewed-     Temp:  [97.6 °F (36.4 °C)-99 °F (37.2 °C)]   Pulse:  [60-73]   Resp:  [18]   BP: (109-140)/(48-70)   SpO2:  [95 %-99 %] .   Home meds for hypertension were reviewed and noted below.   Hypertension Medications               hydroCHLOROthiazide (HYDRODIURIL) 25 MG tablet Take 25 mg by mouth once daily.    isosorbide mononitrate (IMDUR) 30 MG 24 hr tablet Take 1 tablet (30 mg total) by mouth once daily.    metoprolol succinate (TOPROL-XL) 50 MG 24 hr tablet Take 50 mg by mouth.          Considering MARICEL, Hydrochlorothiazide is on HOLD. Continue with Metoprolol and Isosorbide mononitrate.

## 2023-12-13 NOTE — DISCHARGE SUMMARY
[facility]  [facility] Medicine  Discharge Summary      Patient Name: Karen Renteria  MRN: [id number]  JAMIL: [id number]  Patient Class: IP- Inpatient  Admission Date: 12/11/2023  Hospital Length of Stay: 2 days  Discharge Date and Time:  12/13/2023 11:32 AM  Attending Physician: Man Bautista MD   Discharging Provider: María Degroot MD  Primary Care Provider: Jennifer, Primary Doctor    Primary Care Team: Networked reference to record PCT     HPI:   68 year Female with past medical history of DM II, HTN, Polyarthralgia, Cardiomyopathy, CAD s/p CABG on 2011, Atrial Flutter, Essential tremor, 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 Patient was recently seen in clinic for similar symptom, diagnosed as Pneumonia, UTI and was started  on Levofloxacin. As per patient, she could not tolerated per oral antibiotic for Levoflox ( took only 2-3 doses). Maximum temp recorded is 99.6 F. Has minimal headache. Denies chest pain, SOB.     Baseline functional status:  Moves around without roller or a wheelchair.  Lives with her  at home.    ED courses:   Vitals: /96mmHg, HR 90 bpm, RR 20bpm, Temp 97.9F, Oxygen saturation 94% in room air.  WBC 10.29, Hb/Hct: 15.1/46.4, Na 138, K 3.5, Creatinine 4.13, eGFR 11 , Glucose 188  Chest X ray- Lung volume increased with prominent bronchial markings, no lung infiltrates.    Patient received Ondem, Promethazine and 1 L of 0.9% Nacl at ED.    The patient was admitted to [facility] to Family Medicine Service under the direct supervision of Dr. Axel Hand for the continued care and medical management of this patient.      * No surgery found *       Hospital Course:   68 year Female with past medical history of DM II, HTN, Polyarthralgia, Cardiomyopathy, CAD s/p CABG on 2011, Atrial Flutter, Essential tremor, Non rheumatic aortic stenosis (S/P aortic valve replacement), HLD, Spinal stenosis (cervical stenosis) , Sick sinus syndrome with cardiac pacemaker in situ present was admitted on 12/11/23 for management of MARICEL on CKD V, Loss of appetite, Nausea and Vomiting.  Patient was started on IV fluids. Given diagnosis of Pneumonia few days earlier, started on Azithromycin to complete the course of antibiotics.  Patient condition improved over time, Appetite is normal, Nausea and vomiting is resolved. MARICEL improved. Cough improved.  Patient has reached maximum benefit from this hospitalization. Home medications restarted. Will Hold Hydrochlorothiazide for now. Patient scheduled to follow up with Nephrology, Dr. Axel Brown in 2 weeks with BMP result. Pantoprazole is switched to Famotidine 20mg PO daily. Follow up PCP in 1 week. Patient is stable at discharge and will be going home.     Goals of Care Treatment Preferences:  Code Status: DNR      Consults:     Psychiatric  Depression  Patient has  .  depression which is unknown and is currently controlled. Will Continue anti-depressant medications. We will not consult psychiatry at this time. Patient does not display psychosis at this time. Continue to monitor closely and adjust plan of care as needed.  Will continue Sertraline.        Pulmonary  Community acquired pneumonia  Patient was seen 2-3 backs at the clinic as outpatient and was started on Levoflox for Pneumonia.Patient unable to tolerate PO medication due to nausea and vomiting.   Chest X ray- Lung volume increase with prominent bronchial markings, no lung infiltrates.  - Considering patient incomplete course of medication, will start on Azithromycin 500mg IV daily for 3 days.  - Patient is allergic to Cephalexin, so Ceftriaxone will not be given.  - Will  monitor for symptoms.     12/12  - Improving  - Will continue with Azithromycin.    12/13  Resolved.  Patient will not need more antibiotic course.    Cardiac/Vascular  Benign essential hypertension  Chronic, controlled. Latest blood pressure and vitals reviewed-     Temp:  [97.6 °F (36.4 °C)-99 °F (37.2 °C)]   Pulse:  [60-73]   Resp:  [18]   BP: (109-140)/(48-70)   SpO2:  [95 %-99 %] .   Home meds for hypertension were reviewed and noted below.   Hypertension Medications               hydroCHLOROthiazide (HYDRODIURIL) 25 MG tablet Take 25 mg by mouth once daily.    isosorbide mononitrate (IMDUR) 30 MG 24 hr tablet Take 1 tablet (30 mg total) by mouth once daily.    metoprolol succinate (TOPROL-XL) 50 MG 24 hr tablet Take 50 mg by mouth.          Considering MARICEL, Hydrochlorothiazide is on HOLD. Continue with Metoprolol and Isosorbide mononitrate.    Coronary artery disease involving native coronary artery of native heart without angina pectoris  Patient with known CAD s/p CABG in 2011, which is controlled Will continue ASA and Statin and monitor for S/Sx of angina/ACS. Continue to monitor on telemetry.   Echo ordered today.    12/12  Echo- Pending result  Will continue with Aspirin and Statin.    12/13  Continue Aspirin, statin  Follow up with Cardiology.    Renal/  * MARICEL (acute kidney injury)  Patient with acute kidney injury/acute renal failure likely due to pre-renal azotemia due to dehydration MARICEL is currently worsening. Baseline creatinine  1.5  - Labs reviewed- Renal function/electrolytes with Estimated Creatinine Clearance: 17.9 mL/min (A) (based on SCr of 3.01 mg/dL (H)). according to latest data. Monitor urine output and serial BMP and adjust therapy as needed. Avoid nephrotoxins and renally dose meds for GFR listed above.    Creatinine 4.13, eGFR 11  -Avoid nephrotoxic agents.  - Will continue with 0.45% Nacl @125ml/hr . Will follow up Q AM.    12/12  MARICEL improving.  - Creatinine decreased from 4.13 to  3.52  - eGFR improved from 11 to 13.  - Will decrease IV fluids, 0.45% Nacl @ 75ml/hr. Will follow up Q AM.    12/13  Improving  Follow up with Nephrology in 2 weeks with BMP result.    CKD (chronic kidney disease) stage 5, GFR less than 15 ml/min  Creatine stable for now. BMP reviewed- noted Estimated Creatinine Clearance: 17.9 mL/min (A) (based on SCr of 3.01 mg/dL (H)). according to latest data. Based on current GFR, CKD stage is stage 5 - GFR < 15.  Monitor UOP and serial BMP and adjust therapy as needed. Renally dose meds. Avoid nephrotoxic medications and procedures.    Endocrine  Type 2 diabetes mellitus, with long-term current use of insulin  Will hold oral hypoglycemic meds for now.  Last HbA1C 10.7 ( 1 year ago),   Blood sugar 188.Ordered HbA1C today  - Will start with Ins Detemir 5 unti sc HS, Low ISS.    12/12  -Blood sugar 133.  HbA1c- Pending  -  Will continue with Ins Detemir 5 unti sc HS, Low ISS.    12/13  Patient is being discharged today.  Will resume home medication  Counseled patient regarding monitoring blood glucose level at home and follow up PCP in a week.      GI  Nausea and vomiting  Improving  Will continue with Ondansetron 4 mg IV every 8 hrly as needed.    12/13  Resolved      Other  Loss of appetite  Improved  Continue with diabetic diet as tolerated.        Final Active Diagnoses:    Diagnosis Date Noted POA    PRINCIPAL PROBLEM:  MARICEL (acute kidney injury) [N17.9] 12/11/2023 Yes    Community acquired pneumonia [J18.9] 12/11/2023 Yes    Nausea and vomiting [R11.2] 12/08/2023 Yes    CKD (chronic kidney disease) stage 5, GFR less than 15 ml/min [N18.5] 12/11/2023 Yes    Loss of appetite [R63.0] 12/11/2023 Yes    Depression [F32.A] 03/24/2022 Yes    Type 2 diabetes mellitus, with long-term current use of insulin [E11.9, Z79.4] 01/13/2021 Not Applicable    Benign essential hypertension [I10] 08/28/2018 Yes    Coronary artery disease involving native coronary artery of native heart without  angina pectoris [I25.10] 08/28/2018 Yes      Problems Resolved During this Admission:       Discharged Condition: fair    Disposition: Home or Self Care    Follow Up:   Follow-up Information       Axel Brown MD Follow up in 2 week(s).    Specialty: Nephrology  Why: Post hospitalization follow up. Follow up on BMP.  Please follow up on December 20 at 10:15  Contact information:  [address] [facility]  [place]NAL MEDICINE [facility] [address]  [phone]               Jt Allison II, MD Follow up in 1 week(s).    Specialty: Family Medicine  Why: Post hospitalization follow up.  Contact information:  [address] [facility]  [place]NAL MEDICINE [facility] [address]  [phone]                           Patient Instructions:      Basic metabolic panel   Standing Status: Future Standing Exp. Date: 02/10/25     Ambulatory referral/consult to Outpatient Case Management   Referral Priority: Routine Referral Type: Consultation   Referral Reason: Specialty Services Required   Number of Visits Requested: 1     Diet diabetic     Diet Cardiac     Diet renal     Notify your health care provider if you experience any of the following:  temperature >100.4     Notify your health care provider if you experience any of the following:  persistent nausea and vomiting or diarrhea     Notify your health care provider if you experience any of the following:  severe uncontrolled pain     Notify your health care provider if you experience any of the following:  difficulty breathing or increased cough     Notify your health care provider if you experience any of the following:  persistent dizziness, light-headedness, or visual disturbances     Activity as tolerated       Significant Diagnostic Studies: Labs: BMP:   Recent Labs   Lab 12/12/23  0436 12/13/23  0427   * 146*    141   K 4.1 3.6    109*   CO2 28 25   BUN 39* 29*   CREATININE 3.52* 3.01*   CALCIUM 9.1 9.1   , CMP   Recent Labs   Lab  "12/12/23 0436 12/13/23 0427    141   K 4.1 3.6    109*   CO2 28 25   * 146*   BUN 39* 29*   CREATININE 3.52* 3.01*   CALCIUM 9.1 9.1   ANIONGAP 9 11   , CBC   Recent Labs   Lab 12/12/23 0436 12/13/23 0427   WBC 9.67 8.97   HGB 12.8 12.5   HCT 38.9 37.6*    162   , Lipid Panel   Lab Results   Component Value Date    CHOL 237 (H) 10/27/2022    HDL 34 (L) 10/27/2022    LDLCALC 128 10/27/2022    TRIG 375 (H) 10/27/2022    CHOLHDL 7.0 10/27/2022   , A1C:   Recent Labs   Lab 12/13/23 0427   HGBA1C 6.0   , and All labs within the past 24 hours have been reviewed  Microbiology: Blood Culture No results found for: "LABBL"  Radiology: X-Ray: CXR: X-Ray Chest 1 View (CXR): No results found for this visit on 12/11/23.  CT scan: CT ABDOMEN PELVIS WITH CONTRAST: No results found for this visit on 12/11/23. and CT ABDOMEN PELVIS WITHOUT CONTRAST: No results found for this visit on 12/11/23.    Cardiac Graphics: ECG: . and Echocardiogram: 2D echo with color flow doppler: No results found for this or any previous visit. and Transthoracic echo (TTE) complete (Cupid Only):   Results for orders placed or performed during the hospital encounter of 12/11/23   Echo   Result Value Ref Range    BSA 1.88 m2    LVOT stroke volume 52.95 cm3    LVIDd 4.71 3.5 - 6.0 cm    LV Systolic Volume 62.49 mL    LV Systolic Volume Index 34.0 mL/m2    LVIDs 3.81 2.1 - 4.0 cm    LV Diastolic Volume 103.07 mL    LV Diastolic Volume Index 56.02 mL/m2    IVS 0.82 0.6 - 1.1 cm    LVOT diameter 1.68 cm    LVOT area 2.2 cm2    FS 19 (A) 28 - 44 %    Left Ventricle Relative Wall Thickness 0.39 cm    Posterior Wall 0.91 0.6 - 1.1 cm    LV mass 135.89 g    LV Mass Index 74 g/m2    MV Peak E Domenic 0.80 m/s    TDI LATERAL 0.06 m/s    TDI SEPTAL 0.06 m/s    E/E' ratio 13.33 m/s    MV Peak A Domenic 0.95 m/s    TR Max Domenic 1.72 m/s    E/A ratio 0.84     E wave deceleration time 377.56 msec    LV SEPTAL E/E' RATIO 13.33 m/s    LV LATERAL E/E' " RATIO 13.33 m/s    LVOT peak domenic 1.14 m/s    Left Ventricular Outflow Tract Mean Velocity 0.78 cm/s    Left Ventricular Outflow Tract Mean Gradient 2.73 mmHg    RVDD 3.44 cm    TAPSE 1.66 cm    LA size 2.84 cm    Left Atrium Major Axis 2.72 cm    RA Major Axis 3.96 cm    AV mean gradient 13 mmHg    AV peak gradient 18 mmHg    Ao peak domenic 2.15 m/s    Ao VTI 51.20 cm    LVOT peak VTI 23.90 cm    AV valve area 1.03 cm²    AV Velocity Ratio 0.53     AV index (prosthetic) 0.47     MACI by Velocity Ratio 1.17 cm²    MV mean gradient 2 mmHg    MV peak gradient 7 mmHg    MV stenosis pressure 1/2 time 87.88 ms    MV valve area p 1/2 method 2.50 cm2    MV valve area by continuity eq 1.39 cm2    MV VTI 38.1 cm    Triscuspid Valve Regurgitation Peak Gradient 12 mmHg    PV peak gradient 2     RVOT peak domenic 0.70 m/s    Ao root annulus 2.40 cm    IVC diameter 1.08 cm    Mean e' 0.06 m/s    ZLVIDS 1.50     ZLVIDD -0.80     AORTIC VALVE CUSP SEPERATION 1.69 cm    TV resting pulmonary artery pressure 15 mmHg    RV TB RVSP 5 mmHg    Est. RA pres 3 mmHg       Pending Diagnostic Studies:       Procedure Component Value Units Date/Time    Echo [[id number]]  (Abnormal) Resulted: 12/12/23 1032    Order Status: Sent Lab Status: In process Updated: 12/12/23 1637     BSA 1.88 m2      LVOT stroke volume 52.95 cm3      LVIDd 4.71 cm      LV Systolic Volume 62.49 mL      LV Systolic Volume Index 34.0 mL/m2      LVIDs 3.81 cm      LV Diastolic Volume 103.07 mL      LV Diastolic Volume Index 56.02 mL/m2      IVS 0.82 cm      LVOT diameter 1.68 cm      LVOT area 2.2 cm2      FS 19 %      Left Ventricle Relative Wall Thickness 0.39 cm      Posterior Wall 0.91 cm      LV mass 135.89 g      LV Mass Index 74 g/m2      MV Peak E Domenic 0.80 m/s      TDI LATERAL 0.06 m/s      TDI SEPTAL 0.06 m/s      E/E' ratio 13.33 m/s      MV Peak A Domenic 0.95 m/s      TR Max Domenic 1.72 m/s      E/A ratio 0.84     E wave deceleration time 377.56 msec      LV SEPTAL E/E' RATIO  13.33 m/s      LV LATERAL E/E' RATIO 13.33 m/s      LVOT peak christiano 1.14 m/s      Left Ventricular Outflow Tract Mean Velocity 0.78 cm/s      Left Ventricular Outflow Tract Mean Gradient 2.73 mmHg      RVDD 3.44 cm      TAPSE 1.66 cm      LA size 2.84 cm      Left Atrium Major Axis 2.72 cm      RA Major Axis 3.96 cm      AV mean gradient 13 mmHg      AV peak gradient 18 mmHg      Ao peak christiano 2.15 m/s      Ao VTI 51.20 cm      LVOT peak VTI 23.90 cm      AV valve area 1.03 cm²      AV Velocity Ratio 0.53     AV index (prosthetic) 0.47     MACI by Velocity Ratio 1.17 cm²      MV mean gradient 2 mmHg      MV peak gradient 7 mmHg      MV stenosis pressure 1/2 time 87.88 ms      MV valve area p 1/2 method 2.50 cm2      MV valve area by continuity eq 1.39 cm2      MV VTI 38.1 cm      Triscuspid Valve Regurgitation Peak Gradient 12 mmHg      PV peak gradient 2     RVOT peak christiano 0.70 m/s      Ao root annulus 2.40 cm      IVC diameter 1.08 cm      Mean e' 0.06 m/s      ZLVIDS 1.50     ZLVIDD -0.80     AORTIC VALVE CUSP SEPERATION 1.69 cm      TV resting pulmonary artery pressure 15 mmHg      RV TB RVSP 5 mmHg      Est. RA pres 3 mmHg     Narrative:        Left Ventricle: The left ventricle is normal in size. Normal wall   thickness. Septal motion is consistent with pacing.    Right Ventricle: Normal right ventricular cavity size.    Aortic Valve: There is a bioprosthetic valve in the aortic position.    Mitral Valve: There is mild bileaflet sclerosis.    IVC/SVC: Normal venous pressure at 3 mmHg.      Impression:                 Medications:  Reconciled Home Medications:      Medication List        START taking these medications      famotidine 20 MG tablet  Commonly known as: PEPCID  Take 1 tablet (20 mg total) by mouth Daily.            CHANGE how you take these medications      insulin glargine 100 units/mL SubQ pen  Commonly known as: BASAGLAR KWIKPEN U-100 INSULIN  Inject 44 units in the skin in the mornings and 52 units  in the evenings  What changed:   how much to take  how to take this  when to take this  additional instructions            CONTINUE taking these medications      aspirin 81 MG EC tablet  Commonly known as: ECOTRIN  Take 81 mg by mouth.     cholecalciferol (vitamin D3) 25 mcg (1,000 unit) capsule  Commonly known as: VITAMIN D3  Take 1,000 Units by mouth once daily.     fluticasone propionate 50 mcg/actuation nasal spray  Commonly known as: FLONASE  2 sprays by Each Nostril route once daily.     folic acid 1 MG tablet  Commonly known as: FOLVITE  Take 1 mg by mouth once daily.     isosorbide mononitrate 30 MG 24 hr tablet  Commonly known as: IMDUR  Take 1 tablet (30 mg total) by mouth once daily.     magnesium 250 mg Tab  Take by mouth.     meclizine 25 mg tablet  Commonly known as: ANTIVERT  Take 1 tablet (25 mg total) by mouth 3 (three) times daily as needed for Dizziness.     metoprolol succinate 50 MG 24 hr tablet  Commonly known as: TOPROL-XL  Take 50 mg by mouth.     ondansetron 4 MG Tbdl  Commonly known as: ZOFRAN-ODT  Take 1 tablet (4 mg total) by mouth every 8 (eight) hours as needed (nausea).     OZEMPIC 0.25 mg or 0.5 mg(2 mg/1.5 mL) pen injector  Generic drug: semaglutide  Inject 0.25 mg into the skin.     pregabalin 75 MG capsule  Commonly known as: LYRICA  Take 1 capsule (75 mg total) by mouth 2 (two) times daily.     primidone 50 MG Tab  Commonly known as: MYSOLINE  1/2 tab daily     promethazine 25 MG tablet  Commonly known as: PHENERGAN  Take 1 tablet (25 mg total) by mouth every 6 (six) hours as needed for Nausea.     rosuvastatin 20 MG tablet  Commonly known as: CRESTOR  Take 20 mg by mouth every evening.     sertraline 50 MG tablet  Commonly known as: ZOLOFT  TAKE ONE TABLET BY MOUTH EVERY DAY     ZINC ORAL  Take 250 mg by mouth Daily.            STOP taking these medications      hydroCHLOROthiazide 25 MG tablet  Commonly known as: HYDRODIURIL     levoFLOXacin 250 MG tablet  Commonly known as:  LEVAQUIN     meloxicam 15 MG tablet  Commonly known as: MOBIC     pantoprazole 40 MG tablet  Commonly known as: PROTONIX              Indwelling Lines/Drains at time of discharge:   Lines/Drains/Airways       Airway  Duration                  Airway - Non-Surgical 08/12/22 0753 488 days                    Time spent on the discharge of patient: 45 minutes         María Degroot MD  Department of Hospital Medicine  [facility]

## 2023-12-13 NOTE — NURSING
Sn instructed pt on discharge instructions and medications to  from the pharmacy.  Pt gave back vu of instructions.  IV dcd at this time. Dressing applied.  No bleeding observed.  Pt taken to pov via wc by transport at this time.  Pt tolerated well.

## 2023-12-13 NOTE — ASSESSMENT & PLAN NOTE
Will hold oral hypoglycemic meds for now.  Last HbA1C 10.7 ( 1 year ago),   Blood sugar 188.Ordered HbA1C today  - Will start with Ins Detemir 5 unti sc HS, Low ISS.    12/12  -Blood sugar 133.  HbA1c- Pending  -  Will continue with Ins Detemir 5 unti sc HS, Low ISS.    12/13  Patient is being discharged today.  Will resume home medication  Counseled patient regarding monitoring blood glucose level at home and follow up PCP in a week.

## 2023-12-13 NOTE — ASSESSMENT & PLAN NOTE
Creatine stable for now. BMP reviewed- noted Estimated Creatinine Clearance: 17.9 mL/min (A) (based on SCr of 3.01 mg/dL (H)). according to latest data. Based on current GFR, CKD stage is stage 5 - GFR < 15.  Monitor UOP and serial BMP and adjust therapy as needed. Renally dose meds. Avoid nephrotoxic medications and procedures.

## 2023-12-13 NOTE — ASSESSMENT & PLAN NOTE
Patient with known CAD s/p CABG in 2011, which is controlled Will continue ASA and Statin and monitor for S/Sx of angina/ACS. Continue to monitor on telemetry.   Echo ordered today.    12/12  Echo- Pending result  Will continue with Aspirin and Statin.    12/13  Continue Aspirin, statin  Follow up with Cardiology.

## 2023-12-13 NOTE — HOSPITAL COURSE
68 year Female with past medical history of DM II, HTN, Polyarthralgia, Cardiomyopathy, CAD s/p CABG on 2011, Atrial Flutter, Essential tremor, Non rheumatic aortic stenosis (S/P aortic valve replacement), HLD, Spinal stenosis (cervical stenosis) , Sick sinus syndrome with cardiac pacemaker in situ present was admitted on 12/11/23 for management of MARICEL on CKD V, Loss of appetite, Nausea and Vomiting.  Patient was started on IV fluids. Given diagnosis of Pneumonia few days earlier, started on Azithromycin to complete the course of antibiotics.  Patient condition improved over time, Appetite is normal, Nausea and vomiting is resolved. MARICEL improved. Cough improved.  Patient has reached maximum benefit from this hospitalization. Home medications restarted. Will Hold Hydrochlorothiazide for now. Patient scheduled to follow up with Nephrology, Dr. Axel Brown in 2 weeks with BMP result. Pantoprazole is switched to Famotidine 20mg PO daily. Follow up PCP in 1 week. Patient is stable at discharge and will be going home.

## 2023-12-13 NOTE — PLAN OF CARE
Problem: Adult Inpatient Plan of Care  Goal: Plan of Care Review  Outcome: Ongoing, Progressing     Problem: Diabetes Comorbidity  Goal: Blood Glucose Level Within Targeted Range  Outcome: Ongoing, Progressing  Intervention: Monitor and Manage Glycemia  Flowsheets (Taken 12/13/2023 0540)  Glycemic Management: blood glucose monitored     Problem: Fluid and Electrolyte Imbalance (Acute Kidney Injury/Impairment)  Goal: Fluid and Electrolyte Balance  Outcome: Ongoing, Progressing  Intervention: Monitor and Manage Fluid and Electrolyte Balance  Flowsheets (Taken 12/13/2023 0540)  Fluid/Electrolyte Management:   oral rehydration therapy initiated   fluids provided     Problem: Oral Intake Inadequate (Acute Kidney Injury/Impairment)  Goal: Optimal Nutrition Intake  Outcome: Ongoing, Progressing     Problem: Fluid Imbalance (Pneumonia)  Goal: Fluid Balance  Outcome: Ongoing, Progressing  Intervention: Monitor and Manage Fluid Balance  Flowsheets (Taken 12/13/2023 0540)  Fluid/Electrolyte Management:   oral rehydration therapy initiated   fluids provided

## 2023-12-13 NOTE — ASSESSMENT & PLAN NOTE
Patient was seen 2-3 backs at the clinic as outpatient and was started on Levoflox for Pneumonia.Patient unable to tolerate PO medication due to nausea and vomiting.   Chest X ray- Lung volume increase with prominent bronchial markings, no lung infiltrates.  - Considering patient incomplete course of medication, will start on Azithromycin 500mg IV daily for 3 days.  - Patient is allergic to Cephalexin, so Ceftriaxone will not be given.  - Will monitor for symptoms.     12/12  - Improving  - Will continue with Azithromycin.    12/13  Resolved.  Patient will not need more antibiotic course.

## 2023-12-14 ENCOUNTER — PATIENT OUTREACH (OUTPATIENT)
Dept: ADMINISTRATIVE | Facility: CLINIC | Age: 68
End: 2023-12-14

## 2023-12-14 LAB
AORTIC ROOT ANNULUS: 2.4 CM
AORTIC VALVE CUSP SEPERATION: 1.69 CM
AV INDEX (PROSTH): 0.47
AV MEAN GRADIENT: 13 MMHG
AV PEAK GRADIENT: 18 MMHG
AV VALVE AREA BY VELOCITY RATIO: 1.66 CM²
AV VALVE AREA: 1.47 CM²
AV VELOCITY RATIO: 0.53
BSA FOR ECHO PROCEDURE: 1.88 M2
CV ECHO LV RWT: 0.39 CM
DOP CALC AO PEAK VEL: 2.15 M/S
DOP CALC AO VTI: 51.2 CM
DOP CALC LVOT AREA: 3.1 CM2
DOP CALC LVOT DIAMETER: 2 CM
DOP CALC LVOT PEAK VEL: 1.14 M/S
DOP CALC LVOT STROKE VOLUME: 75.05 CM3
DOP CALC MV VTI: 38.1 CM
DOP CALC RVOT PEAK VEL: 0.7 M/S
DOP CALCLVOT PEAK VEL VTI: 23.9 CM
E WAVE DECELERATION TIME: 377.56 MSEC
E/A RATIO: 0.84
E/E' RATIO: 13.33 M/S
ECHO LV POSTERIOR WALL: 0.91 CM (ref 0.6–1.1)
FRACTIONAL SHORTENING: 19 % (ref 28–44)
INTERVENTRICULAR SEPTUM: 0.82 CM (ref 0.6–1.1)
IVC DIAMETER: 1.08 CM
LA MAJOR: 2.72 CM
LEFT ATRIUM SIZE: 2.84 CM
LEFT INTERNAL DIMENSION IN SYSTOLE: 3.81 CM (ref 2.1–4)
LEFT VENTRICLE DIASTOLIC VOLUME INDEX: 56.02 ML/M2
LEFT VENTRICLE DIASTOLIC VOLUME: 103.07 ML
LEFT VENTRICLE MASS INDEX: 74 G/M2
LEFT VENTRICLE SYSTOLIC VOLUME INDEX: 34 ML/M2
LEFT VENTRICLE SYSTOLIC VOLUME: 62.49 ML
LEFT VENTRICULAR INTERNAL DIMENSION IN DIASTOLE: 4.71 CM (ref 3.5–6)
LEFT VENTRICULAR MASS: 135.89 G
LV LATERAL E/E' RATIO: 13.33 M/S
LV SEPTAL E/E' RATIO: 13.33 M/S
LVOT MG: 2.73 MMHG
LVOT MV: 0.78 CM/S
MV MEAN GRADIENT: 2 MMHG
MV PEAK A VEL: 0.95 M/S
MV PEAK E VEL: 0.8 M/S
MV PEAK GRADIENT: 7 MMHG
MV STENOSIS PRESSURE HALF TIME: 87.88 MS
MV VALVE AREA BY CONTINUITY EQUATION: 1.97 CM2
MV VALVE AREA P 1/2 METHOD: 2.5 CM2
OHS LV EJECTION FRACTION SIMPSONS BIPLANE MOD: 40 %
PISA TR MAX VEL: 1.72 M/S
RA MAJOR: 3.96 CM
RA PRESSURE ESTIMATED: 3 MMHG
RIGHT VENTRICULAR END-DIASTOLIC DIMENSION: 3.44 CM
RV TB RVSP: 5 MMHG
TDI LATERAL: 0.06 M/S
TDI SEPTAL: 0.06 M/S
TDI: 0.06 M/S
TR MAX PG: 12 MMHG
TRICUSPID ANNULAR PLANE SYSTOLIC EXCURSION: 1.66 CM
TV REST PULMONARY ARTERY PRESSURE: 15 MMHG
Z-SCORE OF LEFT VENTRICULAR DIMENSION IN END DIASTOLE: -0.8
Z-SCORE OF LEFT VENTRICULAR DIMENSION IN END SYSTOLE: 1.5

## 2023-12-14 NOTE — PLAN OF CARE
[facility]  Discharge Final Note    Primary Care Provider: No, Primary Doctor    Expected Discharge Date: 12/13/2023    Final Discharge Note (most recent)       Final Note - 12/14/23 0831          Final Note    Assessment Type Final Discharge Note     Anticipated Discharge Disposition Home or Self Care                     Important Message from Medicare  Important Message from Medicare regarding Discharge Appeal Rights: Given to patient/caregiver, Explained to patient/caregiver, Signed/date by patient/caregiver     Date IMM was signed: 12/12/23  Time IMM was signed: 1205    Contact Info       Axel Brown MD   Specialty: Nephrology    [address]  [place]NAL MEDICINE [facility] [address]   Phone: [phone]       Next Steps: Follow up in 2 week(s)    Instructions: Post hospitalization follow up. Follow up on BMP.  Please follow up on December 20 at 10:15    Jt Allison II, MD   Specialty: Family Medicine    [address] [facility]  INTERNAL MEDICINE [facility] [address]   Phone: [phone]       Next Steps: Follow up in 1 week(s)    Instructions: Post hospitalization follow up.        Pt discharged home with no needs.

## 2023-12-14 NOTE — PROGRESS NOTES
C3 nurse attempted to contact Karen Renteria  for a TCC post hospital discharge follow up call. No answer. The patient does not have a scheduled HOSFU appointment, and the pt does not have an [facility] PCP.         Pt sent to er for evaluation of oxygen level finger saturation while walking   levl was 98 when sitting dropped to 89 with ambulation

## 2023-12-17 ENCOUNTER — HOSPITAL ENCOUNTER (EMERGENCY)
Facility: HOSPITAL | Age: 68
Discharge: HOME | End: 2023-12-17
Payer: MEDICARE

## 2023-12-17 VITALS
BODY MASS INDEX: 29.81 KG/M2 | HEART RATE: 79 BPM | SYSTOLIC BLOOD PRESSURE: 160 MMHG | TEMPERATURE: 98 F | WEIGHT: 174.63 LBS | OXYGEN SATURATION: 95 % | HEIGHT: 64 IN | RESPIRATION RATE: 18 BRPM | DIASTOLIC BLOOD PRESSURE: 64 MMHG

## 2023-12-17 DIAGNOSIS — R11.2 NAUSEA AND VOMITING, UNSPECIFIED VOMITING TYPE: Primary | ICD-10-CM

## 2023-12-17 DIAGNOSIS — N17.9 ACUTE RENAL FAILURE SUPERIMPOSED ON STAGE 4 CHRONIC KIDNEY DISEASE, UNSPECIFIED ACUTE RENAL FAILURE TYPE: ICD-10-CM

## 2023-12-17 DIAGNOSIS — N18.4 ACUTE RENAL FAILURE SUPERIMPOSED ON STAGE 4 CHRONIC KIDNEY DISEASE, UNSPECIFIED ACUTE RENAL FAILURE TYPE: ICD-10-CM

## 2023-12-17 LAB
ALBUMIN SERPL BCP-MCNC: 4.2 G/DL (ref 3.5–5)
ALBUMIN/GLOB SERPL: 1.1 {RATIO}
ALP SERPL-CCNC: 53 U/L (ref 55–142)
ALT SERPL W P-5'-P-CCNC: 23 U/L (ref 13–56)
ANION GAP SERPL CALCULATED.3IONS-SCNC: 9 MMOL/L (ref 7–16)
AST SERPL W P-5'-P-CCNC: 18 U/L (ref 15–37)
BASOPHILS # BLD AUTO: 0.11 K/UL (ref 0–0.2)
BASOPHILS NFR BLD AUTO: 1.1 % (ref 0–1)
BILIRUB SERPL-MCNC: 0.7 MG/DL (ref ?–1.2)
BILIRUB UR QL STRIP: NEGATIVE
BUN SERPL-MCNC: 21 MG/DL (ref 7–18)
BUN/CREAT SERPL: 8 (ref 6–20)
CALCIUM SERPL-MCNC: 10.5 MG/DL (ref 8.5–10.1)
CHLORIDE SERPL-SCNC: 109 MMOL/L (ref 98–107)
CLARITY UR: CLEAR
CO2 SERPL-SCNC: 30 MMOL/L (ref 21–32)
COLOR UR: ABNORMAL
CREAT SERPL-MCNC: 2.48 MG/DL (ref 0.55–1.02)
DIFFERENTIAL METHOD BLD: ABNORMAL
EGFR (NO RACE VARIABLE) (RUSH/TITUS): 21 ML/MIN/1.73M2
EOSINOPHIL # BLD AUTO: 0.15 K/UL (ref 0–0.5)
EOSINOPHIL NFR BLD AUTO: 1.5 % (ref 1–4)
ERYTHROCYTE [DISTWIDTH] IN BLOOD BY AUTOMATED COUNT: 15.7 % (ref 11.5–14.5)
FLUAV AG UPPER RESP QL IA.RAPID: NEGATIVE
FLUBV AG UPPER RESP QL IA.RAPID: NEGATIVE
GLOBULIN SER-MCNC: 3.7 G/DL (ref 2–4)
GLUCOSE SERPL-MCNC: 158 MG/DL (ref 74–106)
GLUCOSE UR STRIP-MCNC: 100 MG/DL
HCT VFR BLD AUTO: 40.5 % (ref 38–47)
HGB BLD-MCNC: 13.6 G/DL (ref 12–16)
IMM GRANULOCYTES # BLD AUTO: 0.04 K/UL (ref 0–0.04)
IMM GRANULOCYTES NFR BLD: 0.4 % (ref 0–0.4)
KETONES UR STRIP-SCNC: NEGATIVE MG/DL
LEUKOCYTE ESTERASE UR QL STRIP: NEGATIVE
LYMPHOCYTES # BLD AUTO: 1.73 K/UL (ref 1–4.8)
LYMPHOCYTES NFR BLD AUTO: 17.2 % (ref 27–41)
MAGNESIUM SERPL-MCNC: 1.9 MG/DL (ref 1.7–2.3)
MCH RBC QN AUTO: 27.9 PG (ref 27–31)
MCHC RBC AUTO-ENTMCNC: 33.6 G/DL (ref 32–36)
MCV RBC AUTO: 83.2 FL (ref 80–96)
MONOCYTES # BLD AUTO: 0.46 K/UL (ref 0–0.8)
MONOCYTES NFR BLD AUTO: 4.6 % (ref 2–6)
MPC BLD CALC-MCNC: 13.3 FL (ref 9.4–12.4)
NEUTROPHILS # BLD AUTO: 7.58 K/UL (ref 1.8–7.7)
NEUTROPHILS NFR BLD AUTO: 75.2 % (ref 53–65)
NITRITE UR QL STRIP: NEGATIVE
NRBC # BLD AUTO: 0 X10E3/UL
NRBC, AUTO (.00): 0 %
PH UR STRIP: 5.5 PH UNITS
PLATELET # BLD AUTO: 207 K/UL (ref 150–400)
PLATELET MORPHOLOGY: ABNORMAL
POTASSIUM SERPL-SCNC: 3.8 MMOL/L (ref 3.5–5.1)
PROT SERPL-MCNC: 7.9 G/DL (ref 6.4–8.2)
PROT UR QL STRIP: 20
RBC # BLD AUTO: 4.87 M/UL (ref 4.2–5.4)
RBC # UR STRIP: NEGATIVE /UL
RBC MORPH BLD: NORMAL
SARS-COV-2 RDRP RESP QL NAA+PROBE: NEGATIVE
SODIUM SERPL-SCNC: 144 MMOL/L (ref 136–145)
SP GR UR STRIP: 1.02
UROBILINOGEN UR STRIP-ACNC: NORMAL MG/DL
WBC # BLD AUTO: 10.07 K/UL (ref 4.5–11)

## 2023-12-17 PROCEDURE — 85025 COMPLETE CBC W/AUTO DIFF WBC: CPT | Performed by: NURSE PRACTITIONER

## 2023-12-17 PROCEDURE — 96361 HYDRATE IV INFUSION ADD-ON: CPT

## 2023-12-17 PROCEDURE — 99284 EMERGENCY DEPT VISIT MOD MDM: CPT | Mod: 25

## 2023-12-17 PROCEDURE — 81003 URINALYSIS AUTO W/O SCOPE: CPT | Performed by: NURSE PRACTITIONER

## 2023-12-17 PROCEDURE — 96365 THER/PROPH/DIAG IV INF INIT: CPT

## 2023-12-17 PROCEDURE — 87635 SARS-COV-2 COVID-19 AMP PRB: CPT | Performed by: NURSE PRACTITIONER

## 2023-12-17 PROCEDURE — 83735 ASSAY OF MAGNESIUM: CPT | Performed by: NURSE PRACTITIONER

## 2023-12-17 PROCEDURE — 63600175 PHARM REV CODE 636 W HCPCS: Performed by: NURSE PRACTITIONER

## 2023-12-17 PROCEDURE — 80053 COMPREHEN METABOLIC PANEL: CPT | Performed by: NURSE PRACTITIONER

## 2023-12-17 PROCEDURE — 99284 EMERGENCY DEPT VISIT MOD MDM: CPT | Mod: ,,, | Performed by: NURSE PRACTITIONER

## 2023-12-17 PROCEDURE — 25000003 PHARM REV CODE 250: Performed by: NURSE PRACTITIONER

## 2023-12-17 PROCEDURE — 87804 INFLUENZA ASSAY W/OPTIC: CPT | Performed by: NURSE PRACTITIONER

## 2023-12-17 RX ORDER — ONDANSETRON HYDROCHLORIDE 2 MG/ML
4 INJECTION, SOLUTION INTRAVENOUS
Status: DISCONTINUED | OUTPATIENT
Start: 2023-12-17 | End: 2023-12-17

## 2023-12-17 RX ORDER — PROMETHAZINE HYDROCHLORIDE 25 MG/1
25 TABLET ORAL EVERY 6 HOURS PRN
Qty: 15 TABLET | Refills: 0 | Status: SHIPPED | OUTPATIENT
Start: 2023-12-17

## 2023-12-17 RX ORDER — OMEPRAZOLE 20 MG/1
20 CAPSULE, DELAYED RELEASE ORAL DAILY
Qty: 30 CAPSULE | Refills: 0 | Status: SHIPPED | OUTPATIENT
Start: 2023-12-17 | End: 2024-01-16

## 2023-12-17 RX ADMIN — PROMETHAZINE HYDROCHLORIDE 25 MG: 25 INJECTION INTRAMUSCULAR; INTRAVENOUS at 06:12

## 2023-12-17 RX ADMIN — SODIUM CHLORIDE 1000 ML: 9 INJECTION, SOLUTION INTRAVENOUS at 06:12

## 2023-12-17 NOTE — ED TRIAGE NOTES
Presents to ED for complaints of vomiting, diarrhea and fever since yesterday.  Was seen Monday and was hospitalized for 2 days but was released on Wednesday for same issue.

## 2023-12-18 NOTE — DISCHARGE INSTRUCTIONS
Keep scheduled appointment with Dr. Brown.  Take medications as prescribed.  Return to the ER with new or worsening symptoms.

## 2023-12-19 ENCOUNTER — OUTPATIENT CASE MANAGEMENT (OUTPATIENT)
Dept: ADMINISTRATIVE | Facility: OTHER | Age: 68
End: 2023-12-19

## 2023-12-19 NOTE — LETTER
Karen Renteria  [address]  [place] [address]          Dear Karen Renteria,     I work with [facility]'s Outpatient Care Management Department. We received a referral to call you to discuss your medical history. These services are free of charge and are offered to [facility] patients who have recently been discharged from any of our facilities or who have medical conditions that may require the skill of a nurse to assist with management.     I am a Registered Nurse who specializes in connecting patients with available medical and financial resources as well as addressing any educational needs that may be indicated.    I attempted to reach you by telephone, but I was unsuccessful. Please call our department so that we can go over some questions with you, regarding your health.    The Outpatient Care Management Department can be reached at [phone], from 8:00AM to 4:30 PM, on Monday thru Friday.     Additionally, [facility] also has a program where a nurse is available 24/7 to answer questions or provide medical advice, their number is [phone].      Thanks,        Jodi Reis RN [facility]   Outpatient Care Management  Phone #: [phone]

## 2023-12-19 NOTE — PROGRESS NOTES
12/19/2023  1st attempt to complete Initial Assessment  for Outpatient Care Management, left message.  Will mail unable to assess letter.

## 2023-12-22 ENCOUNTER — OUTPATIENT CASE MANAGEMENT (OUTPATIENT)
Dept: ADMINISTRATIVE | Facility: OTHER | Age: 68
End: 2023-12-22

## 2023-12-22 NOTE — PROGRESS NOTES
12/22/2023  3rd attempt to complete Initial Assessment  for Outpatient Care Management, left message. OPCM Case Closure.

## 2024-01-09 DIAGNOSIS — Z71.89 COMPLEX CARE COORDINATION: ICD-10-CM

## 2024-01-11 ENCOUNTER — OFFICE VISIT (OUTPATIENT)
Dept: NEUROLOGY | Facility: CLINIC | Age: 69
End: 2024-01-11
Payer: MEDICARE

## 2024-01-11 VITALS
HEIGHT: 64 IN | SYSTOLIC BLOOD PRESSURE: 132 MMHG | BODY MASS INDEX: 29.71 KG/M2 | OXYGEN SATURATION: 85 % | RESPIRATION RATE: 18 BRPM | HEART RATE: 84 BPM | WEIGHT: 174 LBS | DIASTOLIC BLOOD PRESSURE: 72 MMHG

## 2024-01-11 DIAGNOSIS — E11.42 DIABETIC PERIPHERAL NEUROPATHY ASSOCIATED WITH TYPE 2 DIABETES MELLITUS: ICD-10-CM

## 2024-01-11 DIAGNOSIS — G25.0 ESSENTIAL TREMOR: Primary | ICD-10-CM

## 2024-01-11 PROCEDURE — 99214 OFFICE O/P EST MOD 30 MIN: CPT | Mod: S$PBB,,, | Performed by: PSYCHIATRY & NEUROLOGY

## 2024-01-11 PROCEDURE — 99215 OFFICE O/P EST HI 40 MIN: CPT | Mod: PBBFAC | Performed by: PSYCHIATRY & NEUROLOGY

## 2024-01-11 RX ORDER — PRIMIDONE 50 MG/1
100 TABLET ORAL 3 TIMES DAILY
Qty: 540 TABLET | Refills: 3 | Status: SHIPPED | OUTPATIENT
Start: 2024-01-11

## 2024-01-11 NOTE — PROGRESS NOTES
Subjective:       Patient ID: Karen Renteria is a 68 y.o. female     Chief Complaint:    Chief Complaint   Patient presents with    Tremors     Pt. States walk ing off balance.        Allergies:  Cephalexin, Bactrim [sulfamethoxazole-trimethoprim], and Zofran [ondansetron hcl]    Current Medications:    Outpatient Encounter Medications as of 1/11/2024   Medication Sig Dispense Refill    aspirin (ECOTRIN) 81 MG EC tablet Take 81 mg by mouth.      cholecalciferol, vitamin D3, (VITAMIN D3) 25 mcg (1,000 unit) capsule Take 1,000 Units by mouth once daily.      famotidine (PEPCID) 20 MG tablet Take 1 tablet (20 mg total) by mouth Daily. 90 tablet 0    fluticasone propionate (FLONASE) 50 mcg/actuation nasal spray 2 sprays by Each Nostril route once daily.      folic acid (FOLVITE) 1 MG tablet Take 1 mg by mouth once daily.      insulin (BASAGLAR KWIKPEN U-100 INSULIN) glargine 100 units/mL SubQ pen Inject 44 units in the skin in the mornings and 52 units in the evenings 30 mL 3    isosorbide mononitrate (IMDUR) 30 MG 24 hr tablet Take 1 tablet (30 mg total) by mouth once daily. 30 tablet 5    magnesium 250 mg Tab Take by mouth.      meclizine (ANTIVERT) 25 mg tablet Take 1 tablet (25 mg total) by mouth 3 (three) times daily as needed for Dizziness. 90 tablet 11    metoprolol succinate (TOPROL-XL) 50 MG 24 hr tablet Take 50 mg by mouth.      omeprazole (PRILOSEC) 20 MG capsule Take 1 capsule (20 mg total) by mouth once daily. 30 capsule 0    ondansetron (ZOFRAN-ODT) 4 MG TbDL Take 1 tablet (4 mg total) by mouth every 8 (eight) hours as needed (nausea). 20 tablet 0    promethazine (PHENERGAN) 25 MG tablet Take 1 tablet (25 mg total) by mouth every 6 (six) hours as needed for Nausea. 30 tablet 5    promethazine (PHENERGAN) 25 MG tablet Take 1 tablet (25 mg total) by mouth every 6 (six) hours as needed for Nausea. 15 tablet 0    rosuvastatin (CRESTOR) 20 MG tablet Take 20 mg by mouth every evening.      semaglutide  (OZEMPIC) 0.25 mg or 0.5 mg(2 mg/1.5 mL) pen injector Inject 0.25 mg into the skin.      sertraline (ZOLOFT) 50 MG tablet TAKE ONE TABLET BY MOUTH EVERY DAY 90 tablet 3    ZINC ORAL Take 250 mg by mouth Daily.      pregabalin (LYRICA) 75 MG capsule Take 1 capsule (75 mg total) by mouth 2 (two) times daily. 60 capsule 5    primidone (MYSOLINE) 50 MG Tab 1/2 tab daily (Patient not taking: Reported on 1/11/2024) 15 tablet 11     No facility-administered encounter medications on file as of 1/11/2024.       History of Present Illness  68-year-old white female referred to clinic for evaluation of tremors-patient has a known history of benign essential tremors and has previously seen Dr. Rachael Mejia in [place] for such as well as for chronic generalized headaches and has been in this clinic to see our nursing practitioner and Dr. Banda in the past.  Patient has a significant past medical history with diabetic peripheral neuropathy, diabetes, hypertension, hyperlipidemia, chronic kidney disease, atrial fibrillation, cardiomyopathy, coronary artery disease with pacemaker, sick sinus syndrome subclavian artery stenosis, arthritis, nonrheumatic aortic stenosis, previous gastric ulcers.  Her tremors or bilateral with posture and intention consistent with essential tremor but not at rest.  Given her significant cardiac history and pacemaker I am reluctant to try her on a beta-blocker such as propranolol given the risk for reduction of blood pressure and heart rate.  Patient  MRI brain last year was completely normal with no acute abnormality-note mentioned of repeat MRI brain last month after seeing Dr. Rachael Mejia in [place] but unsure of the need for redundancy unless patient did not notify Dr. Mejia that one had been done in the previous several months.   She is only taking Primidone 50 mg bid but wasting her nighttime pill as tremors do not occur at night  She needs incr freq and dosage         Past Medical  History:   Diagnosis Date    Acute gastric ulcer without hemorrhage or perforation 03/03/2022    Age-related osteoporosis with current pathological fracture 04/21/2021    Benign essential hypertension 08/28/2018    Last Assessment & Plan:   Formatting of this note might be different from the original.  Well controlled    Carpal tunnel syndrome     Cerebral vascular accident     CKD (chronic kidney disease) stage 5, GFR less than 15 ml/min 12/11/2023    Coronary arteriosclerosis     S/P CABG 2011    COVID-19 12/20/2020    Diabetes mellitus, type 2     Diabetic peripheral neuropathy associated with type 2 diabetes mellitus 04/28/2022    Essential tremor 01/20/2022    Falls frequently 12/07/2021    Hyperlipidemia     Hypertension     Insomnia secondary to depression with anxiety     Lumbar radiculopathy     Non-rheumatic aortic stenosis 08/28/2018    Formatting of this note might be different from the original.  S/p Tissue AVR July 2011 ([facility] [place]). Echo October 2017 revealed normally functioning bioprosthesis with normal EF 60-65%     Last Assessment & Plan:   Formatting of this note might be different from the original.  Asymptomatic.    Osteoporosis     Pure hypercholesterolemia 08/28/2018    Formatting of this note might be different from the original.  Chronic statin.       Last Assessment & Plan:   Formatting of this note might be different from the original.   Continue statin.    RBBB 09/08/2021    Formatting of this note might be different from the original.  Initially noted on electrocardiogram in September 2021.    Spinal stenosis, cervical region 01/20/2022    SSS (sick sinus syndrome) 07/12/2022    Last Assessment & Plan:   Formatting of this note might be different from the original.  Post placement of [place] scientific dual-chamber permanent pacemaker by Dr. Freeman on 07/12/2022.    Subclavian artery stenosis 08/28/2018    Formatting of this note might be different from the original.  30% stenosis.     "Tremor     Type 2 diabetes mellitus, with long-term current use of insulin 01/13/2021    Vitamin D deficiency        Past Surgical History:   Procedure Laterality Date    APPENDECTOMY      ASCENDING AORTIC ANEURYSM REPAIR W/ TISSUE AORTIC VALVE REPLACEMENT      CHOLECYSTECTOMY      CORONARY ARTERY BYPASS GRAFT (CABG)      DIRECT LARYNGOSCOPY Bilateral 08/12/2022    Procedure: LARYNGOSCOPY, DIRECT;  Surgeon: Iban Silverio MD;  Location: [facility] OR;  Service: ENT;  Laterality: Bilateral;    HYSTERECTOMY      OOPHORECTOMY      TONSILLECTOMY      VOCAL FOLD LESION EXCISION Bilateral 08/12/2022    Procedure: EXCISION, LESION, VOCAL CORD;  Surgeon: Iban Silverio MD;  Location: [facility] OR;  Service: ENT;  Laterality: Bilateral;       Social History  Ms. Renteria  reports that she quit smoking about 12 years ago. Her smoking use included cigarettes. She started smoking about 30 years ago. She has a 8.7 pack-year smoking history. She has never used smokeless tobacco. She reports that she does not drink alcohol and does not use drugs.    Family History  Ms.'s Renteria family history includes Breast cancer in her mother and sister; Cancer in her mother; Heart disease in her father. She was adopted.    Review of Systems  Review of Systems   Musculoskeletal:  Positive for back pain, joint pain, myalgias and neck pain.   Neurological:  Positive for tingling, tremors, sensory change, weakness and headaches.   Psychiatric/Behavioral:  Positive for depression. The patient is nervous/anxious.    All other systems reviewed and are negative.     Objective:   /72 (BP Location: Right arm, Patient Position: Sitting, BP Method: Large (Manual))   Pulse 84   Resp 18   Ht 5' 4" (1.626 m)   Wt 78.9 kg (174 lb)   SpO2 (!) 85%   BMI 29.87 kg/m²    NEUROLOGICAL EXAMINATION:     MENTAL STATUS   Oriented to person, place, and time.   Level of consciousness: alert  Knowledge: consistent with education.     CRANIAL NERVES "   Cranial nerves II through XII intact.     MOTOR EXAM     Strength   Strength 5/5 throughout.     SENSORY EXAM        Mild paresthesias secondary to diabetic peripheral neuropathy     GAIT AND COORDINATION     Gait  Gait: normal       Physical Exam  Vitals reviewed.   Constitutional:       Appearance: She is normal weight.   Neurological:      General: No focal deficit present.      Mental Status: She is alert and oriented to person, place, and time. Mental status is at baseline.      Cranial Nerves: Cranial nerves 2-12 are intact.      Motor: Motor strength is normal.     Gait: Gait is intact.          Assessment:     Essential tremor    Diabetic peripheral neuropathy associated with type 2 diabetes mellitus         Primary Diagnosis and ICD10  Essential tremor [G25.0]    Plan:     Patient Instructions   Cont Primidone but incr to 100 mg 3x daily ( 7am-noon- 5pm)   Continue to monitor all risk factors   Follow-up regularly with Cardiology and Nephrology  Avoid any headache triggers   Avoid polypharmacy and excess meds  Follow-up three-months      There are no discontinued medications.    Requested Prescriptions      No prescriptions requested or ordered in this encounter

## 2024-01-11 NOTE — PATIENT INSTRUCTIONS
Cont Primidone but incr to 100 mg 3x daily ( 7am-noon- 5pm)   Continue to monitor all risk factors   Follow-up regularly with Cardiology and Nephrology  Avoid any headache triggers   Avoid polypharmacy and excess meds  Follow-up three-months

## 2024-02-23 NOTE — ED PROVIDER NOTES
Encounter Date: 12/17/2023       History     Chief Complaint   Patient presents with    Vomiting    Fever    Diarrhea     Patient presents to ER with complaint of generalized body aches, chills, and cough.  Patient reports decreased appetite with nausea, vomiting, and diarrhea. Reports sore throat and headache.  Cough is non-productive.  Denies fever. Patient reports she was hospitalized 2 days earlier in the week for similar complaint.    The history is provided by the patient. No  was used.     Review of patient's allergies indicates:   Allergen Reactions    Cephalexin Rash    Bactrim [sulfamethoxazole-trimethoprim]     Zofran [ondansetron hcl] Itching     Past Medical History:   Diagnosis Date    Acute gastric ulcer without hemorrhage or perforation 03/03/2022    Age-related osteoporosis with current pathological fracture 04/21/2021    Benign essential hypertension 08/28/2018    Last Assessment & Plan:   Formatting of this note might be different from the original.  Well controlled    Carpal tunnel syndrome     Cerebral vascular accident     CKD (chronic kidney disease) stage 5, GFR less than 15 ml/min 12/11/2023    Coronary arteriosclerosis     S/P CABG 2011    COVID-19 12/20/2020    Diabetes mellitus, type 2     Diabetic peripheral neuropathy associated with type 2 diabetes mellitus 04/28/2022    Essential tremor 01/20/2022    Falls frequently 12/07/2021    Hyperlipidemia     Hypertension     Insomnia secondary to depression with anxiety     Lumbar radiculopathy     Non-rheumatic aortic stenosis 08/28/2018    Formatting of this note might be different from the original.  S/p Tissue AVR July 2011 ([facility] [place]). Echo October 2017 revealed normally functioning bioprosthesis with normal EF 60-65%     Last Assessment & Plan:   Formatting of this note might be different from the original.  Asymptomatic.    Osteoporosis     Pure hypercholesterolemia 08/28/2018    Formatting of this note might  be different from the original.  Chronic statin.       Last Assessment & Plan:   Formatting of this note might be different from the original.   Continue statin.    RBBB 2021    Formatting of this note might be different from the original.  Initially noted on electrocardiogram in 2021.    Spinal stenosis, cervical region 2022    SSS (sick sinus syndrome) 2022    Last Assessment & Plan:   Formatting of this note might be different from the original.  Post placement of [place] scientific dual-chamber permanent pacemaker by Dr. Freeman on 2022.    Subclavian artery stenosis 2018    Formatting of this note might be different from the original.  30% stenosis.    Tremor     Type 2 diabetes mellitus, with long-term current use of insulin 2021    Vitamin D deficiency      Past Surgical History:   Procedure Laterality Date    APPENDECTOMY      ASCENDING AORTIC ANEURYSM REPAIR W/ TISSUE AORTIC VALVE REPLACEMENT      CHOLECYSTECTOMY      CORONARY ARTERY BYPASS GRAFT (CABG)      DIRECT LARYNGOSCOPY Bilateral 2022    Procedure: LARYNGOSCOPY, DIRECT;  Surgeon: Iban Silverio MD;  Location: [facility] OR;  Service: ENT;  Laterality: Bilateral;    HYSTERECTOMY      OOPHORECTOMY      TONSILLECTOMY      VOCAL FOLD LESION EXCISION Bilateral 2022    Procedure: EXCISION, LESION, VOCAL CORD;  Surgeon: Iban Silverio MD;  Location: [facility] OR;  Service: ENT;  Laterality: Bilateral;     Family History   Adopted: Yes   Problem Relation Age of Onset    Cancer Mother     Breast cancer Mother     Heart disease Father     Breast cancer Sister      Social History     Tobacco Use    Smoking status: Former     Current packs/day: 0.00     Average packs/day: 0.5 packs/day for 17.3 years (8.7 ttl pk-yrs)     Types: Cigarettes     Start date:      Quit date: 2011     Years since quittin.8    Smokeless tobacco: Never   Substance Use Topics    Alcohol use: Never    Drug use: Never      Review of Systems   Constitutional:  Positive for activity change, appetite change and fatigue.   Gastrointestinal:  Positive for nausea and vomiting.   Neurological:  Positive for weakness.   All other systems reviewed and are negative.      Physical Exam     Initial Vitals [12/17/23 1610]   BP Pulse Resp Temp SpO2   (!) 160/64 79 18 98.4 °F (36.9 °C) 95 %      MAP       --         Physical Exam    Nursing note and vitals reviewed.  Constitutional: She appears well-developed and well-nourished.   HENT:   Head: Normocephalic.   Right Ear: External ear normal.   Left Ear: External ear normal.   Nose: Nose normal.   Mouth/Throat: Oropharynx is clear and moist.   Eyes: EOM are normal.   Neck:   Normal range of motion.  Cardiovascular:  Normal rate, normal heart sounds and intact distal pulses.           Pulmonary/Chest: Breath sounds normal.   Abdominal: Abdomen is soft. Bowel sounds are normal.   Musculoskeletal:         General: Normal range of motion.      Cervical back: Normal range of motion.     Neurological: She is alert and oriented to person, place, and time. She has normal strength. GCS score is 15. GCS eye subscore is 4. GCS verbal subscore is 5. GCS motor subscore is 6.   Skin: Skin is warm and dry. Capillary refill takes less than 2 seconds.   Psychiatric: She has a normal mood and affect. Her behavior is normal. Judgment and thought content normal.         Medical Screening Exam   See Full Note    ED Course   Procedures  Labs Reviewed   COMPREHENSIVE METABOLIC PANEL - Abnormal; Notable for the following components:       Result Value    Chloride 109 (*)     Glucose 158 (*)     BUN 21 (*)     Creatinine 2.48 (*)     Calcium 10.5 (*)     Alk Phos 53 (*)     eGFR 21 (*)     All other components within normal limits   URINALYSIS, REFLEX TO URINE CULTURE - Abnormal; Notable for the following components:    Protein, UA 20 (*)     Glucose,  (*)     All other components within normal limits   CBC WITH  DIFFERENTIAL - Abnormal; Notable for the following components:    RDW 15.7 (*)     MPV 13.3 (*)     Neutrophils % 75.2 (*)     Lymphocytes % 17.2 (*)     Basophils % 1.1 (*)     All other components within normal limits   CBC MORPHOLOGY - Abnormal; Notable for the following components:    Platelet Morphology Few Large Platelets (*)     All other components within normal limits   RAPID INFLUENZA A/B - Normal   MAGNESIUM - Normal   SARS-COV-2 RNA AMPLIFICATION, QUAL - Normal    Narrative:     Negative SARS-CoV results should not be used as the sole basis for treatment or patient management decisions; negative results should be considered in the context of a patient's recent exposures, history and the presene of clinical signs and symptoms consistent with COVID-19.  Negative results should be treated as presumptive and confirmed by molecular assay, if necessary for patient management.   CBC W/ AUTO DIFFERENTIAL    Narrative:     The following orders were created for panel order CBC auto differential.  Procedure                               Abnormality         Status                     ---------                               -----------         ------                     CBC with Differential[[id number]]       Abnormal            Final result                 Please view results for these tests on the individual orders.          Imaging Results    None          Medications   sodium chloride 0.9% bolus 1,000 mL 1,000 mL (0 mLs Intravenous Stopped 12/17/23 2207)   promethazine (PHENERGAN) 25 mg in dextrose 5 % (D5W) 50 mL IVPB (0 mg Intravenous Stopped 12/17/23 1918)     Medical Decision Making  Patient presents to ER with complaint of generalized body aches, chills, and cough.  Patient reports decreased appetite with nausea, vomiting, and diarrhea. Reports sore throat and headache.  Cough is non-productive.  Denies fever. Patient reports she was hospitalized 2 days earlier in the week for similar complaint.      Amount  and/or Complexity of Data Reviewed  Labs: ordered. Decision-making details documented in ED Course.  Discussion of management or test interpretation with external provider(s): Initiate iv, collect lab work  1 l ns bolus  Phenergan 25mg iv to treat nausea and vomiting.   Patientwong is dc home with dx of nausea and vomiting, and stage 4 renal disease. She is given rx for omeprazole and phenergan to take as prescribed. She is instructed to keep scheduled fu with Dr Brown and to return to ER with new or worsening symptoms.    Risk  Prescription drug management.                                      Clinical Impression:   Final diagnoses:  [R11.2] Nausea and vomiting, unspecified vomiting type (Primary)  [N17.9, N18.4] Acute renal failure superimposed on stage 4 chronic kidney disease, unspecified acute renal failure type        ED Disposition Condition    Discharge Stable          ED Prescriptions       Medication Sig Dispense Start Date End Date Auth. Provider    promethazine (PHENERGAN) 25 MG tablet Take 1 tablet (25 mg total) by mouth every 6 (six) hours as needed for Nausea. 15 tablet 2023 -- Korina Matias FNP    omeprazole (PRILOSEC) 20 MG capsule () Take 1 capsule (20 mg total) by mouth once daily. 30 capsule 2023 Korina Matias FNP          Follow-up Information       Follow up With Specialties Details Why Contact Info    Axel Brown MD Nephrology Schedule an appointment as soon as possible for a visit   [address]  INTERNAL MEDICINE CLINIC  [facility] [address]  [phone]               Korina Matias FNP  24 [id number]

## 2024-04-11 ENCOUNTER — OFFICE VISIT (OUTPATIENT)
Dept: NEUROLOGY | Facility: CLINIC | Age: 69
End: 2024-04-11
Payer: MEDICARE

## 2024-04-11 VITALS
DIASTOLIC BLOOD PRESSURE: 64 MMHG | OXYGEN SATURATION: 97 % | HEIGHT: 64 IN | WEIGHT: 181 LBS | BODY MASS INDEX: 30.9 KG/M2 | HEART RATE: 82 BPM | RESPIRATION RATE: 18 BRPM | SYSTOLIC BLOOD PRESSURE: 122 MMHG

## 2024-04-11 DIAGNOSIS — G25.0 ESSENTIAL TREMOR: Primary | ICD-10-CM

## 2024-04-11 PROCEDURE — 99215 OFFICE O/P EST HI 40 MIN: CPT | Mod: PBBFAC | Performed by: PSYCHIATRY & NEUROLOGY

## 2024-04-11 PROCEDURE — 99214 OFFICE O/P EST MOD 30 MIN: CPT | Mod: S$PBB,,, | Performed by: PSYCHIATRY & NEUROLOGY

## 2024-04-11 RX ORDER — INSULIN DEGLUDEC 200 U/ML
INJECTION, SOLUTION SUBCUTANEOUS
COMMUNITY
Start: 2023-10-26

## 2024-04-11 RX ORDER — HYDROCHLOROTHIAZIDE 25 MG/1
25 TABLET ORAL
COMMUNITY
Start: 2024-01-21

## 2024-04-11 RX ORDER — PANTOPRAZOLE SODIUM 40 MG/1
TABLET, DELAYED RELEASE ORAL
COMMUNITY
Start: 2024-04-01

## 2024-04-11 RX ORDER — ISOSORBIDE DINITRATE 30 MG/1
30 TABLET ORAL
COMMUNITY

## 2024-04-11 NOTE — PROGRESS NOTES
Subjective:       Patient ID: Karen Renteria is a 68 y.o. female     Chief Complaint:    Chief Complaint   Patient presents with    movement disorder     Pt. States had another stroke. Shaking all over  worse.body         Allergies:  Cephalexin, Bactrim [sulfamethoxazole-trimethoprim], and Zofran [ondansetron hcl]    Current Medications:    Outpatient Encounter Medications as of 4/11/2024   Medication Sig Dispense Refill    aspirin (ECOTRIN) 81 MG EC tablet Take 81 mg by mouth.      cholecalciferol, vitamin D3, (VITAMIN D3) 25 mcg (1,000 unit) capsule Take 1,000 Units by mouth once daily.      famotidine (PEPCID) 20 MG tablet Take 1 tablet (20 mg total) by mouth Daily. 90 tablet 0    fluticasone propionate (FLONASE) 50 mcg/actuation nasal spray 2 sprays by Each Nostril route once daily.      folic acid (FOLVITE) 1 MG tablet Take 1 mg by mouth once daily.      hydroCHLOROthiazide (HYDRODIURIL) 25 MG tablet Take 25 mg by mouth.      insulin (BASAGLAR KWIKPEN U-100 INSULIN) glargine 100 units/mL SubQ pen Inject 44 units in the skin in the mornings and 52 units in the evenings 30 mL 3    insulin degludec (TRESIBA FLEXTOUCH U-200) 200 unit/mL (3 mL) insulin pen Tresiba FlexTouch 200 UNIT/ML Subcutaneous Solution Pen-injector QTY: 0  Days: 0 Refills: 0  Written: 10/26/23 Patient Instructions: Give 52 units q am and 45 units q pm      isosorbide dinitrate (ISORDIL) 30 MG Tab Take 30 mg by mouth.      isosorbide mononitrate (IMDUR) 30 MG 24 hr tablet Take 1 tablet (30 mg total) by mouth once daily. 30 tablet 5    magnesium 250 mg Tab Take by mouth.      meclizine (ANTIVERT) 25 mg tablet Take 1 tablet (25 mg total) by mouth 3 (three) times daily as needed for Dizziness. 90 tablet 11    ondansetron (ZOFRAN-ODT) 4 MG TbDL Take 1 tablet (4 mg total) by mouth every 8 (eight) hours as needed (nausea). 20 tablet 0    pantoprazole (PROTONIX) 40 MG tablet TAKE 1 TABLET BY MOUTH ONCE DAILY PRIOR TO SUPPER      primidone  (MYSOLINE) 50 MG Tab Take 2 tablets (100 mg total) by mouth 3 (three) times daily. 540 tablet 3    promethazine (PHENERGAN) 25 MG tablet Take 1 tablet (25 mg total) by mouth every 6 (six) hours as needed for Nausea. 30 tablet 5    promethazine (PHENERGAN) 25 MG tablet Take 1 tablet (25 mg total) by mouth every 6 (six) hours as needed for Nausea. 15 tablet 0    rosuvastatin (CRESTOR) 20 MG tablet Take 20 mg by mouth every evening.      semaglutide (OZEMPIC) 0.25 mg or 0.5 mg(2 mg/1.5 mL) pen injector Inject 0.25 mg into the skin.      sertraline (ZOLOFT) 50 MG tablet TAKE ONE TABLET BY MOUTH EVERY DAY 90 tablet 3    ZINC ORAL Take 250 mg by mouth Daily.      metoprolol succinate (TOPROL-XL) 50 MG 24 hr tablet Take 50 mg by mouth.      omeprazole (PRILOSEC) 20 MG capsule Take 1 capsule (20 mg total) by mouth once daily. 30 capsule 0    pregabalin (LYRICA) 75 MG capsule Take 1 capsule (75 mg total) by mouth 2 (two) times daily. 60 capsule 5     No facility-administered encounter medications on file as of 4/11/2024.       History of Present Illness  67 yo WF in f/u for essential tremor- mult medical problems   Trial of Primidone 50 mg tid has not helped her symptoms as she could not tolerate such due to sedation          Past Medical History:   Diagnosis Date    Acute gastric ulcer without hemorrhage or perforation 03/03/2022    Age-related osteoporosis with current pathological fracture 04/21/2021    Benign essential hypertension 08/28/2018    Last Assessment & Plan:   Formatting of this note might be different from the original.  Well controlled    Carpal tunnel syndrome     Cerebral vascular accident     CKD (chronic kidney disease) stage 5, GFR less than 15 ml/min 12/11/2023    Coronary arteriosclerosis     S/P CABG 2011    COVID-19 12/20/2020    Diabetes mellitus, type 2     Diabetic peripheral neuropathy associated with type 2 diabetes mellitus 04/28/2022    Essential tremor 01/20/2022    Falls frequently  12/07/2021    Hyperlipidemia     Hypertension     Insomnia secondary to depression with anxiety     Lumbar radiculopathy     Non-rheumatic aortic stenosis 08/28/2018    Formatting of this note might be different from the original.  S/p Tissue AVR July 2011 ([facility]). Echo October 2017 revealed normally functioning bioprosthesis with normal EF 60-65%     Last Assessment & Plan:   Formatting of this note might be different from the original.  Asymptomatic.    Osteoporosis     Pure hypercholesterolemia 08/28/2018    Formatting of this note might be different from the original.  Chronic statin.       Last Assessment & Plan:   Formatting of this note might be different from the original.   Continue statin.    RBBB 09/08/2021    Formatting of this note might be different from the original.  Initially noted on electrocardiogram in September 2021.    Spinal stenosis, cervical region 01/20/2022    SSS (sick sinus syndrome) 07/12/2022    Last Assessment & Plan:   Formatting of this note might be different from the original.  Post placement of [employer] scientific dual-chamber permanent pacemaker by Dr. Freeman on 07/12/2022.    Subclavian artery stenosis 08/28/2018    Formatting of this note might be different from the original.  30% stenosis.    Tremor     Type 2 diabetes mellitus, with long-term current use of insulin 01/13/2021    Vitamin D deficiency        Past Surgical History:   Procedure Laterality Date    APPENDECTOMY      ASCENDING AORTIC ANEURYSM REPAIR W/ TISSUE AORTIC VALVE REPLACEMENT      CHOLECYSTECTOMY      CORONARY ARTERY BYPASS GRAFT (CABG)      DIRECT LARYNGOSCOPY Bilateral 08/12/2022    Procedure: LARYNGOSCOPY, DIRECT;  Surgeon: Iban Silverio MD;  Location: [facility] OR;  Service: ENT;  Laterality: Bilateral;    HYSTERECTOMY      OOPHORECTOMY      TONSILLECTOMY      VOCAL FOLD LESION EXCISION Bilateral 08/12/2022    Procedure: EXCISION, LESION, VOCAL CORD;  Surgeon: Iban Silverio MD;  Location:  "[facility] OR;  Service: ENT;  Laterality: Bilateral;       Social History  Ms. Renteria  reports that she quit smoking about 12 years ago. Her smoking use included cigarettes. She started smoking about 30 years ago. She has a 8.7 pack-year smoking history. She has never used smokeless tobacco. She reports that she does not drink alcohol and does not use drugs.    Family History  Ms.'s Renteria family history includes Breast cancer in her mother and sister; Cancer in her mother; Heart disease in her father. She was adopted.    Review of Systems  Review of Systems   Musculoskeletal:  Positive for back pain.   Neurological:  Positive for tremors.   Psychiatric/Behavioral:  Positive for depression.    All other systems reviewed and are negative.     Objective:   /64 (BP Location: Right arm, Patient Position: Sitting, BP Method: Large (Manual))   Pulse 82   Resp 18   Ht 5' 4" (1.626 m)   Wt 82.1 kg (181 lb)   SpO2 97%   BMI 31.07 kg/m²    NEUROLOGICAL EXAMINATION:     MENTAL STATUS   Oriented to person, place, and time.   Level of consciousness: alert  Knowledge: good.     CRANIAL NERVES   Cranial nerves II through XII intact.     MOTOR EXAM     Strength   Strength 5/5 throughout.     GAIT AND COORDINATION     Gait  Gait: normal       Physical Exam  Vitals reviewed.   Constitutional:       Appearance: She is obese.   Neurological:      Mental Status: She is alert and oriented to person, place, and time. Mental status is at baseline.      Cranial Nerves: Cranial nerves 2-12 are intact.      Motor: Motor strength is normal.     Gait: Gait is intact.          Assessment:     Essential tremor         Primary Diagnosis and ICD10  Essential tremor [G25.0]    Plan:     Patient Instructions   Will refer to Dr Ulices Rodgersgy- eval for poss DBS  Control risk factors   F/u w/ Cards for mult cardiac condtions   F/u prn    There are no discontinued medications.    Requested Prescriptions      No prescriptions " requested or ordered in this encounter

## 2024-04-11 NOTE — PATIENT INSTRUCTIONS
Will refer to Dr Ulices Christianson Nsgy- eval for poss DBS  Control risk factors   F/u w/ Cards for mult cardiac condtions   F/u prn

## 2024-05-30 DIAGNOSIS — Z12.31 BREAST CANCER SCREENING BY MAMMOGRAM: Primary | ICD-10-CM

## 2024-06-20 ENCOUNTER — OFFICE VISIT (OUTPATIENT)
Dept: OBSTETRICS AND GYNECOLOGY | Facility: CLINIC | Age: 69
End: 2024-06-20
Payer: MEDICARE

## 2024-06-20 VITALS
DIASTOLIC BLOOD PRESSURE: 55 MMHG | OXYGEN SATURATION: 99 % | SYSTOLIC BLOOD PRESSURE: 107 MMHG | HEIGHT: 64 IN | RESPIRATION RATE: 17 BRPM | HEART RATE: 84 BPM | WEIGHT: 183.81 LBS | BODY MASS INDEX: 31.38 KG/M2

## 2024-06-20 DIAGNOSIS — E66.9 OBESITY (BMI 30.0-34.9): ICD-10-CM

## 2024-06-20 DIAGNOSIS — Z87.19 HISTORY OF DIVERTICULITIS: ICD-10-CM

## 2024-06-20 DIAGNOSIS — R10.84 GENERALIZED ABDOMINAL PAIN: Primary | ICD-10-CM

## 2024-06-20 PROCEDURE — 99202 OFFICE O/P NEW SF 15 MIN: CPT | Mod: ,,, | Performed by: ADVANCED PRACTICE MIDWIFE

## 2024-06-20 RX ORDER — CYCLOBENZAPRINE HCL 10 MG
10 TABLET ORAL
COMMUNITY

## 2024-06-20 RX ORDER — METOPROLOL SUCCINATE 50 MG/1
50 TABLET, EXTENDED RELEASE ORAL DAILY
COMMUNITY

## 2024-06-20 RX ORDER — MELOXICAM 15 MG/1
15 TABLET ORAL DAILY PRN
COMMUNITY

## 2024-06-20 RX ORDER — PREGABALIN 75 MG/1
75 CAPSULE ORAL
COMMUNITY

## 2024-06-20 RX ORDER — DAPAGLIFLOZIN 10 MG/1
10 TABLET, FILM COATED ORAL DAILY
COMMUNITY

## 2024-06-20 RX ORDER — SULFAMETHOXAZOLE AND TRIMETHOPRIM 800; 160 MG/1; MG/1
1 TABLET ORAL 2 TIMES DAILY
COMMUNITY
Start: 2024-06-03

## 2024-06-20 NOTE — PROGRESS NOTES
Patient ID:  Karen Renteria is a 68 y.o. female      Chief Complaint:   Chief Complaint   Patient presents with    Pelvic Pain     Patient is here for pelvic pain, has been experiencing it for over a month and it also hurts when she urinates.        HPI:  Mrs Jones presents as a new patient with c/o constant pelvic/abd pain.  present during visit. I explained that she should see a PCP as she has had a hysterectomy. Reports recent discharge from [facility], was admitted 6/14/24 for dehydration & UTI. Treated with Cipro, Macrobid, and Bactrim. Copy of hospital discharge scanned into EMR, no CT abd done, no reports available. Will request records. She states pain keeps up up at night. Has an extensive hx of medical problems.  reports she was dx with diverticulitis at one time which has not been told to PCP and not listed as medical hx. Thinks pain may be from abd hernia. Discussed and agreed to abd US, will refer to surgical department if needed. Continues to work, no change in activity level. Reports having bladder tack surgery in past. Medical hx updated.   LMP: No LMP recorded. Patient has had a hysterectomy. Done in 1981 by Dr. Bella for endometriosis  Sexually active:  not currently  Contraceptive: n/a      Past Medical History:   Diagnosis Date    Acute gastric ulcer without hemorrhage or perforation 03/03/2022    Age-related osteoporosis with current pathological fracture 04/21/2021    Benign essential hypertension 08/28/2018    Last Assessment & Plan:   Formatting of this note might be different from the original.  Well controlled    Carpal tunnel syndrome     Cerebral vascular accident     CKD (chronic kidney disease) stage 5, GFR less than 15 ml/min 12/11/2023    Coronary arteriosclerosis     S/P CABG 2011    COVID-19 12/20/2020    Diabetes mellitus, type 2     Diabetic peripheral neuropathy associated with type 2 diabetes mellitus 04/28/2022    Diverticulitis     Essential tremor  01/20/2022    Falls frequently 12/07/2021    Hyperlipidemia     Hypertension     Insomnia secondary to depression with anxiety     Lumbar radiculopathy     Non-rheumatic aortic stenosis 08/28/2018    Formatting of this note might be different from the original.  S/p Tissue AVR July 2011 ([facility]). Echo October 2017 revealed normally functioning bioprosthesis with normal EF 60-65%     Last Assessment & Plan:   Formatting of this note might be different from the original.  Asymptomatic.    Osteoporosis     Pure hypercholesterolemia 08/28/2018    Formatting of this note might be different from the original.  Chronic statin.       Last Assessment & Plan:   Formatting of this note might be different from the original.   Continue statin.    RBBB 09/08/2021    Formatting of this note might be different from the original.  Initially noted on electrocardiogram in September 2021.    Spinal stenosis, cervical region 01/20/2022    SSS (sick sinus syndrome) 07/12/2022    Last Assessment & Plan:   Formatting of this note might be different from the original.  Post placement of [employer] scientific dual-chamber permanent pacemaker by Dr. Freeman on 07/12/2022.    Subclavian artery stenosis 08/28/2018    Formatting of this note might be different from the original.  30% stenosis.    Tremor     Type 2 diabetes mellitus, with long-term current use of insulin 01/13/2021    Vitamin D deficiency      Past Surgical History:   Procedure Laterality Date    APPENDECTOMY      ASCENDING AORTIC ANEURYSM REPAIR W/ TISSUE AORTIC VALVE REPLACEMENT      BLADDER SUSPENSION Right     CHOLECYSTECTOMY      CORONARY ARTERY BYPASS GRAFT (CABG)      DIRECT LARYNGOSCOPY Bilateral 08/12/2022    Procedure: LARYNGOSCOPY, DIRECT;  Surgeon: Iban Silverio MD;  Location: [facility];  Service: ENT;  Laterality: Bilateral;    HYSTERECTOMY      OOPHORECTOMY      TONSILLECTOMY      VOCAL FOLD LESION EXCISION Bilateral 08/12/2022     "Procedure: EXCISION, LESION, VOCAL CORD;  Surgeon: Iban Silverio MD;  Location: [facility];  Service: ENT;  Laterality: Bilateral;       OB History          2    Para   2    Term   2            AB        Living             SAB        IAB        Ectopic        Multiple        Live Births                     BP (!) 107/55 (BP Location: Right arm, Patient Position: Sitting)   Pulse 84   Resp 17   Ht 5' 4" (1.626 m)   Wt 83.4 kg (183 lb 12.8 oz)   SpO2 99%   BMI 31.55 kg/m²   Wt Readings from Last 3 Encounters:   24 83.4 kg (183 lb 12.8 oz)   24 82.1 kg (181 lb)   24 78.9 kg (174 lb)          ROS:  Review of Systems   Constitutional: Negative.    Eyes: Negative.    Respiratory: Negative.     Cardiovascular: Negative.    Gastrointestinal:  Positive for abdominal pain.   Genitourinary:  Positive for pelvic pain.   Musculoskeletal: Negative.    Neurological: Negative.    Psychiatric/Behavioral: Negative.            PHYSICAL EXAM:  Physical Exam  Constitutional:       Appearance: Normal appearance. She is obese.   Eyes:      Extraocular Movements: Extraocular movements intact.   Cardiovascular:      Rate and Rhythm: Normal rate.      Pulses: Normal pulses.   Pulmonary:      Effort: Pulmonary effort is normal.   Abdominal:      Palpations: Abdomen is soft.      Tenderness: There is generalized abdominal tenderness.       Genitourinary:     General: Normal vulva.      Exam position: Lithotomy position.      Urethra: No prolapse.      Comments: Vaginal atrophy. No obvious cystocele or rectocele noted upon exam.   Musculoskeletal:         General: Normal range of motion.      Cervical back: Normal range of motion.   Skin:     General: Skin is warm and dry.   Neurological:      Mental Status: She is alert and oriented to person, place, and time.   Psychiatric:         Mood and Affect: Mood normal.         Behavior: Behavior normal.         Thought Content: Thought content normal.        "  Judgment: Judgment normal.        Assessment:  Karen was seen today for pelvic pain.    Diagnoses and all orders for this visit:    Generalized abdominal pain  -     US Abdomen Complete; Future    History of diverticulitis  -     US Abdomen Complete; Future    Obesity (BMI 30.0-34.9)          ICD-10-CM ICD-9-CM    1. Generalized abdominal pain  R10.84 789.07 US Abdomen Complete      2. History of diverticulitis  Z87.19 V12.70 US Abdomen Complete      3. Obesity (BMI 30.0-34.9)  E66.9 278.00           Plan:  Abd US ordered, will schedule and call with appointment  Will call with report and refer if needed  Encouraged follow up with PCP  Encouraged to take home meds at prescribed (Flexeril @ hs, Pepcid @ hs)   Keep all follow up appointments as scheduled     Follow up for appointment with PCP.

## 2024-06-21 ENCOUNTER — PATIENT OUTREACH (OUTPATIENT)
Facility: HOSPITAL | Age: 69
End: 2024-06-21
Payer: MEDICARE

## 2024-06-21 ENCOUNTER — TELEPHONE (OUTPATIENT)
Dept: OBSTETRICS AND GYNECOLOGY | Facility: CLINIC | Age: 69
End: 2024-06-21
Payer: MEDICARE

## 2024-06-21 NOTE — TELEPHONE ENCOUNTER
----- Message from Cleveland Decker sent at 6/21/2024  3:25 PM CDT -----  Regarding: Patient is returning missed call.  Patient is returning missed call about ultrasound. Please call  [phone]

## 2024-06-23 PROBLEM — R10.84 GENERALIZED ABDOMINAL PAIN: Status: ACTIVE | Noted: 2024-06-23

## 2024-06-23 PROBLEM — Z87.19 HISTORY OF DIVERTICULITIS: Status: ACTIVE | Noted: 2024-06-23

## 2024-07-11 ENCOUNTER — HOSPITAL ENCOUNTER (OUTPATIENT)
Dept: RADIOLOGY | Facility: HOSPITAL | Age: 69
Discharge: HOME | End: 2024-07-11
Attending: ADVANCED PRACTICE MIDWIFE
Payer: MEDICARE

## 2024-07-11 DIAGNOSIS — Z87.19 HISTORY OF DIVERTICULITIS: ICD-10-CM

## 2024-07-11 DIAGNOSIS — R10.84 GENERALIZED ABDOMINAL PAIN: ICD-10-CM

## 2024-07-11 PROCEDURE — 76700 US EXAM ABDOM COMPLETE: CPT | Mod: TC

## 2024-07-11 PROCEDURE — 76700 US EXAM ABDOM COMPLETE: CPT | Mod: 26,,, | Performed by: RADIOLOGY

## 2024-07-15 ENCOUNTER — HOSPITAL ENCOUNTER (OUTPATIENT)
Dept: RADIOLOGY | Facility: HOSPITAL | Age: 69
Discharge: HOME | End: 2024-07-15
Attending: FAMILY MEDICINE
Payer: MEDICARE

## 2024-07-15 VITALS — WEIGHT: 175 LBS | HEIGHT: 64 IN | BODY MASS INDEX: 29.88 KG/M2

## 2024-07-15 DIAGNOSIS — Z12.31 BREAST CANCER SCREENING BY MAMMOGRAM: ICD-10-CM

## 2024-07-15 PROCEDURE — 77063 BREAST TOMOSYNTHESIS BI: CPT | Mod: TC

## 2024-10-25 ENCOUNTER — TELEPHONE (OUTPATIENT)
Dept: UROLOGY | Facility: CLINIC | Age: 69
End: 2024-10-25
Payer: MEDICARE

## 2024-10-25 NOTE — TELEPHONE ENCOUNTER
----- Message from Radha sent at 10/25/2024 10:10 AM CDT -----  Regarding: FW: Needs Appt.    ----- Message -----  From: Fabi Pavon  Sent: 10/25/2024   9:43 AM CDT  To: [facility] Staff  Subject: Needs Appt.                                      Karen Renteria calling regarding Patient Advice (message) for Who Called: Tanya Slaughter    Caller is requesting assistance/information from provider's office.    Symptoms (please be specific): Several cysts on bladder and painful urination  How long has patient had these symptoms: July    Preferred Method of Contact: Phone Call  Best Call Back Number, if different: Please call [phone]  Additional Information: Pt. Stated her  works at [employer] and spoke to Dr. De Leon in Pathology lab. Dr. De Leon told him he was back in business and would be able to see his wife. Wife wants an appt. With Dr. De Leon only. I offered her NP Brent Medel and mentioned Dr. Rangel would be coming next year. Pt. Stated she had surgery that removed one cyst and took antibiotics and more cysts came back.         If receive a Tanya Slaughter please delete and disregard. My system pulled her up instead of the correct pt. And I didn't catch it till I sent it but I called myself deleting the message before it was completely sent to the Urology dept.    Dr De Leon has retired and Dr Jimbo Rangel is seeing patient today. 11/18/2024

## 2024-10-25 NOTE — TELEPHONE ENCOUNTER
----- Message from Fabi sent at 10/25/2024  9:40 AM CDT -----  Regarding: Needs Appt.  Karen Renteria calling regarding Patient Advice (message) for Who Called: Tanya ANGELICA RodriguezSlaughter    Caller is requesting assistance/information from provider's office.    Symptoms (please be specific): Several cysts on bladder and painful urination  How long has patient had these symptoms: July    Preferred Method of Contact: Phone Call  Best Call Back Number, if different: Please call [phone]  Additional Information: Pt. Stated her  works at [employer] and spoke to Dr. De Leon in Pathology lab. Dr. De Leon told him he was back in business and would be able to see his wife. Wife wants an appt. With Dr. De Leon only. I offered her NP Brent Medel and mentioned Dr. Rangel would be coming next year. Pt. Stated she had surgery that removed one cyst and took antibiotics and more cysts came back.     I called pt at [phone] and spoke with her.    She reports that she is having problems with dysuria, discolored, nasty looking urine, foul odor to urine, back and bladder pain.  Says she saw Dr De Leon 20 years ago or so.  She said she had UTI back in June or July, thought it was a UTI, her family doctor treated her with 4 rounds of antibiotics and got no better.  Then went to ER and saw on call Dr Frausto, he put roman in and treated her with antibiotics, then after DC she had Bladder washes 1 x week for 4 weeks, and it helped some.  Also had 1 cyst removed and was told it was OK.  But she has several cysts.  Said bladder removal was mentioned and she does not want to have the bladder removed.  She would like to be seen by Dr De Leon if possible.  I told her clinic is closed for the day, but I will check and get back in touch next week.  She voiced understanding.

## 2024-11-18 ENCOUNTER — OFFICE VISIT (OUTPATIENT)
Dept: UROLOGY | Facility: CLINIC | Age: 69
End: 2024-11-18
Payer: MEDICARE

## 2024-11-18 VITALS
HEIGHT: 64 IN | DIASTOLIC BLOOD PRESSURE: 56 MMHG | SYSTOLIC BLOOD PRESSURE: 103 MMHG | BODY MASS INDEX: 30.73 KG/M2 | WEIGHT: 180 LBS | HEART RATE: 89 BPM

## 2024-11-18 DIAGNOSIS — R35.0 FREQUENCY OF URINATION: Primary | ICD-10-CM

## 2024-11-18 DIAGNOSIS — N39.0 URINARY TRACT INFECTION WITH HEMATURIA, SITE UNSPECIFIED: ICD-10-CM

## 2024-11-18 DIAGNOSIS — R30.9 PAIN WITH URINATION: ICD-10-CM

## 2024-11-18 DIAGNOSIS — M48.02 SPINAL STENOSIS, CERVICAL REGION: ICD-10-CM

## 2024-11-18 DIAGNOSIS — E11.42 DIABETIC PERIPHERAL NEUROPATHY ASSOCIATED WITH TYPE 2 DIABETES MELLITUS: ICD-10-CM

## 2024-11-18 DIAGNOSIS — Z87.19 HISTORY OF DIVERTICULITIS: ICD-10-CM

## 2024-11-18 DIAGNOSIS — R31.9 URINARY TRACT INFECTION WITH HEMATURIA, SITE UNSPECIFIED: ICD-10-CM

## 2024-11-18 PROCEDURE — 99204 OFFICE O/P NEW MOD 45 MIN: CPT | Mod: S$PBB,,, | Performed by: UROLOGY

## 2024-11-18 PROCEDURE — 87186 SC STD MICRODIL/AGAR DIL: CPT | Mod: ,,, | Performed by: CLINICAL MEDICAL LABORATORY

## 2024-11-18 PROCEDURE — 99999 PR PBB SHADOW E&M-EST. PATIENT-LVL V: CPT | Mod: PBBFAC,,, | Performed by: UROLOGY

## 2024-11-18 PROCEDURE — 99215 OFFICE O/P EST HI 40 MIN: CPT | Mod: PBBFAC | Performed by: UROLOGY

## 2024-11-18 PROCEDURE — 87086 URINE CULTURE/COLONY COUNT: CPT | Mod: ,,, | Performed by: CLINICAL MEDICAL LABORATORY

## 2024-11-18 PROCEDURE — 87077 CULTURE AEROBIC IDENTIFY: CPT | Mod: ,,, | Performed by: CLINICAL MEDICAL LABORATORY

## 2024-11-18 RX ORDER — METHENAMINE, SODIUM PHOSPHATE, MONOBASIC, ANHYDROUS, PHENYL SALICYLATE, METHYLENE BLUE AND HYOSCYAMINE SULFATE 118; 40.8; 36; 10; .12 MG/1; MG/1; MG/1; MG/1; MG/1
CAPSULE ORAL
COMMUNITY

## 2024-11-18 RX ORDER — LEVOTHYROXINE SODIUM 25 UG/1
25 TABLET ORAL
COMMUNITY

## 2024-11-18 NOTE — PROGRESS NOTES
Assessment:   1. Frequency of urination    2. Pain with urination    3. Spinal stenosis, cervical region    4. Diabetic peripheral neuropathy associated with type 2 diabetes mellitus    5. History of diverticulitis    6. Urinary tract infection with hematuria, site unspecified  -     Urine culture; Future; Expected date: 11/18/2024    Other orders  -     Discontinue: vibegron 75 mg Tab; Take 1 tablet (75 mg total) by mouth once daily. for 14 days         Plan:     We agreed to discontinue URO=MP and start a trial of therapy with selective beta agonist to see if we can calm her bladder down and improve her symptoms.  She is most bothered by nocturia and the urinary frequency which is restricting her sleep and causing dysphoria.  She is also bothered by the urethral pain and we discussed that this could be associated with a component of a holding pattern that she might have developed for the urgency and frequency because the pain is not typical dysuria with the entire stream and at the urethral meatus but more so a pain in the bladder.  We discussed her prior cysts that were removed in the bladder and discussed that my next step would be to see if a trial of selective beta agonist therapy is efficacious and if we can control her symptoms and that I would like to perform a pelvic exam with cystometrogram based on her complaints of feeling as though the bladder is coming out as she may also have a component of prolapse.  We spent time discussing the central nervous system and peripheral nervous system of the bladder and that with her history of diabetes mellitus she may have developed a neuropathy and that we might need to just several medications to including some of the medications that result in glucosuria which also can exacerbate symptoms.     Plan:  She is going to perform a voiding diary and conduct a 2 week trial on selective beta 3 agonist therapy to determine if this helps  Return in 2 weeks for pelvic exam  and cystometrogram and possible cystoscopy            Jimbo Rangel MD  Urology  11/18/2024          UROLOGY HISTORY AND PHYSICAL EXAM    Subjective:      Patient ID: Karen Renteria is a 69 y.o. female.    Chief Complaint::   HPI    The patient is a 69-year-old female with a history of spinal stenosis, diabetic peripheral neuropathy, chronic kidney disease and lower urinary tract symptoms that presents today for urology evaluation for a 6 month history of worsening irritative voiding symptoms with urgency frequency nocturia and pain with urination.  She reports the pain with urination is at the beginning of the stream.     Past Medical History:   Diagnosis Date    Acute gastric ulcer without hemorrhage or perforation 03/03/2022    Age-related osteoporosis with current pathological fracture 04/21/2021    Benign essential hypertension 08/28/2018    Last Assessment & Plan:   Formatting of this note might be different from the original.  Well controlled    Carpal tunnel syndrome     Cerebral vascular accident     CKD (chronic kidney disease) stage 5, GFR less than 15 ml/min 12/11/2023    Coronary arteriosclerosis     S/P CABG 2011    COVID-19 12/20/2020    Diabetes mellitus, type 2     Diabetic peripheral neuropathy associated with type 2 diabetes mellitus 04/28/2022    Diverticulitis     Essential tremor 01/20/2022    Falls frequently 12/07/2021    Hyperlipidemia     Hypertension     Insomnia secondary to depression with anxiety     Lumbar radiculopathy     Non-rheumatic aortic stenosis 08/28/2018    Formatting of this note might be different from the original.  S/p Tissue AVR July 2011 ([facility]). Echo October 2017 revealed normally functioning bioprosthesis with normal EF 60-65%     Last Assessment & Plan:   Formatting of this note might be different from the original.  Asymptomatic.    Osteoporosis     Pure hypercholesterolemia 08/28/2018    Formatting of this note might be different from the original.   Chronic statin.       Last Assessment & Plan:   Formatting of this note might be different from the original.   Continue statin.    RBBB 09/08/2021    Formatting of this note might be different from the original.  Initially noted on electrocardiogram in September 2021.    Spinal stenosis, cervical region 01/20/2022    SSS (sick sinus syndrome) 07/12/2022    Last Assessment & Plan:   Formatting of this note might be different from the original.  Post placement of [place] scientific dual-chamber permanent pacemaker by Dr. Freeman on 07/12/2022.    Subclavian artery stenosis 08/28/2018    Formatting of this note might be different from the original.  30% stenosis.    Tremor     Type 2 diabetes mellitus, with long-term current use of insulin 01/13/2021    Vitamin D deficiency      Past Surgical History:   Procedure Laterality Date    APPENDECTOMY      ASCENDING AORTIC ANEURYSM REPAIR W/ TISSUE AORTIC VALVE REPLACEMENT      BLADDER SUSPENSION Right     CHOLECYSTECTOMY      CORONARY ARTERY BYPASS GRAFT (CABG)      DIRECT LARYNGOSCOPY Bilateral 08/12/2022    Procedure: LARYNGOSCOPY, DIRECT;  Surgeon: Iban Silverio MD;  Location: [facility] OR;  Service: ENT;  Laterality: Bilateral;    HYSTERECTOMY      OOPHORECTOMY      TONSILLECTOMY      VOCAL FOLD LESION EXCISION Bilateral 08/12/2022    Procedure: EXCISION, LESION, VOCAL CORD;  Surgeon: Iban Silverio MD;  Location: [facility] OR;  Service: ENT;  Laterality: Bilateral;        Current Outpatient Medications on File Prior to Visit   Medication Sig Dispense Refill    aspirin (ECOTRIN) 81 MG EC tablet Take 81 mg by mouth.      cyclobenzaprine (FLEXERIL) 10 MG tablet Take 10 mg by mouth as needed for Muscle spasms.      dapagliflozin propanediol (FARXIGA) 10 mg tablet Take 10 mg by mouth once daily.      fluticasone propionate (FLONASE) 50 mcg/actuation nasal spray 2 sprays by Each Nostril route once daily. (Patient not taking: Reported on 6/20/2024)      folic acid (FOLVITE)  1 MG tablet Take 1 mg by mouth once daily.      hydroCHLOROthiazide (HYDRODIURIL) 25 MG tablet Take 25 mg by mouth.      insulin degludec (TRESIBA FLEXTOUCH U-200) 200 unit/mL (3 mL) insulin pen Tresiba FlexTouch 200 UNIT/ML Subcutaneous Solution Pen-injector QTY: 0  Days: 0 Refills: 0  Written: 10/26/23 Patient Instructions: Give 52 units q am and 45 units q pm      isosorbide dinitrate (ISORDIL) 30 MG Tab Take 30 mg by mouth. (Patient not taking: Reported on 6/20/2024)      isosorbide mononitrate (IMDUR) 30 MG 24 hr tablet Take 1 tablet (30 mg total) by mouth once daily. 30 tablet 5    levothyroxine (SYNTHROID) 25 MCG tablet Take 25 mcg by mouth.      meclizine (ANTIVERT) 25 mg tablet Take 1 tablet (25 mg total) by mouth 3 (three) times daily as needed for Dizziness. 90 tablet 11    metoprolol succinate (TOPROL-XL) 50 MG 24 hr tablet Take 50 mg by mouth once daily.      pantoprazole (PROTONIX) 40 MG tablet TAKE 1 TABLET BY MOUTH ONCE DAILY PRIOR TO SUPPER      pregabalin (LYRICA) 75 MG capsule Take 75 mg by mouth.      primidone (MYSOLINE) 50 MG Tab Take 2 tablets (100 mg total) by mouth 3 (three) times daily. 540 tablet 3    promethazine (PHENERGAN) 25 MG tablet Take 1 tablet (25 mg total) by mouth every 6 (six) hours as needed for Nausea. 30 tablet 5    promethazine (PHENERGAN) 25 MG tablet Take 1 tablet (25 mg total) by mouth every 6 (six) hours as needed for Nausea. (Patient not taking: Reported on 6/20/2024) 15 tablet 0    rosuvastatin (CRESTOR) 20 MG tablet Take 20 mg by mouth every evening.      semaglutide (OZEMPIC) 0.25 mg or 0.5 mg(2 mg/1.5 mL) pen injector Inject 0.25 mg into the skin.      sertraline (ZOLOFT) 50 MG tablet TAKE ONE TABLET BY MOUTH EVERY DAY 90 tablet 3    URO--10-40.8-36 mg Cap TAKE 1 CAPSULE BY MOUTH EVERY MORNING, AT NOON, IN THE EVENING AND BEFORE BEDTIME      [DISCONTINUED] cholecalciferol, vitamin D3, (VITAMIN D3) 25 mcg (1,000 unit) capsule Take 1,000 Units by mouth once  daily. (Patient not taking: Reported on 6/20/2024)      [DISCONTINUED] famotidine (PEPCID) 20 MG tablet Take 1 tablet (20 mg total) by mouth Daily. (Patient not taking: Reported on 6/20/2024) 90 tablet 0    [DISCONTINUED] insulin (BASAGLAR KWIKPEN U-100 INSULIN) glargine 100 units/mL SubQ pen Inject 44 units in the skin in the mornings and 52 units in the evenings (Patient not taking: Reported on 6/20/2024) 30 mL 3    [DISCONTINUED] magnesium 250 mg Tab Take by mouth. (Patient not taking: Reported on 6/20/2024)      [DISCONTINUED] meloxicam (MOBIC) 15 MG tablet Take 15 mg by mouth daily as needed.      [DISCONTINUED] omeprazole (PRILOSEC) 20 MG capsule Take 1 capsule (20 mg total) by mouth once daily. 30 capsule 0    [DISCONTINUED] ondansetron (ZOFRAN-ODT) 4 MG TbDL Take 1 tablet (4 mg total) by mouth every 8 (eight) hours as needed (nausea). (Patient not taking: Reported on 6/20/2024) 20 tablet 0    [DISCONTINUED] sulfamethoxazole-trimethoprim 800-160mg (BACTRIM DS) 800-160 mg Tab Take 1 tablet by mouth 2 (two) times daily. (Patient not taking: Reported on 6/20/2024)      [DISCONTINUED] ZINC ORAL Take 250 mg by mouth Daily. (Patient not taking: Reported on 6/20/2024)       No current facility-administered medications on file prior to visit.        Review of patient's allergies indicates:   Allergen Reactions    Cephalexin Rash    Bactrim [sulfamethoxazole-trimethoprim]     Zofran [ondansetron hcl] Itching     Vitals:    11/18/24 0820   BP: (!) 103/56   Pulse: 89          Review of Systems   Constitutional:  Negative for activity change.   HENT:  Negative for mouth sores.    Eyes:  Negative for visual disturbance.   Respiratory:  Negative for shortness of breath.    Cardiovascular:  Negative for palpitations and leg swelling.   Gastrointestinal:  Negative for abdominal pain.   Genitourinary:  Positive for difficulty urinating, frequency and urgency. Negative for dysuria, hematuria and vaginal pain.    Musculoskeletal:  Negative for joint swelling and myalgias.   Neurological:  Negative for weakness.        Objective:     Physical Exam  Vitals and nursing note reviewed.   Constitutional:       Appearance: Normal appearance. She is not ill-appearing or diaphoretic.   Cardiovascular:      Rate and Rhythm: Normal rate.   Pulmonary:      Effort: Pulmonary effort is normal. No respiratory distress.      Breath sounds: No wheezing.   Abdominal:      General: There is no distension.      Palpations: Abdomen is soft. There is no mass.      Tenderness: There is no abdominal tenderness.   Musculoskeletal:         General: No tenderness or deformity.   Skin:     General: Skin is warm and dry.   Neurological:      General: No focal deficit present.      Mental Status: She is alert and oriented to person, place, and time. Mental status is at baseline.   Psychiatric:         Mood and Affect: Mood normal.         Behavior: Behavior normal.         Thought Content: Thought content normal.         Judgment: Judgment normal.            CMP  Sodium   Date Value Ref Range Status   05/17/2024 143 136 - 145 mmol/L Final     Potassium   Date Value Ref Range Status   05/17/2024 4.4 3.5 - 5.1 mmol/L Final     Chloride   Date Value Ref Range Status   05/17/2024 108 100 - 109 mmol/L Final     Carbon Dioxide   Date Value Ref Range Status   05/17/2024 25 22 - 33 mmol/L Final     Glucose   Date Value Ref Range Status   12/17/2023 158 (H) 74 - 106 mg/dL Final     Blood Urea Nitrogen   Date Value Ref Range Status   05/17/2024 40 (H) 5 - 25 mg/dL Final     Creatinine   Date Value Ref Range Status   05/17/2024 2.53 (H) 0.55 - 1.02 mg/dL Final     Calcium   Date Value Ref Range Status   05/17/2024 9.1 8.8 - 10.6 mg/dL Final     Total Protein   Date Value Ref Range Status   12/17/2023 7.9 6.4 - 8.2 g/dL Final     Albumin   Date Value Ref Range Status   12/17/2023 4.2 3.5 - 5.0 g/dL Final     Bilirubin, Total   Date Value Ref Range Status    12/17/2023 0.7 >0.0 - 1.2 mg/dL Final     Alk Phos   Date Value Ref Range Status   12/17/2023 53 (L) 55 - 142 U/L Final     AST   Date Value Ref Range Status   12/17/2023 18 15 - 37 U/L Final     ALT   Date Value Ref Range Status   12/17/2023 23 13 - 56 U/L Final     Anion Gap   Date Value Ref Range Status   05/17/2024 10 8 - 16 mmol/L Final     eGFR   Date Value Ref Range Status   12/17/2023 21 (L) >=60 mL/min/1.73m2 Final      BMP  Lab Results   Component Value Date     05/17/2024    K 4.4 05/17/2024     05/17/2024    CO2 25 05/17/2024    BUN 40 (H) 05/17/2024    CREATININE 2.53 (H) 05/17/2024    CALCIUM 9.1 05/17/2024    ANIONGAP 10 05/17/2024    EGFRNORACEVR 21 (L) 12/17/2023

## 2024-11-20 LAB — UA COMPLETE W REFLEX CULTURE PNL UR: ABNORMAL

## 2024-11-21 ENCOUNTER — PATIENT MESSAGE (OUTPATIENT)
Dept: UROLOGY | Facility: HOSPITAL | Age: 69
End: 2024-11-21
Payer: MEDICARE

## 2024-11-21 DIAGNOSIS — N30.90 KLEBSIELLA CYSTITIS: Primary | ICD-10-CM

## 2024-11-21 DIAGNOSIS — B96.1 KLEBSIELLA CYSTITIS: Primary | ICD-10-CM

## 2024-11-21 RX ORDER — CIPROFLOXACIN 500 MG/1
500 TABLET ORAL 2 TIMES DAILY
Qty: 14 TABLET | Refills: 0 | Status: SHIPPED | OUTPATIENT
Start: 2024-11-21 | End: 2024-11-28

## 2024-11-21 NOTE — TELEPHONE ENCOUNTER
Assessment:   1. Klebsiella cystitis  -     ciprofloxacin HCl (CIPRO) 500 MG tablet; Take 1 tablet (500 mg total) by mouth 2 (two) times daily. for 7 days  Dispense: 14 tablet; Refill: 0         Plan:     Urine Culture >100,000 Klebsiella pneumoniae Abnormal         Resulting Agency:     Susceptibility     Klebsiella pneumoniae     Not Specified     Ampicillin 16 µg/ml Resistant     Ampicillin/Sulbactam <=8/4 µg/ml Sensitive     Aztreonam <=4 µg/ml Sensitive     Cefazolin <=2 µg/ml Sensitive     Cefepime <=2 µg/ml Sensitive     Ceftazidime <=1 µg/ml Sensitive     Ceftazidime/Avibactam <=8 µg/ml Sensitive     Ceftriaxone <=1 µg/ml Sensitive     Ciprofloxacin <=0.25 µg/ml Sensitive     Ertapenem <=0.5 µg/ml Sensitive     Gentamicin <=2 µg/ml Sensitive     Meropenem <=1 µg/ml Sensitive     Nitrofurantoin 64 µg/ml Intermediate     Piperacillin/Tazobactam <=8 µg/ml Sensitive     Tetracycline <=4 µg/ml Sensitive     Tobramycin <=2 µg/ml Sensitive     Trimeth/Sulfa <=2/38 µg/ml Sensitive                 Specimen Collected: 11/18/24 10:52 CST Last Resulted: 11/20/24 08:25 CST       Karen Renteria    I attempted to contact you this morning via your cell phone. Please find attached a copy of your most urine culture which demonstrates a Klebsiella urinary tract infection.  I have submitted a prescription for ciprofloxacin to your pharmacy and would like for you to contact our office and consider coming in sooner than scheduled for an intramuscular injection of antibiotic and see if you tolerate this without allergy or side effect.  I would like to also recheck your kidney function with a basic metabolic panel to ensure that we are giving you the appropriate dose.  Please contact our office upon receipt of this.           Jimbo Rangel MD  Urology  11/21/2024

## 2024-11-22 ENCOUNTER — TELEPHONE (OUTPATIENT)
Dept: UROLOGY | Facility: CLINIC | Age: 69
End: 2024-11-22
Payer: MEDICARE

## 2024-11-22 NOTE — TELEPHONE ENCOUNTER
----- Message from Med Assistant Mireille sent at 11/22/2024  9:15 AM CST -----  Who Called: Karen Renteria    Caller is requesting assistance/information from provider's office.      Preferred Method of Contact: Phone Call  Patient's Preferred Phone Number on File: [phone]   Best Call Back Number, if different:  Additional Information: pt still hurting and burning after taking meds from Monday. When she walking still feel like something is falling out.  I called and spoke with the pt.  She said the gemtesa is not helping yet, and she has taken 3 pills.  I told  her Gemtesa is for her overactive bladder and prob will not help with any pain.  I told her that Dr Rangel sent her in an antibiotic Cipro 500mg take 1 BID x 7 days, is she taking it yet.  She said no.  Said she will pick it up today and start it.  I told her to be sure to drink a full glass water with each pill and incorporate some more water throughtout the day.  She voiced understanding and thanked me for calling her.

## 2024-11-26 ENCOUNTER — OFFICE VISIT (OUTPATIENT)
Dept: UROLOGY | Facility: CLINIC | Age: 69
End: 2024-11-26
Payer: MEDICARE

## 2024-11-26 VITALS
SYSTOLIC BLOOD PRESSURE: 114 MMHG | DIASTOLIC BLOOD PRESSURE: 66 MMHG | WEIGHT: 177 LBS | OXYGEN SATURATION: 92 % | BODY MASS INDEX: 30.38 KG/M2 | HEART RATE: 87 BPM

## 2024-11-26 DIAGNOSIS — E11.42 DIABETIC PERIPHERAL NEUROPATHY ASSOCIATED WITH TYPE 2 DIABETES MELLITUS: ICD-10-CM

## 2024-11-26 DIAGNOSIS — N30.01 ACUTE CYSTITIS WITH HEMATURIA: Primary | ICD-10-CM

## 2024-11-26 PROCEDURE — 99215 OFFICE O/P EST HI 40 MIN: CPT | Mod: PBBFAC | Performed by: UROLOGY

## 2024-11-26 PROCEDURE — 87086 URINE CULTURE/COLONY COUNT: CPT | Mod: ,,, | Performed by: CLINICAL MEDICAL LABORATORY

## 2024-11-26 PROCEDURE — 99213 OFFICE O/P EST LOW 20 MIN: CPT | Mod: S$PBB,25,, | Performed by: UROLOGY

## 2024-11-26 PROCEDURE — 99999PBSHW PR PBB SHADOW TECHNICAL ONLY FILED TO HB: Mod: PBBFAC,,,

## 2024-11-26 PROCEDURE — 96372 THER/PROPH/DIAG INJ SC/IM: CPT | Mod: PBBFAC | Performed by: UROLOGY

## 2024-11-26 PROCEDURE — 99999 PR PBB SHADOW E&M-EST. PATIENT-LVL V: CPT | Mod: PBBFAC,,, | Performed by: UROLOGY

## 2024-11-26 RX ORDER — CEFTRIAXONE 1 G/1
1 INJECTION, POWDER, FOR SOLUTION INTRAMUSCULAR; INTRAVENOUS
Status: COMPLETED | OUTPATIENT
Start: 2024-11-26 | End: 2024-11-26

## 2024-11-26 RX ORDER — LIDOCAINE HYDROCHLORIDE 10 MG/ML
2.1 INJECTION, SOLUTION INFILTRATION; PERINEURAL
Status: COMPLETED | OUTPATIENT
Start: 2024-11-26 | End: 2024-11-26

## 2024-11-26 RX ADMIN — LIDOCAINE HYDROCHLORIDE 2.1 ML: 10 INJECTION, SOLUTION INFILTRATION; PERINEURAL at 03:11

## 2024-11-26 RX ADMIN — CEFTRIAXONE SODIUM 1 G: 1 INJECTION, POWDER, FOR SOLUTION INTRAMUSCULAR; INTRAVENOUS at 03:11

## 2024-11-26 NOTE — PROGRESS NOTES
Assessment:   1. Acute cystitis with hematuria  -     cefTRIAXone injection 1 g  -     LIDOcaine HCL 10 mg/ml (1%) injection 2.1 mL  -     Urine culture    2. Diabetic peripheral neuropathy associated with type 2 diabetes mellitus         Plan:     We discussed her urine culture and that she is on culture directed therapy but that with her concerns and pains heading into a holiday weekend we would facilitate expedited clearance with the treatment of ceftriaxone intramuscularly.  There is a possibility that her urgency and frequency is related to neuropathy and will plan to see her at follow-up to assess whether there are symptoms without evidence of bacterial cystitis.  Today a catheterized urine specimen demonstrated persistent pyuria and so we treated her with additional therapy of ceftriaxone IM and observed her for 30 minutes after procedure which she tolerated well without any side effects.           Jimbo Rangel MD  Urology  11/26/2024          UROLOGY HISTORY AND PHYSICAL EXAM    Subjective:      Patient ID: Karen Renteria is a 69 y.o. female.    Chief Complaint::   HPI    The patient is a 69-year-old female with a history of diabetic peripheral neuropathy and a history of recurrent urinary tract infections with urgency and frequency who presents today reporting that she is on culture directed therapy as prescribed but reports she continues to have dysuria and bladder pain.  She denies any gross hematuria and denies any radiation of the pain to the back or the flanks.      Past Medical History:   Diagnosis Date    Acute gastric ulcer without hemorrhage or perforation 03/03/2022    Age-related osteoporosis with current pathological fracture 04/21/2021    Benign essential hypertension 08/28/2018    Last Assessment & Plan:   Formatting of this note might be different from the original.  Well controlled    Carpal tunnel syndrome     Cerebral vascular accident     CKD (chronic kidney disease) stage 5, GFR  less than 15 ml/min 12/11/2023    Coronary arteriosclerosis     S/P CABG 2011    COVID-19 12/20/2020    Diabetes mellitus, type 2     Diabetic peripheral neuropathy associated with type 2 diabetes mellitus 04/28/2022    Diverticulitis     Essential tremor 01/20/2022    Falls frequently 12/07/2021    Hyperlipidemia     Hypertension     Insomnia secondary to depression with anxiety     Lumbar radiculopathy     Non-rheumatic aortic stenosis 08/28/2018    Formatting of this note might be different from the original.  S/p Tissue AVR July 2011 ([facility]). Echo October 2017 revealed normally functioning bioprosthesis with normal EF 60-65%     Last Assessment & Plan:   Formatting of this note might be different from the original.  Asymptomatic.    Osteoporosis     Pure hypercholesterolemia 08/28/2018    Formatting of this note might be different from the original.  Chronic statin.       Last Assessment & Plan:   Formatting of this note might be different from the original.   Continue statin.    RBBB 09/08/2021    Formatting of this note might be different from the original.  Initially noted on electrocardiogram in September 2021.    Spinal stenosis, cervical region 01/20/2022    SSS (sick sinus syndrome) 07/12/2022    Last Assessment & Plan:   Formatting of this note might be different from the original.  Post placement of [place] scientific dual-chamber permanent pacemaker by Dr. Freeman on 07/12/2022.    Subclavian artery stenosis 08/28/2018    Formatting of this note might be different from the original.  30% stenosis.    Tremor     Type 2 diabetes mellitus, with long-term current use of insulin 01/13/2021    Vitamin D deficiency      Past Surgical History:   Procedure Laterality Date    APPENDECTOMY      ASCENDING AORTIC ANEURYSM REPAIR W/ TISSUE AORTIC VALVE REPLACEMENT      BLADDER SUSPENSION Right     CHOLECYSTECTOMY      CORONARY ARTERY BYPASS GRAFT (CABG)      DIRECT LARYNGOSCOPY Bilateral 08/12/2022     Procedure: LARYNGOSCOPY, DIRECT;  Surgeon: Iban Silverio MD;  Location: [facility] OR;  Service: ENT;  Laterality: Bilateral;    HYSTERECTOMY      OOPHORECTOMY      TONSILLECTOMY      VOCAL FOLD LESION EXCISION Bilateral 08/12/2022    Procedure: EXCISION, LESION, VOCAL CORD;  Surgeon: Iban Silverio MD;  Location: [facility] OR;  Service: ENT;  Laterality: Bilateral;        Current Outpatient Medications on File Prior to Visit   Medication Sig Dispense Refill    aspirin (ECOTRIN) 81 MG EC tablet Take 81 mg by mouth.      ciprofloxacin HCl (CIPRO) 500 MG tablet Take 1 tablet (500 mg total) by mouth 2 (two) times daily. for 7 days 14 tablet 0    cyclobenzaprine (FLEXERIL) 10 MG tablet Take 10 mg by mouth as needed for Muscle spasms.      dapagliflozin propanediol (FARXIGA) 10 mg tablet Take 10 mg by mouth once daily.      fluticasone propionate (FLONASE) 50 mcg/actuation nasal spray 2 sprays by Each Nostril route once daily.      folic acid (FOLVITE) 1 MG tablet Take 1 mg by mouth once daily.      hydroCHLOROthiazide (HYDRODIURIL) 25 MG tablet Take 25 mg by mouth.      insulin degludec (TRESIBA FLEXTOUCH U-200) 200 unit/mL (3 mL) insulin pen Tresiba FlexTouch 200 UNIT/ML Subcutaneous Solution Pen-injector QTY: 0  Days: 0 Refills: 0  Written: 10/26/23 Patient Instructions: Give 52 units q am and 45 units q pm      isosorbide mononitrate (IMDUR) 30 MG 24 hr tablet Take 1 tablet (30 mg total) by mouth once daily. 30 tablet 5    levothyroxine (SYNTHROID) 25 MCG tablet Take 25 mcg by mouth.      meclizine (ANTIVERT) 25 mg tablet Take 1 tablet (25 mg total) by mouth 3 (three) times daily as needed for Dizziness. 90 tablet 11    metoprolol succinate (TOPROL-XL) 50 MG 24 hr tablet Take 50 mg by mouth once daily.      pantoprazole (PROTONIX) 40 MG tablet TAKE 1 TABLET BY MOUTH ONCE DAILY PRIOR TO SUPPER      pregabalin (LYRICA) 75 MG capsule Take 75 mg by mouth.      primidone (MYSOLINE) 50 MG Tab Take 2 tablets (100 mg  total) by mouth 3 (three) times daily. 540 tablet 3    promethazine (PHENERGAN) 25 MG tablet Take 1 tablet (25 mg total) by mouth every 6 (six) hours as needed for Nausea. 30 tablet 5    rosuvastatin (CRESTOR) 20 MG tablet Take 20 mg by mouth every evening.      semaglutide (OZEMPIC) 0.25 mg or 0.5 mg(2 mg/1.5 mL) pen injector Inject 0.25 mg into the skin.      sertraline (ZOLOFT) 50 MG tablet TAKE ONE TABLET BY MOUTH EVERY DAY 90 tablet 3    URO--10-40.8-36 mg Cap TAKE 1 CAPSULE BY MOUTH EVERY MORNING, AT NOON, IN THE EVENING AND BEFORE BEDTIME      isosorbide dinitrate (ISORDIL) 30 MG Tab Take 30 mg by mouth. (Patient not taking: Reported on 11/26/2024)      promethazine (PHENERGAN) 25 MG tablet Take 1 tablet (25 mg total) by mouth every 6 (six) hours as needed for Nausea. (Patient not taking: Reported on 11/26/2024) 15 tablet 0     No current facility-administered medications on file prior to visit.        Review of patient's allergies indicates:   Allergen Reactions    Cephalexin Rash    Bactrim [sulfamethoxazole-trimethoprim]     Zofran [ondansetron hcl] Itching     Vitals:    11/26/24 1317   BP: 114/66   Pulse: 87          Review of Systems   Constitutional:  Negative for chills and fever.   Respiratory:  Negative for shortness of breath.    Cardiovascular:  Negative for leg swelling.   Gastrointestinal:  Negative for abdominal pain.   Genitourinary:  Positive for dysuria, frequency and urgency. Negative for genital sores and hematuria.   Musculoskeletal:  Negative for gait problem.      Objective:     Physical Exam  Vitals and nursing note reviewed.   Constitutional:       Appearance: Normal appearance. She is not ill-appearing or diaphoretic.   Eyes:      Conjunctiva/sclera: Conjunctivae normal.   Cardiovascular:      Rate and Rhythm: Normal rate.   Pulmonary:      Effort: Pulmonary effort is normal. No respiratory distress.      Breath sounds: No wheezing.   Abdominal:      General: There is no  distension.      Palpations: Abdomen is soft.      Tenderness: There is no abdominal tenderness. There is no right CVA tenderness or left CVA tenderness.   Genitourinary:     Comments: The meatus was prepped and a catheterized urine specimen was collected.   Musculoskeletal:         General: No deformity.   Skin:     General: Skin is warm.   Neurological:      General: No focal deficit present.      Mental Status: She is alert and oriented to person, place, and time. Mental status is at baseline.   Psychiatric:         Mood and Affect: Mood normal.         Behavior: Behavior normal.         Thought Content: Thought content normal.         Judgment: Judgment normal.            CMP  Sodium   Date Value Ref Range Status   05/17/2024 143 136 - 145 mmol/L Final     Potassium   Date Value Ref Range Status   05/17/2024 4.4 3.5 - 5.1 mmol/L Final     Chloride   Date Value Ref Range Status   05/17/2024 108 100 - 109 mmol/L Final     Carbon Dioxide   Date Value Ref Range Status   05/17/2024 25 22 - 33 mmol/L Final     Glucose   Date Value Ref Range Status   12/17/2023 158 (H) 74 - 106 mg/dL Final     Blood Urea Nitrogen   Date Value Ref Range Status   05/17/2024 40 (H) 5 - 25 mg/dL Final     Creatinine   Date Value Ref Range Status   05/17/2024 2.53 (H) 0.55 - 1.02 mg/dL Final     Calcium   Date Value Ref Range Status   05/17/2024 9.1 8.8 - 10.6 mg/dL Final     Total Protein   Date Value Ref Range Status   12/17/2023 7.9 6.4 - 8.2 g/dL Final     Albumin   Date Value Ref Range Status   12/17/2023 4.2 3.5 - 5.0 g/dL Final     Bilirubin, Total   Date Value Ref Range Status   12/17/2023 0.7 >0.0 - 1.2 mg/dL Final     Alk Phos   Date Value Ref Range Status   12/17/2023 53 (L) 55 - 142 U/L Final     AST   Date Value Ref Range Status   12/17/2023 18 15 - 37 U/L Final     ALT   Date Value Ref Range Status   12/17/2023 23 13 - 56 U/L Final     Anion Gap   Date Value Ref Range Status   05/17/2024 10 8 - 16 mmol/L Final     eGFR   Date  Value Ref Range Status   12/17/2023 21 (L) >=60 mL/min/1.73m2 Final      BMP  Lab Results   Component Value Date     05/17/2024    K 4.4 05/17/2024     05/17/2024    CO2 25 05/17/2024    BUN 40 (H) 05/17/2024    CREATININE 2.53 (H) 05/17/2024    CALCIUM 9.1 05/17/2024    ANIONGAP 10 05/17/2024    EGFRNORACEVR 21 (L) 12/17/2023

## 2024-11-29 LAB — UA COMPLETE W REFLEX CULTURE PNL UR: NO GROWTH

## 2024-12-02 ENCOUNTER — OFFICE VISIT (OUTPATIENT)
Dept: UROLOGY | Facility: CLINIC | Age: 69
End: 2024-12-02
Payer: MEDICARE

## 2024-12-02 VITALS
WEIGHT: 183 LBS | HEART RATE: 81 BPM | BODY MASS INDEX: 31.24 KG/M2 | SYSTOLIC BLOOD PRESSURE: 118 MMHG | OXYGEN SATURATION: 92 % | HEIGHT: 64 IN | DIASTOLIC BLOOD PRESSURE: 67 MMHG | TEMPERATURE: 98 F

## 2024-12-02 DIAGNOSIS — N30.90 KLEBSIELLA CYSTITIS: ICD-10-CM

## 2024-12-02 DIAGNOSIS — B96.1 KLEBSIELLA CYSTITIS: ICD-10-CM

## 2024-12-02 DIAGNOSIS — N30.01 ACUTE CYSTITIS WITH HEMATURIA: Primary | ICD-10-CM

## 2024-12-02 DIAGNOSIS — R35.0 FREQUENCY OF URINATION: ICD-10-CM

## 2024-12-02 DIAGNOSIS — M48.02 SPINAL STENOSIS, CERVICAL REGION: ICD-10-CM

## 2024-12-02 DIAGNOSIS — E11.42 DIABETIC PERIPHERAL NEUROPATHY ASSOCIATED WITH TYPE 2 DIABETES MELLITUS: ICD-10-CM

## 2024-12-02 PROCEDURE — 99213 OFFICE O/P EST LOW 20 MIN: CPT | Mod: S$PBB,,, | Performed by: UROLOGY

## 2024-12-02 PROCEDURE — 99215 OFFICE O/P EST HI 40 MIN: CPT | Mod: PBBFAC | Performed by: UROLOGY

## 2024-12-02 PROCEDURE — 99999 PR PBB SHADOW E&M-EST. PATIENT-LVL V: CPT | Mod: PBBFAC,,, | Performed by: UROLOGY

## 2024-12-02 NOTE — PROGRESS NOTES
Assessment:   1. Acute cystitis with hematuria    2. Klebsiella cystitis    3. Diabetic peripheral neuropathy associated with type 2 diabetes mellitus    4. Frequency of urination    5. Spinal stenosis, cervical region         Plan:     She is completely asymptomatic without any nocturia urgency or frequency.  We discussed her remarkable improvement with culture directed antibiotics and discussed plans to postpone cystometrogram and cystoscopy at this time.  We want to closely monitor her to see if she develops another urinary tract infection and also how these seem to present such as the timing, the symptoms and whether all of her symptoms are attributed to the bacterial cystitis which I suspect based on her response since treatment.     Plan:  Continue close observation return in 6 weeks  Return sooner if symptoms develop            Jimbo Rangel MD  Urology  12/02/2024          UROLOGY HISTORY AND PHYSICAL EXAM    Subjective:      Patient ID: Karen Renteria is a 69 y.o. female.    Chief Complaint::   Urinary Tract Infection   Pertinent negatives include no urgency.     The patient is a 69-year-old female that presents today for follow-up and reports that she feels remarkably better since treatment.  She denies any urgency or frequency reports that she slept through the night the last couple of days and was able to shop this weekend and felt freedom from her urinary bladder symptoms.  She has no dysuria currently.       Past Medical History:   Diagnosis Date    Acute gastric ulcer without hemorrhage or perforation 03/03/2022    Age-related osteoporosis with current pathological fracture 04/21/2021    Benign essential hypertension 08/28/2018    Last Assessment & Plan:   Formatting of this note might be different from the original.  Well controlled    Carpal tunnel syndrome     Cerebral vascular accident     CKD (chronic kidney disease) stage 5, GFR less than 15 ml/min 12/11/2023    Coronary arteriosclerosis      S/P CABG 2011    COVID-19 12/20/2020    Diabetes mellitus, type 2     Diabetic peripheral neuropathy associated with type 2 diabetes mellitus 04/28/2022    Diverticulitis     Essential tremor 01/20/2022    Falls frequently 12/07/2021    Hyperlipidemia     Hypertension     Insomnia secondary to depression with anxiety     Lumbar radiculopathy     Non-rheumatic aortic stenosis 08/28/2018    Formatting of this note might be different from the original.  S/p Tissue AVR July 2011 ([facility]). Echo October 2017 revealed normally functioning bioprosthesis with normal EF 60-65%     Last Assessment & Plan:   Formatting of this note might be different from the original.  Asymptomatic.    Osteoporosis     Pure hypercholesterolemia 08/28/2018    Formatting of this note might be different from the original.  Chronic statin.       Last Assessment & Plan:   Formatting of this note might be different from the original.   Continue statin.    RBBB 09/08/2021    Formatting of this note might be different from the original.  Initially noted on electrocardiogram in September 2021.    Spinal stenosis, cervical region 01/20/2022    SSS (sick sinus syndrome) 07/12/2022    Last Assessment & Plan:   Formatting of this note might be different from the original.  Post placement of [place] scientific dual-chamber permanent pacemaker by Dr. Freeman on 07/12/2022.    Subclavian artery stenosis 08/28/2018    Formatting of this note might be different from the original.  30% stenosis.    Tremor     Type 2 diabetes mellitus, with long-term current use of insulin 01/13/2021    Vitamin D deficiency      Past Surgical History:   Procedure Laterality Date    APPENDECTOMY      ASCENDING AORTIC ANEURYSM REPAIR W/ TISSUE AORTIC VALVE REPLACEMENT      BLADDER SUSPENSION Right     CHOLECYSTECTOMY      CORONARY ARTERY BYPASS GRAFT (CABG)      DIRECT LARYNGOSCOPY Bilateral 08/12/2022    Procedure: LARYNGOSCOPY, DIRECT;  Surgeon: Iban Silverio MD;   Location: [facility] OR;  Service: ENT;  Laterality: Bilateral;    HYSTERECTOMY      OOPHORECTOMY      TONSILLECTOMY      VOCAL FOLD LESION EXCISION Bilateral 08/12/2022    Procedure: EXCISION, LESION, VOCAL CORD;  Surgeon: Iban Silverio MD;  Location: [facility] OR;  Service: ENT;  Laterality: Bilateral;        Current Outpatient Medications on File Prior to Visit   Medication Sig Dispense Refill    aspirin (ECOTRIN) 81 MG EC tablet Take 81 mg by mouth.      cyclobenzaprine (FLEXERIL) 10 MG tablet Take 10 mg by mouth as needed for Muscle spasms.      dapagliflozin propanediol (FARXIGA) 10 mg tablet Take 10 mg by mouth once daily.      fluticasone propionate (FLONASE) 50 mcg/actuation nasal spray 2 sprays by Each Nostril route once daily.      folic acid (FOLVITE) 1 MG tablet Take 1 mg by mouth once daily.      hydroCHLOROthiazide (HYDRODIURIL) 25 MG tablet Take 25 mg by mouth.      insulin degludec (TRESIBA FLEXTOUCH U-200) 200 unit/mL (3 mL) insulin pen Tresiba FlexTouch 200 UNIT/ML Subcutaneous Solution Pen-injector QTY: 0  Days: 0 Refills: 0  Written: 10/26/23 Patient Instructions: Give 52 units q am and 45 units q pm      isosorbide dinitrate (ISORDIL) 30 MG Tab Take 30 mg by mouth. (Patient not taking: Reported on 11/26/2024)      isosorbide mononitrate (IMDUR) 30 MG 24 hr tablet Take 1 tablet (30 mg total) by mouth once daily. 30 tablet 5    levothyroxine (SYNTHROID) 25 MCG tablet Take 25 mcg by mouth.      meclizine (ANTIVERT) 25 mg tablet Take 1 tablet (25 mg total) by mouth 3 (three) times daily as needed for Dizziness. 90 tablet 11    metoprolol succinate (TOPROL-XL) 50 MG 24 hr tablet Take 50 mg by mouth once daily.      pantoprazole (PROTONIX) 40 MG tablet TAKE 1 TABLET BY MOUTH ONCE DAILY PRIOR TO SUPPER      pregabalin (LYRICA) 75 MG capsule Take 75 mg by mouth.      primidone (MYSOLINE) 50 MG Tab Take 2 tablets (100 mg total) by mouth 3 (three) times daily. 540 tablet 3    promethazine (PHENERGAN)  25 MG tablet Take 1 tablet (25 mg total) by mouth every 6 (six) hours as needed for Nausea. 30 tablet 5    promethazine (PHENERGAN) 25 MG tablet Take 1 tablet (25 mg total) by mouth every 6 (six) hours as needed for Nausea. (Patient not taking: Reported on 11/26/2024) 15 tablet 0    rosuvastatin (CRESTOR) 20 MG tablet Take 20 mg by mouth every evening.      semaglutide (OZEMPIC) 0.25 mg or 0.5 mg(2 mg/1.5 mL) pen injector Inject 0.25 mg into the skin.      sertraline (ZOLOFT) 50 MG tablet TAKE ONE TABLET BY MOUTH EVERY DAY 90 tablet 3    URO--10-40.8-36 mg Cap TAKE 1 CAPSULE BY MOUTH EVERY MORNING, AT NOON, IN THE EVENING AND BEFORE BEDTIME       No current facility-administered medications on file prior to visit.        Review of patient's allergies indicates:   Allergen Reactions    Cephalexin Rash    Bactrim [sulfamethoxazole-trimethoprim]     Zofran [ondansetron hcl] Itching     Vitals:    12/02/24 0814   BP: 118/67   Pulse: 81   Temp: 98 °F (36.7 °C)          Review of Systems   Constitutional:  Negative for activity change and fever.   Respiratory:  Negative for wheezing.    Gastrointestinal:  Negative for abdominal pain.   Genitourinary:  Negative for dysuria and urgency.   Musculoskeletal:  Negative for joint swelling.   Neurological:  Negative for tremors.   Hematological:  Negative for adenopathy.   Psychiatric/Behavioral:  Negative for sleep disturbance.       Objective:     Physical Exam  Vitals and nursing note reviewed.   Constitutional:       Appearance: She is not ill-appearing.   Eyes:      General: No scleral icterus.  Cardiovascular:      Rate and Rhythm: Normal rate.   Pulmonary:      Effort: Pulmonary effort is normal. No respiratory distress.      Breath sounds: No wheezing.   Abdominal:      General: There is no distension.      Palpations: Abdomen is soft. There is no mass.      Tenderness: There is no abdominal tenderness.   Musculoskeletal:         General: No deformity.   Skin:      General: Skin is warm.   Neurological:      General: No focal deficit present.      Mental Status: She is alert and oriented to person, place, and time. Mental status is at baseline.   Psychiatric:         Mood and Affect: Mood normal.         Behavior: Behavior normal.         Thought Content: Thought content normal.         Judgment: Judgment normal.            CMP  Sodium   Date Value Ref Range Status   05/17/2024 143 136 - 145 mmol/L Final     Potassium   Date Value Ref Range Status   05/17/2024 4.4 3.5 - 5.1 mmol/L Final     Chloride   Date Value Ref Range Status   05/17/2024 108 100 - 109 mmol/L Final     Carbon Dioxide   Date Value Ref Range Status   05/17/2024 25 22 - 33 mmol/L Final     Glucose   Date Value Ref Range Status   12/17/2023 158 (H) 74 - 106 mg/dL Final     Blood Urea Nitrogen   Date Value Ref Range Status   05/17/2024 40 (H) 5 - 25 mg/dL Final     Creatinine   Date Value Ref Range Status   05/17/2024 2.53 (H) 0.55 - 1.02 mg/dL Final     Calcium   Date Value Ref Range Status   05/17/2024 9.1 8.8 - 10.6 mg/dL Final     Total Protein   Date Value Ref Range Status   12/17/2023 7.9 6.4 - 8.2 g/dL Final     Albumin   Date Value Ref Range Status   12/17/2023 4.2 3.5 - 5.0 g/dL Final     Bilirubin, Total   Date Value Ref Range Status   12/17/2023 0.7 >0.0 - 1.2 mg/dL Final     Alk Phos   Date Value Ref Range Status   12/17/2023 53 (L) 55 - 142 U/L Final     AST   Date Value Ref Range Status   12/17/2023 18 15 - 37 U/L Final     ALT   Date Value Ref Range Status   12/17/2023 23 13 - 56 U/L Final     Anion Gap   Date Value Ref Range Status   05/17/2024 10 8 - 16 mmol/L Final     eGFR   Date Value Ref Range Status   12/17/2023 21 (L) >=60 mL/min/1.73m2 Final      BMP  Lab Results   Component Value Date     05/17/2024    K 4.4 05/17/2024     05/17/2024    CO2 25 05/17/2024    BUN 40 (H) 05/17/2024    CREATININE 2.53 (H) 05/17/2024    CALCIUM 9.1 05/17/2024    ANIONGAP 10 05/17/2024     EGFRNORACEVR 21 (L) 12/17/2023

## 2024-12-07 NOTE — PROGRESS NOTES
08/15/2023   --Chart accessed for: care gaps  --Care Gaps addressed:HTN  Outreach made to patient via portal . (Success) (Left Message) (Unavailable)   Patient stated N/A  Care Everywhere updates requested and reviewed.  Media reports reviewed.  [facility] and [facility] reviewed.  Immunization Database ([employer]) reviewed. Vaccinations uploaded:   updated with external NO Report  NO Requested records f  Next appointment 10/26/2023 . Appointment notes updated to include: ENT    Health Maintenance Due   Topic Date Due    Foot Exam  Never done    TETANUS VACCINE  Never done    Shingles Vaccine (1 of 2) Never done    COVID-19 Vaccine (3 - Pfizer series) 06/16/2021    Eye Exam  02/03/2022    High Dose Statin  04/29/2022    Hemoglobin A1c  07/13/2023       normal...

## 2024-12-16 ENCOUNTER — HOSPITAL ENCOUNTER (OUTPATIENT)
Facility: HOSPITAL | Age: 69
Discharge: HOME | End: 2024-12-17
Attending: EMERGENCY MEDICINE | Admitting: STUDENT IN AN ORGANIZED HEALTH CARE EDUCATION/TRAINING PROGRAM
Payer: MEDICARE

## 2024-12-16 DIAGNOSIS — R79.89 ELEVATED TROPONIN: ICD-10-CM

## 2024-12-16 DIAGNOSIS — I50.9 CONGESTIVE HEART FAILURE, UNSPECIFIED HF CHRONICITY, UNSPECIFIED HEART FAILURE TYPE: ICD-10-CM

## 2024-12-16 DIAGNOSIS — I50.43 ACUTE ON CHRONIC COMBINED SYSTOLIC AND DIASTOLIC HEART FAILURE: ICD-10-CM

## 2024-12-16 DIAGNOSIS — R06.02 SHORTNESS OF BREATH: Primary | ICD-10-CM

## 2024-12-16 DIAGNOSIS — R06.02 SOB (SHORTNESS OF BREATH): ICD-10-CM

## 2024-12-16 PROBLEM — F32.A DEPRESSION: Status: ACTIVE | Noted: 2024-12-16

## 2024-12-16 PROBLEM — J15.9 BACTERIAL PNEUMONIA: Status: ACTIVE | Noted: 2024-12-16

## 2024-12-16 PROBLEM — K25.3 ACUTE GASTRIC ULCER WITHOUT HEMORRHAGE OR PERFORATION: Status: RESOLVED | Noted: 2022-03-03 | Resolved: 2024-12-16

## 2024-12-16 LAB
ALBUMIN SERPL BCP-MCNC: 3.2 G/DL (ref 3.4–4.8)
ALBUMIN/GLOB SERPL: 0.9 {RATIO}
ALP SERPL-CCNC: 89 U/L (ref 40–150)
ALT SERPL W P-5'-P-CCNC: 9 U/L
ANION GAP SERPL CALCULATED.3IONS-SCNC: 15 MMOL/L (ref 7–16)
AORTIC ROOT ANNULUS: 2.33 CM
AORTIC VALVE CUSP SEPERATION: 1.23 CM
APICAL FOUR CHAMBER EJECTION FRACTION: 70 %
AST SERPL W P-5'-P-CCNC: 26 U/L (ref 5–34)
AV MEAN GRADIENT: 11.3 MMHG
AV PEAK GRADIENT: 19.4 MMHG
BASOPHILS # BLD AUTO: 0.08 K/UL (ref 0–0.2)
BASOPHILS NFR BLD AUTO: 0.9 % (ref 0–1)
BILIRUB SERPL-MCNC: 0.4 MG/DL
BSA FOR ECHO PROCEDURE: 1.92 M2
BUN SERPL-MCNC: 32 MG/DL (ref 10–20)
BUN/CREAT SERPL: 15 (ref 6–20)
CALCIUM SERPL-MCNC: 8.3 MG/DL (ref 8.4–10.2)
CHLORIDE SERPL-SCNC: 107 MMOL/L (ref 98–107)
CO2 SERPL-SCNC: 18 MMOL/L (ref 23–31)
CREAT SERPL-MCNC: 2.15 MG/DL (ref 0.55–1.02)
CV ECHO LV RWT: 0.51 CM
DIFFERENTIAL METHOD BLD: ABNORMAL
DOP CALC AO PEAK VEL: 2.2 M/S
DOP CALC AO VTI: 50.9 CM
E/A RATIO: 0.88
E/E' RATIO: 13.23 M/S
ECHO LV POSTERIOR WALL: 1.1 CM (ref 0.6–1.1)
EGFR (NO RACE VARIABLE) (RUSH/TITUS): 24 ML/MIN/1.73M2
EOSINOPHIL # BLD AUTO: 0.46 K/UL (ref 0–0.5)
EOSINOPHIL NFR BLD AUTO: 5.1 % (ref 1–4)
ERYTHROCYTE [DISTWIDTH] IN BLOOD BY AUTOMATED COUNT: 15.5 % (ref 11.5–14.5)
EST. AVERAGE GLUCOSE BLD GHB EST-MCNC: 128 MG/DL
FRACTIONAL SHORTENING: 30.2 % (ref 28–44)
GLOBULIN SER-MCNC: 3.5 G/DL (ref 2–4)
GLUCOSE SERPL-MCNC: 179 MG/DL (ref 82–115)
GLUCOSE SERPL-MCNC: 63 MG/DL (ref 70–105)
GLUCOSE SERPL-MCNC: 70 MG/DL (ref 70–105)
HBA1C MFR BLD HPLC: 6.1 %
HCT VFR BLD AUTO: 33.9 % (ref 38–47)
HGB BLD-MCNC: 9.9 G/DL (ref 12–16)
IMM GRANULOCYTES # BLD AUTO: 0.04 K/UL (ref 0–0.04)
IMM GRANULOCYTES NFR BLD: 0.4 % (ref 0–0.4)
INTERVENTRICULAR SEPTUM: 1 CM (ref 0.6–1.1)
IVC DIAMETER: 2.48 CM
LEFT ATRIUM AREA SYSTOLIC (APICAL 4 CHAMBER): 10.43 CM2
LEFT ATRIUM SIZE: 3.37 CM
LEFT INTERNAL DIMENSION IN SYSTOLE: 3 CM (ref 2.1–4)
LEFT VENTRICLE DIASTOLIC VOLUME INDEX: 36.29 ML/M2
LEFT VENTRICLE DIASTOLIC VOLUME: 67.86 ML
LEFT VENTRICLE END DIASTOLIC VOLUME APICAL 4 CHAMBER: 53.85 ML
LEFT VENTRICLE END SYSTOLIC VOLUME APICAL 4 CHAMBER: 18.42 ML
LEFT VENTRICLE MASS INDEX: 81.6 G/M2
LEFT VENTRICLE SYSTOLIC VOLUME INDEX: 18.7 ML/M2
LEFT VENTRICLE SYSTOLIC VOLUME: 34.98 ML
LEFT VENTRICULAR INTERNAL DIMENSION IN DIASTOLE: 4.3 CM (ref 3.5–6)
LEFT VENTRICULAR MASS: 152.6 G
LV LATERAL E/E' RATIO: 10.75 M/S
LV SEPTAL E/E' RATIO: 17.2 M/S
LVED V (TEICH): 67.86 ML
LVES V (TEICH): 34.98 ML
LVOT MG: 2.31 MMHG
LVOT MV: 0.73 CM/S
LYMPHOCYTES # BLD AUTO: 0.93 K/UL (ref 1–4.8)
LYMPHOCYTES NFR BLD AUTO: 10.2 % (ref 27–41)
MAGNESIUM SERPL-MCNC: 1.9 MG/DL (ref 1.6–2.6)
MCH RBC QN AUTO: 25.6 PG (ref 27–31)
MCHC RBC AUTO-ENTMCNC: 29.2 G/DL (ref 32–36)
MCV RBC AUTO: 87.8 FL (ref 80–96)
MONOCYTES # BLD AUTO: 0.69 K/UL (ref 0–0.8)
MONOCYTES NFR BLD AUTO: 7.6 % (ref 2–6)
MPC BLD CALC-MCNC: 13.2 FL (ref 9.4–12.4)
MV PEAK A VEL: 0.98 M/S
MV PEAK E VEL: 0.86 M/S
NEUTROPHILS # BLD AUTO: 6.88 K/UL (ref 1.8–7.7)
NEUTROPHILS NFR BLD AUTO: 75.8 % (ref 53–65)
NRBC # BLD AUTO: 0 X10E3/UL
NRBC, AUTO (.00): 0 %
NT-PROBNP SERPL-MCNC: ABNORMAL PG/ML (ref 1–125)
OHS CV RV/LV RATIO: 1 CM
OHS LV EJECTION FRACTION SIMPSONS BIPLANE MOD: 58 %
PHOSPHATE SERPL-MCNC: 3.4 MG/DL (ref 2.3–4.7)
PISA TR MAX VEL: 2.92 M/S
PLATELET # BLD AUTO: 213 K/UL (ref 150–400)
PLATELET MORPHOLOGY: NORMAL
POTASSIUM SERPL-SCNC: 4.6 MMOL/L (ref 3.5–5.1)
PROT SERPL-MCNC: 6.7 G/DL (ref 5.8–7.6)
PV PEAK GRADIENT: 2 MMHG
PV PEAK VELOCITY: 0.75 M/S
RA MAJOR: 5.41 CM
RA PRESSURE ESTIMATED: 15 MMHG
RBC # BLD AUTO: 3.86 M/UL (ref 4.2–5.4)
RBC MORPH BLD: NORMAL
RIGHT VENTRICLE DIASTOLIC BASEL DIMENSION: 4.3 CM
RIGHT VENTRICLE DIASTOLIC LENGTH: 4.7 CM
RIGHT VENTRICLE DIASTOLIC MID DIMENSION: 4.2 CM
RIGHT VENTRICULAR LENGTH IN DIASTOLE (APICAL 4-CHAMBER VIEW): 4.7 CM
RV MID DIAMA: 4.21 CM
RV TB RVSP: 18 MMHG
SODIUM SERPL-SCNC: 135 MMOL/L (ref 136–145)
TDI LATERAL: 0.08 M/S
TDI SEPTAL: 0.05 M/S
TDI: 0.07 M/S
TR MAX PG: 34 MMHG
TRICUSPID ANNULAR PLANE SYSTOLIC EXCURSION: 1.1 CM
TROPONIN I SERPL HS-MCNC: 33.5 NG/L
TROPONIN I SERPL HS-MCNC: 35.1 NG/L
TSH SERPL DL<=0.005 MIU/L-ACNC: 0.28 UIU/ML (ref 0.35–4.94)
TV REST PULMONARY ARTERY PRESSURE: 49 MMHG
WBC # BLD AUTO: 9.08 K/UL (ref 4.5–11)
Z-SCORE OF LEFT VENTRICULAR DIMENSION IN END DIASTOLE: -1.92
Z-SCORE OF LEFT VENTRICULAR DIMENSION IN END SYSTOLE: -0.55

## 2024-12-16 PROCEDURE — 84443 ASSAY THYROID STIM HORMONE: CPT | Performed by: STUDENT IN AN ORGANIZED HEALTH CARE EDUCATION/TRAINING PROGRAM

## 2024-12-16 PROCEDURE — 63600175 PHARM REV CODE 636 W HCPCS: Performed by: STUDENT IN AN ORGANIZED HEALTH CARE EDUCATION/TRAINING PROGRAM

## 2024-12-16 PROCEDURE — 99285 EMERGENCY DEPT VISIT HI MDM: CPT | Mod: 25

## 2024-12-16 PROCEDURE — G0378 HOSPITAL OBSERVATION PER HR: HCPCS

## 2024-12-16 PROCEDURE — 25000242 PHARM REV CODE 250 ALT 637 W/ HCPCS: Performed by: STUDENT IN AN ORGANIZED HEALTH CARE EDUCATION/TRAINING PROGRAM

## 2024-12-16 PROCEDURE — 83036 HEMOGLOBIN GLYCOSYLATED A1C: CPT | Performed by: STUDENT IN AN ORGANIZED HEALTH CARE EDUCATION/TRAINING PROGRAM

## 2024-12-16 PROCEDURE — 93005 ELECTROCARDIOGRAM TRACING: CPT

## 2024-12-16 PROCEDURE — 99900035 HC TECH TIME PER 15 MIN (STAT)

## 2024-12-16 PROCEDURE — 63600175 PHARM REV CODE 636 W HCPCS: Performed by: EMERGENCY MEDICINE

## 2024-12-16 PROCEDURE — 85025 COMPLETE CBC W/AUTO DIFF WBC: CPT | Performed by: EMERGENCY MEDICINE

## 2024-12-16 PROCEDURE — 94640 AIRWAY INHALATION TREATMENT: CPT

## 2024-12-16 PROCEDURE — 94799 UNLISTED PULMONARY SVC/PX: CPT

## 2024-12-16 PROCEDURE — 83880 ASSAY OF NATRIURETIC PEPTIDE: CPT | Performed by: EMERGENCY MEDICINE

## 2024-12-16 PROCEDURE — 25000003 PHARM REV CODE 250: Performed by: STUDENT IN AN ORGANIZED HEALTH CARE EDUCATION/TRAINING PROGRAM

## 2024-12-16 PROCEDURE — 80053 COMPREHEN METABOLIC PANEL: CPT | Performed by: EMERGENCY MEDICINE

## 2024-12-16 PROCEDURE — 96375 TX/PRO/DX INJ NEW DRUG ADDON: CPT

## 2024-12-16 PROCEDURE — 84100 ASSAY OF PHOSPHORUS: CPT | Performed by: STUDENT IN AN ORGANIZED HEALTH CARE EDUCATION/TRAINING PROGRAM

## 2024-12-16 PROCEDURE — 36415 COLL VENOUS BLD VENIPUNCTURE: CPT | Performed by: EMERGENCY MEDICINE

## 2024-12-16 PROCEDURE — 82962 GLUCOSE BLOOD TEST: CPT

## 2024-12-16 PROCEDURE — 83735 ASSAY OF MAGNESIUM: CPT | Performed by: STUDENT IN AN ORGANIZED HEALTH CARE EDUCATION/TRAINING PROGRAM

## 2024-12-16 PROCEDURE — 99223 1ST HOSP IP/OBS HIGH 75: CPT | Mod: AI,,, | Performed by: STUDENT IN AN ORGANIZED HEALTH CARE EDUCATION/TRAINING PROGRAM

## 2024-12-16 PROCEDURE — 27000221 HC OXYGEN, UP TO 24 HOURS

## 2024-12-16 PROCEDURE — 25000003 PHARM REV CODE 250: Performed by: EMERGENCY MEDICINE

## 2024-12-16 PROCEDURE — 96365 THER/PROPH/DIAG IV INF INIT: CPT

## 2024-12-16 PROCEDURE — 84484 ASSAY OF TROPONIN QUANT: CPT | Performed by: EMERGENCY MEDICINE

## 2024-12-16 RX ORDER — ISOSORBIDE MONONITRATE 30 MG/1
30 TABLET, EXTENDED RELEASE ORAL DAILY
Status: DISCONTINUED | OUTPATIENT
Start: 2024-12-16 | End: 2024-12-17 | Stop reason: HOSPADM

## 2024-12-16 RX ORDER — SERTRALINE HYDROCHLORIDE 50 MG/1
50 TABLET, FILM COATED ORAL DAILY
Status: DISCONTINUED | OUTPATIENT
Start: 2024-12-17 | End: 2024-12-17 | Stop reason: HOSPADM

## 2024-12-16 RX ORDER — ROSUVASTATIN CALCIUM 40 MG/1
40 TABLET, COATED ORAL DAILY
COMMUNITY
Start: 2024-12-07

## 2024-12-16 RX ORDER — LEVOFLOXACIN 5 MG/ML
500 INJECTION, SOLUTION INTRAVENOUS
Status: DISCONTINUED | OUTPATIENT
Start: 2024-12-16 | End: 2024-12-17 | Stop reason: DRUGHIGH

## 2024-12-16 RX ORDER — ASPIRIN 325 MG
325 TABLET ORAL
Status: COMPLETED | OUTPATIENT
Start: 2024-12-16 | End: 2024-12-16

## 2024-12-16 RX ORDER — IBUPROFEN 200 MG
16 TABLET ORAL
Status: DISCONTINUED | OUTPATIENT
Start: 2024-12-16 | End: 2024-12-17 | Stop reason: HOSPADM

## 2024-12-16 RX ORDER — MORPHINE SULFATE 4 MG/ML
4 INJECTION, SOLUTION INTRAMUSCULAR; INTRAVENOUS
Status: COMPLETED | OUTPATIENT
Start: 2024-12-16 | End: 2024-12-16

## 2024-12-16 RX ORDER — CYCLOBENZAPRINE HCL 10 MG
10 TABLET ORAL
Status: DISCONTINUED | OUTPATIENT
Start: 2024-12-16 | End: 2024-12-17 | Stop reason: HOSPADM

## 2024-12-16 RX ORDER — PREGABALIN 75 MG/1
75 CAPSULE ORAL 2 TIMES DAILY
Status: DISCONTINUED | OUTPATIENT
Start: 2024-12-16 | End: 2024-12-17 | Stop reason: HOSPADM

## 2024-12-16 RX ORDER — SODIUM CHLORIDE 0.9 % (FLUSH) 0.9 %
10 SYRINGE (ML) INJECTION
Status: DISCONTINUED | OUTPATIENT
Start: 2024-12-16 | End: 2024-12-17 | Stop reason: HOSPADM

## 2024-12-16 RX ORDER — ATORVASTATIN CALCIUM 40 MG/1
40 TABLET, FILM COATED ORAL DAILY
Status: DISCONTINUED | OUTPATIENT
Start: 2024-12-16 | End: 2024-12-17 | Stop reason: HOSPADM

## 2024-12-16 RX ORDER — MULTIVITAMIN
1 TABLET ORAL DAILY
COMMUNITY

## 2024-12-16 RX ORDER — POLYETHYLENE GLYCOL 3350 17 G/17G
34 POWDER, FOR SOLUTION ORAL 2 TIMES DAILY PRN
Status: DISCONTINUED | OUTPATIENT
Start: 2024-12-16 | End: 2024-12-17 | Stop reason: HOSPADM

## 2024-12-16 RX ORDER — ASPIRIN 81 MG/1
81 TABLET ORAL DAILY
Status: DISCONTINUED | OUTPATIENT
Start: 2024-12-17 | End: 2024-12-17 | Stop reason: HOSPADM

## 2024-12-16 RX ORDER — IPRATROPIUM BROMIDE AND ALBUTEROL SULFATE 2.5; .5 MG/3ML; MG/3ML
3 SOLUTION RESPIRATORY (INHALATION) EVERY 6 HOURS
Status: DISCONTINUED | OUTPATIENT
Start: 2024-12-16 | End: 2024-12-17 | Stop reason: HOSPADM

## 2024-12-16 RX ORDER — INSULIN ASPART 100 [IU]/ML
0-10 INJECTION, SOLUTION INTRAVENOUS; SUBCUTANEOUS
Status: DISCONTINUED | OUTPATIENT
Start: 2024-12-16 | End: 2024-12-17 | Stop reason: HOSPADM

## 2024-12-16 RX ORDER — PROMETHAZINE HYDROCHLORIDE 25 MG/1
25 TABLET ORAL EVERY 6 HOURS PRN
Status: DISCONTINUED | OUTPATIENT
Start: 2024-12-16 | End: 2024-12-17 | Stop reason: HOSPADM

## 2024-12-16 RX ORDER — FOLIC ACID 1 MG/1
1 TABLET ORAL DAILY
Status: DISCONTINUED | OUTPATIENT
Start: 2024-12-16 | End: 2024-12-17 | Stop reason: HOSPADM

## 2024-12-16 RX ORDER — PRIMIDONE 50 MG/1
100 TABLET ORAL 3 TIMES DAILY
Status: DISCONTINUED | OUTPATIENT
Start: 2024-12-16 | End: 2024-12-17 | Stop reason: HOSPADM

## 2024-12-16 RX ORDER — LEVOTHYROXINE SODIUM 25 UG/1
25 TABLET ORAL
Status: DISCONTINUED | OUTPATIENT
Start: 2024-12-17 | End: 2024-12-17 | Stop reason: HOSPADM

## 2024-12-16 RX ORDER — IBUPROFEN 200 MG
24 TABLET ORAL
Status: DISCONTINUED | OUTPATIENT
Start: 2024-12-16 | End: 2024-12-17 | Stop reason: HOSPADM

## 2024-12-16 RX ORDER — PANTOPRAZOLE SODIUM 40 MG/1
40 TABLET, DELAYED RELEASE ORAL DAILY
Status: DISCONTINUED | OUTPATIENT
Start: 2024-12-16 | End: 2024-12-17 | Stop reason: HOSPADM

## 2024-12-16 RX ORDER — INSULIN GLARGINE 100 [IU]/ML
25 INJECTION, SOLUTION SUBCUTANEOUS DAILY
Status: DISCONTINUED | OUTPATIENT
Start: 2024-12-16 | End: 2024-12-17 | Stop reason: HOSPADM

## 2024-12-16 RX ORDER — ACETAMINOPHEN 325 MG/1
650 TABLET ORAL EVERY 6 HOURS PRN
Status: DISCONTINUED | OUTPATIENT
Start: 2024-12-16 | End: 2024-12-17 | Stop reason: HOSPADM

## 2024-12-16 RX ORDER — GLUCAGON 1 MG
1 KIT INJECTION
Status: DISCONTINUED | OUTPATIENT
Start: 2024-12-16 | End: 2024-12-17 | Stop reason: HOSPADM

## 2024-12-16 RX ORDER — FUROSEMIDE 10 MG/ML
80 INJECTION INTRAMUSCULAR; INTRAVENOUS EVERY 12 HOURS
Status: DISCONTINUED | OUTPATIENT
Start: 2024-12-16 | End: 2024-12-17 | Stop reason: HOSPADM

## 2024-12-16 RX ADMIN — LEVOFLOXACIN 500 MG: 5 INJECTION, SOLUTION INTRAVENOUS at 04:12

## 2024-12-16 RX ADMIN — ACETAMINOPHEN 650 MG: 325 TABLET ORAL at 07:12

## 2024-12-16 RX ADMIN — FUROSEMIDE 80 MG: 10 INJECTION, SOLUTION INTRAMUSCULAR; INTRAVENOUS at 09:12

## 2024-12-16 RX ADMIN — PRIMIDONE 100 MG: 50 TABLET ORAL at 10:12

## 2024-12-16 RX ADMIN — ISOSORBIDE MONONITRATE 30 MG: 30 TABLET, EXTENDED RELEASE ORAL at 04:12

## 2024-12-16 RX ADMIN — IPRATROPIUM BROMIDE AND ALBUTEROL SULFATE 3 ML: .5; 3 SOLUTION RESPIRATORY (INHALATION) at 06:12

## 2024-12-16 RX ADMIN — PREGABALIN 75 MG: 75 CAPSULE ORAL at 09:12

## 2024-12-16 RX ADMIN — ATORVASTATIN CALCIUM 40 MG: 40 TABLET, FILM COATED ORAL at 04:12

## 2024-12-16 RX ADMIN — PRIMIDONE 100 MG: 50 TABLET ORAL at 04:12

## 2024-12-16 RX ADMIN — NITROGLYCERIN 1 INCH: 20 OINTMENT TOPICAL at 11:12

## 2024-12-16 RX ADMIN — ASPIRIN 325 MG ORAL TABLET 325 MG: 325 PILL ORAL at 11:12

## 2024-12-16 RX ADMIN — PANTOPRAZOLE SODIUM 40 MG: 40 TABLET, DELAYED RELEASE ORAL at 04:12

## 2024-12-16 RX ADMIN — FOLIC ACID 1 MG: 1 TABLET ORAL at 04:12

## 2024-12-16 RX ADMIN — MORPHINE SULFATE 4 MG: 4 INJECTION, SOLUTION INTRAMUSCULAR; INTRAVENOUS at 11:12

## 2024-12-16 NOTE — H&P
[facility] - Emergency Department  Hospital Medicine  History & Physical    Patient Name: Karen Renteria  MRN: [id number]  Patient Class: OP- Observation  Admission Date: 12/16/2024  Attending Physician: Ulices Smith DO   Primary Care Provider: Jt Allison II, MD         Patient information was obtained from patient, past medical records, and ER records.     Subjective:     Principal Problem:Acute on chronic combined systolic and diastolic heart failure    Chief Complaint:   Chief Complaint   Patient presents with    Shortness of Breath    Chest Pain     Presents to ED for complaints of shortness of breath and chest pain .  Patient reports that she has been short of breath since Saturday.        HPI: 69-year-old white female with a past medical history of osteoporosis, history of gastric ulcer, hypertension, CVA, CKD stage 5, CAD status post CABG in 2011, type 2 diabetes, diabetic neuropathy, essential tremor, hyperlipidemia, hypertension, insomnia, aortic stenosis status post aortic valve replacement in 2011, sick sinus syndrome, subclavian artery stenosis who presents to the ED with 2 days of shortness of breath.  Symptoms began roughly 2 days ago.  She has had low-grade fever at home of 100.6.  She has had a cough productive of purulent sputum.  She has had some thoracic pain that is associated with taking deep breaths.  It is located primarily in her back.  She denies any history of heart failure she denies any history of kidney disease.  Renal function today is consistent with CKD stage IV and better than her usual renal function.  She also has an echo that showed combined systolic and diastolic heart failure.  Denies any history of this.  She denies any orthopnea, weight gain, paroxysmal nocturnal dyspnea.  Chest x-ray is consistent with pulmonary edema.  Exam is consistent with pulmonary edema.  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  She denies any  palpitations wheezing nausea vomiting diarrhea dysuria.    Past Medical History:   Diagnosis Date    Acute gastric ulcer without hemorrhage or perforation 03/03/2022    Age-related osteoporosis with current pathological fracture 04/21/2021    Benign essential hypertension 08/28/2018    Last Assessment & Plan:   Formatting of this note might be different from the original.  Well controlled    Carpal tunnel syndrome     Cerebral vascular accident     CKD (chronic kidney disease) stage 5, GFR less than 15 ml/min 12/11/2023    Coronary arteriosclerosis     S/P CABG 2011    COVID-19 12/20/2020    Diabetes mellitus, type 2     Diabetic peripheral neuropathy associated with type 2 diabetes mellitus 04/28/2022    Diverticulitis     Essential tremor 01/20/2022    Falls frequently 12/07/2021    Hyperlipidemia     Hypertension     Insomnia secondary to depression with anxiety     Lumbar radiculopathy     Non-rheumatic aortic stenosis 08/28/2018    Formatting of this note might be different from the original.  S/p Tissue AVR July 2011 ([facility]). Echo October 2017 revealed normally functioning bioprosthesis with normal EF 60-65%     Last Assessment & Plan:   Formatting of this note might be different from the original.  Asymptomatic.    Osteoporosis     Pure hypercholesterolemia 08/28/2018    Formatting of this note might be different from the original.  Chronic statin.       Last Assessment & Plan:   Formatting of this note might be different from the original.   Continue statin.    RBBB 09/08/2021    Formatting of this note might be different from the original.  Initially noted on electrocardiogram in September 2021.    Spinal stenosis, cervical region 01/20/2022    SSS (sick sinus syndrome) 07/12/2022    Last Assessment & Plan:   Formatting of this note might be different from the original.  Post placement of [employer] dual-chamber permanent pacemaker by Dr. Freeman on 07/12/2022.    Subclavian artery stenosis  08/28/2018    Formatting of this note might be different from the original.  30% stenosis.    Tremor     Type 2 diabetes mellitus, with long-term current use of insulin 01/13/2021    Vitamin D deficiency        Past Surgical History:   Procedure Laterality Date    APPENDECTOMY      ASCENDING AORTIC ANEURYSM REPAIR W/ TISSUE AORTIC VALVE REPLACEMENT      BLADDER SUSPENSION Right     CHOLECYSTECTOMY      CORONARY ARTERY BYPASS GRAFT (CABG)      DIRECT LARYNGOSCOPY Bilateral 08/12/2022    Procedure: LARYNGOSCOPY, DIRECT;  Surgeon: Iban Silverio MD;  Location: [facility] OR;  Service: ENT;  Laterality: Bilateral;    HYSTERECTOMY      OOPHORECTOMY      TONSILLECTOMY      VOCAL FOLD LESION EXCISION Bilateral 08/12/2022    Procedure: EXCISION, LESION, VOCAL CORD;  Surgeon: Iban Silverio MD;  Location: [facility] OR;  Service: ENT;  Laterality: Bilateral;       Review of patient's allergies indicates:   Allergen Reactions    Cephalexin Rash    Bactrim [sulfamethoxazole-trimethoprim]     Zofran [ondansetron hcl] Itching       No current facility-administered medications on file prior to encounter.     Current Outpatient Medications on File Prior to Encounter   Medication Sig    aspirin (ECOTRIN) 81 MG EC tablet Take 81 mg by mouth once daily.    dapagliflozin propanediol (FARXIGA) 10 mg tablet Take 10 mg by mouth once daily.    folic acid (FOLVITE) 1 MG tablet Take 1 mg by mouth once daily.    insulin degludec (TRESIBA FLEXTOUCH U-200) 200 unit/mL (3 mL) insulin pen 52 units in the morning and 32 units in the evening    isosorbide dinitrate (ISORDIL) 30 MG Tab Take 30 mg by mouth.    levothyroxine (SYNTHROID) 25 MCG tablet Take 25 mcg by mouth before breakfast.    meclizine (ANTIVERT) 25 mg tablet Take 1 tablet (25 mg total) by mouth 3 (three) times daily as needed for Dizziness.    metoprolol succinate (TOPROL-XL) 50 MG 24 hr tablet Take 50 mg by mouth once daily.    multivitamin (THERAGRAN) per tablet Take 1 tablet by  mouth once daily.    pantoprazole (PROTONIX) 40 MG tablet TAKE 1 TABLET BY MOUTH ONCE DAILY PRIOR TO SUPPER    pregabalin (LYRICA) 75 MG capsule Take 75 mg by mouth 2 (two) times daily.    primidone (MYSOLINE) 50 MG Tab Take 2 tablets (100 mg total) by mouth 3 (three) times daily. (Patient taking differently: Take 100 mg by mouth 2 (two) times a day.)    promethazine (PHENERGAN) 25 MG tablet Take 1 tablet (25 mg total) by mouth every 6 (six) hours as needed for Nausea.    rosuvastatin (CRESTOR) 40 MG Tab Take 40 mg by mouth once daily.    sertraline (ZOLOFT) 50 MG tablet TAKE ONE TABLET BY MOUTH EVERY DAY    [DISCONTINUED] hydroCHLOROthiazide (HYDRODIURIL) 25 MG tablet Take 25 mg by mouth.    [DISCONTINUED] rosuvastatin (CRESTOR) 20 MG tablet Take 20 mg by mouth every evening.    cyclobenzaprine (FLEXERIL) 10 MG tablet Take 10 mg by mouth as needed for Muscle spasms.    semaglutide (OZEMPIC) 0.25 mg or 0.5 mg(2 mg/1.5 mL) pen injector Inject 0.25 mg into the skin.    [DISCONTINUED] fluticasone propionate (FLONASE) 50 mcg/actuation nasal spray 2 sprays by Each Nostril route once daily.    [DISCONTINUED] isosorbide mononitrate (IMDUR) 30 MG 24 hr tablet Take 1 tablet (30 mg total) by mouth once daily.    [DISCONTINUED] promethazine (PHENERGAN) 25 MG tablet Take 1 tablet (25 mg total) by mouth every 6 (six) hours as needed for Nausea. (Patient not taking: Reported on 2024)    [DISCONTINUED] URO--10-40.8-36 mg Cap TAKE 1 CAPSULE BY MOUTH EVERY MORNING, AT NOON, IN THE EVENING AND BEFORE BEDTIME     Family History       Problem Relation (Age of Onset)    Breast cancer Mother, Sister    Cancer Mother    Heart disease Father          Tobacco Use    Smoking status: Former     Current packs/day: 0.00     Average packs/day: 0.5 packs/day for 17.3 years (8.7 ttl pk-yrs)     Types: Cigarettes     Start date:      Quit date: 2011     Years since quittin.6    Smokeless tobacco: Never   Substance and  Sexual Activity    Alcohol use: Never    Drug use: Never    Sexual activity: Not Currently     Review of Systems   Constitutional:  Negative for chills, fatigue, fever and unexpected weight change.   HENT:  Negative for congestion, mouth sores and sore throat.    Eyes:  Negative for photophobia and visual disturbance.   Respiratory:  Positive for cough and shortness of breath. Negative for chest tightness and wheezing.    Cardiovascular:  Positive for chest pain. Negative for palpitations and leg swelling.   Gastrointestinal:  Negative for abdominal pain, diarrhea, nausea and vomiting.   Endocrine: Negative for cold intolerance and heat intolerance.   Genitourinary:  Negative for difficulty urinating, dysuria, frequency and urgency.   Musculoskeletal:  Negative for arthralgias, back pain and myalgias.   Skin:  Negative for pallor and rash.   Neurological:  Negative for tremors, seizures, syncope, weakness, numbness and headaches.   Hematological:  Does not bruise/bleed easily.   Psychiatric/Behavioral:  Negative for agitation, confusion, hallucinations and suicidal ideas.      Objective:     Vital Signs (Most Recent):  Temp: 98 °F (36.7 °C) (12/16/24 0723)  Pulse: 70 (12/16/24 1454)  Resp: 15 (12/16/24 1454)  BP: 129/64 (12/16/24 1454)  SpO2: 96 % (12/16/24 1454) Vital Signs (24h Range):  Temp:  [98 °F (36.7 °C)] 98 °F (36.7 °C)  Pulse:  [69-93] 70  Resp:  [12-31] 15  SpO2:  [89 %-100 %] 96 %  BP: (101-138)/(42-66) 129/64     Weight: 81.6 kg (180 lb)  Body mass index is 30.9 kg/m².     Physical Exam  Vitals reviewed.   Constitutional:       Appearance: Normal appearance.   HENT:      Head: Normocephalic and atraumatic.      Nose: Nose normal.   Eyes:      Extraocular Movements: Extraocular movements intact.      Conjunctiva/sclera: Conjunctivae normal.   Neck:      Trachea: Trachea normal.   Cardiovascular:      Rate and Rhythm: Normal rate and regular rhythm.      Pulses: Normal pulses.      Heart sounds: Normal  "heart sounds.   Pulmonary:      Effort: Pulmonary effort is normal.      Breath sounds: Normal air entry. Rales present.   Abdominal:      General: Bowel sounds are normal.      Palpations: Abdomen is soft.   Musculoskeletal:         General: Normal range of motion.      Cervical back: Neck supple.   Skin:     General: Skin is warm and dry.   Neurological:      General: No focal deficit present.      Mental Status: She is alert and oriented to person, place, and time.      Comments: Grossly normal motor and sensory function without focal deficit appreciated.   Psychiatric:         Mood and Affect: Mood and affect normal.         Behavior: Behavior is cooperative.                Significant Labs: All pertinent labs within the past 24 hours have been reviewed.    Significant Imaging: I have reviewed all pertinent imaging results/findings within the past 24 hours.  Assessment/Plan:     * Acute on chronic combined systolic and diastolic heart failure  Patient has Combined Systolic and Diastolic heart failure that is Acute on chronic. On presentation their CHF was decompensated. Evidence of decompensated CHF on presentation includes: crackles on lung auscultation and shortness of breath. The etiology of their decompensation is likely medication non-compliance. Most recent BNP and echo results are listed below.  No results for input(s): "BNP" in the last 72 hours.  Latest ECHO  Results for orders placed during the hospital encounter of 12/11/23    Echo    Interpretation Summary    Left Ventricle: The left ventricle is normal in size. Normal wall thickness. Regional wall motion abnormalities present. Septal motion is consistent with pacing. There is mildly reduced systolic function. Grade I diastolic dysfunction.    Right Ventricle: Normal right ventricular cavity size. Systolic function is normal.    Aortic Valve: There is a bioprosthetic valve in the aortic position.    Mitral Valve: There is mild bileaflet sclerosis.    " Pulmonary Artery: The estimated pulmonary artery systolic pressure is 15 mmHg.    IVC/SVC: Normal venous pressure at 3 mmHg.    Current Heart Failure Medications  furosemide injection 80 mg, Every 12 hours, Intravenous    Plan  - Monitor strict I&Os and daily weights.    - Place on telemetry  - Low sodium diet  - Place on fluid restriction of 1.5 L.   - Cardiology has not been consulted  - The patient's volume status is worsening as indicated by crackles on lung auscultation, dyspnea on exertion (CHAUDHARY), and shortness of breath. Will adjust treatment as follows:  IV Lasix  - follow up            Depression  Patient has persistent depression which is unknown and is currently controlled. Will Continue anti-depressant medications. We will not consult psychiatry at this time. Patient does not display psychosis at this time. Continue to monitor closely and adjust plan of care as needed.        Bacterial pneumonia  Patient has a diagnosis of pneumonia. The cause of the pneumonia is suspected to be bacterial in etiology but organism is not known. The pneumonia is stable. The patient has the following signs/symptoms of pneumonia: cough, sputum production, and shortness of breath. The patient does have a current oxygen requirement and the patient does not have a home oxygen requirement. I have reviewed the pertinent imaging. The following cultures have been collected: Sputum culture The culture results are listed below.     Current antimicrobial regimen consists of the antibiotics listed below. Will monitor patient closely and continue current treatment plan unchanged.    Antibiotics (From admission, onward)      Start     Stop Route Frequency Ordered    12/16/24 1445  levoFLOXacin 500 mg/100 mL IVPB 500 mg         -- IV Every 24 hours (non-standard times) 12/16/24 1344            Microbiology Results (last 7 days)       ** No results found for the last 168 hours. **            CKD (chronic kidney disease) stage 5, GFR less  "than 15 ml/min  Creatine stable for now. BMP reviewed- noted Estimated Creatinine Clearance: 25.2 mL/min (A) (based on SCr of 2.15 mg/dL (H)). according to latest data. Based on current GFR, CKD stage is stage 4 - GFR 15-29.  Monitor UOP and serial BMP and adjust therapy as needed. Renally dose meds. Avoid nephrotoxic medications and procedures.    Subclavian artery stenosis  History of subclavian artery stenosis      SSS (sick sinus syndrome)  Status post pacemaker placement      Paroxysmal atrial fibrillation  Patient has paroxysmal (<7 days) atrial fibrillation. Patient is currently in sinus rhythm. [id number] Score: 4. The patients heart rate in the last 24 hours is as follows:  Pulse  Min: 69  Max: 93     Antiarrhythmics       Anticoagulants       Plan  - Replete lytes with a goal of K>4, Mg >2  - not anticoagulated  - Patient's afib is currently controlled  - follow        Non-rheumatic aortic stenosis  Echocardiogram with evidence of aortic stenosis that is severe . The patient's most recent echocardiogram result is listed below. We will manage the valvular abnormality by valve replacement 2011    No echocardiogram results found for the past 12 months     Benign essential hypertension  Patient's blood pressure range in the last 24 hours was: BP  Min: 101/50  Max: 138/66.The patient's inpatient anti-hypertensive regimen is listed below:  Current Antihypertensives  isosorbide mononitrate 24 hr tablet 30 mg, Daily, Oral  furosemide injection 80 mg, Every 12 hours, Intravenous    Plan  - BP is controlled, no changes needed to their regimen  - follow    Diabetic peripheral neuropathy associated with type 2 diabetes mellitus  Patient's FSGs are controlled on current medication regimen.  Last A1c reviewed-   Lab Results   Component Value Date    HGBA1C 6.0 12/13/2023     Most recent fingerstick glucose reviewed- No results for input(s): "POCTGLUCOSE" in the last 24 hours.  Current correctional scale  " "Medium  Maintain anti-hyperglycemic dose as follows-   Antihyperglycemics (From admission, onward)      Start     Stop Route Frequency Ordered    12/16/24 1445  insulin glargine U-100 (Lantus) injection 25 Units         -- SubQ Daily 12/16/24 1342    12/16/24 1442  insulin aspart U-100 injection 0-10 Units         -- SubQ Before meals & nightly PRN 12/16/24 1342          Hold Oral hypoglycemics while patient is in the hospital.       Essential tremor  Continue primidone      Vitamin D deficiency  Continue supplementation outpatient      Coronary artery disease involving native coronary artery of native heart without angina pectoris  Patient with known CAD s/p CABG, which is controlled Will continue ASA and Statin and monitor for S/Sx of angina/ACS. Continue to monitor on telemetry.     Type 2 diabetes mellitus, with long-term current use of insulin  Patient's FSGs are controlled on current medication regimen.  Last A1c reviewed-   Lab Results   Component Value Date    HGBA1C 6.0 12/13/2023     Most recent fingerstick glucose reviewed- No results for input(s): "POCTGLUCOSE" in the last 24 hours.  Current correctional scale  Low  Maintain anti-hyperglycemic dose as follows-   Antihyperglycemics (From admission, onward)      Start     Stop Route Frequency Ordered    12/16/24 1445  insulin glargine U-100 (Lantus) injection 25 Units         -- SubQ Daily 12/16/24 1342    12/16/24 1442  insulin aspart U-100 injection 0-10 Units         -- SubQ Before meals & nightly PRN 12/16/24 1342          Hold Oral hypoglycemics while patient is in the hospital.          VTE Risk Mitigation (From admission, onward)           Ordered     IP VTE HIGH RISK PATIENT  Once         12/16/24 1341     Place sequential compression device  Until discontinued         12/16/24 1341                       On 12/16/2024, patient should be placed in hospital observation services under my care.             Ulices Smith DO  Department of Hospital " Medicine  [facility] - Emergency Department

## 2024-12-16 NOTE — ASSESSMENT & PLAN NOTE
"Patient's FSGs are controlled on current medication regimen.  Last A1c reviewed-   Lab Results   Component Value Date    HGBA1C 6.0 12/13/2023     Most recent fingerstick glucose reviewed- No results for input(s): "POCTGLUCOSE" in the last 24 hours.  Current correctional scale  Medium  Maintain anti-hyperglycemic dose as follows-   Antihyperglycemics (From admission, onward)      Start     Stop Route Frequency Ordered    12/16/24 1445  insulin glargine U-100 (Lantus) injection 25 Units         -- SubQ Daily 12/16/24 1342    12/16/24 1442  insulin aspart U-100 injection 0-10 Units         -- SubQ Before meals & nightly PRN 12/16/24 1342          Hold Oral hypoglycemics while patient is in the hospital.     "

## 2024-12-16 NOTE — PHARMACY MED REC
"Admission Medication History     The home medication history was taken by Clarissa Peng.    You may go to "Admission" then "Reconcile Home Medications" tabs to review and/or act upon these items.     The home medication list has been updated by the Pharmacy department.   Please read ALL comments highlighted in yellow.   Please address this information as you see fit.    Feel free to contact us if you have any questions or require assistance.  Medications Updated:  Insulin degludec updated to 52 units in the morning and 32 units in the evening  Primidone 100 mg three times a day updated to Primidone 100 mg twice daily  Rosuvastatin 20 mg updated to Rosuvastatin 40 mg  Patient's list of medications from home still had 20 mg but 40 mg appears to have been the most recent prescription picked up at her pharmacy  Medication Added:  Multivitamin      Patient reports no longer taking the following medication(s). The medication(s) listed below were removed from the home medication list. Please reorder if appropriate:  Isosorbide Mononitrate 30 mg  Patient had a list of her medications that listed Isosorbide 30 mg ER. Based on pharmacy records, Isosorbide Dinitrate appears to be the most recent prescription.  [facility] [place]--10-40.8-36 mg    Medications listed below were obtained from: Patient/family and Analytic software- [employer]  (Not in a hospital admission)        Current Outpatient Medications on File Prior to Encounter   Medication Sig Dispense Refill Last Dose/Taking    aspirin (ECOTRIN) 81 MG EC tablet Take 81 mg by mouth once daily.   12/16/2024 Morning    dapagliflozin propanediol (FARXIGA) 10 mg tablet Take 10 mg by mouth once daily.   12/16/2024 Morning    folic acid (FOLVITE) 1 MG tablet Take 1 mg by mouth once daily.   12/16/2024    insulin degludec (TRESIBA FLEXTOUCH U-200) 200 unit/mL (3 mL) insulin pen 52 units in the morning and 32 units in the evening   12/16/2024 Morning    isosorbide dinitrate (ISORDIL) 30 MG " Tab Take 30 mg by mouth.   12/16/2024    levothyroxine (SYNTHROID) 25 MCG tablet Take 25 mcg by mouth before breakfast.   12/16/2024 Morning    meclizine (ANTIVERT) 25 mg tablet Take 1 tablet (25 mg total) by mouth 3 (three) times daily as needed for Dizziness. 90 tablet 11 Past Month    metoprolol succinate (TOPROL-XL) 50 MG 24 hr tablet Take 50 mg by mouth once daily.   12/16/2024 Morning    multivitamin (THERAGRAN) per tablet Take 1 tablet by mouth once daily.   12/16/2024    pantoprazole (PROTONIX) 40 MG tablet TAKE 1 TABLET BY MOUTH ONCE DAILY PRIOR TO SUPPER   12/16/2024 Morning    pregabalin (LYRICA) 75 MG capsule Take 75 mg by mouth 2 (two) times daily.   12/16/2024 Morning    primidone (MYSOLINE) 50 MG Tab Take 2 tablets (100 mg total) by mouth 3 (three) times daily. (Patient taking differently: Take 100 mg by mouth 2 (two) times a day.) 540 tablet 3 12/16/2024 Morning    promethazine (PHENERGAN) 25 MG tablet Take 1 tablet (25 mg total) by mouth every 6 (six) hours as needed for Nausea. 30 tablet 5 Past Month    rosuvastatin (CRESTOR) 40 MG Tab Take 40 mg by mouth once daily.   12/16/2024    sertraline (ZOLOFT) 50 MG tablet TAKE ONE TABLET BY MOUTH EVERY DAY 90 tablet 3 12/16/2024    [DISCONTINUED] hydroCHLOROthiazide (HYDRODIURIL) 25 MG tablet Take 25 mg by mouth.   12/16/2024 Morning    [DISCONTINUED] rosuvastatin (CRESTOR) 20 MG tablet Take 20 mg by mouth every evening.   12/15/2024 Evening    cyclobenzaprine (FLEXERIL) 10 MG tablet Take 10 mg by mouth as needed for Muscle spasms.   More than a month    semaglutide (OZEMPIC) 0.25 mg or 0.5 mg(2 mg/1.5 mL) pen injector Inject 0.25 mg into the skin.   12/14/2024    [DISCONTINUED] fluticasone propionate (FLONASE) 50 mcg/actuation nasal spray 2 sprays by Each Nostril route once daily.       [DISCONTINUED] isosorbide mononitrate (IMDUR) 30 MG 24 hr tablet Take 1 tablet (30 mg total) by mouth once daily. 30 tablet 5     [DISCONTINUED] promethazine (PHENERGAN)  25 MG tablet Take 1 tablet (25 mg total) by mouth every 6 (six) hours as needed for Nausea. (Patient not taking: Reported on 11/26/2024) 15 tablet 0     [DISCONTINUED] URO--10-40.8-36 mg Cap TAKE 1 CAPSULE BY MOUTH EVERY MORNING, AT NOON, IN THE EVENING AND BEFORE BEDTIME            Potential issues to be addressed PRIOR TO DISCHARGE  No issues identified.    Clarissa Peng  Medication Access Specialist  EXT. [id number]    .

## 2024-12-16 NOTE — HPI
69-year-old white female with a past medical history of osteoporosis, history of gastric ulcer, hypertension, CVA, CKD stage 5, CAD status post CABG in 2011, type 2 diabetes, diabetic neuropathy, essential tremor, hyperlipidemia, hypertension, insomnia, aortic stenosis status post aortic valve replacement in 2011, sick sinus syndrome, subclavian artery stenosis who presents to the ED with 2 days of shortness of breath.  Symptoms began roughly 2 days ago.  She has had low-grade fever at home of 100.6.  She has had a cough productive of purulent sputum.  She has had some thoracic pain that is associated with taking deep breaths.  It is located primarily in her back.  She denies any history of heart failure she denies any history of kidney disease.  Renal function today is consistent with CKD stage IV and better than her usual renal function.  She also has an echo that showed combined systolic and diastolic heart failure.  Denies any history of this.  She denies any orthopnea, weight gain, paroxysmal nocturnal dyspnea.  Chest x-ray is consistent with pulmonary edema.  Exam is consistent with pulmonary edema.  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  She denies any palpitations wheezing nausea vomiting diarrhea dysuria.

## 2024-12-16 NOTE — SUBJECTIVE & OBJECTIVE
Past Medical History:   Diagnosis Date    Acute gastric ulcer without hemorrhage or perforation 03/03/2022    Age-related osteoporosis with current pathological fracture 04/21/2021    Benign essential hypertension 08/28/2018    Last Assessment & Plan:   Formatting of this note might be different from the original.  Well controlled    Carpal tunnel syndrome     Cerebral vascular accident     CKD (chronic kidney disease) stage 5, GFR less than 15 ml/min 12/11/2023    Coronary arteriosclerosis     S/P CABG 2011    COVID-19 12/20/2020    Diabetes mellitus, type 2     Diabetic peripheral neuropathy associated with type 2 diabetes mellitus 04/28/2022    Diverticulitis     Essential tremor 01/20/2022    Falls frequently 12/07/2021    Hyperlipidemia     Hypertension     Insomnia secondary to depression with anxiety     Lumbar radiculopathy     Non-rheumatic aortic stenosis 08/28/2018    Formatting of this note might be different from the original.  S/p Tissue AVR July 2011 ([facility]). Echo October 2017 revealed normally functioning bioprosthesis with normal EF 60-65%     Last Assessment & Plan:   Formatting of this note might be different from the original.  Asymptomatic.    Osteoporosis     Pure hypercholesterolemia 08/28/2018    Formatting of this note might be different from the original.  Chronic statin.       Last Assessment & Plan:   Formatting of this note might be different from the original.   Continue statin.    RBBB 09/08/2021    Formatting of this note might be different from the original.  Initially noted on electrocardiogram in September 2021.    Spinal stenosis, cervical region 01/20/2022    SSS (sick sinus syndrome) 07/12/2022    Last Assessment & Plan:   Formatting of this note might be different from the original.  Post placement of [employer] scientific dual-chamber permanent pacemaker by Dr. Freeman on 07/12/2022.    Subclavian artery stenosis 08/28/2018    Formatting of this note might be different from  the original.  30% stenosis.    Tremor     Type 2 diabetes mellitus, with long-term current use of insulin 01/13/2021    Vitamin D deficiency        Past Surgical History:   Procedure Laterality Date    APPENDECTOMY      ASCENDING AORTIC ANEURYSM REPAIR W/ TISSUE AORTIC VALVE REPLACEMENT      BLADDER SUSPENSION Right     CHOLECYSTECTOMY      CORONARY ARTERY BYPASS GRAFT (CABG)      DIRECT LARYNGOSCOPY Bilateral 08/12/2022    Procedure: LARYNGOSCOPY, DIRECT;  Surgeon: Iban Silverio MD;  Location: [facility] OR;  Service: ENT;  Laterality: Bilateral;    HYSTERECTOMY      OOPHORECTOMY      TONSILLECTOMY      VOCAL FOLD LESION EXCISION Bilateral 08/12/2022    Procedure: EXCISION, LESION, VOCAL CORD;  Surgeon: Iban Silverio MD;  Location: [facility] OR;  Service: ENT;  Laterality: Bilateral;       Review of patient's allergies indicates:   Allergen Reactions    Cephalexin Rash    Bactrim [sulfamethoxazole-trimethoprim]     Zofran [ondansetron hcl] Itching       No current facility-administered medications on file prior to encounter.     Current Outpatient Medications on File Prior to Encounter   Medication Sig    aspirin (ECOTRIN) 81 MG EC tablet Take 81 mg by mouth once daily.    dapagliflozin propanediol (FARXIGA) 10 mg tablet Take 10 mg by mouth once daily.    folic acid (FOLVITE) 1 MG tablet Take 1 mg by mouth once daily.    insulin degludec (TRESIBA FLEXTOUCH U-200) 200 unit/mL (3 mL) insulin pen 52 units in the morning and 32 units in the evening    isosorbide dinitrate (ISORDIL) 30 MG Tab Take 30 mg by mouth.    levothyroxine (SYNTHROID) 25 MCG tablet Take 25 mcg by mouth before breakfast.    meclizine (ANTIVERT) 25 mg tablet Take 1 tablet (25 mg total) by mouth 3 (three) times daily as needed for Dizziness.    metoprolol succinate (TOPROL-XL) 50 MG 24 hr tablet Take 50 mg by mouth once daily.    multivitamin (THERAGRAN) per tablet Take 1 tablet by mouth once daily.    pantoprazole (PROTONIX) 40 MG tablet TAKE  1 TABLET BY MOUTH ONCE DAILY PRIOR TO SUPPER    pregabalin (LYRICA) 75 MG capsule Take 75 mg by mouth 2 (two) times daily.    primidone (MYSOLINE) 50 MG Tab Take 2 tablets (100 mg total) by mouth 3 (three) times daily. (Patient taking differently: Take 100 mg by mouth 2 (two) times a day.)    promethazine (PHENERGAN) 25 MG tablet Take 1 tablet (25 mg total) by mouth every 6 (six) hours as needed for Nausea.    rosuvastatin (CRESTOR) 40 MG Tab Take 40 mg by mouth once daily.    sertraline (ZOLOFT) 50 MG tablet TAKE ONE TABLET BY MOUTH EVERY DAY    [DISCONTINUED] hydroCHLOROthiazide (HYDRODIURIL) 25 MG tablet Take 25 mg by mouth.    [DISCONTINUED] rosuvastatin (CRESTOR) 20 MG tablet Take 20 mg by mouth every evening.    cyclobenzaprine (FLEXERIL) 10 MG tablet Take 10 mg by mouth as needed for Muscle spasms.    semaglutide (OZEMPIC) 0.25 mg or 0.5 mg(2 mg/1.5 mL) pen injector Inject 0.25 mg into the skin.    [DISCONTINUED] fluticasone propionate (FLONASE) 50 mcg/actuation nasal spray 2 sprays by Each Nostril route once daily.    [DISCONTINUED] isosorbide mononitrate (IMDUR) 30 MG 24 hr tablet Take 1 tablet (30 mg total) by mouth once daily.    [DISCONTINUED] promethazine (PHENERGAN) 25 MG tablet Take 1 tablet (25 mg total) by mouth every 6 (six) hours as needed for Nausea. (Patient not taking: Reported on 2024)    [DISCONTINUED] URO--10-40.8-36 mg Cap TAKE 1 CAPSULE BY MOUTH EVERY MORNING, AT NOON, IN THE EVENING AND BEFORE BEDTIME     Family History       Problem Relation (Age of Onset)    Breast cancer Mother, Sister    Cancer Mother    Heart disease Father          Tobacco Use    Smoking status: Former     Current packs/day: 0.00     Average packs/day: 0.5 packs/day for 17.3 years (8.7 ttl pk-yrs)     Types: Cigarettes     Start date:      Quit date: 2011     Years since quittin.6    Smokeless tobacco: Never   Substance and Sexual Activity    Alcohol use: Never    Drug use: Never    Sexual  activity: Not Currently     Review of Systems   Constitutional:  Negative for chills, fatigue, fever and unexpected weight change.   HENT:  Negative for congestion, mouth sores and sore throat.    Eyes:  Negative for photophobia and visual disturbance.   Respiratory:  Positive for cough and shortness of breath. Negative for chest tightness and wheezing.    Cardiovascular:  Positive for chest pain. Negative for palpitations and leg swelling.   Gastrointestinal:  Negative for abdominal pain, diarrhea, nausea and vomiting.   Endocrine: Negative for cold intolerance and heat intolerance.   Genitourinary:  Negative for difficulty urinating, dysuria, frequency and urgency.   Musculoskeletal:  Negative for arthralgias, back pain and myalgias.   Skin:  Negative for pallor and rash.   Neurological:  Negative for tremors, seizures, syncope, weakness, numbness and headaches.   Hematological:  Does not bruise/bleed easily.   Psychiatric/Behavioral:  Negative for agitation, confusion, hallucinations and suicidal ideas.      Objective:     Vital Signs (Most Recent):  Temp: 98 °F (36.7 °C) (12/16/24 0723)  Pulse: 70 (12/16/24 1454)  Resp: 15 (12/16/24 1454)  BP: 129/64 (12/16/24 1454)  SpO2: 96 % (12/16/24 1454) Vital Signs (24h Range):  Temp:  [98 °F (36.7 °C)] 98 °F (36.7 °C)  Pulse:  [69-93] 70  Resp:  [12-31] 15  SpO2:  [89 %-100 %] 96 %  BP: (101-138)/(42-66) 129/64     Weight: 81.6 kg (180 lb)  Body mass index is 30.9 kg/m².     Physical Exam  Vitals reviewed.   Constitutional:       Appearance: Normal appearance.   HENT:      Head: Normocephalic and atraumatic.      Nose: Nose normal.   Eyes:      Extraocular Movements: Extraocular movements intact.      Conjunctiva/sclera: Conjunctivae normal.   Neck:      Trachea: Trachea normal.   Cardiovascular:      Rate and Rhythm: Normal rate and regular rhythm.      Pulses: Normal pulses.      Heart sounds: Normal heart sounds.   Pulmonary:      Effort: Pulmonary effort is normal.       Breath sounds: Normal air entry. Rales present.   Abdominal:      General: Bowel sounds are normal.      Palpations: Abdomen is soft.   Musculoskeletal:         General: Normal range of motion.      Cervical back: Neck supple.   Skin:     General: Skin is warm and dry.   Neurological:      General: No focal deficit present.      Mental Status: She is alert and oriented to person, place, and time.      Comments: Grossly normal motor and sensory function without focal deficit appreciated.   Psychiatric:         Mood and Affect: Mood and affect normal.         Behavior: Behavior is cooperative.                Significant Labs: All pertinent labs within the past 24 hours have been reviewed.    Significant Imaging: I have reviewed all pertinent imaging results/findings within the past 24 hours.

## 2024-12-16 NOTE — ASSESSMENT & PLAN NOTE
Patient has a diagnosis of pneumonia. The cause of the pneumonia is suspected to be bacterial in etiology but organism is not known. The pneumonia is stable. The patient has the following signs/symptoms of pneumonia: cough, sputum production, and shortness of breath. The patient does have a current oxygen requirement and the patient does not have a home oxygen requirement. I have reviewed the pertinent imaging. The following cultures have been collected: Sputum culture The culture results are listed below.     Current antimicrobial regimen consists of the antibiotics listed below. Will monitor patient closely and continue current treatment plan unchanged.    Antibiotics (From admission, onward)      Start     Stop Route Frequency Ordered    12/16/24 1445  levoFLOXacin 500 mg/100 mL IVPB 500 mg         -- IV Every 24 hours (non-standard times) 12/16/24 1344            Microbiology Results (last 7 days)       ** No results found for the last 168 hours. **

## 2024-12-16 NOTE — Clinical Note
Diagnosis: Shortness of breath [786.05.ICD-9-CM]   Future Attending Provider: EAN BARRAGAN [[id number]]   Special Needs:: No Special Needs [1]

## 2024-12-16 NOTE — ASSESSMENT & PLAN NOTE
"Patient has Combined Systolic and Diastolic heart failure that is Acute on chronic. On presentation their CHF was decompensated. Evidence of decompensated CHF on presentation includes: crackles on lung auscultation and shortness of breath. The etiology of their decompensation is likely medication non-compliance. Most recent BNP and echo results are listed below.  No results for input(s): "BNP" in the last 72 hours.  Latest ECHO  Results for orders placed during the hospital encounter of 12/11/23    Echo    Interpretation Summary    Left Ventricle: The left ventricle is normal in size. Normal wall thickness. Regional wall motion abnormalities present. Septal motion is consistent with pacing. There is mildly reduced systolic function. Grade I diastolic dysfunction.    Right Ventricle: Normal right ventricular cavity size. Systolic function is normal.    Aortic Valve: There is a bioprosthetic valve in the aortic position.    Mitral Valve: There is mild bileaflet sclerosis.    Pulmonary Artery: The estimated pulmonary artery systolic pressure is 15 mmHg.    IVC/SVC: Normal venous pressure at 3 mmHg.    Current Heart Failure Medications  furosemide injection 80 mg, Every 12 hours, Intravenous    Plan  - Monitor strict I&Os and daily weights.    - Place on telemetry  - Low sodium diet  - Place on fluid restriction of 1.5 L.   - Cardiology has not been consulted  - The patient's volume status is worsening as indicated by crackles on lung auscultation, dyspnea on exertion (CHAUDHARY), and shortness of breath. Will adjust treatment as follows:  IV Lasix  - follow up          "

## 2024-12-16 NOTE — ASSESSMENT & PLAN NOTE
Echocardiogram with evidence of aortic stenosis that is severe . The patient's most recent echocardiogram result is listed below. We will manage the valvular abnormality by valve replacement 2011    No echocardiogram results found for the past 12 months

## 2024-12-16 NOTE — ASSESSMENT & PLAN NOTE
"Patient's FSGs are controlled on current medication regimen.  Last A1c reviewed-   Lab Results   Component Value Date    HGBA1C 6.0 12/13/2023     Most recent fingerstick glucose reviewed- No results for input(s): "POCTGLUCOSE" in the last 24 hours.  Current correctional scale  Low  Maintain anti-hyperglycemic dose as follows-   Antihyperglycemics (From admission, onward)      Start     Stop Route Frequency Ordered    12/16/24 1445  insulin glargine U-100 (Lantus) injection 25 Units         -- SubQ Daily 12/16/24 1342    12/16/24 1442  insulin aspart U-100 injection 0-10 Units         -- SubQ Before meals & nightly PRN 12/16/24 1342          Hold Oral hypoglycemics while patient is in the hospital.      "

## 2024-12-16 NOTE — ASSESSMENT & PLAN NOTE
Creatine stable for now. BMP reviewed- noted Estimated Creatinine Clearance: 25.2 mL/min (A) (based on SCr of 2.15 mg/dL (H)). according to latest data. Based on current GFR, CKD stage is stage 4 - GFR 15-29.  Monitor UOP and serial BMP and adjust therapy as needed. Renally dose meds. Avoid nephrotoxic medications and procedures.

## 2024-12-16 NOTE — Clinical Note
"Karen Javier" Myra was seen and treated in our emergency department on 12/16/2024.  She may return to work on 12/18/2024.       If you have any questions or concerns, please don't hesitate to call.      Carmelo Moura RN RN    "

## 2024-12-16 NOTE — ED PROVIDER NOTES
Encounter Date: 12/16/2024       History     Chief Complaint   Patient presents with    Shortness of Breath    Chest Pain     Presents to ED for complaints of shortness of breath and chest pain .  Patient reports that she has been short of breath since Saturday.     Patient is a 69-year-old female who presents to the emergency department complaining of 2 day history of shortness of breath, 1 day history of sharp anterior chest pain which is worse with inspiration.  One day history of cough with greenish sputum production.  No radiation of pain, no fever, no vomiting, no diaphoresis, no other acute problems or complaints at this time.  Patient has history of coronary disease, she had three-vessel bypass surgery and a valve replacement about 10 or 12 years ago.        Review of patient's allergies indicates:   Allergen Reactions    Cephalexin Rash    Bactrim [sulfamethoxazole-trimethoprim]     Zofran [ondansetron hcl] Itching     Past Medical History:   Diagnosis Date    Acute gastric ulcer without hemorrhage or perforation 03/03/2022    Age-related osteoporosis with current pathological fracture 04/21/2021    Benign essential hypertension 08/28/2018    Last Assessment & Plan:   Formatting of this note might be different from the original.  Well controlled    Carpal tunnel syndrome     Cerebral vascular accident     CKD (chronic kidney disease) stage 5, GFR less than 15 ml/min 12/11/2023    Coronary arteriosclerosis     S/P CABG 2011    COVID-19 12/20/2020    Diabetes mellitus, type 2     Diabetic peripheral neuropathy associated with type 2 diabetes mellitus 04/28/2022    Diverticulitis     Essential tremor 01/20/2022    Falls frequently 12/07/2021    Hyperlipidemia     Hypertension     Insomnia secondary to depression with anxiety     Lumbar radiculopathy     Non-rheumatic aortic stenosis 08/28/2018    Formatting of this note might be different from the original.  S/p Tissue AVR July 2011 ([facility]). Echo  October 2017 revealed normally functioning bioprosthesis with normal EF 60-65%     Last Assessment & Plan:   Formatting of this note might be different from the original.  Asymptomatic.    Osteoporosis     Pure hypercholesterolemia 08/28/2018    Formatting of this note might be different from the original.  Chronic statin.       Last Assessment & Plan:   Formatting of this note might be different from the original.   Continue statin.    RBBB 09/08/2021    Formatting of this note might be different from the original.  Initially noted on electrocardiogram in September 2021.    Spinal stenosis, cervical region 01/20/2022    SSS (sick sinus syndrome) 07/12/2022    Last Assessment & Plan:   Formatting of this note might be different from the original.  Post placement of [employer] scientific dual-chamber permanent pacemaker by Dr. Freeman on 07/12/2022.    Subclavian artery stenosis 08/28/2018    Formatting of this note might be different from the original.  30% stenosis.    Tremor     Type 2 diabetes mellitus, with long-term current use of insulin 01/13/2021    Vitamin D deficiency      Past Surgical History:   Procedure Laterality Date    APPENDECTOMY      ASCENDING AORTIC ANEURYSM REPAIR W/ TISSUE AORTIC VALVE REPLACEMENT      BLADDER SUSPENSION Right     CHOLECYSTECTOMY      CORONARY ARTERY BYPASS GRAFT (CABG)      DIRECT LARYNGOSCOPY Bilateral 08/12/2022    Procedure: LARYNGOSCOPY, DIRECT;  Surgeon: Iban Silverio MD;  Location: [facility] OR;  Service: ENT;  Laterality: Bilateral;    HYSTERECTOMY      OOPHORECTOMY      TONSILLECTOMY      VOCAL FOLD LESION EXCISION Bilateral 08/12/2022    Procedure: EXCISION, LESION, VOCAL CORD;  Surgeon: Iban Silverio MD;  Location: [facility] OR;  Service: ENT;  Laterality: Bilateral;     Family History   Adopted: Yes   Problem Relation Name Age of Onset    Cancer Mother      Breast cancer Mother      Heart disease Father      Breast cancer Sister       Social History     Tobacco Use     Smoking status: Former     Current packs/day: 0.00     Average packs/day: 0.5 packs/day for 17.3 years (8.7 ttl pk-yrs)     Types: Cigarettes     Start date:      Quit date: 2011     Years since quittin.6    Smokeless tobacco: Never   Substance Use Topics    Alcohol use: Never    Drug use: Never     Review of Systems   Respiratory:  Positive for shortness of breath.    Cardiovascular:  Positive for chest pain.   All other systems reviewed and are negative.      Physical Exam     Initial Vitals [24 0723]   BP Pulse Resp Temp SpO2   (!) 116/42 93 (!) 31 98 °F (36.7 °C) (!) 89 %      MAP       --         Physical Exam    Nursing note and vitals reviewed.  Constitutional: She appears well-developed and well-nourished.   HENT:   Head: Normocephalic.   Eyes: Pupils are equal, round, and reactive to light.   Cardiovascular:  Normal rate.           Pulmonary/Chest:   Bibasilar rales are present.  Right greater than left.   Abdominal: Abdomen is soft.   Musculoskeletal:         General: Edema present. Normal range of motion.      Comments: Plus one pitting edema bilateral lower extremities.     Neurological: She is alert.   Skin: Skin is warm. Capillary refill takes less than 2 seconds.   Psychiatric: She has a normal mood and affect.         Medical Screening Exam   See Full Note    ED Course   Procedures  Labs Reviewed   COMPREHENSIVE METABOLIC PANEL - Abnormal       Result Value    Sodium 135 (*)     Potassium 4.6      Chloride 107      CO2 18 (*)     Anion Gap 15      Glucose 179 (*)     BUN 32 (*)     Creatinine 2.15 (*)     BUN/Creatinine Ratio 15      Calcium 8.3 (*)     Total Protein 6.7      Albumin 3.2 (*)     Globulin 3.5      A/G Ratio 0.9      Bilirubin, Total 0.4      Alk Phos 89      ALT 9      AST 26      eGFR 24 (*)    TROPONIN I - Abnormal    Troponin I High Sensitivity 35.1 (*)    NT-PRO NATRIURETIC PEPTIDE - Abnormal    ProBNP 10,106 (*)    CBC WITH DIFFERENTIAL - Abnormal    WBC  9.08      RBC 3.86 (*)     Hemoglobin 9.9 (*)     Hematocrit 33.9 (*)     MCV 87.8      MCH 25.6 (*)     MCHC 29.2 (*)     RDW 15.5 (*)     Platelet Count 213      MPV 13.2 (*)     Neutrophils % 75.8 (*)     Lymphocytes % 10.2 (*)     Monocytes % 7.6 (*)     Eosinophils % 5.1 (*)     Basophils % 0.9      Immature Granulocytes % 0.4      nRBC, Auto 0.0      Neutrophils, Abs 6.88      Lymphocytes, Absolute 0.93 (*)     Monocytes, Absolute 0.69      Eosinophils, Absolute 0.46      Basophils, Absolute 0.08      Immature Granulocytes, Absolute 0.04      nRBC, Absolute 0.00      Diff Type Auto     TROPONIN I - Abnormal    Troponin I High Sensitivity 33.5 (*)    CBC W/ AUTO DIFFERENTIAL    Narrative:     The following orders were created for panel order CBC auto differential.  Procedure                               Abnormality         Status                     ---------                               -----------         ------                     CBC with Differential[[id number]]       Abnormal            Final result                 Please view results for these tests on the individual orders.   CBC MORPHOLOGY    Platelet Morphology Normal      RBC Morphology Normal            Imaging Results              X-Ray Chest AP Portable (Final result)  Result time 12/16/24 08:53:57      Final result by Van Zuñiga MD (12/16/24 08:53:57)                   Impression:      Mildly prominent increased interstitial markings would be in keeping with vascular congestion/edema in the setting of reported CHF.      Electronically signed by: Van Zuñiga  Date:    12/16/2024  Time:    08:53               Narrative:    EXAMINATION:  XR CHEST AP PORTABLE    CLINICAL HISTORY:  CHF;    TECHNIQUE:  Single frontal view of the chest was performed.    COMPARISON:  Chest radiograph performed 12/11/2023, 09:21 hours.    FINDINGS:  Monitoring leads over the chest.  Status post median sternotomy.  Prior valve replacement.  Left subclavian  approach pacer.    Enlarged cardiac silhouette.  Prominence of central vasculature.  Chronic interstitial coarsening is noted.    Mildly prominent interstitial markings appear superimposed upon chronic findings.    No definite focal airspace consolidation is seen.  Suspected left basilar atelectasis.    No definite pneumothorax or large volume pleural effusion.    No acute findings the visualized abdomen osseous and soft tissue structures appear without definite acute change.                                       Medications   aspirin tablet 325 mg (325 mg Oral Given 12/16/24 1102)   nitroGLYCERIN 2% TD oint ointment 1 inch (1 inch Topical (Top) Given 12/16/24 1102)   morphine injection 4 mg (4 mg Intravenous Given 12/16/24 1110)     Medical Decision Making             ED Course as of 12/16/24 1323   Mon Dec 16, 2024   0806 Medical decision-making:  Differential diagnosis includes chest pain, shortness breath, pneumonia, CHF, STEMI, NSTEMI, ACS.  All testing ordered and interpreted by me. [BB]   0807 EKG by my interpretation shows sinus rhythm, rate of 90, right bundle-branch block, no acute ST segment changes. [BB]   0910 Chest x-ray by my interpretation shows cardiomegaly, CHF. [BB]   1006 CBC is normal except anemia with hematocrit of 33.9. [BB]   1009 Troponin is mildly elevated at 35.  Will obtain repeat. [BB]   1010 Awaiting repeat troponin [BB]   1030 CMP shows elevated creatinine of 2.15 which is slightly better than baseline for patient when compared to review of outside medical record. [BB]   1046 Pro BNP [place] at [address]. [BB]   1051 I made the decision to admit patient and discussed case with internal medicine hospitalist on-call who agrees with admission. [BB]      ED Course User Index  [BB] Alec Rogers MD                             Clinical Impression:   Final diagnoses:  [R06.02] Shortness of breath  [I50.9] Congestive heart failure, unspecified HF chronicity, unspecified heart failure type  (Primary)  [R79.89] Elevated troponin        ED Disposition Condition    Observation Stable                Alec Rogers MD  12/16/24 6259

## 2024-12-16 NOTE — ED NOTES
Patient's Blood Glucose was 63 - patient was given orange juice and will re-evaluate patient's glucose

## 2024-12-16 NOTE — ASSESSMENT & PLAN NOTE
Patient has paroxysmal (<7 days) atrial fibrillation. Patient is currently in sinus rhythm. [id number] Score: 4. The patients heart rate in the last 24 hours is as follows:  Pulse  Min: 69  Max: 93     Antiarrhythmics       Anticoagulants       Plan  - Replete lytes with a goal of K>4, Mg >2  - not anticoagulated  - Patient's afib is currently controlled  - follow

## 2024-12-16 NOTE — ASSESSMENT & PLAN NOTE
Patient's blood pressure range in the last 24 hours was: BP  Min: 101/50  Max: 138/66.The patient's inpatient anti-hypertensive regimen is listed below:  Current Antihypertensives  isosorbide mononitrate 24 hr tablet 30 mg, Daily, Oral  furosemide injection 80 mg, Every 12 hours, Intravenous    Plan  - BP is controlled, no changes needed to their regimen  - follow

## 2024-12-17 VITALS
RESPIRATION RATE: 18 BRPM | SYSTOLIC BLOOD PRESSURE: 106 MMHG | DIASTOLIC BLOOD PRESSURE: 49 MMHG | TEMPERATURE: 98 F | WEIGHT: 180 LBS | HEIGHT: 64 IN | HEART RATE: 94 BPM | BODY MASS INDEX: 30.73 KG/M2 | OXYGEN SATURATION: 96 %

## 2024-12-17 PROBLEM — E11.69 DM TYPE 2 WITH DIABETIC DYSLIPIDEMIA: Status: ACTIVE | Noted: 2021-01-13

## 2024-12-17 PROBLEM — E78.5 DM TYPE 2 WITH DIABETIC DYSLIPIDEMIA: Status: ACTIVE | Noted: 2021-01-13

## 2024-12-17 LAB
ANION GAP SERPL CALCULATED.3IONS-SCNC: 15 MMOL/L (ref 7–16)
BUN SERPL-MCNC: 30 MG/DL (ref 10–20)
BUN/CREAT SERPL: 14 (ref 6–20)
CALCIUM SERPL-MCNC: 8.6 MG/DL (ref 8.4–10.2)
CHLORIDE SERPL-SCNC: 106 MMOL/L (ref 98–107)
CO2 SERPL-SCNC: 25 MMOL/L (ref 23–31)
CREAT SERPL-MCNC: 2.08 MG/DL (ref 0.55–1.02)
EGFR (NO RACE VARIABLE) (RUSH/TITUS): 25 ML/MIN/1.73M2
GLUCOSE SERPL-MCNC: 172 MG/DL (ref 70–105)
GLUCOSE SERPL-MCNC: 62 MG/DL (ref 70–105)
GLUCOSE SERPL-MCNC: 86 MG/DL (ref 82–115)
OHS QRS DURATION: 168 MS
OHS QTC CALCULATION: 511 MS
POTASSIUM SERPL-SCNC: 4.5 MMOL/L (ref 3.5–5.1)
SODIUM SERPL-SCNC: 141 MMOL/L (ref 136–145)

## 2024-12-17 PROCEDURE — 94640 AIRWAY INHALATION TREATMENT: CPT | Mod: XB

## 2024-12-17 PROCEDURE — 82962 GLUCOSE BLOOD TEST: CPT

## 2024-12-17 PROCEDURE — 99239 HOSP IP/OBS DSCHRG MGMT >30: CPT | Mod: ,,, | Performed by: STUDENT IN AN ORGANIZED HEALTH CARE EDUCATION/TRAINING PROGRAM

## 2024-12-17 PROCEDURE — 25000003 PHARM REV CODE 250: Performed by: STUDENT IN AN ORGANIZED HEALTH CARE EDUCATION/TRAINING PROGRAM

## 2024-12-17 PROCEDURE — 27000221 HC OXYGEN, UP TO 24 HOURS

## 2024-12-17 PROCEDURE — G0378 HOSPITAL OBSERVATION PER HR: HCPCS

## 2024-12-17 PROCEDURE — 80048 BASIC METABOLIC PNL TOTAL CA: CPT | Performed by: STUDENT IN AN ORGANIZED HEALTH CARE EDUCATION/TRAINING PROGRAM

## 2024-12-17 PROCEDURE — 99900035 HC TECH TIME PER 15 MIN (STAT)

## 2024-12-17 PROCEDURE — 63600175 PHARM REV CODE 636 W HCPCS: Performed by: STUDENT IN AN ORGANIZED HEALTH CARE EDUCATION/TRAINING PROGRAM

## 2024-12-17 PROCEDURE — 96376 TX/PRO/DX INJ SAME DRUG ADON: CPT

## 2024-12-17 PROCEDURE — 25000242 PHARM REV CODE 250 ALT 637 W/ HCPCS: Performed by: STUDENT IN AN ORGANIZED HEALTH CARE EDUCATION/TRAINING PROGRAM

## 2024-12-17 PROCEDURE — 96372 THER/PROPH/DIAG INJ SC/IM: CPT | Performed by: STUDENT IN AN ORGANIZED HEALTH CARE EDUCATION/TRAINING PROGRAM

## 2024-12-17 PROCEDURE — 94761 N-INVAS EAR/PLS OXIMETRY MLT: CPT

## 2024-12-17 RX ORDER — LEVOFLOXACIN 500 MG/1
500 TABLET, FILM COATED ORAL DAILY
Qty: 6 TABLET | Refills: 0 | Status: SHIPPED | OUTPATIENT
Start: 2024-12-17 | End: 2024-12-24 | Stop reason: ALTCHOICE

## 2024-12-17 RX ORDER — LEVOFLOXACIN 5 MG/ML
250 INJECTION, SOLUTION INTRAVENOUS
Status: DISCONTINUED | OUTPATIENT
Start: 2024-12-17 | End: 2024-12-17 | Stop reason: HOSPADM

## 2024-12-17 RX ORDER — IPRATROPIUM BROMIDE AND ALBUTEROL SULFATE 2.5; .5 MG/3ML; MG/3ML
3 SOLUTION RESPIRATORY (INHALATION) EVERY 6 HOURS
Qty: 75 ML | Refills: 0 | Status: SHIPPED | OUTPATIENT
Start: 2024-12-17 | End: 2025-12-17

## 2024-12-17 RX ORDER — ROSUVASTATIN CALCIUM 20 MG/1
10 TABLET, COATED ORAL NIGHTLY
Qty: 90 TABLET | Refills: 1 | Status: SHIPPED | OUTPATIENT
Start: 2024-12-17 | End: 2024-12-17 | Stop reason: HOSPADM

## 2024-12-17 RX ORDER — FUROSEMIDE 40 MG/1
40 TABLET ORAL DAILY
Qty: 30 TABLET | Refills: 11 | Status: SHIPPED | OUTPATIENT
Start: 2024-12-17 | End: 2025-12-17

## 2024-12-17 RX ADMIN — FOLIC ACID 1 MG: 1 TABLET ORAL at 09:12

## 2024-12-17 RX ADMIN — INSULIN GLARGINE 25 UNITS: 100 INJECTION, SOLUTION SUBCUTANEOUS at 09:12

## 2024-12-17 RX ADMIN — ISOSORBIDE MONONITRATE 30 MG: 30 TABLET, EXTENDED RELEASE ORAL at 09:12

## 2024-12-17 RX ADMIN — IPRATROPIUM BROMIDE AND ALBUTEROL SULFATE 3 ML: .5; 3 SOLUTION RESPIRATORY (INHALATION) at 12:12

## 2024-12-17 RX ADMIN — FUROSEMIDE 80 MG: 10 INJECTION, SOLUTION INTRAMUSCULAR; INTRAVENOUS at 09:12

## 2024-12-17 RX ADMIN — PRIMIDONE 100 MG: 50 TABLET ORAL at 09:12

## 2024-12-17 RX ADMIN — IPRATROPIUM BROMIDE AND ALBUTEROL SULFATE 3 ML: .5; 3 SOLUTION RESPIRATORY (INHALATION) at 07:12

## 2024-12-17 RX ADMIN — LEVOTHYROXINE SODIUM 25 MCG: 0.03 TABLET ORAL at 06:12

## 2024-12-17 RX ADMIN — SERTRALINE HYDROCHLORIDE 50 MG: 50 TABLET ORAL at 09:12

## 2024-12-17 RX ADMIN — ATORVASTATIN CALCIUM 40 MG: 40 TABLET, FILM COATED ORAL at 09:12

## 2024-12-17 RX ADMIN — ASPIRIN 81 MG: 81 TABLET, COATED ORAL at 09:12

## 2024-12-17 RX ADMIN — PANTOPRAZOLE SODIUM 40 MG: 40 TABLET, DELAYED RELEASE ORAL at 09:12

## 2024-12-17 RX ADMIN — INSULIN ASPART 2 UNITS: 100 INJECTION, SOLUTION INTRAVENOUS; SUBCUTANEOUS at 12:12

## 2024-12-17 RX ADMIN — PREGABALIN 75 MG: 75 CAPSULE ORAL at 09:12

## 2024-12-17 NOTE — ASSESSMENT & PLAN NOTE
"Patient has Combined Systolic and Diastolic heart failure that is Acute on chronic. On presentation their CHF was decompensated. Evidence of decompensated CHF on presentation includes: crackles on lung auscultation and shortness of breath. The etiology of their decompensation is likely medication non-compliance. Most recent BNP and echo results are listed below.  No results for input(s): "BNP" in the last 72 hours.  Latest ECHO  Results for orders placed during the hospital encounter of 12/11/23    Echo    Interpretation Summary    Left Ventricle: The left ventricle is normal in size. Normal wall thickness. Regional wall motion abnormalities present. Septal motion is consistent with pacing. There is mildly reduced systolic function. Grade I diastolic dysfunction.    Right Ventricle: Normal right ventricular cavity size. Systolic function is normal.    Aortic Valve: There is a bioprosthetic valve in the aortic position.    Mitral Valve: There is mild bileaflet sclerosis.    Pulmonary Artery: The estimated pulmonary artery systolic pressure is 15 mmHg.    IVC/SVC: Normal venous pressure at 3 mmHg.    Current Heart Failure Medications  furosemide injection 80 mg, Every 12 hours, Intravenous  furosemide (LASIX) tablet, Daily, Oral    Plan  - Monitor strict I&Os and daily weights.    - Place on telemetry  - Low sodium diet  - Place on fluid restriction of 1.5 L.   - Cardiology has not been consulted  - The patient's volume status is worsening as indicated by crackles on lung auscultation, dyspnea on exertion (CHAUDHARY), and shortness of breath. Will adjust treatment as follows:  IV Lasix  - follow up  On Farxiga at home.  On metoprolol succinate at home.  Given EGFR 24 we will hold off on Aldactone and losartan for now.  We will add Lasix        "

## 2024-12-17 NOTE — CONSULTS
Consult received and appreciated. Consult for diet education. Patient currently in CCU. Will provide on follow up as appropriate.

## 2024-12-17 NOTE — PLAN OF CARE
[facility] - Emergency Department  Initial Discharge Assessment       Primary Care Provider: Jt Allison II, MD    Admission Diagnosis: Shortness of breath [R06.02]    Admission Date: 12/16/2024  Expected Discharge Date: 12/17/2024    Transition of Care Barriers: None    Payor: MEDICARE / Plan: MEDICARE PART A & B / Product Type: Government /     Extended Emergency Contact Information  Primary Emergency Contact: Nadiya Renteria  Address: [facility] [address] [place]  Mobile Phone: [phone]  Relation: Spouse  Preferred language: English   needed? No    Discharge Plan A: Home with family  Discharge Plan B: Home Health, Long-term acute care facility (LTAC), Skilled Nursing Facility, Rehab, New Nursing Home placement - alf care facility      [id number] - [place], [place] - [address]  [address]  [place] [address]  Phone: [phone] Fax: [phone]    [facility] [address] [place], [place] - [address] [place]  [address] [facility] [place] [address]  Phone: [phone] Fax: [phone]    [facility]  [address] [place] [address]  Phone: [phone] Fax: [phone]      Initial Assessment (most recent)       Adult Discharge Assessment - 12/17/24 1315          Discharge Assessment    Assessment Type Discharge Planning Assessment     Source of Information patient;family     People in Home spouse     Do you expect to return to your current living situation? Yes     Do you have help at home or someone to help you manage your care at home? Yes     Who are your caregiver(s) and their phone number(s)? spouse nadiya [phone]     Prior to hospitilization cognitive status: Alert/Oriented     Current cognitive status: Alert/Oriented     Walking or Climbing Stairs Difficulty no     Dressing/Bathing Difficulty no     Home Accessibility stairs to enter home     Number of Stairs, Main  Entrance three     Home Layout Able to live on 1st floor     Equipment Currently Used at Home none     Patient currently being followed by outpatient case management? No     Do you currently have service(s) that help you manage your care at home? No     Do you take prescription medications? Yes     Do you have prescription coverage? Yes     Do you have any problems affording any of your prescribed medications? No     Who is going to help you get home at discharge? spouse     How do you get to doctors appointments? car, drives self;family or friend will provide     Are you on dialysis? No     Do you take coumadin? No     Discharge Plan A Home with family     Discharge Plan B Home Health;Long-term acute care facility (LTAC);Skilled Nursing Facility;Rehab;New Nursing Home placement - shelter care facility     DME Needed Upon Discharge  oxygen;nebulizer     Discharge Plan discussed with: Spouse/sig other;Patient     Transition of Care Barriers None        Physical Activity    On average, how many days per week do you engage in moderate to strenuous exercise (like a brisk walk)? 0 days     On average, how many minutes do you engage in exercise at this level? 0 min        Financial Resource Strain    How hard is it for you to pay for the very basics like food, housing, medical care, and heating? Not very hard        Housing Stability    In the last 12 months, was there a time when you were not able to pay the mortgage or rent on time? No     At any time in the past 12 months, were you homeless or living in a shelter (including now)? No        Transportation Needs    Has the lack of transportation kept you from medical appointments, meetings, work or from getting things needed for daily living? No        Food Insecurity    Within the past 12 months, you worried that your food would run out before you got the money to buy more. Never true     Within the past 12 months, the food you bought just didn't last and you didn't  have money to get more. Never true        Stress    Do you feel stress - tense, restless, nervous, or anxious, or unable to sleep at night because your mind is troubled all the time - these days? Not at all        Social Isolation    How often do you feel lonely or isolated from those around you?  Never        Alcohol Use    Q1: How often do you have a drink containing alcohol? Never     Q2: How many drinks containing alcohol do you have on a typical day when you are drinking? Patient does not drink     Q3: How often do you have six or more drinks on one occasion? Never        Utilities    In the past 12 months has the electric, gas, oil, or water company threatened to shut off services in your home? No        Health Literacy    How often do you need to have someone help you when you read instructions, pamphlets, or other written material from your doctor or pharmacy? Never                   [facility] - Emergency Department  Discharge Final Note    Primary Care Provider: Jt Allison II, MD    Expected Discharge Date: 12/17/2024    Final Discharge Note (most recent)       Final Note - 12/17/24 1319          Final Note    Assessment Type Final Discharge Note     Anticipated Discharge Disposition Home or Self Care        Post-Acute Status    Post-Acute Authorization [facility] Status Set-up Complete/Auth obtained     Discharge Delays None known at this time                     Important Message from Medicare             Contact Info       Jt Allison II, MD   Specialty: Family Medicine   Relationship: PCP - General    [address] [facility]  INTERNAL MEDICINE CLINIC  [facility] [address]   Phone: [phone]       Next Steps: Schedule an appointment as soon as possible for a visit in 1 week(s)    Cardiologist        Next Steps: Follow up    Instructions: FOLLOW UP WITH YOUR CARDIOLOGIST IN 2-3 WEEKS. Bring this discharge packet to that appointment AS WELL as ALL medications        Pt lives  at home with spouse, no hh or dme pta, sdoh completed, consult for neb/home o2, called order to [facility] at the medical store, dme to be delivered to pt, consult for cardiac rehab-packet given and explained to pt/spouse, dc plan home today

## 2024-12-17 NOTE — ASSESSMENT & PLAN NOTE
Patient's blood pressure range in the last 24 hours was: BP  Min: 106/49  Max: 141/72.The patient's inpatient anti-hypertensive regimen is listed below:  Current Antihypertensives  isosorbide mononitrate 24 hr tablet 30 mg, Daily, Oral  furosemide injection 80 mg, Every 12 hours, Intravenous  furosemide (LASIX) tablet, Daily, Oral    Plan  - BP is controlled, no changes needed to their regimen  - follow

## 2024-12-17 NOTE — ASSESSMENT & PLAN NOTE
Patient has paroxysmal (<7 days) atrial fibrillation. Patient is currently in sinus rhythm. [id number] Score: 4. The patients heart rate in the last 24 hours is as follows:  Pulse  Min: 66  Max: 99     Antiarrhythmics       Anticoagulants       Plan  - Replete lytes with a goal of K>4, Mg >2  - not anticoagulated  - Patient's afib is currently controlled  - follow  Defer to her cardiologist

## 2024-12-17 NOTE — ASSESSMENT & PLAN NOTE
Echocardiogram with evidence of aortic stenosis that is severe . The patient's most recent echocardiogram result is listed below. We will manage the valvular abnormality by valve replacement 2011    Echo    Result Date: 12/16/2024    Left Ventricle: The left ventricle is normal in size. Mildly increased   wall thickness. There is concentric remodeling. Septal motion is   consistent with pacing. There is normal systolic function with a visually   estimated ejection fraction of 55 - 60%. Quantitated ejection fraction is   58%. There is normal diastolic function.    Right Ventricle: Mild right ventricular enlargement. Systolic function   is moderately reduced. Pacemaker lead present in the ventricle.    Right Atrium: Right atrium is mildly dilated.    Aortic Valve: There is a bioprosthetic valve in the aortic position.    Mitral Valve: There is moderate regurgitation with a posterolateral   eccentriccally directed jet.    Tricuspid Valve: There is moderate regurgitation.    Pulmonary Artery: The estimated pulmonary artery systolic pressure is   49 mmHg.    IVC/SVC: Elevated venous pressure at 15 mmHg.

## 2024-12-17 NOTE — ASSESSMENT & PLAN NOTE
Patient has a diagnosis of pneumonia. The cause of the pneumonia is suspected to be bacterial in etiology but organism is not known. The pneumonia is stable. The patient has the following signs/symptoms of pneumonia: cough, sputum production, and shortness of breath. The patient does have a current oxygen requirement and the patient does not have a home oxygen requirement. I have reviewed the pertinent imaging. The following cultures have been collected: Sputum culture The culture results are listed below.     Current antimicrobial regimen consists of the antibiotics listed below. Will monitor patient closely and continue current treatment plan unchanged.    Antibiotics (From admission, onward)      Start     Stop Route Frequency Ordered    12/16/24 1445  levoFLOXacin 500 mg/100 mL IVPB 500 mg         -- IV Every 24 hours (non-standard times) 12/16/24 1344            Microbiology Results (last 7 days)       ** No results found for the last 168 hours. **        Levaquin for 6 more days

## 2024-12-17 NOTE — ASSESSMENT & PLAN NOTE
"Patient's FSGs are controlled on current medication regimen.  Last A1c reviewed-   Lab Results   Component Value Date    HGBA1C 6.1 12/16/2024     Most recent fingerstick glucose reviewed- No results for input(s): "POCTGLUCOSE" in the last 24 hours.  Current correctional scale  Medium  Maintain anti-hyperglycemic dose as follows-   Antihyperglycemics (From admission, onward)    Start     Stop Route Frequency Ordered    12/16/24 1445  insulin glargine U-100 (Lantus) injection 25 Units         -- SubQ Daily 12/16/24 1342    12/16/24 1442  insulin aspart U-100 injection 0-10 Units         -- SubQ Before meals & nightly PRN 12/16/24 1342        Hold Oral hypoglycemics while patient is in the hospital.     "

## 2024-12-17 NOTE — ASSESSMENT & PLAN NOTE
"Patient's FSGs are controlled on current medication regimen.  Last A1c reviewed-   Lab Results   Component Value Date    HGBA1C 6.1 12/16/2024     Most recent fingerstick glucose reviewed- No results for input(s): "POCTGLUCOSE" in the last 24 hours.  Current correctional scale  Low  Maintain anti-hyperglycemic dose as follows-   Antihyperglycemics (From admission, onward)    Start     Stop Route Frequency Ordered    12/16/24 1445  insulin glargine U-100 (Lantus) injection 25 Units         -- SubQ Daily 12/16/24 1342    12/16/24 1442  insulin aspart U-100 injection 0-10 Units         -- SubQ Before meals & nightly PRN 12/16/24 1342        Hold Oral hypoglycemics while patient is in the hospital.      "

## 2024-12-17 NOTE — ASSESSMENT & PLAN NOTE
Creatine stable for now. BMP reviewed- noted Estimated Creatinine Clearance: 26 mL/min (A) (based on SCr of 2.08 mg/dL (H)). according to latest data. Based on current GFR, CKD stage is stage 4 - GFR 15-29.  Monitor UOP and serial BMP and adjust therapy as needed. Renally dose meds. Avoid nephrotoxic medications and procedures.

## 2024-12-17 NOTE — HOSPITAL COURSE
12/17 patient reports feeling much better.  She is still hypoxic into the 80s on room air but overall feels well in his motivated to go home.  She will benefit from home oxygen.  Case management we will secure this prior to discharge.  She needs to follow up with her PCP in 1 week.  Follow up with her cardiologist in the next 2-3 weeks

## 2024-12-17 NOTE — DISCHARGE SUMMARY
[facility] - Emergency Department  Hospital Medicine  Discharge Summary      Patient Name: Karen Renteria  MRN: [id number]  [facility]: [id number]  Patient Class: OP- Observation  Admission Date: 12/16/2024  Hospital Length of Stay: 0 days  Discharge Date and Time:  12/17/2024 11:06 AM  Attending Physician: Ulices Smith DO   Discharging Provider: Ulices Smith DO  Primary Care Provider: Jt Allison II, MD    Primary Care Team: Networked reference to record PCT     HPI:   69-year-old white female with a past medical history of osteoporosis, history of gastric ulcer, hypertension, CVA, CKD stage 5, CAD status post CABG in 2011, type 2 diabetes, diabetic neuropathy, essential tremor, hyperlipidemia, hypertension, insomnia, aortic stenosis status post aortic valve replacement in 2011, sick sinus syndrome, subclavian artery stenosis who presents to the ED with 2 days of shortness of breath.  Symptoms began roughly 2 days ago.  She has had low-grade fever at home of 100.6.  She has had a cough productive of purulent sputum.  She has had some thoracic pain that is associated with taking deep breaths.  It is located primarily in her back.  She denies any history of heart failure she denies any history of kidney disease.  Renal function today is consistent with CKD stage IV and better than her usual renal function.  She also has an echo that showed combined systolic and diastolic heart failure.  Denies any history of this.  She denies any orthopnea, weight gain, paroxysmal nocturnal dyspnea.  Chest x-ray is consistent with pulmonary edema.  Exam is consistent with pulmonary edema.  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  She denies any palpitations wheezing nausea vomiting diarrhea dysuria.    * No surgery found *      Hospital Course:   12/17 patient reports feeling much better.  She is still hypoxic into the 80s on room air but overall feels well in his motivated to go home.   "She will benefit from home oxygen.  Case management we will secure this prior to discharge.  She needs to follow up with her PCP in 1 week.  Follow up with her cardiologist in the next 2-3 weeks     Goals of Care Treatment Preferences:  Code Status: Full Code         Consults:   Consults (From admission, onward)          Status Ordering Provider     Inpatient consult to Social Work  Once        Provider:  (Not yet assigned)    Ordered EAN BARRAGAN     Inpatient consult to Social Work/Case Management  Once        Provider:  (Not yet assigned)    Acknowledged EAN BARRAGAN     Inpatient consult to Registered Dietitian/Nutritionist  Once        Provider:  (Not yet assigned)    Completed EAN BARRAGAN            * Acute on chronic combined systolic and diastolic heart failure  Patient has Combined Systolic and Diastolic heart failure that is Acute on chronic. On presentation their CHF was decompensated. Evidence of decompensated CHF on presentation includes: crackles on lung auscultation and shortness of breath. The etiology of their decompensation is likely medication non-compliance. Most recent BNP and echo results are listed below.  No results for input(s): "BNP" in the last 72 hours.  Latest ECHO  Results for orders placed during the hospital encounter of 12/11/23    Echo    Interpretation Summary    Left Ventricle: The left ventricle is normal in size. Normal wall thickness. Regional wall motion abnormalities present. Septal motion is consistent with pacing. There is mildly reduced systolic function. Grade I diastolic dysfunction.    Right Ventricle: Normal right ventricular cavity size. Systolic function is normal.    Aortic Valve: There is a bioprosthetic valve in the aortic position.    Mitral Valve: There is mild bileaflet sclerosis.    Pulmonary Artery: The estimated pulmonary artery systolic pressure is 15 mmHg.    IVC/SVC: Normal venous pressure at 3 mmHg.    Current Heart Failure Medications  furosemide " injection 80 mg, Every 12 hours, Intravenous  furosemide (LASIX) tablet, Daily, Oral    Plan  - Monitor strict I&Os and daily weights.    - Place on telemetry  - Low sodium diet  - Place on fluid restriction of 1.5 L.   - Cardiology has not been consulted  - The patient's volume status is worsening as indicated by crackles on lung auscultation, dyspnea on exertion (CHAUDHARY), and shortness of breath. Will adjust treatment as follows:  IV Lasix  - follow up  On Farxiga at home.  On metoprolol succinate at home.  Given EGFR 24 we will hold off on Aldactone and losartan for now.  We will add Lasix          Depression  Patient has persistent depression which is unknown and is currently controlled. Will Continue anti-depressant medications. We will not consult psychiatry at this time. Patient does not display psychosis at this time. Continue to monitor closely and adjust plan of care as needed.        Bacterial pneumonia  Patient has a diagnosis of pneumonia. The cause of the pneumonia is suspected to be bacterial in etiology but organism is not known. The pneumonia is stable. The patient has the following signs/symptoms of pneumonia: cough, sputum production, and shortness of breath. The patient does have a current oxygen requirement and the patient does not have a home oxygen requirement. I have reviewed the pertinent imaging. The following cultures have been collected: Sputum culture The culture results are listed below.     Current antimicrobial regimen consists of the antibiotics listed below. Will monitor patient closely and continue current treatment plan unchanged.    Antibiotics (From admission, onward)      Start     Stop Route Frequency Ordered    12/16/24 1445  levoFLOXacin 500 mg/100 mL IVPB 500 mg         -- IV Every 24 hours (non-standard times) 12/16/24 1344            Microbiology Results (last 7 days)       ** No results found for the last 168 hours. **        Levaquin for 6 more days    CKD (chronic kidney  disease) stage 5, GFR less than 15 ml/min  Creatine stable for now. BMP reviewed- noted Estimated Creatinine Clearance: 26 mL/min (A) (based on SCr of 2.08 mg/dL (H)). according to latest data. Based on current GFR, CKD stage is stage 4 - GFR 15-29.  Monitor UOP and serial BMP and adjust therapy as needed. Renally dose meds. Avoid nephrotoxic medications and procedures.    Subclavian artery stenosis  History of subclavian artery stenosis      SSS (sick sinus syndrome)  Status post pacemaker placement      Paroxysmal atrial fibrillation  Patient has paroxysmal (<7 days) atrial fibrillation. Patient is currently in sinus rhythm. [id number] Score: 4. The patients heart rate in the last 24 hours is as follows:  Pulse  Min: 66  Max: 99     Antiarrhythmics       Anticoagulants       Plan  - Replete lytes with a goal of K>4, Mg >2  - not anticoagulated  - Patient's afib is currently controlled  - follow  Defer to her cardiologist      Non-rheumatic aortic stenosis  Echocardiogram with evidence of aortic stenosis that is severe . The patient's most recent echocardiogram result is listed below. We will manage the valvular abnormality by valve replacement 2011    Echo    Result Date: 12/16/2024    Left Ventricle: The left ventricle is normal in size. Mildly increased   wall thickness. There is concentric remodeling. Septal motion is   consistent with pacing. There is normal systolic function with a visually   estimated ejection fraction of 55 - 60%. Quantitated ejection fraction is   58%. There is normal diastolic function.    Right Ventricle: Mild right ventricular enlargement. Systolic function   is moderately reduced. Pacemaker lead present in the ventricle.    Right Atrium: Right atrium is mildly dilated.    Aortic Valve: There is a bioprosthetic valve in the aortic position.    Mitral Valve: There is moderate regurgitation with a posterolateral   eccentriccally directed jet.    Tricuspid Valve: There is moderate  "regurgitation.    Pulmonary Artery: The estimated pulmonary artery systolic pressure is   49 mmHg.    IVC/SVC: Elevated venous pressure at 15 mmHg.          Benign essential hypertension  Patient's blood pressure range in the last 24 hours was: BP  Min: 106/49  Max: 141/72.The patient's inpatient anti-hypertensive regimen is listed below:  Current Antihypertensives  isosorbide mononitrate 24 hr tablet 30 mg, Daily, Oral  furosemide injection 80 mg, Every 12 hours, Intravenous  furosemide (LASIX) tablet, Daily, Oral    Plan  - BP is controlled, no changes needed to their regimen  - follow    Diabetic peripheral neuropathy associated with type 2 diabetes mellitus  Patient's FSGs are controlled on current medication regimen.  Last A1c reviewed-   Lab Results   Component Value Date    HGBA1C 6.1 12/16/2024     Most recent fingerstick glucose reviewed- No results for input(s): "POCTGLUCOSE" in the last 24 hours.  Current correctional scale  Medium  Maintain anti-hyperglycemic dose as follows-   Antihyperglycemics (From admission, onward)      Start     Stop Route Frequency Ordered    12/16/24 1445  insulin glargine U-100 (Lantus) injection 25 Units         -- SubQ Daily 12/16/24 1342    12/16/24 1442  insulin aspart U-100 injection 0-10 Units         -- SubQ Before meals & nightly PRN 12/16/24 1342          Hold Oral hypoglycemics while patient is in the hospital.       Essential tremor  Continue primidone      Vitamin D deficiency  Continue supplementation outpatient      Coronary artery disease involving native coronary artery of native heart without angina pectoris  Patient with known CAD s/p CABG, which is controlled Will continue ASA and Statin and monitor for S/Sx of angina/ACS. Continue to monitor on telemetry.     DM type 2 with diabetic dyslipidemia  Patient's FSGs are controlled on current medication regimen.  Last A1c reviewed-   Lab Results   Component Value Date    HGBA1C 6.1 12/16/2024     Most recent " "fingerstick glucose reviewed- No results for input(s): "POCTGLUCOSE" in the last 24 hours.  Current correctional scale  Low  Maintain anti-hyperglycemic dose as follows-   Antihyperglycemics (From admission, onward)      Start     Stop Route Frequency Ordered    12/16/24 1445  insulin glargine U-100 (Lantus) injection 25 Units         -- SubQ Daily 12/16/24 1342    12/16/24 1442  insulin aspart U-100 injection 0-10 Units         -- SubQ Before meals & nightly PRN 12/16/24 1342          Hold Oral hypoglycemics while patient is in the hospital.          Final Active Diagnoses:    Diagnosis Date Noted POA    PRINCIPAL PROBLEM:  Acute on chronic combined systolic and diastolic heart failure [I50.43] 12/16/2024 Yes     Chronic    Bacterial pneumonia [J15.9] 12/16/2024 Yes    Depression [F32.A] 12/16/2024 Yes    CKD (chronic kidney disease) stage 5, GFR less than 15 ml/min [N18.5] 12/11/2023 Yes    Paroxysmal atrial fibrillation [I48.0] 08/18/2022 Yes    SSS (sick sinus syndrome) [I49.5] 07/12/2022 Yes    Diabetic peripheral neuropathy associated with type 2 diabetes mellitus [E11.42] 04/28/2022 Yes    Essential tremor [G25.0] 01/20/2022 Yes    Vitamin D deficiency [E55.9] 04/29/2021 Yes    DM type 2 with diabetic dyslipidemia [E11.69, E78.5] 01/13/2021 Yes    Coronary artery disease involving native coronary artery of native heart without angina pectoris [I25.10] 08/28/2018 Yes    Benign essential hypertension [I10] 08/28/2018 Yes    Non-rheumatic aortic stenosis [I35.0] 08/28/2018 Yes    Subclavian artery stenosis [I77.1] 08/28/2018 Yes      Problems Resolved During this Admission:    Diagnosis Date Noted Date Resolved POA    Acute gastric ulcer without hemorrhage or perforation [K25.3] 03/03/2022 12/16/2024 Yes       Discharged Condition: good    Disposition: Home or Self Care    Follow Up:   Follow-up Information       Jt Allison II, MD. Schedule an appointment as soon as possible for a visit in 1 week(s).  "   Specialty: Family Medicine  Contact information:  [address] [facility]  INTER[facility]  [place] [address]  [phone]                           Patient Instructions:      Call MD for:  temperature >100.4     Call MD for:  persistent nausea and vomiting or diarrhea     Call MD for:  severe uncontrolled pain     Call MD for:  redness, tenderness, or signs of infection (pain, swelling, redness, odor or green/yellow discharge around incision site)     Call MD for:  difficulty breathing or increased cough     Call MD for:  severe persistent headache     Call MD for:  worsening rash     Call MD for:  persistent dizziness, light-headedness, or visual disturbances     Call MD for:  increased confusion or weakness       Significant Diagnostic Studies: Labs: All labs within the past 24 hours have been reviewed    Pending Diagnostic Studies:       None           Medications:  Reconciled Home Medications:      Medication List        START taking these medications      albuterol-ipratropium 2.5 mg-0.5 mg/3 mL nebulizer solution  Commonly known as: DUO-NEB  Take 3 mLs by nebulization every 6 (six) hours. Rescue     furosemide 40 MG tablet  Commonly known as: LASIX  Take 1 tablet (40 mg total) by mouth once daily.     levoFLOXacin 500 MG tablet  Commonly known as: LEVAQUIN  Take 1 tablet (500 mg total) by mouth once daily.            CHANGE how you take these medications      rosuvastatin 40 MG Tab  Commonly known as: CRESTOR  Take 40 mg by mouth once daily.  What changed: Another medication with the same name was removed. Continue taking this medication, and follow the directions you see here.            CONTINUE taking these medications      aspirin 81 MG EC tablet  Commonly known as: ECOTRIN  Take 81 mg by mouth once daily.     cyclobenzaprine 10 MG tablet  Commonly known as: FLEXERIL  Take 10 mg by mouth as needed for Muscle spasms.     dapagliflozin propanediol 10 mg tablet  Commonly known as:  Farxiga  Take 10 mg by mouth once daily.     folic acid 1 MG tablet  Commonly known as: FOLVITE  Take 1 mg by mouth once daily.     insulin degludec 200 unit/mL (3 mL) insulin pen  Commonly known as: TRESIBA FLEXTOUCH U-200  52 units in the morning and 32 units in the evening     isosorbide dinitrate 30 MG Tab  Commonly known as: ISORDIL  Take 30 mg by mouth.     levothyroxine 25 MCG tablet  Commonly known as: SYNTHROID  Take 25 mcg by mouth before breakfast.     meclizine 25 mg tablet  Commonly known as: ANTIVERT  Take 1 tablet (25 mg total) by mouth 3 (three) times daily as needed for Dizziness.     metoprolol succinate 50 MG 24 hr tablet  Commonly known as: TOPROL-XL  Take 50 mg by mouth once daily.     multivitamin per tablet  Commonly known as: THERAGRAN  Take 1 tablet by mouth once daily.     OZEMPIC 0.25 mg or 0.5 mg(2 mg/1.5 mL) pen injector  Generic drug: semaglutide  Inject 0.25 mg into the skin.     pantoprazole 40 MG tablet  Commonly known as: PROTONIX  TAKE 1 TABLET BY MOUTH ONCE DAILY PRIOR TO SUPPER     pregabalin 75 MG capsule  Commonly known as: LYRICA  Take 75 mg by mouth 2 (two) times daily.     primidone 50 MG Tab  Commonly known as: MYSOLINE  Take 2 tablets (100 mg total) by mouth 3 (three) times daily.     promethazine 25 MG tablet  Commonly known as: PHENERGAN  Take 1 tablet (25 mg total) by mouth every 6 (six) hours as needed for Nausea.     sertraline 50 MG tablet  Commonly known as: ZOLOFT  TAKE ONE TABLET BY MOUTH EVERY DAY              Indwelling Lines/Drains at time of discharge:   Lines/Drains/Airways       None                   Time spent on the discharge of patient: 35 minutes         Ulices Smith DO  Department of Hospital Medicine  [facility] - Emergency Department

## 2024-12-18 ENCOUNTER — PATIENT OUTREACH (OUTPATIENT)
Dept: ADMINISTRATIVE | Facility: CLINIC | Age: 69
End: 2024-12-18

## 2024-12-18 NOTE — PROGRESS NOTES
C3 nurse attempted to contact Karen Renteria  for a TCC post hospital discharge follow up call. No answer. Left voicemail with callback information. The patient does not have a scheduled HOSFU appointment. C3 nurse was unable to schedule [facility]FU appointment [facility].

## 2024-12-24 ENCOUNTER — HOSPITAL ENCOUNTER (INPATIENT)
Facility: HOSPITAL | Age: 69
LOS: 3 days | Discharge: HOME | DRG: 189 | End: 2024-12-27
Attending: FAMILY MEDICINE | Admitting: HOSPITALIST
Payer: MEDICARE

## 2024-12-24 DIAGNOSIS — I50.9 CONGESTIVE HEART FAILURE, UNSPECIFIED HF CHRONICITY, UNSPECIFIED HEART FAILURE TYPE: ICD-10-CM

## 2024-12-24 DIAGNOSIS — R06.02 SHORTNESS OF BREATH: ICD-10-CM

## 2024-12-24 DIAGNOSIS — J96.01 ACUTE HYPOXEMIC RESPIRATORY FAILURE: Primary | ICD-10-CM

## 2024-12-24 PROBLEM — J18.9 PNEUMONIA: Status: ACTIVE | Noted: 2024-12-16

## 2024-12-24 PROBLEM — I21.A1 TYPE 2 MI (MYOCARDIAL INFARCTION): Status: ACTIVE | Noted: 2024-12-24

## 2024-12-24 PROBLEM — E66.9 OBESITY (BMI 30-39.9): Status: ACTIVE | Noted: 2024-12-24

## 2024-12-24 LAB
ALBUMIN SERPL BCP-MCNC: 3.1 G/DL (ref 3.4–4.8)
ALBUMIN/GLOB SERPL: 0.7 {RATIO}
ALP SERPL-CCNC: 92 U/L (ref 40–150)
ALT SERPL W P-5'-P-CCNC: 13 U/L
ANION GAP SERPL CALCULATED.3IONS-SCNC: 18 MMOL/L (ref 7–16)
AST SERPL W P-5'-P-CCNC: 33 U/L (ref 5–34)
BASOPHILS # BLD AUTO: 0.11 K/UL (ref 0–0.2)
BASOPHILS NFR BLD AUTO: 0.8 % (ref 0–1)
BILIRUB SERPL-MCNC: 0.7 MG/DL
BILIRUB UR QL STRIP: NEGATIVE
BUN SERPL-MCNC: 45 MG/DL (ref 10–20)
BUN/CREAT SERPL: 16 (ref 6–20)
CALCIUM SERPL-MCNC: 9.2 MG/DL (ref 8.4–10.2)
CHLORIDE SERPL-SCNC: 102 MMOL/L (ref 98–107)
CLARITY UR: CLEAR
CO2 SERPL-SCNC: 23 MMOL/L (ref 23–31)
COLOR UR: COLORLESS
CREAT SERPL-MCNC: 2.81 MG/DL (ref 0.55–1.02)
D DIMER PPP FEU-MCNC: 0.86 ΜG/ML (ref 0–0.47)
DIFFERENTIAL METHOD BLD: ABNORMAL
EGFR (NO RACE VARIABLE) (RUSH/TITUS): 18 ML/MIN/1.73M2
EOSINOPHIL # BLD AUTO: 0.55 K/UL (ref 0–0.5)
EOSINOPHIL NFR BLD AUTO: 4 % (ref 1–4)
ERYTHROCYTE [DISTWIDTH] IN BLOOD BY AUTOMATED COUNT: 15.5 % (ref 11.5–14.5)
GLOBULIN SER-MCNC: 4.2 G/DL (ref 2–4)
GLUCOSE SERPL-MCNC: 100 MG/DL (ref 70–105)
GLUCOSE SERPL-MCNC: 115 MG/DL (ref 82–115)
GLUCOSE UR STRIP-MCNC: NORMAL MG/DL
HADV DNA NPH QL NAA+NON-PROBE: NOT DETECTED
HCT VFR BLD AUTO: 34.8 % (ref 38–47)
HGB BLD-MCNC: 10.7 G/DL (ref 12–16)
HMPV RNA NPH QL NAA+NON-PROBE: NOT DETECTED
IMM GRANULOCYTES # BLD AUTO: 0.08 K/UL (ref 0–0.04)
IMM GRANULOCYTES NFR BLD: 0.6 % (ref 0–0.4)
INFLUENZA A (SUBTYPE H1): NOT DETECTED
INFLUENZA A (SUBTYPE H3): NOT DETECTED
INFLUENZA A (VERIGENE): NOT DETECTED
INFLUENZA B (VERIGENE): NOT DETECTED
INR BLD: 1.11
KETONES UR STRIP-SCNC: NEGATIVE MG/DL
LACTATE SERPL-SCNC: 1.3 MMOL/L (ref 0.5–2.2)
LEUKOCYTE ESTERASE UR QL STRIP: NEGATIVE
LYMPHOCYTES # BLD AUTO: 1.35 K/UL (ref 1–4.8)
LYMPHOCYTES NFR BLD AUTO: 9.8 % (ref 27–41)
MCH RBC QN AUTO: 25.7 PG (ref 27–31)
MCHC RBC AUTO-ENTMCNC: 30.7 G/DL (ref 32–36)
MCV RBC AUTO: 83.5 FL (ref 80–96)
MONOCYTES # BLD AUTO: 1.11 K/UL (ref 0–0.8)
MONOCYTES NFR BLD AUTO: 8.1 % (ref 2–6)
MPC BLD CALC-MCNC: 12.4 FL (ref 9.4–12.4)
NEUTROPHILS # BLD AUTO: 10.54 K/UL (ref 1.8–7.7)
NEUTROPHILS NFR BLD AUTO: 76.7 % (ref 53–65)
NITRITE UR QL STRIP: NEGATIVE
NRBC # BLD AUTO: 0 X10E3/UL
NRBC, AUTO (.00): 0 %
NT-PROBNP SERPL-MCNC: ABNORMAL PG/ML (ref 1–125)
PARAINFLUENZA 1: NOT DETECTED
PARAINFLUENZA 2: NOT DETECTED
PARAINFLUENZA 3: NOT DETECTED
PARAINFLUENZA 4: NOT DETECTED
PH UR STRIP: 6 PH UNITS
PLATELET # BLD AUTO: 269 K/UL (ref 150–400)
POTASSIUM SERPL-SCNC: 3.5 MMOL/L (ref 3.5–5.1)
PROCALCITONIN SERPL-MCNC: 0.24 NG/ML
PROT SERPL-MCNC: 7.3 G/DL (ref 5.8–7.6)
PROT UR QL STRIP: NEGATIVE
PROTHROMBIN TIME: 14.2 SECONDS (ref 11.7–14.7)
RBC # BLD AUTO: 4.17 M/UL (ref 4.2–5.4)
RBC # UR STRIP: NEGATIVE /UL
RESPIRATORY SYNCYTIAL VIRUS A: NOT DETECTED
RESPIRATORY SYNCYTIAL VIRUS B: NOT DETECTED
RHINOVIRUS: NOT DETECTED
SARS-COV-2 RDRP RESP QL NAA+PROBE: NEGATIVE
SODIUM SERPL-SCNC: 139 MMOL/L (ref 136–145)
SP GR UR STRIP: 1.01
TROPONIN I SERPL HS-MCNC: 719 NG/L
TROPONIN I SERPL HS-MCNC: 859 NG/L
TROPONIN I SERPL HS-MCNC: 864.8 NG/L
UROBILINOGEN UR STRIP-ACNC: NORMAL MG/DL
WBC # BLD AUTO: 13.74 K/UL (ref 4.5–11)

## 2024-12-24 PROCEDURE — 96365 THER/PROPH/DIAG IV INF INIT: CPT

## 2024-12-24 PROCEDURE — 87040 BLOOD CULTURE FOR BACTERIA: CPT | Performed by: FAMILY MEDICINE

## 2024-12-24 PROCEDURE — 87633 RESP VIRUS 12-25 TARGETS: CPT | Performed by: HOSPITALIST

## 2024-12-24 PROCEDURE — 96375 TX/PRO/DX INJ NEW DRUG ADDON: CPT

## 2024-12-24 PROCEDURE — 83605 ASSAY OF LACTIC ACID: CPT | Performed by: HOSPITALIST

## 2024-12-24 PROCEDURE — 80053 COMPREHEN METABOLIC PANEL: CPT | Performed by: FAMILY MEDICINE

## 2024-12-24 PROCEDURE — 94799 UNLISTED PULMONARY SVC/PX: CPT

## 2024-12-24 PROCEDURE — 63700000 PHARM REV CODE 250 ALT 637 W/O HCPCS: Performed by: HOSPITALIST

## 2024-12-24 PROCEDURE — 84484 ASSAY OF TROPONIN QUANT: CPT | Performed by: HOSPITALIST

## 2024-12-24 PROCEDURE — 11000001 HC ACUTE MED/SURG PRIVATE ROOM

## 2024-12-24 PROCEDURE — 25000003 PHARM REV CODE 250: Performed by: HOSPITALIST

## 2024-12-24 PROCEDURE — 82962 GLUCOSE BLOOD TEST: CPT

## 2024-12-24 PROCEDURE — 85025 COMPLETE CBC W/AUTO DIFF WBC: CPT | Performed by: FAMILY MEDICINE

## 2024-12-24 PROCEDURE — 84484 ASSAY OF TROPONIN QUANT: CPT | Performed by: FAMILY MEDICINE

## 2024-12-24 PROCEDURE — 99223 1ST HOSP IP/OBS HIGH 75: CPT | Mod: ,,, | Performed by: HOSPITALIST

## 2024-12-24 PROCEDURE — 25000003 PHARM REV CODE 250: Performed by: FAMILY MEDICINE

## 2024-12-24 PROCEDURE — 87635 SARS-COV-2 COVID-19 AMP PRB: CPT | Performed by: HOSPITALIST

## 2024-12-24 PROCEDURE — 85610 PROTHROMBIN TIME: CPT | Performed by: HOSPITALIST

## 2024-12-24 PROCEDURE — 27000221 HC OXYGEN, UP TO 24 HOURS

## 2024-12-24 PROCEDURE — 36415 COLL VENOUS BLD VENIPUNCTURE: CPT | Performed by: FAMILY MEDICINE

## 2024-12-24 PROCEDURE — 93010 ELECTROCARDIOGRAM REPORT: CPT | Mod: ,,, | Performed by: INTERNAL MEDICINE

## 2024-12-24 PROCEDURE — 83880 ASSAY OF NATRIURETIC PEPTIDE: CPT | Performed by: FAMILY MEDICINE

## 2024-12-24 PROCEDURE — 63600175 PHARM REV CODE 636 W HCPCS: Performed by: HOSPITALIST

## 2024-12-24 PROCEDURE — 93005 ELECTROCARDIOGRAM TRACING: CPT

## 2024-12-24 PROCEDURE — 84145 PROCALCITONIN (PCT): CPT | Performed by: HOSPITALIST

## 2024-12-24 PROCEDURE — 99900035 HC TECH TIME PER 15 MIN (STAT)

## 2024-12-24 PROCEDURE — 25000003 PHARM REV CODE 250: Performed by: NURSE PRACTITIONER

## 2024-12-24 PROCEDURE — 94761 N-INVAS EAR/PLS OXIMETRY MLT: CPT

## 2024-12-24 PROCEDURE — 85379 FIBRIN DEGRADATION QUANT: CPT | Performed by: HOSPITALIST

## 2024-12-24 PROCEDURE — 81003 URINALYSIS AUTO W/O SCOPE: CPT | Performed by: HOSPITALIST

## 2024-12-24 PROCEDURE — 63600175 PHARM REV CODE 636 W HCPCS: Performed by: FAMILY MEDICINE

## 2024-12-24 PROCEDURE — 99285 EMERGENCY DEPT VISIT HI MDM: CPT | Mod: 25

## 2024-12-24 RX ORDER — ASPIRIN 81 MG/1
81 TABLET ORAL DAILY
Status: DISCONTINUED | OUTPATIENT
Start: 2024-12-24 | End: 2024-12-27 | Stop reason: HOSPADM

## 2024-12-24 RX ORDER — PANTOPRAZOLE SODIUM 40 MG/1
40 TABLET, DELAYED RELEASE ORAL DAILY
Status: DISCONTINUED | OUTPATIENT
Start: 2024-12-24 | End: 2024-12-27 | Stop reason: HOSPADM

## 2024-12-24 RX ORDER — FUROSEMIDE 10 MG/ML
40 INJECTION INTRAMUSCULAR; INTRAVENOUS
Status: COMPLETED | OUTPATIENT
Start: 2024-12-24 | End: 2024-12-24

## 2024-12-24 RX ORDER — GLUCAGON 1 MG
1 KIT INJECTION
Status: DISCONTINUED | OUTPATIENT
Start: 2024-12-24 | End: 2024-12-27 | Stop reason: HOSPADM

## 2024-12-24 RX ORDER — ATORVASTATIN CALCIUM 40 MG/1
40 TABLET, FILM COATED ORAL DAILY
Status: DISCONTINUED | OUTPATIENT
Start: 2024-12-24 | End: 2024-12-27 | Stop reason: HOSPADM

## 2024-12-24 RX ORDER — PREDNISONE 20 MG/1
20 TABLET ORAL DAILY
Status: DISCONTINUED | OUTPATIENT
Start: 2024-12-24 | End: 2024-12-26

## 2024-12-24 RX ORDER — IBUPROFEN 200 MG
24 TABLET ORAL
Status: DISCONTINUED | OUTPATIENT
Start: 2024-12-24 | End: 2024-12-27 | Stop reason: HOSPADM

## 2024-12-24 RX ORDER — AZITHROMYCIN 250 MG/1
250 TABLET, FILM COATED ORAL DAILY
Status: DISCONTINUED | OUTPATIENT
Start: 2024-12-25 | End: 2024-12-27 | Stop reason: HOSPADM

## 2024-12-24 RX ORDER — IBUPROFEN 200 MG
16 TABLET ORAL
Status: DISCONTINUED | OUTPATIENT
Start: 2024-12-24 | End: 2024-12-27 | Stop reason: HOSPADM

## 2024-12-24 RX ORDER — SERTRALINE HYDROCHLORIDE 50 MG/1
50 TABLET, FILM COATED ORAL DAILY
Status: DISCONTINUED | OUTPATIENT
Start: 2024-12-25 | End: 2024-12-27 | Stop reason: HOSPADM

## 2024-12-24 RX ORDER — HYDROCODONE BITARTRATE AND ACETAMINOPHEN 5; 325 MG/1; MG/1
1 TABLET ORAL ONCE
Status: COMPLETED | OUTPATIENT
Start: 2024-12-24 | End: 2024-12-24

## 2024-12-24 RX ORDER — IPRATROPIUM BROMIDE AND ALBUTEROL SULFATE 2.5; .5 MG/3ML; MG/3ML
3 SOLUTION RESPIRATORY (INHALATION) EVERY 6 HOURS PRN
Status: DISCONTINUED | OUTPATIENT
Start: 2024-12-24 | End: 2024-12-27 | Stop reason: HOSPADM

## 2024-12-24 RX ORDER — INSULIN ASPART 100 [IU]/ML
0-5 INJECTION, SOLUTION INTRAVENOUS; SUBCUTANEOUS
Status: DISCONTINUED | OUTPATIENT
Start: 2024-12-24 | End: 2024-12-27 | Stop reason: HOSPADM

## 2024-12-24 RX ORDER — HYDROCHLOROTHIAZIDE 12.5 MG/1
12.5 TABLET ORAL DAILY
COMMUNITY
Start: 2024-11-18

## 2024-12-24 RX ORDER — FUROSEMIDE 10 MG/ML
40 INJECTION INTRAMUSCULAR; INTRAVENOUS
Status: DISCONTINUED | OUTPATIENT
Start: 2024-12-24 | End: 2024-12-26

## 2024-12-24 RX ORDER — LEVOTHYROXINE SODIUM 25 UG/1
25 TABLET ORAL
Status: DISCONTINUED | OUTPATIENT
Start: 2024-12-25 | End: 2024-12-27 | Stop reason: HOSPADM

## 2024-12-24 RX ORDER — SODIUM CHLORIDE 0.9 % (FLUSH) 0.9 %
10 SYRINGE (ML) INJECTION
Status: DISCONTINUED | OUTPATIENT
Start: 2024-12-24 | End: 2024-12-27 | Stop reason: HOSPADM

## 2024-12-24 RX ORDER — PRIMIDONE 50 MG/1
100 TABLET ORAL 2 TIMES DAILY
Status: DISCONTINUED | OUTPATIENT
Start: 2024-12-24 | End: 2024-12-27 | Stop reason: HOSPADM

## 2024-12-24 RX ORDER — AZITHROMYCIN 250 MG/1
500 TABLET, FILM COATED ORAL ONCE
Status: COMPLETED | OUTPATIENT
Start: 2024-12-24 | End: 2024-12-24

## 2024-12-24 RX ORDER — DAPAGLIFLOZIN 10 MG/1
10 TABLET, FILM COATED ORAL DAILY
Status: DISCONTINUED | OUTPATIENT
Start: 2024-12-24 | End: 2024-12-27 | Stop reason: HOSPADM

## 2024-12-24 RX ADMIN — FUROSEMIDE 40 MG: 10 INJECTION, SOLUTION INTRAMUSCULAR; INTRAVENOUS at 12:12

## 2024-12-24 RX ADMIN — AZITHROMYCIN DIHYDRATE 500 MG: 250 TABLET ORAL at 08:12

## 2024-12-24 RX ADMIN — PIPERACILLIN SODIUM AND TAZOBACTAM SODIUM 4.5 G: 4; .5 INJECTION, POWDER, LYOPHILIZED, FOR SOLUTION INTRAVENOUS at 12:12

## 2024-12-24 RX ADMIN — PIPERACILLIN SODIUM AND TAZOBACTAM SODIUM 4.5 G: 4; .5 INJECTION, POWDER, LYOPHILIZED, FOR SOLUTION INTRAVENOUS at 09:12

## 2024-12-24 RX ADMIN — FUROSEMIDE 40 MG: 10 INJECTION, SOLUTION INTRAVENOUS at 03:12

## 2024-12-24 RX ADMIN — PREDNISONE 20 MG: 20 TABLET ORAL at 08:12

## 2024-12-24 RX ADMIN — HYDROCODONE BITARTRATE AND ACETAMINOPHEN 1 TABLET: 5; 325 TABLET ORAL at 10:12

## 2024-12-24 NOTE — LETTER
December 27, 2024         [address] [place] [id number]  Phone: [phone]  Fax: [phone]       Patient: Karen Renteria   YOB: 1955  Date of Visit: 12/27/2024    To Whom It May Concern:    Kym Renteria  was at [facility] on 12/27/2024. The patient may return to work/school on 12/30/2024 with no restrictions. If you have any questions or concerns, or if I can be of further assistance, please do not hesitate to contact me.    Sincerely,    CUBA Ya Dr.

## 2024-12-24 NOTE — ED PROVIDER NOTES
Encounter Date: 12/24/2024       History     Chief Complaint   Patient presents with    Shortness of Breath     Patient presents to ED with c/o SOB x 1 week that got increasingly worse today.  Patient states she was diagnosed with pneumonia a week ago and has been taking Levaquin.       Patient comes in with fever coughing.  Feels worse now than she did the other day.  She is diagnosed with pneumonia on last hospitalization.  With underlying CHF--        Review of patient's allergies indicates:   Allergen Reactions    Cephalexin Rash    Bactrim [sulfamethoxazole-trimethoprim]     Zofran [ondansetron hcl] Itching     Past Medical History:   Diagnosis Date    Acute gastric ulcer without hemorrhage or perforation 03/03/2022    Age-related osteoporosis with current pathological fracture 04/21/2021    Benign essential hypertension 08/28/2018    Last Assessment & Plan:   Formatting of this note might be different from the original.  Well controlled    Carpal tunnel syndrome     Cerebral vascular accident     CKD (chronic kidney disease) stage 5, GFR less than 15 ml/min 12/11/2023    Coronary arteriosclerosis     S/P CABG 2011    COVID-19 12/20/2020    Diabetes mellitus, type 2     Diabetic peripheral neuropathy associated with type 2 diabetes mellitus 04/28/2022    Diverticulitis     Essential tremor 01/20/2022    Falls frequently 12/07/2021    Hyperlipidemia     Hypertension     Insomnia secondary to depression with anxiety     Lumbar radiculopathy     Non-rheumatic aortic stenosis 08/28/2018    Formatting of this note might be different from the original.  S/p Tissue AVR July 2011 ([facility] Echo October 2017 revealed normally functioning bioprosthesis with normal EF 60-65%     Last Assessment & Plan:   Formatting of this note might be different from the original.  Asymptomatic.    Osteoporosis     Pure hypercholesterolemia 08/28/2018    Formatting of this note might be different from the original.  Chronic  statin.       Last Assessment & Plan:   Formatting of this note might be different from the original.   Continue statin.    RBBB 2021    Formatting of this note might be different from the original.  Initially noted on electrocardiogram in 2021.    Spinal stenosis, cervical region 2022    SSS (sick sinus syndrome) 2022    Last Assessment & Plan:   Formatting of this note might be different from the original.  Post placement of [place] scientific dual-chamber permanent pacemaker by Dr. Freeman on 2022.    Subclavian artery stenosis 2018    Formatting of this note might be different from the original.  30% stenosis.    Tremor     Type 2 diabetes mellitus, with long-term current use of insulin 2021    Vitamin D deficiency      Past Surgical History:   Procedure Laterality Date    APPENDECTOMY      ASCENDING AORTIC ANEURYSM REPAIR W/ TISSUE AORTIC VALVE REPLACEMENT      BLADDER SUSPENSION Right     CHOLECYSTECTOMY      CORONARY ARTERY BYPASS GRAFT (CABG)      DIRECT LARYNGOSCOPY Bilateral 2022    Procedure: LARYNGOSCOPY, DIRECT;  Surgeon: Iban Silverio MD;  Location: [facility] OR;  Service: ENT;  Laterality: Bilateral;    HYSTERECTOMY      OOPHORECTOMY      TONSILLECTOMY      VOCAL FOLD LESION EXCISION Bilateral 2022    Procedure: EXCISION, LESION, VOCAL CORD;  Surgeon: Iban Silverio MD;  Location: [facility] OR;  Service: ENT;  Laterality: Bilateral;     Family History   Adopted: Yes   Problem Relation Name Age of Onset    Cancer Mother      Breast cancer Mother      Heart disease Father      Breast cancer Sister       Social History     Tobacco Use    Smoking status: Former     Current packs/day: 0.00     Average packs/day: 0.5 packs/day for 17.3 years (8.7 ttl pk-yrs)     Types: Cigarettes     Start date:      Quit date: 2011     Years since quittin.6    Smokeless tobacco: Never   Substance Use Topics    Alcohol use: Never    Drug use: Never      Review of Systems   Constitutional: Negative.  Negative for fever.   HENT: Negative.  Negative for sore throat.    Eyes: Negative.    Respiratory: Negative.  Negative for shortness of breath.    Cardiovascular: Negative.  Negative for chest pain.   Gastrointestinal: Negative.  Negative for nausea.   Endocrine: Negative.    Genitourinary: Negative.  Negative for dysuria.   Musculoskeletal: Negative.  Negative for back pain.   Skin: Negative.  Negative for rash.   Allergic/Immunologic: Negative.    Neurological: Negative.  Negative for weakness.   Hematological: Negative.  Does not bruise/bleed easily.   Psychiatric/Behavioral: Negative.     All other systems reviewed and are negative.      Physical Exam     Initial Vitals [12/24/24 0728]   BP Pulse Resp Temp SpO2   (!) 112/53 99 (!) 23 98.4 °F (36.9 °C) (!) 87 %      MAP       --         Physical Exam    Constitutional: She appears well-developed and well-nourished.   HENT:   Head: Normocephalic and atraumatic.   Right Ear: External ear normal.   Left Ear: External ear normal.   Nose: Nose normal. Mouth/Throat: Oropharynx is clear and moist.   Eyes: Conjunctivae and EOM are normal. Pupils are equal, round, and reactive to light.   Neck: Neck supple.   Normal range of motion.  Cardiovascular:  Normal rate, regular rhythm, normal heart sounds and intact distal pulses.           Pulmonary/Chest: Breath sounds normal.   Abdominal: Abdomen is soft. Bowel sounds are normal.   Genitourinary:    Vagina and uterus normal.     Musculoskeletal:         General: Normal range of motion.      Cervical back: Normal range of motion and neck supple.     Neurological: She is alert and oriented to person, place, and time. She has normal strength and normal reflexes.   Skin: Skin is warm. Capillary refill takes less than 2 seconds.   Psychiatric: She has a normal mood and affect. Her behavior is normal. Judgment and thought content normal.         Medical Screening Exam   See Full  Note    ED Course   Procedures  Labs Reviewed   COMPREHENSIVE METABOLIC PANEL - Abnormal       Result Value    Sodium 139      Potassium 3.5      Chloride 102      CO2 23      Anion Gap 18 (*)     Glucose 115      BUN 45 (*)     Creatinine 2.81 (*)     BUN/Creatinine Ratio 16      Calcium 9.2      Total Protein 7.3      Albumin 3.1 (*)     Globulin 4.2 (*)     A/G Ratio 0.7      Bilirubin, Total 0.7      Alk Phos 92      ALT 13      AST 33      eGFR 18 (*)    TROPONIN I - Abnormal    Troponin I High Sensitivity 864.8 (*)    NT-PRO NATRIURETIC PEPTIDE - Abnormal    ProBNP 11,589 (*)    CBC WITH DIFFERENTIAL - Abnormal    WBC 13.74 (*)     RBC 4.17 (*)     Hemoglobin 10.7 (*)     Hematocrit 34.8 (*)     MCV 83.5      MCH 25.7 (*)     MCHC 30.7 (*)     RDW 15.5 (*)     Platelet Count 269      MPV 12.4      Neutrophils % 76.7 (*)     Lymphocytes % 9.8 (*)     Monocytes % 8.1 (*)     Eosinophils % 4.0      Basophils % 0.8      Immature Granulocytes % 0.6 (*)     nRBC, Auto 0.0      Neutrophils, Abs 10.54 (*)     Lymphocytes, Absolute 1.35      Monocytes, Absolute 1.11 (*)     Eosinophils, Absolute 0.55 (*)     Basophils, Absolute 0.11      Immature Granulocytes, Absolute 0.08 (*)     nRBC, Absolute 0.00      Diff Type Auto     TROPONIN I - Abnormal    Troponin I High Sensitivity 859.0 (*)    SARS-COV-2 RNA AMPLIFICATION, QUAL - Normal    SARS COV-2 Molecular Negative      Narrative:     Negative SARS-CoV results should not be used as the sole basis for treatment or patient management decisions; negative results should be considered in the context of a patient's recent exposures, history and the presene of clinical signs and symptoms consistent with COVID-19.  Negative results should be treated as presumptive and confirmed by molecular assay, if necessary for patient management.   CULTURE, BLOOD   CULTURE, BLOOD   CBC W/ AUTO DIFFERENTIAL    Narrative:     The following orders were created for panel order CBC auto  differential.  Procedure                               Abnormality         Status                     ---------                               -----------         ------                     CBC with Differential[[id number]]       Abnormal            Final result                 Please view results for these tests on the individual orders.   URINALYSIS, REFLEX TO URINE CULTURE    Color, UA Colorless      Clarity, UA Clear      pH, UA 6.0      Leukocytes, UA Negative      Nitrites, UA Negative      Protein, UA Negative      Glucose, UA Normal      Ketones, UA Negative      Urobilinogen, UA Normal      Bilirubin, UA Negative      Blood, UA Negative      Specific Gravity, UA 1.009            Imaging Results              X-Ray Chest AP (Final result)  Result time 12/24/24 09:03:20      Final result by Van Zuñiga MD (12/24/24 09:03:20)                   Impression:      Patchy interstitial and alveolar opacities could relate to edema versus infectious or noninfectious inflammatory process.    Question trace pleural effusions.      Electronically signed by: Van Zuñiga  Date:    12/24/2024  Time:    09:03               Narrative:    EXAMINATION:  XR CHEST AP PORTABLE    CLINICAL HISTORY:  shortness of breath;    TECHNIQUE:  Single frontal view of the chest was performed.    COMPARISON:  Chest radiograph performed 12/16/2024, 08:18 hours.    FINDINGS:  Monitoring leads over the chest.  Status post median sternotomy.  Prior valve repair.  Left subclavian approach pacer.    Similar cardiomediastinal contours.    Patchy interstitial and alveolar opacities are noted bilaterally.    Trace pleural effusions not excluded.    No definite pneumothorax.    No acute findings in the visualized abdomen.    Osseous and soft tissue structures appear without definite acute change.                                       Medications   aspirin EC tablet 81 mg (81 mg Oral Given 12/25/24 0828)   dapagliflozin propanediol (Farxiga)  tablet 10 mg (10 mg Oral Given 12/25/24 0828)   levothyroxine tablet 25 mcg (25 mcg Oral Given 12/25/24 0606)   pantoprazole EC tablet 40 mg (40 mg Oral Given 12/25/24 0828)   primidone tablet 100 mg (100 mg Oral Given 12/25/24 0827)   sertraline tablet 50 mg (50 mg Oral Given 12/25/24 0828)   atorvastatin tablet 40 mg (40 mg Oral Given 12/25/24 0828)   sodium chloride 0.9% flush 10 mL (has no administration in time range)   furosemide injection 40 mg (40 mg Intravenous Given 12/25/24 0706)   piperacillin-tazobactam (ZOSYN) 4.5 g in D5W 100 mL IVPB (MB+) (0 g Intravenous Stopped 12/25/24 1232)   predniSONE tablet 20 mg (20 mg Oral Given 12/25/24 0828)   azithromycin tablet 250 mg (250 mg Oral Given 12/25/24 0828)   albuterol-ipratropium 2.5 mg-0.5 mg/3 mL nebulizer solution 3 mL (3 mLs Nebulization Given 12/25/24 0205)   glucose chewable tablet 16 g (has no administration in time range)   glucose chewable tablet 24 g (has no administration in time range)   dextrose 50% injection 12.5 g (has no administration in time range)   dextrose 50% injection 25 g (has no administration in time range)   glucagon (human recombinant) injection 1 mg (has no administration in time range)   insulin aspart U-100 injection 0-5 Units (2 Units Subcutaneous Given 12/25/24 1149)   fluconazole tablet 200 mg (has no administration in time range)   furosemide injection 40 mg (40 mg Intravenous Given 12/24/24 1245)   piperacillin-tazobactam (ZOSYN) 4.5 g in D5W 100 mL IVPB (MB+) (0 g Intravenous Stopped 12/24/24 1315)   azithromycin tablet 500 mg (500 mg Oral Given 12/24/24 2059)   HYDROcodone-acetaminophen 5-325 mg per tablet 1 tablet (1 tablet Oral Given 12/24/24 2222)     Medical Decision Making  Amount and/or Complexity of Data Reviewed  Labs: ordered.  Radiology: ordered.    Risk  Prescription drug management.  Decision regarding hospitalization.                          Medical Decision Making:   Initial Assessment:   Patient comes in  with fever coughing.  Feels worse now than she did the other day.  She is diagnosed with pneumonia on last hospitalization.  With underlying CHF--        Differential Diagnosis:   Elevated troponin elevated BNP and underlying pneumonia  ED Management:  Admission Zosyn             Clinical Impression:   Final diagnoses:  [R06.02] Shortness of breath        ED Disposition Condition    Admit                 Roberto Villareal, DO  12/25/24 6528

## 2024-12-25 PROBLEM — I50.9 CHF (CONGESTIVE HEART FAILURE): Status: ACTIVE | Noted: 2024-12-25

## 2024-12-25 LAB
ANION GAP SERPL CALCULATED.3IONS-SCNC: 18 MMOL/L (ref 7–16)
BUN SERPL-MCNC: 49 MG/DL (ref 10–20)
BUN/CREAT SERPL: 16 (ref 6–20)
CALCIUM SERPL-MCNC: 9.4 MG/DL (ref 8.4–10.2)
CHLORIDE SERPL-SCNC: 97 MMOL/L (ref 98–107)
CO2 SERPL-SCNC: 24 MMOL/L (ref 23–31)
CREAT SERPL-MCNC: 3.09 MG/DL (ref 0.55–1.02)
EGFR (NO RACE VARIABLE) (RUSH/TITUS): 16 ML/MIN/1.73M2
GLUCOSE SERPL-MCNC: 156 MG/DL (ref 70–105)
GLUCOSE SERPL-MCNC: 165 MG/DL (ref 70–105)
GLUCOSE SERPL-MCNC: 168 MG/DL (ref 82–115)
GLUCOSE SERPL-MCNC: 230 MG/DL (ref 70–105)
GLUCOSE SERPL-MCNC: 245 MG/DL (ref 70–105)
MAGNESIUM SERPL-MCNC: 2.1 MG/DL (ref 1.6–2.6)
POTASSIUM SERPL-SCNC: 3.9 MMOL/L (ref 3.5–5.1)
SODIUM SERPL-SCNC: 135 MMOL/L (ref 136–145)

## 2024-12-25 PROCEDURE — 87070 CULTURE OTHR SPECIMN AEROBIC: CPT | Performed by: HOSPITALIST

## 2024-12-25 PROCEDURE — 82962 GLUCOSE BLOOD TEST: CPT

## 2024-12-25 PROCEDURE — 25000242 PHARM REV CODE 250 ALT 637 W/ HCPCS: Performed by: HOSPITALIST

## 2024-12-25 PROCEDURE — 94761 N-INVAS EAR/PLS OXIMETRY MLT: CPT

## 2024-12-25 PROCEDURE — 27000221 HC OXYGEN, UP TO 24 HOURS

## 2024-12-25 PROCEDURE — 63700000 PHARM REV CODE 250 ALT 637 W/O HCPCS: Performed by: HOSPITALIST

## 2024-12-25 PROCEDURE — 36415 COLL VENOUS BLD VENIPUNCTURE: CPT | Performed by: HOSPITALIST

## 2024-12-25 PROCEDURE — 25000003 PHARM REV CODE 250: Performed by: HOSPITALIST

## 2024-12-25 PROCEDURE — 63600175 PHARM REV CODE 636 W HCPCS: Performed by: HOSPITALIST

## 2024-12-25 PROCEDURE — 27200667 HC PACEMAKER, TEMPORARY MONITORING, PER SHIFT

## 2024-12-25 PROCEDURE — 94640 AIRWAY INHALATION TREATMENT: CPT

## 2024-12-25 PROCEDURE — 83735 ASSAY OF MAGNESIUM: CPT | Performed by: HOSPITALIST

## 2024-12-25 PROCEDURE — 99233 SBSQ HOSP IP/OBS HIGH 50: CPT | Mod: ,,, | Performed by: HOSPITALIST

## 2024-12-25 PROCEDURE — 11000001 HC ACUTE MED/SURG PRIVATE ROOM

## 2024-12-25 PROCEDURE — 99900035 HC TECH TIME PER 15 MIN (STAT)

## 2024-12-25 PROCEDURE — 80048 BASIC METABOLIC PNL TOTAL CA: CPT | Performed by: HOSPITALIST

## 2024-12-25 RX ORDER — FLUCONAZOLE 100 MG/1
200 TABLET ORAL DAILY
Status: DISCONTINUED | OUTPATIENT
Start: 2024-12-25 | End: 2024-12-27 | Stop reason: HOSPADM

## 2024-12-25 RX ORDER — FLUTICASONE PROPIONATE 50 MCG
2 SPRAY, SUSPENSION (ML) NASAL DAILY
Status: DISCONTINUED | OUTPATIENT
Start: 2024-12-25 | End: 2024-12-27 | Stop reason: HOSPADM

## 2024-12-25 RX ADMIN — FUROSEMIDE 40 MG: 10 INJECTION, SOLUTION INTRAVENOUS at 07:12

## 2024-12-25 RX ADMIN — ATORVASTATIN CALCIUM 40 MG: 40 TABLET, FILM COATED ORAL at 08:12

## 2024-12-25 RX ADMIN — PRIMIDONE 100 MG: 50 TABLET ORAL at 08:12

## 2024-12-25 RX ADMIN — FLUCONAZOLE 200 MG: 100 TABLET ORAL at 04:12

## 2024-12-25 RX ADMIN — LEVOTHYROXINE SODIUM 25 MCG: 0.03 TABLET ORAL at 06:12

## 2024-12-25 RX ADMIN — AZITHROMYCIN DIHYDRATE 250 MG: 250 TABLET ORAL at 08:12

## 2024-12-25 RX ADMIN — INSULIN ASPART 2 UNITS: 100 INJECTION, SOLUTION INTRAVENOUS; SUBCUTANEOUS at 11:12

## 2024-12-25 RX ADMIN — PIPERACILLIN SODIUM AND TAZOBACTAM SODIUM 4.5 G: 4; .5 INJECTION, POWDER, LYOPHILIZED, FOR SOLUTION INTRAVENOUS at 09:12

## 2024-12-25 RX ADMIN — DAPAGLIFLOZIN 10 MG: 10 TABLET, FILM COATED ORAL at 08:12

## 2024-12-25 RX ADMIN — PRIMIDONE 100 MG: 50 TABLET ORAL at 09:12

## 2024-12-25 RX ADMIN — PANTOPRAZOLE SODIUM 40 MG: 40 TABLET, DELAYED RELEASE ORAL at 08:12

## 2024-12-25 RX ADMIN — SERTRALINE HYDROCHLORIDE 50 MG: 50 TABLET ORAL at 08:12

## 2024-12-25 RX ADMIN — INSULIN ASPART 2 UNITS: 100 INJECTION, SOLUTION INTRAVENOUS; SUBCUTANEOUS at 04:12

## 2024-12-25 RX ADMIN — IPRATROPIUM BROMIDE AND ALBUTEROL SULFATE 3 ML: .5; 3 SOLUTION RESPIRATORY (INHALATION) at 02:12

## 2024-12-25 RX ADMIN — FUROSEMIDE 40 MG: 10 INJECTION, SOLUTION INTRAVENOUS at 04:12

## 2024-12-25 RX ADMIN — PIPERACILLIN SODIUM AND TAZOBACTAM SODIUM 4.5 G: 4; .5 INJECTION, POWDER, LYOPHILIZED, FOR SOLUTION INTRAVENOUS at 08:12

## 2024-12-25 RX ADMIN — PREDNISONE 20 MG: 20 TABLET ORAL at 08:12

## 2024-12-25 RX ADMIN — ASPIRIN 81 MG: 81 TABLET, COATED ORAL at 08:12

## 2024-12-25 NOTE — ASSESSMENT & PLAN NOTE
"Patient's FSGs are controlled on current medication regimen.  Last A1c reviewed-   Lab Results   Component Value Date    HGBA1C 6.1 12/16/2024     Most recent fingerstick glucose reviewed- No results for input(s): "POCTGLUCOSE" in the last 24 hours.  Current correctional scale  Low  Maintain anti-hyperglycemic dose as follows-   Antihyperglycemics (From admission, onward)      Start     Stop Route Frequency Ordered    12/24/24 1500  dapagliflozin propanediol (Farxiga) tablet 10 mg        Question Answer Comment   Does this patient have a diagnosis of heart failure? Yes    Does this patient have type 1 diabetes or diabetic ketoacidosis? No    Does this patient have symptomatic hypotension? No    Is the patient NPO or pending major surgery in next 3 days or less? No        -- Oral Daily 12/24/24 1357          Hold Oral hypoglycemics while patient is in the hospital.  "

## 2024-12-25 NOTE — ASSESSMENT & PLAN NOTE
"Patient has Diastolic (HFpEF) heart failure that is Acute on chronic. On presentation their CHF was decompensated. Evidence of decompensated CHF on presentation includes: edema, elevated JVD, and dyspnea on exertion ([place]AUDH[place]). The etiology of their decompensation is likely no lasix at home, per patient . Most recent BNP and echo results are listed below.  No results for input(s): "BNP" in the last 72 hours.  Latest ECHO  Results for orders placed during the hospital encounter of 12/16/24    Echo    Interpretation Summary    Left Ventricle: The left ventricle is normal in size. Mildly increased wall thickness. There is concentric remodeling. Septal motion is consistent with pacing. There is normal systolic function with a visually estimated ejection fraction of 55 - 60%. Quantitated ejection fraction is 58%. There is normal diastolic function.    Right Ventricle: Mild right ventricular enlargement. Systolic function is moderately reduced. Pacemaker lead present in the ventricle.    Right Atrium: Right atrium is mildly dilated.    Aortic Valve: There is a bioprosthetic valve in the aortic position.    Mitral Valve: There is moderate regurgitation with a posterolateral eccentriccally directed jet.    Tricuspid Valve: There is moderate regurgitation.    Pulmonary Artery: The estimated pulmonary artery systolic pressure is 49 mmHg.    IVC/SVC: Elevated venous pressure at 15 mmHg.    Current Heart Failure Medications  , Daily, Oral  dapagliflozin propanediol (Farxiga) tablet 10 mg, Daily, Oral  furosemide injection 40 mg, 2 times daily before meals, Intravenous    Plan  - Monitor strict I&Os and daily weights.    - Place on telemetry  - Low sodium diet  - Place on fluid restriction of 1 L.   - Cardiology has been consulted  - The patient's volume status is improving but not at their baseline as indicated by edema, elevated JVD, and shortness of breath    "

## 2024-12-25 NOTE — PLAN OF CARE
Problem: Pneumonia  Goal: Fluid Balance  Outcome: Progressing  Goal: Resolution of Infection Signs and Symptoms  Outcome: Progressing  Goal: Effective Oxygenation and Ventilation  Outcome: Progressing

## 2024-12-25 NOTE — H&P
[facility]  [facility] Medicine  History & Physical    Patient Name: Karen Renteria  MRN: [id number]  Patient Class: IP- Inpatient  Admission Date: 12/24/2024  Attending Physician: Gretta Salcedo MD   Primary Care Provider: Jt Allison II, MD         Patient information was obtained from patient, past medical records, and ER records.     Subjective:     Principal Problem:Acute hypoxemic respiratory failure    Chief Complaint:   Chief Complaint   Patient presents with    Shortness of Breath     Patient presents to ED with c/o SOB x 1 week that got increasingly worse today.  Patient states she was diagnosed with pneumonia a week ago and has been taking Levaquin.          HPI: Ms. Karen Renteria is a pleasant 68yo woman history of diabetes, CAD s/p CABG 2012, aortic valve replacement 2012, s/p pacemaker placement 2022, diastolic heart failure, obesity, prior tobacco use, CVA in 2022, gastric ulcer, CKD stage 5, HTN, HLD, depression who presents with shortness of breath.  She states she felt great after leaving the hospital on Tuesday - she was admitted to this hospital for pneumonia.  On Sunday, she reports she started feeling weak.  She had been taking levoquin and lasix until Saturday.  She developed dyspnea on exertion.  She reports that she recalls taking levoquin but she does not recall receiving lasix.  She denies lower extremity swelling, difficulty lying flat, chest pain, dizziness.  She endorses nasal drainage and has been using flonase for that.  She reports severe headaches and congestion.  That compromises her ability to breath.  She states she has been coughing up a small amount of mucus.      She was recently admitted for pneumonia wherein she was admitted for one day and discharged on levofloxacin, lasix 40mg daily, and duonebs.        ED course:   Initial Vitals [12/24/24 0728]   BP Pulse Resp Temp SpO2   (!) 112/53 99 (!) 23 98.4 °F (36.9 °C) (!) 87 %         Past  Medical History:   Diagnosis Date    Acute gastric ulcer without hemorrhage or perforation 03/03/2022    Age-related osteoporosis with current pathological fracture 04/21/2021    Benign essential hypertension 08/28/2018    Last Assessment & Plan:   Formatting of this note might be different from the original.  Well controlled    Carpal tunnel syndrome     Cerebral vascular accident     CKD (chronic kidney disease) stage 5, GFR less than 15 ml/min 12/11/2023    Coronary arteriosclerosis     S/P CABG 2011    COVID-19 12/20/2020    Diabetes mellitus, type 2     Diabetic peripheral neuropathy associated with type 2 diabetes mellitus 04/28/2022    Diverticulitis     Essential tremor 01/20/2022    Falls frequently 12/07/2021    Hyperlipidemia     Hypertension     Insomnia secondary to depression with anxiety     Lumbar radiculopathy     Non-rheumatic aortic stenosis 08/28/2018    Formatting of this note might be different from the original.  S/p Tissue AVR July 2011 ([facility]). Echo October 2017 revealed normally functioning bioprosthesis with normal EF 60-65%     Last Assessment & Plan:   Formatting of this note might be different from the original.  Asymptomatic.    Osteoporosis     Pure hypercholesterolemia 08/28/2018    Formatting of this note might be different from the original.  Chronic statin.       Last Assessment & Plan:   Formatting of this note might be different from the original.   Continue statin.    RBBB 09/08/2021    Formatting of this note might be different from the original.  Initially noted on electrocardiogram in September 2021.    Spinal stenosis, cervical region 01/20/2022    SSS (sick sinus syndrome) 07/12/2022    Last Assessment & Plan:   Formatting of this note might be different from the original.  Post placement of [place] scientific dual-chamber permanent pacemaker by Dr. Freeman on 07/12/2022.    Subclavian artery stenosis 08/28/2018    Formatting of this note might be different from the  original.  30% stenosis.    Tremor     Type 2 diabetes mellitus, with long-term current use of insulin 01/13/2021    Vitamin D deficiency        Past Surgical History:   Procedure Laterality Date    APPENDECTOMY      ASCENDING AORTIC ANEURYSM REPAIR W/ TISSUE AORTIC VALVE REPLACEMENT      BLADDER SUSPENSION Right     CHOLECYSTECTOMY      CORONARY ARTERY BYPASS GRAFT (CABG)      DIRECT LARYNGOSCOPY Bilateral 08/12/2022    Procedure: LARYNGOSCOPY, DIRECT;  Surgeon: Iban Silverio MD;  Location: [facility] OR;  Service: ENT;  Laterality: Bilateral;    HYSTERECTOMY      OOPHORECTOMY      TONSILLECTOMY      VOCAL FOLD LESION EXCISION Bilateral 08/12/2022    Procedure: EXCISION, LESION, VOCAL CORD;  Surgeon: Iban Silverio MD;  Location: [facility] OR;  Service: ENT;  Laterality: Bilateral;       Review of patient's allergies indicates:   Allergen Reactions    Cephalexin Rash    Bactrim [sulfamethoxazole-trimethoprim]     Zofran [ondansetron hcl] Itching       No current facility-administered medications on file prior to encounter.     Current Outpatient Medications on File Prior to Encounter   Medication Sig    albuterol-ipratropium (DUO-NEB) 2.5 mg-0.5 mg/3 mL nebulizer solution Take 3 mLs by nebulization every 6 (six) hours. Rescue    furosemide (LASIX) 40 MG tablet Take 1 tablet (40 mg total) by mouth once daily.    hydroCHLOROthiazide (HYDRODIURIL) 12.5 MG Tab Take 12.5 mg by mouth once daily.    insulin degludec (TRESIBA FLEXTOUCH U-200) 200 unit/mL (3 mL) insulin pen 52 units in the morning and 32 units in the evening    isosorbide dinitrate (ISORDIL) 30 MG Tab Take 30 mg by mouth once daily.    levothyroxine (SYNTHROID) 25 MCG tablet Take 25 mcg by mouth before breakfast.    meclizine (ANTIVERT) 25 mg tablet Take 1 tablet (25 mg total) by mouth 3 (three) times daily as needed for Dizziness. (Patient taking differently: Take 25 mg by mouth daily as needed for Dizziness.)    pantoprazole (PROTONIX) 40 MG tablet  TAKE 1 TABLET BY MOUTH ONCE DAILY PRIOR TO SUPPER    pregabalin (LYRICA) 75 MG capsule Take 75 mg by mouth 2 (two) times daily.    primidone (MYSOLINE) 50 MG Tab Take 2 tablets (100 mg total) by mouth 3 (three) times daily. (Patient taking differently: Take 100 mg by mouth 2 (two) times a day.)    rosuvastatin (CRESTOR) 40 MG Tab Take 40 mg by mouth once daily.    sertraline (ZOLOFT) 50 MG tablet TAKE ONE TABLET BY MOUTH EVERY DAY (Patient taking differently: Take 100 mg by mouth once daily.)    aspirin (ECOTRIN) 81 MG EC tablet Take 81 mg by mouth once daily.    multivitamin (THERAGRAN) per tablet Take 1 tablet by mouth once daily.    semaglutide (OZEMPIC) 0.25 mg or 0.5 mg(2 mg/1.5 mL) pen injector Inject 0.25 mg into the skin.    [DISCONTINUED] cyclobenzaprine (FLEXERIL) 10 MG tablet Take 10 mg by mouth as needed for Muscle spasms.    [DISCONTINUED] dapagliflozin propanediol (FARXIGA) 10 mg tablet Take 10 mg by mouth once daily.    [DISCONTINUED] folic acid (FOLVITE) 1 MG tablet Take 1 mg by mouth once daily.    [DISCONTINUED] levoFLOXacin (LEVAQUIN) 500 MG tablet Take 1 tablet (500 mg total) by mouth once daily.    [DISCONTINUED] metoprolol succinate (TOPROL-XL) 50 MG 24 hr tablet Take 50 mg by mouth once daily.    [DISCONTINUED] promethazine (PHENERGAN) 25 MG tablet Take 1 tablet (25 mg total) by mouth every 6 (six) hours as needed for Nausea.     Family History       Problem Relation (Age of Onset)    Breast cancer Mother, Sister    Cancer Mother    Heart disease Father          Tobacco Use    Smoking status: Former     Current packs/day: 0.00     Average packs/day: 0.5 packs/day for 17.3 years (8.7 ttl pk-yrs)     Types: Cigarettes     Start date:      Quit date: 2011     Years since quittin.6    Smokeless tobacco: Never   Substance and Sexual Activity    Alcohol use: Never    Drug use: Never    Sexual activity: Not Currently     Review of Systems   Constitutional:  Negative for activity change,  chills, fatigue and fever.   HENT:  Positive for postnasal drip, rhinorrhea and sinus pressure. Negative for congestion and sore throat.    Respiratory:  Positive for cough and shortness of breath. Negative for chest tightness.    Gastrointestinal:  Negative for abdominal distention, abdominal pain and diarrhea.   Endocrine: Negative for cold intolerance and heat intolerance.   Genitourinary:  Negative for dysuria.   Musculoskeletal:  Negative for arthralgias and back pain.   Skin:  Negative for color change and rash.   Neurological:  Positive for headaches. Negative for dizziness and tremors.   Psychiatric/Behavioral:  Negative for agitation. The patient is not nervous/anxious.      Objective:     Vital Signs (Most Recent):  Temp: 97.5 °F (36.4 °C) (12/24/24 1646)  Pulse: 98 (12/24/24 1646)  Resp: 18 (12/24/24 1646)  BP: 116/64 (12/24/24 1646)  SpO2: 96 % (12/24/24 1646) Vital Signs (24h Range):  Temp:  [97.5 °F (36.4 °C)-98.4 °F (36.9 °C)] 97.5 °F (36.4 °C)  Pulse:  [72-99] 98  Resp:  [14-23] 18  SpO2:  [87 %-100 %] 96 %  BP: (101-123)/(40-64) 116/64     Weight: 79.8 kg (176 lb)  Body mass index is 30.21 kg/m².     Physical Exam  Constitutional:       Appearance: Normal appearance.   HENT:      Head: Normocephalic and atraumatic.      Nose: Nose normal.      Mouth/Throat:      Mouth: Mucous membranes are moist.      Pharynx: Oropharynx is clear.   Eyes:      Extraocular Movements: Extraocular movements intact.      Conjunctiva/sclera: Conjunctivae normal.      Pupils: Pupils are equal, round, and reactive to light.   Cardiovascular:      Rate and Rhythm: Regular rhythm. Tachycardia present.      Pulses: Normal pulses.      Heart sounds: Normal heart sounds.   Pulmonary:      Effort: Pulmonary effort is normal.      Breath sounds: Wheezing and rales present.   Abdominal:      General: Abdomen is flat. Bowel sounds are normal.      Palpations: Abdomen is soft.   Musculoskeletal:         General: Normal range of  motion.      Cervical back: Normal range of motion and neck supple.   Skin:     General: Skin is warm and dry.   Neurological:      General: No focal deficit present.      Mental Status: She is alert and oriented to person, place, and time.      Motor: Weakness present.   Psychiatric:         Mood and Affect: Mood normal.         Behavior: Behavior normal.              CRANIAL NERVES     CN III, IV, VI   Pupils are equal, round, and reactive to light.       Significant Labs: All pertinent labs within the past 24 hours have been reviewed.    Significant Imaging: I have reviewed all pertinent imaging results/findings within the past 24 hours.  Assessment/Plan:     * Acute hypoxemic respiratory failure  Patient with Hypoxic Respiratory failure which is Acute.  she is on home oxygen at 2 LPM. Supplemental oxygen was provided and noted-      .   Signs/symptoms of respiratory failure include- tachypnea, respiratory distress, and wheezing. Contributing diagnoses includes - CHF and Pneumonia Labs and images were reviewed. Patient Has recent ABG, which has been reviewed. Will treat underlying causes and adjust management of respiratory failure as follows-   Treat CHF exacerbation with elevated troponin. Consult Cardiology.   Treat pneumonia with procalcitonin and sputum cultures.  F/u blood cultures.     Pneumonia  Patient has a diagnosis of pneumonia. The cause of the pneumonia is suspected to be bacterial in etiology but organism is not known. The pneumonia is worsening due to hypoxia . The patient has the following signs/symptoms of pneumonia: persistent hypoxia , cough, and sputum production. The patient does have a current oxygen requirement and the patient does have a home oxygen requirement. I have reviewed the pertinent imaging. The following cultures have been collected: Blood cultures and Sputum culture The culture results are listed below.     Current antimicrobial regimen consists of the antibiotics listed below.  Will monitor patient closely and continue current treatment plan unchanged.    Antibiotics (From admission, onward)      Start     Stop Route Frequency Ordered    12/25/24 0900  azithromycin tablet 250 mg         -- Oral Daily 12/24/24 2009 12/24/24 2100  piperacillin-tazobactam (ZOSYN) 4.5 g in D5W 100 mL IVPB (MB+)         -- IV Every 12 hours (non-standard times) 12/24/24 1959 12/24/24 2008  azithromycin tablet 500 mg         -- Oral Once 12/24/24 2009            Microbiology Results (last 7 days)       Procedure Component Value Units Date/Time    Culture, Lower Respiratory [[id number]]     Order Status: Sent Specimen: Respiratory from Sputum Expectorated     Blood Culture #2 [[id number]] Collected: 12/24/24 0842    Order Status: Sent Specimen: Blood Updated: 12/24/24 0842    Blood Culture #1 [[id number]] Collected: 12/24/24 0842    Order Status: Sent Specimen: Blood Updated: 12/24/24 0842            Obesity (BMI 30-39.9)  Body mass index is 30.21 kg/m². Morbid obesity complicates all aspects of disease management from diagnostic modalities to treatment. Weight loss encouraged and health benefits explained to patient.         Type 2 MI (myocardial infarction)  Shortness of breath with elevated BNP and troponin.   High risk patient with prior valve disease and CAD s/p CABG.      Depression  Patient has persistent depression which is moderate and is currently controlled. Will Continue anti-depressant medications. We will not consult psychiatry at this time. Patient does not display psychosis at this time. Continue to monitor closely and adjust plan of care as needed.        CKD (chronic kidney disease) stage 5, GFR less than 15 ml/min  Creatine stable for now. BMP reviewed- noted Estimated Creatinine Clearance: 19 mL/min (A) (based on SCr of 2.81 mg/dL (H)). according to latest data. Based on current GFR, CKD stage is stage 5 - GFR < 15.  Monitor UOP and serial BMP and adjust therapy as needed. Renally dose  "meds. Avoid nephrotoxic medications and procedures.    SSS (sick sinus syndrome)  S/p pacemaker placement.       Pure hypercholesterolemia  Continue home statin.       Benign essential hypertension  Patient's blood pressure range in the last 24 hours was: BP  Min: 101/52  Max: 123/60.The patient's inpatient anti-hypertensive regimen is listed below:  Current Antihypertensives  , Daily, Oral  furosemide injection 40 mg, 2 times daily before meals, Intravenous    Plan  - BP is controlled, no changes needed to their regimen      DM type 2 with diabetic dyslipidemia  Patient's FSGs are controlled on current medication regimen.  Last A1c reviewed-   Lab Results   Component Value Date    HGBA1C 6.1 12/16/2024     Most recent fingerstick glucose reviewed- No results for input(s): "POCTGLUCOSE" in the last 24 hours.  Current correctional scale  Low  Maintain anti-hyperglycemic dose as follows-   Antihyperglycemics (From admission, onward)      Start     Stop Route Frequency Ordered    12/24/24 1500  dapagliflozin propanediol (Farxiga) tablet 10 mg        Question Answer Comment   Does this patient have a diagnosis of heart failure? Yes    Does this patient have type 1 diabetes or diabetic ketoacidosis? No    Does this patient have symptomatic hypotension? No    Is the patient NPO or pending major surgery in next 3 days or less? No        -- Oral Daily 12/24/24 1357          Hold Oral hypoglycemics while patient is in the hospital.      VTE Risk Mitigation (From admission, onward)           Ordered     IP VTE HIGH RISK PATIENT  Once         12/24/24 1357     Place sequential compression device  Until discontinued         12/24/24 1357                                    Gretta Salcedo MD  Department of Hospital Medicine  [facility]          "

## 2024-12-25 NOTE — PROGRESS NOTES
[facility] Medicine  Progress Note    Patient Name: Karen Renteria  MRN: [id number]  Patient Class: IP- Inpatient   Admission Date: 12/24/2024  Length of Stay: 1 days  Attending Physician: Gretta Salcedo MD  Primary Care Provider: Jt Allison II, MD        Subjective     Principal Problem:Acute hypoxemic respiratory failure    HPI:  Ms. Karen Renteria is a pleasant 68yo woman history of diabetes, CAD s/p CABG 2012, aortic valve replacement 2012, s/p pacemaker placement 2022, diastolic heart failure, obesity, prior tobacco use, CVA in 2022, gastric ulcer, CKD stage 5, HTN, HLD, depression who presents with shortness of breath.  She states she felt great after leaving the hospital on Tuesday - she was admitted to this hospital for pneumonia.  On Sunday, she reports she started feeling weak.  She had been taking levoquin and lasix until Saturday.  She developed dyspnea on exertion.  She reports that she recalls taking levoquin but she does not recall receiving lasix.  She denies lower extremity swelling, difficulty lying flat, chest pain, dizziness.  She endorses nasal drainage and has been using flonase for that.  She reports severe headaches and congestion.  That compromises her ability to breath.  She states she has been coughing up a small amount of mucus.      She was recently admitted for pneumonia wherein she was admitted for one day and discharged on levofloxacin, lasix 40mg daily, and duonebs.        ED course:   Initial Vitals [12/24/24 0728]   BP Pulse Resp Temp SpO2   (!) 112/53 99 (!) 23 98.4 °F (36.9 °C) (!) 87 %         Overview/Hospital Course:  No notes on file    Interval History:     Review of Systems   Constitutional:  Negative for activity change, chills, fatigue and fever.   HENT:  Positive for postnasal drip, rhinorrhea and sinus pressure. Negative for congestion and sore throat.    Respiratory:  Positive for cough and shortness of breath. Negative  for chest tightness.    Gastrointestinal:  Negative for abdominal distention, abdominal pain and diarrhea.   Endocrine: Negative for cold intolerance and heat intolerance.   Genitourinary:  Negative for dysuria.   Musculoskeletal:  Negative for arthralgias and back pain.   Skin:  Negative for color change and rash.   Neurological:  Positive for headaches. Negative for dizziness and tremors.   Psychiatric/Behavioral:  Negative for agitation. The patient is not nervous/anxious.      Objective:     Vital Signs (Most Recent):  Temp: 97.6 °F (36.4 °C) (12/25/24 0725)  Pulse: 99 (12/25/24 0725)  Resp: 16 (12/25/24 0725)  BP: 115/78 (12/25/24 0725)  SpO2: (!) 92 % (12/25/24 0527) Vital Signs (24h Range):  Temp:  [97.5 °F (36.4 °C)-98.3 °F (36.8 °C)] 97.6 °F (36.4 °C)  Pulse:  [72-99] 99  Resp:  [14-22] 16  SpO2:  [90 %-100 %] 92 %  BP: (101-128)/(40-78) 115/78     Weight: 79.8 kg (176 lb)  Body mass index is 30.21 kg/m².    Intake/Output Summary (Last 24 hours) at 12/25/2024 0750  Last data filed at 12/25/2024 0149  Gross per 24 hour   Intake 330 ml   Output 550 ml   Net -220 ml         Physical Exam  Constitutional:       Appearance: Normal appearance.   HENT:      Head: Normocephalic and atraumatic.      Nose: Nose normal.      Mouth/Throat:      Mouth: Mucous membranes are moist.      Pharynx: Oropharynx is clear.   Eyes:      Extraocular Movements: Extraocular movements intact.      Conjunctiva/sclera: Conjunctivae normal.      Pupils: Pupils are equal, round, and reactive to light.   Cardiovascular:      Rate and Rhythm: Regular rhythm. Tachycardia present.      Pulses: Normal pulses.      Heart sounds: Normal heart sounds.   Pulmonary:      Effort: Pulmonary effort is normal.      Breath sounds: Wheezing and rales present.   Abdominal:      General: Abdomen is flat. Bowel sounds are normal.      Palpations: Abdomen is soft.   Musculoskeletal:         General: Normal range of motion.      Cervical back: Normal range  "of motion and neck supple.   Skin:     General: Skin is warm and dry.   Neurological:      General: No focal deficit present.      Mental Status: She is alert and oriented to person, place, and time.      Motor: Weakness present.   Psychiatric:         Mood and Affect: Mood normal.         Behavior: Behavior normal.             Significant Labs: All pertinent labs within the past 24 hours have been reviewed.    Significant Imaging: I have reviewed all pertinent imaging results/findings within the past 24 hours.    Assessment and Plan     * Acute hypoxemic respiratory failure  Patient with Hypoxic Respiratory failure which is Acute.  she is on home oxygen at 2 LPM. Supplemental oxygen was provided and noted-      .   Signs/symptoms of respiratory failure include- tachypnea, respiratory distress, and wheezing. Contributing diagnoses includes - CHF and Pneumonia Labs and images were reviewed. Patient Has recent ABG, which has been reviewed. Will treat underlying causes and adjust management of respiratory failure as follows-   Treat CHF exacerbation with elevated troponin. Consult Cardiology.   Treat pneumonia with procalcitonin and sputum cultures.  F/u blood cultures.     12/25: hypoxia, but no other concerning features aside from elevated troponin in patient with CKD stage 5.   Continue to treat URI and cardiac issues with diuresis and cardiology consult. Valve disease is present as well.     CHF (congestive heart failure)  Patient has Diastolic (HFpEF) heart failure that is Acute on chronic. On presentation their CHF was decompensated. Evidence of decompensated CHF on presentation includes: edema, elevated JVD, and dyspnea on exertion ([place]AUDHARY). The etiology of their decompensation is likely no lasix at home, per patient . Most recent BNP and echo results are listed below.  No results for input(s): "BNP" in the last 72 hours.  Latest ECHO  Results for orders placed during the hospital encounter of " 12/16/24    Echo    Interpretation Summary    Left Ventricle: The left ventricle is normal in size. Mildly increased wall thickness. There is concentric remodeling. Septal motion is consistent with pacing. There is normal systolic function with a visually estimated ejection fraction of 55 - 60%. Quantitated ejection fraction is 58%. There is normal diastolic function.    Right Ventricle: Mild right ventricular enlargement. Systolic function is moderately reduced. Pacemaker lead present in the ventricle.    Right Atrium: Right atrium is mildly dilated.    Aortic Valve: There is a bioprosthetic valve in the aortic position.    Mitral Valve: There is moderate regurgitation with a posterolateral eccentriccally directed jet.    Tricuspid Valve: There is moderate regurgitation.    Pulmonary Artery: The estimated pulmonary artery systolic pressure is 49 mmHg.    IVC/SVC: Elevated venous pressure at 15 mmHg.    Current Heart Failure Medications  , Daily, Oral  dapagliflozin propanediol (Farxiga) tablet 10 mg, Daily, Oral  furosemide injection 40 mg, 2 times daily before meals, Intravenous    Plan  - Monitor strict I&Os and daily weights.    - Place on telemetry  - Low sodium diet  - Place on fluid restriction of 1 L.   - Cardiology has been consulted  - The patient's volume status is improving but not at their baseline as indicated by edema, elevated JVD, and shortness of breath      Pneumonia  Patient has a diagnosis of pneumonia. The cause of the pneumonia is suspected to be bacterial in etiology but organism is not known. The pneumonia is worsening due to hypoxia . The patient has the following signs/symptoms of pneumonia: persistent hypoxia , cough, and sputum production. The patient does have a current oxygen requirement and the patient does have a home oxygen requirement. I have reviewed the pertinent imaging. The following cultures have been collected: Blood cultures and Sputum culture The culture results are listed  below.     Current antimicrobial regimen consists of the antibiotics listed below. Will monitor patient closely and continue current treatment plan unchanged.    Antibiotics (From admission, onward)      Start     Stop Route Frequency Ordered    12/25/24 0900  azithromycin tablet 250 mg         -- Oral Daily 12/24/24 2009 12/24/24 2100  piperacillin-tazobactam (ZOSYN) 4.5 g in D5W 100 mL IVPB (MB+)         -- IV Every 12 hours (non-standard times) 12/24/24 1959 12/24/24 2008  azithromycin tablet 500 mg         -- Oral Once 12/24/24 2009            Microbiology Results (last 7 days)       Procedure Component Value Units Date/Time    Culture, Lower Respiratory [[id number]]     Order Status: Sent Specimen: Respiratory from Sputum Expectorated     Blood Culture #2 [[id number]] Collected: 12/24/24 0842    Order Status: Sent Specimen: Blood Updated: 12/24/24 0842    Blood Culture #1 [[id number]] Collected: 12/24/24 0842    Order Status: Sent Specimen: Blood Updated: 12/24/24 0842            Obesity (BMI 30-39.9)  Body mass index is 30.21 kg/m². Morbid obesity complicates all aspects of disease management from diagnostic modalities to treatment. Weight loss encouraged and health benefits explained to patient.         Type 2 MI (myocardial infarction)  Shortness of breath with elevated BNP and troponin.   High risk patient with prior valve disease and CAD s/p CABG.      Depression  Patient has persistent depression which is moderate and is currently controlled. Will Continue anti-depressant medications. We will not consult psychiatry at this time. Patient does not display psychosis at this time. Continue to monitor closely and adjust plan of care as needed.        CKD (chronic kidney disease) stage 5, GFR less than 15 ml/min  Creatine stable for now. BMP reviewed- noted Estimated Creatinine Clearance: 19 mL/min (A) (based on SCr of 2.81 mg/dL (H)). according to latest data. Based on current GFR, CKD stage is stage 5 -  "GFR < 15.  Monitor UOP and serial BMP and adjust therapy as needed. Renally dose meds. Avoid nephrotoxic medications and procedures.    SSS (sick sinus syndrome)  S/p pacemaker placement.       Pure hypercholesterolemia  Continue home statin.       Benign essential hypertension  Patient's blood pressure range in the last 24 hours was: BP  Min: 101/52  Max: 123/60.The patient's inpatient anti-hypertensive regimen is listed below:  Current Antihypertensives  , Daily, Oral  furosemide injection 40 mg, 2 times daily before meals, Intravenous    Plan  - BP is controlled, no changes needed to their regimen      DM type 2 with diabetic dyslipidemia  Patient's FSGs are controlled on current medication regimen.  Last A1c reviewed-   Lab Results   Component Value Date    HGBA1C 6.1 12/16/2024     Most recent fingerstick glucose reviewed- No results for input(s): "POCTGLUCOSE" in the last 24 hours.  Current correctional scale  Low  Maintain anti-hyperglycemic dose as follows-   Antihyperglycemics (From admission, onward)      Start     Stop Route Frequency Ordered    12/24/24 1500  dapagliflozin propanediol (Farxiga) tablet 10 mg        Question Answer Comment   Does this patient have a diagnosis of heart failure? Yes    Does this patient have type 1 diabetes or diabetic ketoacidosis? No    Does this patient have symptomatic hypotension? No    Is the patient NPO or pending major surgery in next 3 days or less? No        -- Oral Daily 12/24/24 1357          Hold Oral hypoglycemics while patient is in the hospital.      VTE Risk Mitigation (From admission, onward)           Ordered     IP VTE HIGH RISK PATIENT  Once         12/24/24 1357     Place sequential compression device  Until discontinued         12/24/24 1357                    Discharge Planning   VALERY:      Code Status: Full Code   Medical Readiness for Discharge Date:              Gretta Salcedo MD  Department of Hospital Medicine   [facility]

## 2024-12-25 NOTE — PLAN OF CARE
SS consulted for dc planning and to assist with cardiac rehab when ordered. Cardiac rehab not yet ordered. SS to follow up in am and provide cardiac rehab information packet. Ss following

## 2024-12-25 NOTE — ASSESSMENT & PLAN NOTE
Patient with Hypoxic Respiratory failure which is Acute.  she is on home oxygen at 2 LPM. Supplemental oxygen was provided and noted-      .   Signs/symptoms of respiratory failure include- tachypnea, respiratory distress, and wheezing. Contributing diagnoses includes - CHF and Pneumonia Labs and images were reviewed. Patient Has recent ABG, which has been reviewed. Will treat underlying causes and adjust management of respiratory failure as follows-   Treat CHF exacerbation with elevated troponin. Consult Cardiology.   Treat pneumonia with procalcitonin and sputum cultures.  F/u blood cultures.

## 2024-12-25 NOTE — ASSESSMENT & PLAN NOTE
Creatine stable for now. BMP reviewed- noted Estimated Creatinine Clearance: 19 mL/min (A) (based on SCr of 2.81 mg/dL (H)). according to latest data. Based on current GFR, CKD stage is stage 5 - GFR < 15.  Monitor UOP and serial BMP and adjust therapy as needed. Renally dose meds. Avoid nephrotoxic medications and procedures.

## 2024-12-25 NOTE — SUBJECTIVE & OBJECTIVE
Interval History:     Review of Systems   Constitutional:  Negative for activity change, chills, fatigue and fever.   HENT:  Positive for postnasal drip, rhinorrhea and sinus pressure. Negative for congestion and sore throat.    Respiratory:  Positive for cough and shortness of breath. Negative for chest tightness.    Gastrointestinal:  Negative for abdominal distention, abdominal pain and diarrhea.   Endocrine: Negative for cold intolerance and heat intolerance.   Genitourinary:  Negative for dysuria.   Musculoskeletal:  Negative for arthralgias and back pain.   Skin:  Negative for color change and rash.   Neurological:  Positive for headaches. Negative for dizziness and tremors.   Psychiatric/Behavioral:  Negative for agitation. The patient is not nervous/anxious.      Objective:     Vital Signs (Most Recent):  Temp: 97.6 °F (36.4 °C) (12/25/24 0725)  Pulse: 99 (12/25/24 0725)  Resp: 16 (12/25/24 0725)  BP: 115/78 (12/25/24 0725)  SpO2: (!) 92 % (12/25/24 0527) Vital Signs (24h Range):  Temp:  [97.5 °F (36.4 °C)-98.3 °F (36.8 °C)] 97.6 °F (36.4 °C)  Pulse:  [72-99] 99  Resp:  [14-22] 16  SpO2:  [90 %-100 %] 92 %  BP: (101-128)/(40-78) 115/78     Weight: 79.8 kg (176 lb)  Body mass index is 30.21 kg/m².    Intake/Output Summary (Last 24 hours) at 12/25/2024 0750  Last data filed at 12/25/2024 0149  Gross per 24 hour   Intake 330 ml   Output 550 ml   Net -220 ml         Physical Exam  Constitutional:       Appearance: Normal appearance.   HENT:      Head: Normocephalic and atraumatic.      Nose: Nose normal.      Mouth/Throat:      Mouth: Mucous membranes are moist.      Pharynx: Oropharynx is clear.   Eyes:      Extraocular Movements: Extraocular movements intact.      Conjunctiva/sclera: Conjunctivae normal.      Pupils: Pupils are equal, round, and reactive to light.   Cardiovascular:      Rate and Rhythm: Regular rhythm. Tachycardia present.      Pulses: Normal pulses.      Heart sounds: Normal heart sounds.    Pulmonary:      Effort: Pulmonary effort is normal.      Breath sounds: Wheezing and rales present.   Abdominal:      General: Abdomen is flat. Bowel sounds are normal.      Palpations: Abdomen is soft.   Musculoskeletal:         General: Normal range of motion.      Cervical back: Normal range of motion and neck supple.   Skin:     General: Skin is warm and dry.   Neurological:      General: No focal deficit present.      Mental Status: She is alert and oriented to person, place, and time.      Motor: Weakness present.   Psychiatric:         Mood and Affect: Mood normal.         Behavior: Behavior normal.             Significant Labs: All pertinent labs within the past 24 hours have been reviewed.    Significant Imaging: I have reviewed all pertinent imaging results/findings within the past 24 hours.

## 2024-12-25 NOTE — SUBJECTIVE & OBJECTIVE
Past Medical History:   Diagnosis Date    Acute gastric ulcer without hemorrhage or perforation 03/03/2022    Age-related osteoporosis with current pathological fracture 04/21/2021    Benign essential hypertension 08/28/2018    Last Assessment & Plan:   Formatting of this note might be different from the original.  Well controlled    Carpal tunnel syndrome     Cerebral vascular accident     CKD (chronic kidney disease) stage 5, GFR less than 15 ml/min 12/11/2023    Coronary arteriosclerosis     S/P CABG 2011    COVID-19 12/20/2020    Diabetes mellitus, type 2     Diabetic peripheral neuropathy associated with type 2 diabetes mellitus 04/28/2022    Diverticulitis     Essential tremor 01/20/2022    Falls frequently 12/07/2021    Hyperlipidemia     Hypertension     Insomnia secondary to depression with anxiety     Lumbar radiculopathy     Non-rheumatic aortic stenosis 08/28/2018    Formatting of this note might be different from the original.  S/p Tissue AVR July 2011 ([facility]). Echo October 2017 revealed normally functioning bioprosthesis with normal EF 60-65%     Last Assessment & Plan:   Formatting of this note might be different from the original.  Asymptomatic.    Osteoporosis     Pure hypercholesterolemia 08/28/2018    Formatting of this note might be different from the original.  Chronic statin.       Last Assessment & Plan:   Formatting of this note might be different from the original.   Continue statin.    RBBB 09/08/2021    Formatting of this note might be different from the original.  Initially noted on electrocardiogram in September 2021.    Spinal stenosis, cervical region 01/20/2022    SSS (sick sinus syndrome) 07/12/2022    Last Assessment & Plan:   Formatting of this note might be different from the original.  Post placement of [place] scientific dual-chamber permanent pacemaker by Dr. Freeman on 07/12/2022.    Subclavian artery stenosis 08/28/2018    Formatting of this note might be different from  the original.  30% stenosis.    Tremor     Type 2 diabetes mellitus, with long-term current use of insulin 01/13/2021    Vitamin D deficiency        Past Surgical History:   Procedure Laterality Date    APPENDECTOMY      ASCENDING AORTIC ANEURYSM REPAIR W/ TISSUE AORTIC VALVE REPLACEMENT      BLADDER SUSPENSION Right     CHOLECYSTECTOMY      CORONARY ARTERY BYPASS GRAFT (CABG)      DIRECT LARYNGOSCOPY Bilateral 08/12/2022    Procedure: LARYNGOSCOPY, DIRECT;  Surgeon: Iban Silverio MD;  Location: [facility] OR;  Service: ENT;  Laterality: Bilateral;    HYSTERECTOMY      OOPHORECTOMY      TONSILLECTOMY      VOCAL FOLD LESION EXCISION Bilateral 08/12/2022    Procedure: EXCISION, LESION, VOCAL CORD;  Surgeon: Iban Silverio MD;  Location: [facility] OR;  Service: ENT;  Laterality: Bilateral;       Review of patient's allergies indicates:   Allergen Reactions    Cephalexin Rash    Bactrim [sulfamethoxazole-trimethoprim]     Zofran [ondansetron hcl] Itching       No current facility-administered medications on file prior to encounter.     Current Outpatient Medications on File Prior to Encounter   Medication Sig    albuterol-ipratropium (DUO-NEB) 2.5 mg-0.5 mg/3 mL nebulizer solution Take 3 mLs by nebulization every 6 (six) hours. Rescue    furosemide (LASIX) 40 MG tablet Take 1 tablet (40 mg total) by mouth once daily.    hydroCHLOROthiazide (HYDRODIURIL) 12.5 MG Tab Take 12.5 mg by mouth once daily.    insulin degludec (TRESIBA FLEXTOUCH U-200) 200 unit/mL (3 mL) insulin pen 52 units in the morning and 32 units in the evening    isosorbide dinitrate (ISORDIL) 30 MG Tab Take 30 mg by mouth once daily.    levothyroxine (SYNTHROID) 25 MCG tablet Take 25 mcg by mouth before breakfast.    meclizine (ANTIVERT) 25 mg tablet Take 1 tablet (25 mg total) by mouth 3 (three) times daily as needed for Dizziness. (Patient taking differently: Take 25 mg by mouth daily as needed for Dizziness.)    pantoprazole (PROTONIX) 40 MG  tablet TAKE 1 TABLET BY MOUTH ONCE DAILY PRIOR TO SUPPER    pregabalin (LYRICA) 75 MG capsule Take 75 mg by mouth 2 (two) times daily.    primidone (MYSOLINE) 50 MG Tab Take 2 tablets (100 mg total) by mouth 3 (three) times daily. (Patient taking differently: Take 100 mg by mouth 2 (two) times a day.)    rosuvastatin (CRESTOR) 40 MG Tab Take 40 mg by mouth once daily.    sertraline (ZOLOFT) 50 MG tablet TAKE ONE TABLET BY MOUTH EVERY DAY (Patient taking differently: Take 100 mg by mouth once daily.)    aspirin (ECOTRIN) 81 MG EC tablet Take 81 mg by mouth once daily.    multivitamin (THERAGRAN) per tablet Take 1 tablet by mouth once daily.    semaglutide (OZEMPIC) 0.25 mg or 0.5 mg(2 mg/1.5 mL) pen injector Inject 0.25 mg into the skin.    [DISCONTINUED] cyclobenzaprine (FLEXERIL) 10 MG tablet Take 10 mg by mouth as needed for Muscle spasms.    [DISCONTINUED] dapagliflozin propanediol (FARXIGA) 10 mg tablet Take 10 mg by mouth once daily.    [DISCONTINUED] folic acid (FOLVITE) 1 MG tablet Take 1 mg by mouth once daily.    [DISCONTINUED] levoFLOXacin (LEVAQUIN) 500 MG tablet Take 1 tablet (500 mg total) by mouth once daily.    [DISCONTINUED] metoprolol succinate (TOPROL-XL) 50 MG 24 hr tablet Take 50 mg by mouth once daily.    [DISCONTINUED] promethazine (PHENERGAN) 25 MG tablet Take 1 tablet (25 mg total) by mouth every 6 (six) hours as needed for Nausea.     Family History       Problem Relation (Age of Onset)    Breast cancer Mother, Sister    Cancer Mother    Heart disease Father          Tobacco Use    Smoking status: Former     Current packs/day: 0.00     Average packs/day: 0.5 packs/day for 17.3 years (8.7 ttl pk-yrs)     Types: Cigarettes     Start date:      Quit date: 2011     Years since quittin.6    Smokeless tobacco: Never   Substance and Sexual Activity    Alcohol use: Never    Drug use: Never    Sexual activity: Not Currently     Review of Systems   Constitutional:  Negative for activity  change, chills, fatigue and fever.   HENT:  Positive for postnasal drip, rhinorrhea and sinus pressure. Negative for congestion and sore throat.    Respiratory:  Positive for cough and shortness of breath. Negative for chest tightness.    Gastrointestinal:  Negative for abdominal distention, abdominal pain and diarrhea.   Endocrine: Negative for cold intolerance and heat intolerance.   Genitourinary:  Negative for dysuria.   Musculoskeletal:  Negative for arthralgias and back pain.   Skin:  Negative for color change and rash.   Neurological:  Positive for headaches. Negative for dizziness and tremors.   Psychiatric/Behavioral:  Negative for agitation. The patient is not nervous/anxious.      Objective:     Vital Signs (Most Recent):  Temp: 97.5 °F (36.4 °C) (12/24/24 1646)  Pulse: 98 (12/24/24 1646)  Resp: 18 (12/24/24 1646)  BP: 116/64 (12/24/24 1646)  SpO2: 96 % (12/24/24 1646) Vital Signs (24h Range):  Temp:  [97.5 °F (36.4 °C)-98.4 °F (36.9 °C)] 97.5 °F (36.4 °C)  Pulse:  [72-99] 98  Resp:  [14-23] 18  SpO2:  [87 %-100 %] 96 %  BP: (101-123)/(40-64) 116/64     Weight: 79.8 kg (176 lb)  Body mass index is 30.21 kg/m².     Physical Exam  Constitutional:       Appearance: Normal appearance.   HENT:      Head: Normocephalic and atraumatic.      Nose: Nose normal.      Mouth/Throat:      Mouth: Mucous membranes are moist.      Pharynx: Oropharynx is clear.   Eyes:      Extraocular Movements: Extraocular movements intact.      Conjunctiva/sclera: Conjunctivae normal.      Pupils: Pupils are equal, round, and reactive to light.   Cardiovascular:      Rate and Rhythm: Regular rhythm. Tachycardia present.      Pulses: Normal pulses.      Heart sounds: Normal heart sounds.   Pulmonary:      Effort: Pulmonary effort is normal.      Breath sounds: Wheezing and rales present.   Abdominal:      General: Abdomen is flat. Bowel sounds are normal.      Palpations: Abdomen is soft.   Musculoskeletal:         General: Normal range  of motion.      Cervical back: Normal range of motion and neck supple.   Skin:     General: Skin is warm and dry.   Neurological:      General: No focal deficit present.      Mental Status: She is alert and oriented to person, place, and time.      Motor: Weakness present.   Psychiatric:         Mood and Affect: Mood normal.         Behavior: Behavior normal.              CRANIAL NERVES     CN III, IV, VI   Pupils are equal, round, and reactive to light.       Significant Labs: All pertinent labs within the past 24 hours have been reviewed.    Significant Imaging: I have reviewed all pertinent imaging results/findings within the past 24 hours.   Keystone Flap Text: The defect edges were debeveled with a #15 scalpel blade.  Given the location of the defect, shape of the defect a keystone flap was deemed most appropriate.  Using a sterile surgical marker, an appropriate keystone flap was drawn incorporating the defect, outlining the appropriate donor tissue and placing the expected incisions within the relaxed skin tension lines where possible. The area thus outlined was incised deep to adipose tissue with a #15 scalpel blade.  The skin margins were undermined to an appropriate distance in all directions around the primary defect and laterally outward around the flap utilizing iris scissors.

## 2024-12-25 NOTE — ASSESSMENT & PLAN NOTE
Body mass index is 30.21 kg/m². Morbid obesity complicates all aspects of disease management from diagnostic modalities to treatment. Weight loss encouraged and health benefits explained to patient.

## 2024-12-25 NOTE — HPI
Ms. Karen Renteria is a pleasant 70yo woman history of diabetes, CAD s/p CABG 2012, aortic valve replacement 2012, s/p pacemaker placement 2022, diastolic heart failure, obesity, prior tobacco use, CVA in 2022, gastric ulcer, CKD stage 5, HTN, HLD, depression who presents with shortness of breath.  She states she felt great after leaving the hospital on Tuesday - she was admitted to this hospital for pneumonia.  On Sunday, she reports she started feeling weak.  She had been taking levoquin and lasix until Saturday.  She developed dyspnea on exertion.  She reports that she recalls taking levoquin but she does not recall receiving lasix.  She denies lower extremity swelling, difficulty lying flat, chest pain, dizziness.  She endorses nasal drainage and has been using flonase for that.  She reports severe headaches and congestion.  That compromises her ability to breath.  She states she has been coughing up a small amount of mucus.      She was recently admitted for pneumonia wherein she was admitted for one day and discharged on levofloxacin, lasix 40mg daily, and duonebs.        ED course:   Initial Vitals [12/24/24 0728]   BP Pulse Resp Temp SpO2   (!) 112/53 99 (!) 23 98.4 °F (36.9 °C) (!) 87 %

## 2024-12-25 NOTE — ASSESSMENT & PLAN NOTE
Patient's blood pressure range in the last 24 hours was: BP  Min: 101/52  Max: 123/60.The patient's inpatient anti-hypertensive regimen is listed below:  Current Antihypertensives  , Daily, Oral  furosemide injection 40 mg, 2 times daily before meals, Intravenous    Plan  - BP is controlled, no changes needed to their regimen

## 2024-12-25 NOTE — ASSESSMENT & PLAN NOTE
Patient with Hypoxic Respiratory failure which is Acute.  she is on home oxygen at 2 LPM. Supplemental oxygen was provided and noted-      .   Signs/symptoms of respiratory failure include- tachypnea, respiratory distress, and wheezing. Contributing diagnoses includes - CHF and Pneumonia Labs and images were reviewed. Patient Has recent ABG, which has been reviewed. Will treat underlying causes and adjust management of respiratory failure as follows-   Treat CHF exacerbation with elevated troponin. Consult Cardiology.   Treat pneumonia with procalcitonin and sputum cultures.  F/u blood cultures.     12/25: hypoxia, but no other concerning features aside from elevated troponin in patient with CKD stage 5.   Continue to treat URI and cardiac issues with diuresis and cardiology consult. Valve disease is present as well.

## 2024-12-25 NOTE — ASSESSMENT & PLAN NOTE
Shortness of breath with elevated BNP and troponin.   High risk patient with prior valve disease and CAD s/p CABG.

## 2024-12-25 NOTE — ASSESSMENT & PLAN NOTE
Patient has a diagnosis of pneumonia. The cause of the pneumonia is suspected to be bacterial in etiology but organism is not known. The pneumonia is worsening due to hypoxia . The patient has the following signs/symptoms of pneumonia: persistent hypoxia , cough, and sputum production. The patient does have a current oxygen requirement and the patient does have a home oxygen requirement. I have reviewed the pertinent imaging. The following cultures have been collected: Blood cultures and Sputum culture The culture results are listed below.     Current antimicrobial regimen consists of the antibiotics listed below. Will monitor patient closely and continue current treatment plan unchanged.    Antibiotics (From admission, onward)      Start     Stop Route Frequency Ordered    12/25/24 0900  azithromycin tablet 250 mg         -- Oral Daily 12/24/24 2009 12/24/24 2100  piperacillin-tazobactam (ZOSYN) 4.5 g in D5W 100 mL IVPB (MB+)         -- IV Every 12 hours (non-standard times) 12/24/24 1959 12/24/24 2008  azithromycin tablet 500 mg         -- Oral Once 12/24/24 2009            Microbiology Results (last 7 days)       Procedure Component Value Units Date/Time    Culture, Lower Respiratory [[id number]]     Order Status: Sent Specimen: Respiratory from Sputum Expectorated     Blood Culture #2 [[id number]] Collected: 12/24/24 0842    Order Status: Sent Specimen: Blood Updated: 12/24/24 0842    Blood Culture #1 [[id number]] Collected: 12/24/24 0842    Order Status: Sent Specimen: Blood Updated: 12/24/24 0842

## 2024-12-26 LAB
ANION GAP SERPL CALCULATED.3IONS-SCNC: 18 MMOL/L (ref 7–16)
BUN SERPL-MCNC: 65 MG/DL (ref 10–20)
BUN/CREAT SERPL: 19 (ref 6–20)
CALCIUM SERPL-MCNC: 9.2 MG/DL (ref 8.4–10.2)
CHLORIDE SERPL-SCNC: 97 MMOL/L (ref 98–107)
CO2 SERPL-SCNC: 27 MMOL/L (ref 23–31)
CREAT SERPL-MCNC: 3.39 MG/DL (ref 0.55–1.02)
EGFR (NO RACE VARIABLE) (RUSH/TITUS): 14 ML/MIN/1.73M2
GLUCOSE SERPL-MCNC: 116 MG/DL (ref 82–115)
GLUCOSE SERPL-MCNC: 118 MG/DL (ref 70–105)
GLUCOSE SERPL-MCNC: 197 MG/DL (ref 70–105)
GLUCOSE SERPL-MCNC: 252 MG/DL (ref 70–105)
GLUCOSE SERPL-MCNC: 364 MG/DL (ref 70–105)
MAGNESIUM SERPL-MCNC: 2.2 MG/DL (ref 1.6–2.6)
OHS QRS DURATION: 146 MS
OHS QTC CALCULATION: 457 MS
POTASSIUM SERPL-SCNC: 3.4 MMOL/L (ref 3.5–5.1)
SODIUM SERPL-SCNC: 139 MMOL/L (ref 136–145)

## 2024-12-26 PROCEDURE — 36415 COLL VENOUS BLD VENIPUNCTURE: CPT | Performed by: HOSPITALIST

## 2024-12-26 PROCEDURE — 80048 BASIC METABOLIC PNL TOTAL CA: CPT | Performed by: HOSPITALIST

## 2024-12-26 PROCEDURE — 83735 ASSAY OF MAGNESIUM: CPT | Performed by: HOSPITALIST

## 2024-12-26 PROCEDURE — 99233 SBSQ HOSP IP/OBS HIGH 50: CPT | Mod: ,,, | Performed by: HOSPITALIST

## 2024-12-26 PROCEDURE — 25000003 PHARM REV CODE 250: Performed by: HOSPITALIST

## 2024-12-26 PROCEDURE — 25000242 PHARM REV CODE 250 ALT 637 W/ HCPCS: Performed by: HOSPITALIST

## 2024-12-26 PROCEDURE — 94761 N-INVAS EAR/PLS OXIMETRY MLT: CPT

## 2024-12-26 PROCEDURE — 63600175 PHARM REV CODE 636 W HCPCS: Performed by: HOSPITALIST

## 2024-12-26 PROCEDURE — 99900035 HC TECH TIME PER 15 MIN (STAT)

## 2024-12-26 PROCEDURE — 63700000 PHARM REV CODE 250 ALT 637 W/O HCPCS: Performed by: HOSPITALIST

## 2024-12-26 PROCEDURE — 27000221 HC OXYGEN, UP TO 24 HOURS

## 2024-12-26 PROCEDURE — 82962 GLUCOSE BLOOD TEST: CPT

## 2024-12-26 PROCEDURE — 27200667 HC PACEMAKER, TEMPORARY MONITORING, PER SHIFT

## 2024-12-26 PROCEDURE — 11000001 HC ACUTE MED/SURG PRIVATE ROOM

## 2024-12-26 PROCEDURE — 25000003 PHARM REV CODE 250: Performed by: NURSE PRACTITIONER

## 2024-12-26 RX ORDER — PREDNISONE 20 MG/1
40 TABLET ORAL DAILY
Status: DISCONTINUED | OUTPATIENT
Start: 2024-12-26 | End: 2024-12-27 | Stop reason: HOSPADM

## 2024-12-26 RX ORDER — MECLIZINE HYDROCHLORIDE 25 MG/1
25 TABLET ORAL ONCE
Status: COMPLETED | OUTPATIENT
Start: 2024-12-26 | End: 2024-12-26

## 2024-12-26 RX ORDER — ACETAMINOPHEN 325 MG/1
650 TABLET ORAL EVERY 6 HOURS PRN
Status: DISCONTINUED | OUTPATIENT
Start: 2024-12-26 | End: 2024-12-27 | Stop reason: HOSPADM

## 2024-12-26 RX ADMIN — AZITHROMYCIN DIHYDRATE 250 MG: 250 TABLET ORAL at 08:12

## 2024-12-26 RX ADMIN — POTASSIUM BICARBONATE 50 MEQ: 977.5 TABLET, EFFERVESCENT ORAL at 05:12

## 2024-12-26 RX ADMIN — PIPERACILLIN SODIUM AND TAZOBACTAM SODIUM 4.5 G: 4; .5 INJECTION, POWDER, LYOPHILIZED, FOR SOLUTION INTRAVENOUS at 09:12

## 2024-12-26 RX ADMIN — SERTRALINE HYDROCHLORIDE 50 MG: 50 TABLET ORAL at 08:12

## 2024-12-26 RX ADMIN — FLUTICASONE PROPIONATE 100 MCG: 50 SPRAY, METERED NASAL at 03:12

## 2024-12-26 RX ADMIN — ASPIRIN 81 MG: 81 TABLET, COATED ORAL at 08:12

## 2024-12-26 RX ADMIN — PRIMIDONE 100 MG: 50 TABLET ORAL at 09:12

## 2024-12-26 RX ADMIN — PREDNISONE 40 MG: 20 TABLET ORAL at 08:12

## 2024-12-26 RX ADMIN — ATORVASTATIN CALCIUM 40 MG: 40 TABLET, FILM COATED ORAL at 08:12

## 2024-12-26 RX ADMIN — FLUTICASONE PROPIONATE 100 MCG: 50 SPRAY, METERED NASAL at 09:12

## 2024-12-26 RX ADMIN — POTASSIUM BICARBONATE 50 MEQ: 977.5 TABLET, EFFERVESCENT ORAL at 08:12

## 2024-12-26 RX ADMIN — ACETAMINOPHEN 650 MG: 325 TABLET ORAL at 09:12

## 2024-12-26 RX ADMIN — FLUCONAZOLE 200 MG: 100 TABLET ORAL at 08:12

## 2024-12-26 RX ADMIN — MECLIZINE HYDROCHLORIDE 25 MG: 25 TABLET ORAL at 09:12

## 2024-12-26 RX ADMIN — FUROSEMIDE 40 MG: 10 INJECTION, SOLUTION INTRAVENOUS at 06:12

## 2024-12-26 RX ADMIN — INSULIN ASPART 3 UNITS: 100 INJECTION, SOLUTION INTRAVENOUS; SUBCUTANEOUS at 09:12

## 2024-12-26 RX ADMIN — PANTOPRAZOLE SODIUM 40 MG: 40 TABLET, DELAYED RELEASE ORAL at 08:12

## 2024-12-26 RX ADMIN — LEVOTHYROXINE SODIUM 25 MCG: 0.03 TABLET ORAL at 06:12

## 2024-12-26 RX ADMIN — DAPAGLIFLOZIN 10 MG: 10 TABLET, FILM COATED ORAL at 08:12

## 2024-12-26 RX ADMIN — PRIMIDONE 100 MG: 50 TABLET ORAL at 08:12

## 2024-12-26 NOTE — PLAN OF CARE
[facility]  Initial Discharge Assessment       Primary Care Provider: Jt Allison II, MD    Admission Diagnosis: Shortness of breath [R06.02]    Admission Date: 12/24/2024  Expected Discharge Date:     Transition of Care Barriers: None    Payor: MEDICARE / Plan: MEDICARE PART A & B / Product Type: Government /     Extended Emergency Contact Information  Primary Emergency Contact: Cristo Renteria  Address: [facility] [place] [facility] [address] [place]  Mobile Phone: [phone]  Relation: Spouse  Preferred language: English   needed? No    Discharge Plan A: Home with family  Discharge Plan B: Home with family, Home Health, Long-term acute care facility (LTAC), Rehab, Skilled Nursing Facility      [id number] - [place], [place] - [address]  [address]  [place] [address]  Phone: [phone] Fax: [phone]    [facility] [address] [facility], [place] - [address] [place]  [address] [facility] [place] [address]  Phone: [phone] Fax: [phone]    [facility]  [address] [place] [address] [place] [address]  Phone: [phone] Fax: [phone]      Initial Assessment (most recent)       Adult Discharge Assessment - 12/26/24 1026          Discharge Assessment    Assessment Type Discharge Planning Assessment     Confirmed/corrected address, phone number and insurance Yes     Confirmed Demographics Correct on Facesheet     Source of Information patient;family     People in Home spouse     Do you expect to return to your current living situation? Yes     Do you have help at home or someone to help you manage your care at home? Yes     Who are your caregiver(s) and their phone number(s)? Cristo Renteria, , [phone]     Prior to hospitilization cognitive status: Unable to Assess     Current cognitive status: Alert/Oriented     Walking or Climbing Stairs Difficulty no     Dressing/Bathing Difficulty no     Home  Accessibility stairs to enter home;not wheelchair accessible     Number of Stairs, Main Entrance four     Stair Railings, Main Entrance railings safe and in good condition;railings on both sides of stairs     Home Layout Able to live on 1st floor     Equipment Currently Used at Home nebulizer;oxygen     Readmission within 30 days? Yes     Patient currently being followed by outpatient case management? No     Do you currently have service(s) that help you manage your care at home? No     Do you take prescription medications? Yes     Do you have prescription coverage? Yes     Coverage Medicare     Do you have any problems affording any of your prescribed medications? No     Is the patient taking medications as prescribed? yes     Who is going to help you get home at discharge? Cristo Renteria, , [phone]     How do you get to doctors appointments? car, drives self;family or friend will provide     Are you on dialysis? No     Do you take coumadin? No     Discharge Plan A Home with family     Discharge Plan B Home with family;Home Health;Long-term acute care facility (LTAC);Rehab;Skilled Nursing Facility     DME Needed Upon Discharge  none     Discharge Plan discussed with: Patient     Transition of Care Barriers None        OTHER    Name(s) of People in Home Cristo Renteria, , [phone]                   SS spoke with pt and family at bedside. Pt lives at home with her  and plans to return to current living arrangements when medically ready for dc. Pt has required dme including O2. Pt is not current with hh. SDOH completed 1 week ago. IM obtained. Ss following

## 2024-12-26 NOTE — ASSESSMENT & PLAN NOTE
Patient with Hypoxic Respiratory failure which is Acute.  she is on home oxygen at 2 LPM. Supplemental oxygen was provided and noted-      .   Signs/symptoms of respiratory failure include- tachypnea, respiratory distress, and wheezing. Contributing diagnoses includes - CHF and Pneumonia Labs and images were reviewed. Patient Has recent ABG, which has been reviewed. Will treat underlying causes and adjust management of respiratory failure as follows-   Treat CHF exacerbation with elevated troponin. Consult Cardiology.   Treat pneumonia with procalcitonin and sputum cultures.  F/u blood cultures.     12/25: hypoxia, but no other concerning features aside from elevated troponin in patient with CKD stage 5.   Continue to treat URI and cardiac issues with diuresis and cardiology consult. Valve disease is present as well.     12/26: improving overall.  However, she stated she has nasal congestion and requested flonase.  I'll increase her prednisone dose to 40 today.

## 2024-12-26 NOTE — SUBJECTIVE & OBJECTIVE
Interval History:     Review of Systems   Constitutional:  Negative for activity change, chills, fatigue and fever.   HENT:  Positive for postnasal drip, rhinorrhea and sinus pressure. Negative for congestion and sore throat.    Respiratory:  Positive for cough and shortness of breath. Negative for chest tightness.    Gastrointestinal:  Negative for abdominal distention, abdominal pain and diarrhea.   Endocrine: Negative for cold intolerance and heat intolerance.   Genitourinary:  Negative for dysuria.   Musculoskeletal:  Negative for arthralgias and back pain.   Skin:  Negative for color change and rash.   Neurological:  Positive for headaches. Negative for dizziness and tremors.   Psychiatric/Behavioral:  Negative for agitation. The patient is not nervous/anxious.      Objective:     Vital Signs (Most Recent):  Temp: 97.7 °F (36.5 °C) (12/26/24 0720)  Pulse: 64 (12/26/24 0720)  Resp: 16 (12/26/24 0720)  BP: 109/68 (12/26/24 0720)  SpO2: 96 % (12/26/24 0720) Vital Signs (24h Range):  Temp:  [97.4 °F (36.3 °C)-98.9 °F (37.2 °C)] 97.7 °F (36.5 °C)  Pulse:  [64-89] 64  Resp:  [14-18] 16  SpO2:  [91 %-97 %] 96 %  BP: ()/(54-71) 109/68     Weight: 79.8 kg (176 lb)  Body mass index is 30.21 kg/m².  No intake or output data in the 24 hours ending 12/26/24 0743      Physical Exam  Constitutional:       Appearance: Normal appearance.   HENT:      Head: Normocephalic and atraumatic.      Nose: Nose normal.      Mouth/Throat:      Mouth: Mucous membranes are moist.      Pharynx: Oropharynx is clear.   Eyes:      Extraocular Movements: Extraocular movements intact.      Conjunctiva/sclera: Conjunctivae normal.      Pupils: Pupils are equal, round, and reactive to light.   Cardiovascular:      Rate and Rhythm: Regular rhythm. Tachycardia present.      Pulses: Normal pulses.      Heart sounds: Normal heart sounds.   Pulmonary:      Effort: Pulmonary effort is normal.      Breath sounds: Wheezing and rales present.    Abdominal:      General: Abdomen is flat. Bowel sounds are normal.      Palpations: Abdomen is soft.   Musculoskeletal:         General: Normal range of motion.      Cervical back: Normal range of motion and neck supple.   Skin:     General: Skin is warm and dry.   Neurological:      General: No focal deficit present.      Mental Status: She is alert and oriented to person, place, and time.      Motor: Weakness present.   Psychiatric:         Mood and Affect: Mood normal.         Behavior: Behavior normal.             Significant Labs: All pertinent labs within the past 24 hours have been reviewed.    Significant Imaging: I have reviewed all pertinent imaging results/findings within the past 24 hours.

## 2024-12-26 NOTE — CONSULTS
[facility]  Cardiology  Consult Note    Patient Name: Karen Renteria  MRN: [id number]  Admission Date: 12/24/2024  Hospital Length of Stay: 2 days  Code Status: Full Code   Attending Provider: Gretta Salcedo MD   Consulting Provider: MICHAEL Tineo  Primary Care Physician: Jt Allison II, MD  Principal Problem:Acute hypoxemic respiratory failure    Patient information was obtained from patient, spouse/SO, and ER records.     Inpatient consult to Cardiology  Consult performed by: Van Torres ACNP  Consult ordered by: Gretta Salcedo MD  Reason for consult: diastolic HF        Subjective:     Chief Complaint: Dyspnea     HPI:   68 y/o fmale with a past medical hx of CAD s/p CABG 2012, aortic valve replacement 2012, s/p pacemaker placement 2022, diastolic heart failure, obesity, prior tobacco use, CVA in 2022, gastric ulcer, CKD stage 5, HTN, HLD seen in consult today for elevated troponin and CHF exacerbation. The patient was recently discharged from the hospital after tx of PNA. She endorses weakness, dyspnea with exertion. No reported orthopnea, lower extremity edema, chest discomfort or palpitations. Troponin 864->859->719, pBnp 11,000. Echo demonstrates EF 58% w/ moderate MR/TR. Currently followed by cardiology at [place]          Past Medical History:   Diagnosis Date    Acute gastric ulcer without hemorrhage or perforation 03/03/2022    Age-related osteoporosis with current pathological fracture 04/21/2021    Benign essential hypertension 08/28/2018    Last Assessment & Plan:   Formatting of this note might be different from the original.  Well controlled    Carpal tunnel syndrome     Cerebral vascular accident     CKD (chronic kidney disease) stage 5, GFR less than 15 ml/min 12/11/2023    Coronary arteriosclerosis     S/P CABG 2011    COVID-19 12/20/2020    Diabetes mellitus, type 2     Diabetic peripheral neuropathy associated with type 2  diabetes mellitus 04/28/2022    Diverticulitis     Essential tremor 01/20/2022    Falls frequently 12/07/2021    Hyperlipidemia     Hypertension     Insomnia secondary to depression with anxiety     Lumbar radiculopathy     Non-rheumatic aortic stenosis 08/28/2018    Formatting of this note might be different from the original.  S/p Tissue AVR July 2011 ([facility]). Echo October 2017 revealed normally functioning bioprosthesis with normal EF 60-65%     Last Assessment & Plan:   Formatting of this note might be different from the original.  Asymptomatic.    Osteoporosis     Pure hypercholesterolemia 08/28/2018    Formatting of this note might be different from the original.  Chronic statin.       Last Assessment & Plan:   Formatting of this note might be different from the original.   Continue statin.    RBBB 09/08/2021    Formatting of this note might be different from the original.  Initially noted on electrocardiogram in September 2021.    Spinal stenosis, cervical region 01/20/2022    SSS (sick sinus syndrome) 07/12/2022    Last Assessment & Plan:   Formatting of this note might be different from the original.  Post placement of [place] scientific dual-chamber permanent pacemaker by Dr. Freeman on 07/12/2022.    Subclavian artery stenosis 08/28/2018    Formatting of this note might be different from the original.  30% stenosis.    Tremor     Type 2 diabetes mellitus, with long-term current use of insulin 01/13/2021    Vitamin D deficiency        Past Surgical History:   Procedure Laterality Date    APPENDECTOMY      ASCENDING AORTIC ANEURYSM REPAIR W/ TISSUE AORTIC VALVE REPLACEMENT      BLADDER SUSPENSION Right     CHOLECYSTECTOMY      CORONARY ARTERY BYPASS GRAFT (CABG)      DIRECT LARYNGOSCOPY Bilateral 08/12/2022    Procedure: LARYNGOSCOPY, DIRECT;  Surgeon: Iban Silverio MD;  Location: [facility];  Service: ENT;  Laterality: Bilateral;    HYSTERECTOMY      OOPHORECTOMY      TONSILLECTOMY      VOCAL  FOLD LESION EXCISION Bilateral 08/12/2022    Procedure: EXCISION, LESION, VOCAL CORD;  Surgeon: Iban Silverio MD;  Location: [facility];  Service: ENT;  Laterality: Bilateral;       Review of patient's allergies indicates:   Allergen Reactions    Cephalexin Rash    Bactrim [sulfamethoxazole-trimethoprim]     Zofran [ondansetron hcl] Itching       No current facility-administered medications on file prior to encounter.     Current Outpatient Medications on File Prior to Encounter   Medication Sig    albuterol-ipratropium (DUO-NEB) 2.5 mg-0.5 mg/3 mL nebulizer solution Take 3 mLs by nebulization every 6 (six) hours. Rescue    furosemide (LASIX) 40 MG tablet Take 1 tablet (40 mg total) by mouth once daily.    hydroCHLOROthiazide (HYDRODIURIL) 12.5 MG Tab Take 12.5 mg by mouth once daily.    insulin degludec (TRESIBA FLEXTOUCH U-200) 200 unit/mL (3 mL) insulin pen 52 units in the morning and 32 units in the evening    isosorbide dinitrate (ISORDIL) 30 MG Tab Take 30 mg by mouth once daily.    levothyroxine (SYNTHROID) 25 MCG tablet Take 25 mcg by mouth before breakfast.    meclizine (ANTIVERT) 25 mg tablet Take 1 tablet (25 mg total) by mouth 3 (three) times daily as needed for Dizziness. (Patient taking differently: Take 25 mg by mouth daily as needed for Dizziness.)    pantoprazole (PROTONIX) 40 MG tablet TAKE 1 TABLET BY MOUTH ONCE DAILY PRIOR TO SUPPER    pregabalin (LYRICA) 75 MG capsule Take 75 mg by mouth 2 (two) times daily.    primidone (MYSOLINE) 50 MG Tab Take 2 tablets (100 mg total) by mouth 3 (three) times daily. (Patient taking differently: Take 100 mg by mouth 2 (two) times a day.)    rosuvastatin (CRESTOR) 40 MG Tab Take 40 mg by mouth once daily.    sertraline (ZOLOFT) 50 MG tablet TAKE ONE TABLET BY MOUTH EVERY DAY (Patient taking differently: Take 100 mg by mouth once daily.)    aspirin (ECOTRIN) 81 MG EC tablet Take 81 mg by mouth once daily.    multivitamin (THERAGRAN) per tablet Take 1 tablet  by mouth once daily.    semaglutide (OZEMPIC) 0.25 mg or 0.5 mg(2 mg/1.5 mL) pen injector Inject 0.25 mg into the skin.     Family History       Problem Relation (Age of Onset)    Breast cancer Mother, Sister    Cancer Mother    Heart disease Father          Tobacco Use    Smoking status: Former     Current packs/day: 0.00     Average packs/day: 0.5 packs/day for 17.3 years (8.7 ttl pk-yrs)     Types: Cigarettes     Start date:      Quit date: 2011     Years since quittin.6    Smokeless tobacco: Never   Substance and Sexual Activity    Alcohol use: Never    Drug use: Never    Sexual activity: Not Currently     Review of Systems   Constitutional: Positive for malaise/fatigue.   Cardiovascular:  Positive for dyspnea on exertion. Negative for chest pain, irregular heartbeat, leg swelling, near-syncope, orthopnea and palpitations.   Respiratory:  Positive for cough and shortness of breath.    All other systems reviewed and are negative.    Objective:     Vital Signs (Most Recent):  Temp: 97.5 °F (36.4 °C) (24 1200)  Pulse: 78 (24 1200)  Resp: 16 (24 1200)  BP: 109/61 (24 1200)  SpO2: 95 % (24 1200) Vital Signs (24h Range):  Temp:  [97.5 °F (36.4 °C)-98.9 °F (37.2 °C)] 97.5 °F (36.4 °C)  Pulse:  [64-79] 78  Resp:  [14-18] 16  SpO2:  [91 %-98 %] 95 %  BP: ()/(54-71) 109/61     Weight: 79.8 kg (176 lb)  Body mass index is 30.21 kg/m².    SpO2: 95 %       No intake or output data in the 24 hours ending 24 1412    Lines/Drains/Airways       Peripheral Intravenous Line  Duration                  Peripheral IV - Single Lumen 24 0736 20 G Right Antecubital 2 days                     Physical Exam  Vitals reviewed.   Constitutional:       General: She is not in acute distress.     Appearance: She is not ill-appearing.   HENT:      Head: Normocephalic and atraumatic.   Eyes:      Pupils: Pupils are equal, round, and reactive to light.   Cardiovascular:      Rate and  Rhythm: Normal rate and regular rhythm.      Pulses: Normal pulses.      Heart sounds: Normal heart sounds.   Pulmonary:      Effort: Pulmonary effort is normal.      Breath sounds: Rhonchi present.   Abdominal:      General: Bowel sounds are normal.      Palpations: Abdomen is soft.   Musculoskeletal:         General: Normal range of motion.      Cervical back: Normal range of motion.      Right lower leg: No edema.      Left lower leg: No edema.   Skin:     General: Skin is warm and dry.      Capillary Refill: Capillary refill takes less than 2 seconds.   Neurological:      General: No focal deficit present.      Mental Status: She is alert and oriented to person, place, and time.   Psychiatric:         Mood and Affect: Mood normal.         Behavior: Behavior normal.          Significant Labs: All pertinent lab results from the last 24 hours have been reviewed.    Significant Imaging: Echocardiogram: Transthoracic echo (TTE) complete (Cupid Only):   Results for orders placed or performed during the hospital encounter of 12/16/24   Echo   Result Value Ref Range    BSA 1.92 m2    A4C EF 70 %    LVIDd 4.3 3.5 - 6.0 cm    LV Systolic Volume 34.98 mL    LV Systolic Volume Index 18.7 mL/m2    LVIDs 3.0 2.1 - 4.0 cm    LV Diastolic Volume 67.86 mL    LV ESV A4C 18.42 mL    LV Diastolic Volume Index 36.29 mL/m2    LV EDV A4C 53.85 mL    Left Ventricular End Systolic Volume by Teichholz Method 34.98 mL    Left Ventricular End Diastolic Volume by Teichholz Method 67.86 mL    IVS 1.0 0.6 - 1.1 cm    FS 30.2 28 - 44 %    Left Ventricle Relative Wall Thickness 0.51 cm    PW 1.1 0.6 - 1.1 cm    LV mass 152.6 g    LV Mass Index 81.6 g/m2    MV Peak E Domenic 0.86 m/s    TDI LATERAL 0.08 m/s    TDI SEPTAL 0.05 m/s    E/E' ratio 13.23 m/s    MV Peak A Domenic 0.98 m/s    TR Max Domenic 2.92 m/s    E/A ratio 0.88     LV SEPTAL E/E' RATIO 17.20 m/s    LV LATERAL E/E' RATIO 10.75 m/s    Left Ventricular Outflow Tract Mean Velocity 0.73 cm/s     Left Ventricular Outflow Tract Mean Gradient 2.31 mmHg    RV- jj basal diam 4.3 cm    RV-jj mid d 4.2 cm    RV Basal Diameter 4.70 cm    RV-jj length 4.7 cm    RV mid diameter 4.21 cm    TAPSE 1.10 cm    RV/LV Ratio 1.00 cm    LA size 3.37 cm    RA Major Axis 5.41 cm    AV mean gradient 11.3 mmHg    AV peak gradient 19.4 mmHg    Ao peak christiano 2.2 m/s    Ao VTI 50.9 cm    Triscuspid Valve Regurgitation Peak Gradient 34 mmHg    PV PEAK VELOCITY 0.75 m/s    PV peak gradient 2 mmHg    Ao root annulus 2.33 cm    IVC diameter 2.48 cm    Mean e' 0.07 m/s    ZLVIDS -0.55     ZLVIDD -1.92     LA area A4C 10.43 cm2    AORTIC VALVE CUSP SEPERATION 1.23 cm    TV resting pulmonary artery pressure 49 mmHg    RV TB RVSP 18 mmHg    Est. RA pres 15 mmHg    Muller's Biplane MOD Ejection Fraction 58 %    Narrative      Left Ventricle: The left ventricle is normal in size. Mildly increased   wall thickness. There is concentric remodeling. Septal motion is   consistent with pacing. There is normal systolic function with a visually   estimated ejection fraction of 55 - 60%. Quantitated ejection fraction is   58%. There is normal diastolic function.    Right Ventricle: Mild right ventricular enlargement. Systolic function   is moderately reduced. Pacemaker lead present in the ventricle.    Right Atrium: Right atrium is mildly dilated.    Aortic Valve: There is a bioprosthetic valve in the aortic position.    Mitral Valve: There is moderate regurgitation with a posterolateral   eccentriccally directed jet.    Tricuspid Valve: There is moderate regurgitation.    Pulmonary Artery: The estimated pulmonary artery systolic pressure is   49 mmHg.    IVC/SVC: Elevated venous pressure at 15 mmHg.       Assessment and Plan:     * Acute hypoxemic respiratory failure  Likely related to PNA and not diastolic hf. IV lasix stopped due to sCre 3.3 up from 2.08 nine days ago.  continue home medications at discharge  Follow up with  cardiology  Cardiology will sign off at  this time. Call with questions or concerns    SSS (sick sinus syndrome)  Post placement of [place] scientific dual-chamber permanent pacemaker by Dr. Freeman on 07/12/2022.    Pure hypercholesterolemia    Continue statin    Benign essential hypertension  Patient's blood pressure range in the last 24 hours was: BP  Min: 93/54  Max: 127/71.The patient's inpatient anti-hypertensive regimen is listed below:  Current Antihypertensives  Jardiance    Plan  - BP is controlled, no changes needed to their regimen          VTE Risk Mitigation (From admission, onward)           Ordered     IP VTE HIGH RISK PATIENT  Once         12/24/24 1357     Place sequential compression device  Until discontinued         12/24/24 1357                    Thank you for your consult. I will sign off. Please contact us if you have any additional questions.    Van Torres, CELIOP  Cardiology   [facility]    Pt seen and examined independently, chart reviewed, essentially agree with findings as documented    CC: Shortness of breath  HPI: Pt admitted with shortness of breath, elevated troponin, found to have elevated cardiac enzemes  PMH: reviewed  PEL: agree with findings as documented.  LABS, Xrays, EKGs reviewed, old records reviewed    IMP/Plan:  SOB due to slowly resolving pneumonia, EF preserved at 55 - 60%, does not appear to have CHF   Troponin elevation: likely due to demand ischemia, consistent with type 2 MI, not CAD      Will sign  off, please contact if we can be of assistance, pt following with cardiology in [place].

## 2024-12-26 NOTE — SUBJECTIVE & OBJECTIVE
Past Medical History:   Diagnosis Date    Acute gastric ulcer without hemorrhage or perforation 03/03/2022    Age-related osteoporosis with current pathological fracture 04/21/2021    Benign essential hypertension 08/28/2018    Last Assessment & Plan:   Formatting of this note might be different from the original.  Well controlled    Carpal tunnel syndrome     Cerebral vascular accident     CKD (chronic kidney disease) stage 5, GFR less than 15 ml/min 12/11/2023    Coronary arteriosclerosis     S/P CABG 2011    COVID-19 12/20/2020    Diabetes mellitus, type 2     Diabetic peripheral neuropathy associated with type 2 diabetes mellitus 04/28/2022    Diverticulitis     Essential tremor 01/20/2022    Falls frequently 12/07/2021    Hyperlipidemia     Hypertension     Insomnia secondary to depression with anxiety     Lumbar radiculopathy     Non-rheumatic aortic stenosis 08/28/2018    Formatting of this note might be different from the original.  S/p Tissue AVR July 2011 ([facility]). Echo October 2017 revealed normally functioning bioprosthesis with normal EF 60-65%     Last Assessment & Plan:   Formatting of this note might be different from the original.  Asymptomatic.    Osteoporosis     Pure hypercholesterolemia 08/28/2018    Formatting of this note might be different from the original.  Chronic statin.       Last Assessment & Plan:   Formatting of this note might be different from the original.   Continue statin.    RBBB 09/08/2021    Formatting of this note might be different from the original.  Initially noted on electrocardiogram in September 2021.    Spinal stenosis, cervical region 01/20/2022    SSS (sick sinus syndrome) 07/12/2022    Last Assessment & Plan:   Formatting of this note might be different from the original.  Post placement of [place] scientific dual-chamber permanent pacemaker by Dr. Freeman on 07/12/2022.    Subclavian artery stenosis 08/28/2018    Formatting of this note might be different from  the original.  30% stenosis.    Tremor     Type 2 diabetes mellitus, with long-term current use of insulin 01/13/2021    Vitamin D deficiency        Past Surgical History:   Procedure Laterality Date    APPENDECTOMY      ASCENDING AORTIC ANEURYSM REPAIR W/ TISSUE AORTIC VALVE REPLACEMENT      BLADDER SUSPENSION Right     CHOLECYSTECTOMY      CORONARY ARTERY BYPASS GRAFT (CABG)      DIRECT LARYNGOSCOPY Bilateral 08/12/2022    Procedure: LARYNGOSCOPY, DIRECT;  Surgeon: Iban Silverio MD;  Location: [facility] OR;  Service: ENT;  Laterality: Bilateral;    HYSTERECTOMY      OOPHORECTOMY      TONSILLECTOMY      VOCAL FOLD LESION EXCISION Bilateral 08/12/2022    Procedure: EXCISION, LESION, VOCAL CORD;  Surgeon: Iban Silverio MD;  Location: [facility] OR;  Service: ENT;  Laterality: Bilateral;       Review of patient's allergies indicates:   Allergen Reactions    Cephalexin Rash    Bactrim [sulfamethoxazole-trimethoprim]     Zofran [ondansetron hcl] Itching       No current facility-administered medications on file prior to encounter.     Current Outpatient Medications on File Prior to Encounter   Medication Sig    albuterol-ipratropium (DUO-NEB) 2.5 mg-0.5 mg/3 mL nebulizer solution Take 3 mLs by nebulization every 6 (six) hours. Rescue    furosemide (LASIX) 40 MG tablet Take 1 tablet (40 mg total) by mouth once daily.    hydroCHLOROthiazide (HYDRODIURIL) 12.5 MG Tab Take 12.5 mg by mouth once daily.    insulin degludec (TRESIBA FLEXTOUCH U-200) 200 unit/mL (3 mL) insulin pen 52 units in the morning and 32 units in the evening    isosorbide dinitrate (ISORDIL) 30 MG Tab Take 30 mg by mouth once daily.    levothyroxine (SYNTHROID) 25 MCG tablet Take 25 mcg by mouth before breakfast.    meclizine (ANTIVERT) 25 mg tablet Take 1 tablet (25 mg total) by mouth 3 (three) times daily as needed for Dizziness. (Patient taking differently: Take 25 mg by mouth daily as needed for Dizziness.)    pantoprazole (PROTONIX) 40 MG  tablet TAKE 1 TABLET BY MOUTH ONCE DAILY PRIOR TO SUPPER    pregabalin (LYRICA) 75 MG capsule Take 75 mg by mouth 2 (two) times daily.    primidone (MYSOLINE) 50 MG Tab Take 2 tablets (100 mg total) by mouth 3 (three) times daily. (Patient taking differently: Take 100 mg by mouth 2 (two) times a day.)    rosuvastatin (CRESTOR) 40 MG Tab Take 40 mg by mouth once daily.    sertraline (ZOLOFT) 50 MG tablet TAKE ONE TABLET BY MOUTH EVERY DAY (Patient taking differently: Take 100 mg by mouth once daily.)    aspirin (ECOTRIN) 81 MG EC tablet Take 81 mg by mouth once daily.    multivitamin (THERAGRAN) per tablet Take 1 tablet by mouth once daily.    semaglutide (OZEMPIC) 0.25 mg or 0.5 mg(2 mg/1.5 mL) pen injector Inject 0.25 mg into the skin.     Family History       Problem Relation (Age of Onset)    Breast cancer Mother, Sister    Cancer Mother    Heart disease Father          Tobacco Use    Smoking status: Former     Current packs/day: 0.00     Average packs/day: 0.5 packs/day for 17.3 years (8.7 ttl pk-yrs)     Types: Cigarettes     Start date:      Quit date: 2011     Years since quittin.6    Smokeless tobacco: Never   Substance and Sexual Activity    Alcohol use: Never    Drug use: Never    Sexual activity: Not Currently     Review of Systems   Constitutional: Positive for malaise/fatigue.   Cardiovascular:  Positive for dyspnea on exertion. Negative for chest pain, irregular heartbeat, leg swelling, near-syncope, orthopnea and palpitations.   Respiratory:  Positive for cough and shortness of breath.    All other systems reviewed and are negative.    Objective:     Vital Signs (Most Recent):  Temp: 97.5 °F (36.4 °C) (24 1200)  Pulse: 78 (24 1200)  Resp: 16 (24 1200)  BP: 109/61 (24 1200)  SpO2: 95 % (24 1200) Vital Signs (24h Range):  Temp:  [97.5 °F (36.4 °C)-98.9 °F (37.2 °C)] 97.5 °F (36.4 °C)  Pulse:  [64-79] 78  Resp:  [14-18] 16  SpO2:  [91 %-98 %] 95 %  BP:  ()/(54-71) 109/61     Weight: 79.8 kg (176 lb)  Body mass index is 30.21 kg/m².    SpO2: 95 %       No intake or output data in the 24 hours ending 12/26/24 1412    Lines/Drains/Airways       Peripheral Intravenous Line  Duration                  Peripheral IV - Single Lumen 12/24/24 0736 20 G Right Antecubital 2 days                     Physical Exam  Vitals reviewed.   Constitutional:       General: She is not in acute distress.     Appearance: She is not ill-appearing.   HENT:      Head: Normocephalic and atraumatic.   Eyes:      Pupils: Pupils are equal, round, and reactive to light.   Cardiovascular:      Rate and Rhythm: Normal rate and regular rhythm.      Pulses: Normal pulses.      Heart sounds: Normal heart sounds.   Pulmonary:      Effort: Pulmonary effort is normal.      Breath sounds: Rhonchi present.   Abdominal:      General: Bowel sounds are normal.      Palpations: Abdomen is soft.   Musculoskeletal:         General: Normal range of motion.      Cervical back: Normal range of motion.      Right lower leg: No edema.      Left lower leg: No edema.   Skin:     General: Skin is warm and dry.      Capillary Refill: Capillary refill takes less than 2 seconds.   Neurological:      General: No focal deficit present.      Mental Status: She is alert and oriented to person, place, and time.   Psychiatric:         Mood and Affect: Mood normal.         Behavior: Behavior normal.          Significant Labs: All pertinent lab results from the last 24 hours have been reviewed.    Significant Imaging: Echocardiogram: Transthoracic echo (TTE) complete (Cupid Only):   Results for orders placed or performed during the hospital encounter of 12/16/24   Echo   Result Value Ref Range    BSA 1.92 m2    A4C EF 70 %    LVIDd 4.3 3.5 - 6.0 cm    LV Systolic Volume 34.98 mL    LV Systolic Volume Index 18.7 mL/m2    LVIDs 3.0 2.1 - 4.0 cm    LV Diastolic Volume 67.86 mL    LV ESV A4C 18.42 mL    LV Diastolic Volume Index  36.29 mL/m2    LV EDV A4C 53.85 mL    Left Ventricular End Systolic Volume by Teichholz Method 34.98 mL    Left Ventricular End Diastolic Volume by Teichholz Method 67.86 mL    IVS 1.0 0.6 - 1.1 cm    FS 30.2 28 - 44 %    Left Ventricle Relative Wall Thickness 0.51 cm    PW 1.1 0.6 - 1.1 cm    LV mass 152.6 g    LV Mass Index 81.6 g/m2    MV Peak E Domenic 0.86 m/s    TDI LATERAL 0.08 m/s    TDI SEPTAL 0.05 m/s    E/E' ratio 13.23 m/s    MV Peak A Domenic 0.98 m/s    TR Max Domenic 2.92 m/s    E/A ratio 0.88     LV SEPTAL E/E' RATIO 17.20 m/s    LV LATERAL E/E' RATIO 10.75 m/s    Left Ventricular Outflow Tract Mean Velocity 0.73 cm/s    Left Ventricular Outflow Tract Mean Gradient 2.31 mmHg    RV- jj basal diam 4.3 cm    RV-jj mid d 4.2 cm    RV Basal Diameter 4.70 cm    RV-jj length 4.7 cm    RV mid diameter 4.21 cm    TAPSE 1.10 cm    RV/LV Ratio 1.00 cm    LA size 3.37 cm    RA Major Axis 5.41 cm    AV mean gradient 11.3 mmHg    AV peak gradient 19.4 mmHg    Ao peak domenic 2.2 m/s    Ao VTI 50.9 cm    Triscuspid Valve Regurgitation Peak Gradient 34 mmHg    PV PEAK VELOCITY 0.75 m/s    PV peak gradient 2 mmHg    Ao root annulus 2.33 cm    IVC diameter 2.48 cm    Mean e' 0.07 m/s    ZLVIDS -0.55     ZLVIDD -1.92     LA area A4C 10.43 cm2    AORTIC VALVE CUSP SEPERATION 1.23 cm    TV resting pulmonary artery pressure 49 mmHg    RV TB RVSP 18 mmHg    Est. RA pres 15 mmHg    Muller's Biplane MOD Ejection Fraction 58 %    Narrative      Left Ventricle: The left ventricle is normal in size. Mildly increased   wall thickness. There is concentric remodeling. Septal motion is   consistent with pacing. There is normal systolic function with a visually   estimated ejection fraction of 55 - 60%. Quantitated ejection fraction is   58%. There is normal diastolic function.    Right Ventricle: Mild right ventricular enlargement. Systolic function   is moderately reduced. Pacemaker lead present in the ventricle.    Right Atrium: Right  atrium is mildly dilated.    Aortic Valve: There is a bioprosthetic valve in the aortic position.    Mitral Valve: There is moderate regurgitation with a posterolateral   eccentriccally directed jet.    Tricuspid Valve: There is moderate regurgitation.    Pulmonary Artery: The estimated pulmonary artery systolic pressure is   49 mmHg.    IVC/SVC: Elevated venous pressure at 15 mmHg.

## 2024-12-26 NOTE — ASSESSMENT & PLAN NOTE
Patient's blood pressure range in the last 24 hours was: BP  Min: 93/54  Max: 127/71.The patient's inpatient anti-hypertensive regimen is listed below:  Current Antihypertensives  Jardiance    Plan  - BP is controlled, no changes needed to their regimen

## 2024-12-26 NOTE — PLAN OF CARE
Problem: Pneumonia  Goal: Effective Oxygenation and Ventilation  Outcome: Progressing     Problem: Gas Exchange Impaired  Goal: Optimal Gas Exchange  Outcome: Progressing

## 2024-12-26 NOTE — ASSESSMENT & PLAN NOTE
Likely related to PNA and not diastolic hf. IV lasix stopped due to sCre 3.3 up from 2.08 nine days ago.  continue home medications at discharge  Follow up with cardiology  Cardiology will sign off at  this time. Call with questions or concerns

## 2024-12-26 NOTE — ASSESSMENT & PLAN NOTE
Post placement of [employer] scientific dual-chamber permanent pacemaker by Dr. Freeman on 07/12/2022.

## 2024-12-26 NOTE — CONSULTS
Consult for low salt diet education received and appreciated. Diet education will be completed on follow up as appropriate.

## 2024-12-26 NOTE — PLAN OF CARE
Problem: Pneumonia  Goal: Fluid Balance  Outcome: Progressing  Goal: Resolution of Infection Signs and Symptoms  Outcome: Progressing  Goal: Effective Oxygenation and Ventilation  Outcome: Progressing      Rifampin Pregnancy And Lactation Text: This medication is Pregnancy Category C and it isn't know if it is safe during pregnancy. It is also excreted in breast milk and should not be used if you are breast feeding.

## 2024-12-26 NOTE — PROGRESS NOTES
[facility] Medicine  Progress Note    Patient Name: Karen Renteria  MRN: [id number]  Patient Class: IP- Inpatient   Admission Date: 12/24/2024  Length of Stay: 2 days  Attending Physician: Gretta Salcedo MD  Primary Care Provider: Jt Allison II, MD        Subjective     Principal Problem:Acute hypoxemic respiratory failure    HPI:  Ms. Karen Renteria is a pleasant 70yo woman history of diabetes, CAD s/p CABG 2012, aortic valve replacement 2012, s/p pacemaker placement 2022, diastolic heart failure, obesity, prior tobacco use, CVA in 2022, gastric ulcer, CKD stage 5, HTN, HLD, depression who presents with shortness of breath.  She states she felt great after leaving the hospital on Tuesday - she was admitted to this hospital for pneumonia.  On Sunday, she reports she started feeling weak.  She had been taking levoquin and lasix until Saturday.  She developed dyspnea on exertion.  She reports that she recalls taking levoquin but she does not recall receiving lasix.  She denies lower extremity swelling, difficulty lying flat, chest pain, dizziness.  She endorses nasal drainage and has been using flonase for that.  She reports severe headaches and congestion.  That compromises her ability to breath.  She states she has been coughing up a small amount of mucus.      She was recently admitted for pneumonia wherein she was admitted for one day and discharged on levofloxacin, lasix 40mg daily, and duonebs.        ED course:   Initial Vitals [12/24/24 0728]   BP Pulse Resp Temp SpO2   (!) 112/53 99 (!) 23 98.4 °F (36.9 °C) (!) 87 %         Overview/Hospital Course:  No notes on file    Interval History:     Review of Systems   Constitutional:  Negative for activity change, chills, fatigue and fever.   HENT:  Positive for postnasal drip, rhinorrhea and sinus pressure. Negative for congestion and sore throat.    Respiratory:  Positive for cough and shortness of breath. Negative  for chest tightness.    Gastrointestinal:  Negative for abdominal distention, abdominal pain and diarrhea.   Endocrine: Negative for cold intolerance and heat intolerance.   Genitourinary:  Negative for dysuria.   Musculoskeletal:  Negative for arthralgias and back pain.   Skin:  Negative for color change and rash.   Neurological:  Positive for headaches. Negative for dizziness and tremors.   Psychiatric/Behavioral:  Negative for agitation. The patient is not nervous/anxious.      Objective:     Vital Signs (Most Recent):  Temp: 97.7 °F (36.5 °C) (12/26/24 0720)  Pulse: 64 (12/26/24 0720)  Resp: 16 (12/26/24 0720)  BP: 109/68 (12/26/24 0720)  SpO2: 96 % (12/26/24 0720) Vital Signs (24h Range):  Temp:  [97.4 °F (36.3 °C)-98.9 °F (37.2 °C)] 97.7 °F (36.5 °C)  Pulse:  [64-89] 64  Resp:  [14-18] 16  SpO2:  [91 %-97 %] 96 %  BP: ()/(54-71) 109/68     Weight: 79.8 kg (176 lb)  Body mass index is 30.21 kg/m².  No intake or output data in the 24 hours ending 12/26/24 0743      Physical Exam  Constitutional:       Appearance: Normal appearance.   HENT:      Head: Normocephalic and atraumatic.      Nose: Nose normal.      Mouth/Throat:      Mouth: Mucous membranes are moist.      Pharynx: Oropharynx is clear.   Eyes:      Extraocular Movements: Extraocular movements intact.      Conjunctiva/sclera: Conjunctivae normal.      Pupils: Pupils are equal, round, and reactive to light.   Cardiovascular:      Rate and Rhythm: Regular rhythm. Tachycardia present.      Pulses: Normal pulses.      Heart sounds: Normal heart sounds.   Pulmonary:      Effort: Pulmonary effort is normal.      Breath sounds: Wheezing and rales present.   Abdominal:      General: Abdomen is flat. Bowel sounds are normal.      Palpations: Abdomen is soft.   Musculoskeletal:         General: Normal range of motion.      Cervical back: Normal range of motion and neck supple.   Skin:     General: Skin is warm and dry.   Neurological:      General: No focal  "deficit present.      Mental Status: She is alert and oriented to person, place, and time.      Motor: Weakness present.   Psychiatric:         Mood and Affect: Mood normal.         Behavior: Behavior normal.             Significant Labs: All pertinent labs within the past 24 hours have been reviewed.    Significant Imaging: I have reviewed all pertinent imaging results/findings within the past 24 hours.    Assessment and Plan     * Acute hypoxemic respiratory failure  Patient with Hypoxic Respiratory failure which is Acute.  she is on home oxygen at 2 LPM. Supplemental oxygen was provided and noted-      .   Signs/symptoms of respiratory failure include- tachypnea, respiratory distress, and wheezing. Contributing diagnoses includes - CHF and Pneumonia Labs and images were reviewed. Patient Has recent ABG, which has been reviewed. Will treat underlying causes and adjust management of respiratory failure as follows-   Treat CHF exacerbation with elevated troponin. Consult Cardiology.   Treat pneumonia with procalcitonin and sputum cultures.  F/u blood cultures.     12/25: hypoxia, but no other concerning features aside from elevated troponin in patient with CKD stage 5.   Continue to treat URI and cardiac issues with diuresis and cardiology consult. Valve disease is present as well.     12/26: improving overall.  However, she stated she has nasal congestion and requested flonase.  I'll increase her prednisone dose to 40 today.      CHF (congestive heart failure)  Patient has Diastolic (HFpEF) heart failure that is Acute on chronic. On presentation their CHF was decompensated. Evidence of decompensated CHF on presentation includes: edema, elevated JVD, and dyspnea on exertion (CHAUDHARY). The etiology of their decompensation is likely no lasix at home, per patient . Most recent BNP and echo results are listed below.  No results for input(s): "BNP" in the last 72 hours.  Latest ECHO  Results for orders placed during the " hospital encounter of 12/16/24    Echo    Interpretation Summary    Left Ventricle: The left ventricle is normal in size. Mildly increased wall thickness. There is concentric remodeling. Septal motion is consistent with pacing. There is normal systolic function with a visually estimated ejection fraction of 55 - 60%. Quantitated ejection fraction is 58%. There is normal diastolic function.    Right Ventricle: Mild right ventricular enlargement. Systolic function is moderately reduced. Pacemaker lead present in the ventricle.    Right Atrium: Right atrium is mildly dilated.    Aortic Valve: There is a bioprosthetic valve in the aortic position.    Mitral Valve: There is moderate regurgitation with a posterolateral eccentriccally directed jet.    Tricuspid Valve: There is moderate regurgitation.    Pulmonary Artery: The estimated pulmonary artery systolic pressure is 49 mmHg.    IVC/SVC: Elevated venous pressure at 15 mmHg.    Current Heart Failure Medications  , Daily, Oral  dapagliflozin propanediol (Farxiga) tablet 10 mg, Daily, Oral  furosemide injection 40 mg, 2 times daily before meals, Intravenous    Plan  - Monitor strict I&Os and daily weights.    - Place on telemetry  - Low sodium diet  - Place on fluid restriction of 1 L.   - Cardiology has been consulted  - The patient's volume status is improving but not at their baseline as indicated by edema, elevated JVD, and shortness of breath      Pneumonia  Patient has a diagnosis of pneumonia. The cause of the pneumonia is suspected to be bacterial in etiology but organism is not known. The pneumonia is worsening due to hypoxia . The patient has the following signs/symptoms of pneumonia: persistent hypoxia , cough, and sputum production. The patient does have a current oxygen requirement and the patient does have a home oxygen requirement. I have reviewed the pertinent imaging. The following cultures have been collected: Blood cultures and Sputum culture The  culture results are listed below.     Current antimicrobial regimen consists of the antibiotics listed below. Will monitor patient closely and continue current treatment plan unchanged.    Antibiotics (From admission, onward)      Start     Stop Route Frequency Ordered    12/25/24 0900  azithromycin tablet 250 mg         -- Oral Daily 12/24/24 2009 12/24/24 2100  piperacillin-tazobactam (ZOSYN) 4.5 g in D5W 100 mL IVPB (MB+)         -- IV Every 12 hours (non-standard times) 12/24/24 1959 12/24/24 2008  azithromycin tablet 500 mg         -- Oral Once 12/24/24 2009            Microbiology Results (last 7 days)       Procedure Component Value Units Date/Time    Culture, Lower Respiratory [[id number]]     Order Status: Sent Specimen: Respiratory from Sputum Expectorated     Blood Culture #2 [[id number]] Collected: 12/24/24 0842    Order Status: Sent Specimen: Blood Updated: 12/24/24 0842    Blood Culture #1 [[id number]] Collected: 12/24/24 0842    Order Status: Sent Specimen: Blood Updated: 12/24/24 0842            Obesity (BMI 30-39.9)  Body mass index is 30.21 kg/m². Morbid obesity complicates all aspects of disease management from diagnostic modalities to treatment. Weight loss encouraged and health benefits explained to patient.         Type 2 MI (myocardial infarction)  Shortness of breath with elevated BNP and troponin.   High risk patient with prior valve disease and CAD s/p CABG.      Depression  Patient has persistent depression which is moderate and is currently controlled. Will Continue anti-depressant medications. We will not consult psychiatry at this time. Patient does not display psychosis at this time. Continue to monitor closely and adjust plan of care as needed.        CKD (chronic kidney disease) stage 5, GFR less than 15 ml/min  Creatine stable for now. BMP reviewed- noted Estimated Creatinine Clearance: 19 mL/min (A) (based on SCr of 2.81 mg/dL (H)). according to latest data. Based on current  "GFR, CKD stage is stage 5 - GFR < 15.  Monitor UOP and serial BMP and adjust therapy as needed. Renally dose meds. Avoid nephrotoxic medications and procedures.    SSS (sick sinus syndrome)  S/p pacemaker placement.       Pure hypercholesterolemia  Continue home statin.       Benign essential hypertension  Patient's blood pressure range in the last 24 hours was: BP  Min: 101/52  Max: 123/60.The patient's inpatient anti-hypertensive regimen is listed below:  Current Antihypertensives  , Daily, Oral  furosemide injection 40 mg, 2 times daily before meals, Intravenous    Plan  - BP is controlled, no changes needed to their regimen      DM type 2 with diabetic dyslipidemia  Patient's FSGs are controlled on current medication regimen.  Last A1c reviewed-   Lab Results   Component Value Date    HGBA1C 6.1 12/16/2024     Most recent fingerstick glucose reviewed- No results for input(s): "POCTGLUCOSE" in the last 24 hours.  Current correctional scale  Low  Maintain anti-hyperglycemic dose as follows-   Antihyperglycemics (From admission, onward)      Start     Stop Route Frequency Ordered    12/24/24 1500  dapagliflozin propanediol (Farxiga) tablet 10 mg        Question Answer Comment   Does this patient have a diagnosis of heart failure? Yes    Does this patient have type 1 diabetes or diabetic ketoacidosis? No    Does this patient have symptomatic hypotension? No    Is the patient NPO or pending major surgery in next 3 days or less? No        -- Oral Daily 12/24/24 1357          Hold Oral hypoglycemics while patient is in the hospital.      VTE Risk Mitigation (From admission, onward)           Ordered     IP VTE HIGH RISK PATIENT  Once         12/24/24 1357     Place sequential compression device  Until discontinued         12/24/24 1357                    Discharge Planning   VALERY:      Code Status: Full Code   Medical Readiness for Discharge Date:              Gretta Salcedo MD  Department of Hospital Medicine "   [facility]

## 2024-12-27 VITALS
WEIGHT: 176 LBS | BODY MASS INDEX: 30.05 KG/M2 | TEMPERATURE: 99 F | OXYGEN SATURATION: 97 % | SYSTOLIC BLOOD PRESSURE: 112 MMHG | HEIGHT: 64 IN | DIASTOLIC BLOOD PRESSURE: 77 MMHG | RESPIRATION RATE: 16 BRPM | HEART RATE: 78 BPM

## 2024-12-27 LAB
ANION GAP SERPL CALCULATED.3IONS-SCNC: 17 MMOL/L (ref 7–16)
BUN SERPL-MCNC: 63 MG/DL (ref 10–20)
BUN/CREAT SERPL: 20 (ref 6–20)
CALCIUM SERPL-MCNC: 8.9 MG/DL (ref 8.4–10.2)
CHLORIDE SERPL-SCNC: 98 MMOL/L (ref 98–107)
CO2 SERPL-SCNC: 26 MMOL/L (ref 23–31)
CREAT SERPL-MCNC: 3.21 MG/DL (ref 0.55–1.02)
CULTURE, LOWER RESPIRATORY: ABNORMAL
EGFR (NO RACE VARIABLE) (RUSH/TITUS): 15 ML/MIN/1.73M2
GLUCOSE SERPL-MCNC: 184 MG/DL (ref 82–115)
GLUCOSE SERPL-MCNC: 212 MG/DL (ref 70–105)
GLUCOSE SERPL-MCNC: 253 MG/DL (ref 70–105)
GRAM STN SPEC: ABNORMAL
MAGNESIUM SERPL-MCNC: 2.4 MG/DL (ref 1.6–2.6)
NT-PROBNP SERPL-MCNC: 2747 PG/ML (ref 1–125)
POTASSIUM SERPL-SCNC: 4 MMOL/L (ref 3.5–5.1)
SODIUM SERPL-SCNC: 137 MMOL/L (ref 136–145)

## 2024-12-27 PROCEDURE — 63700000 PHARM REV CODE 250 ALT 637 W/O HCPCS: Performed by: HOSPITALIST

## 2024-12-27 PROCEDURE — 99239 HOSP IP/OBS DSCHRG MGMT >30: CPT | Mod: ,,, | Performed by: HOSPITALIST

## 2024-12-27 PROCEDURE — 80048 BASIC METABOLIC PNL TOTAL CA: CPT | Performed by: HOSPITALIST

## 2024-12-27 PROCEDURE — 25000003 PHARM REV CODE 250: Performed by: HOSPITALIST

## 2024-12-27 PROCEDURE — 63600175 PHARM REV CODE 636 W HCPCS: Performed by: HOSPITALIST

## 2024-12-27 PROCEDURE — 83735 ASSAY OF MAGNESIUM: CPT | Performed by: HOSPITALIST

## 2024-12-27 PROCEDURE — 83880 ASSAY OF NATRIURETIC PEPTIDE: CPT | Performed by: HOSPITALIST

## 2024-12-27 PROCEDURE — 25000242 PHARM REV CODE 250 ALT 637 W/ HCPCS: Performed by: HOSPITALIST

## 2024-12-27 PROCEDURE — 94761 N-INVAS EAR/PLS OXIMETRY MLT: CPT

## 2024-12-27 PROCEDURE — 27000221 HC OXYGEN, UP TO 24 HOURS

## 2024-12-27 PROCEDURE — 36415 COLL VENOUS BLD VENIPUNCTURE: CPT | Performed by: HOSPITALIST

## 2024-12-27 PROCEDURE — 82962 GLUCOSE BLOOD TEST: CPT

## 2024-12-27 PROCEDURE — 99900035 HC TECH TIME PER 15 MIN (STAT)

## 2024-12-27 PROCEDURE — 99223 1ST HOSP IP/OBS HIGH 75: CPT | Mod: ,,, | Performed by: INTERNAL MEDICINE

## 2024-12-27 RX ORDER — PREDNISONE 10 MG/1
10 TABLET ORAL DAILY
Qty: 5 TABLET | Refills: 0 | Status: SHIPPED | OUTPATIENT
Start: 2024-12-27 | End: 2025-01-01

## 2024-12-27 RX ORDER — AMOXICILLIN AND CLAVULANATE POTASSIUM 875; 125 MG/1; MG/1
1 TABLET, FILM COATED ORAL 2 TIMES DAILY
Qty: 10 TABLET | Refills: 0 | Status: SHIPPED | OUTPATIENT
Start: 2024-12-27 | End: 2025-01-01

## 2024-12-27 RX ORDER — OXYMETAZOLINE HCL 0.05 %
2 SPRAY, NON-AEROSOL (ML) NASAL 2 TIMES DAILY
Status: DISCONTINUED | OUTPATIENT
Start: 2024-12-27 | End: 2024-12-27 | Stop reason: HOSPADM

## 2024-12-27 RX ORDER — MECLIZINE HYDROCHLORIDE 25 MG/1
25 TABLET ORAL 3 TIMES DAILY PRN
Status: DISCONTINUED | OUTPATIENT
Start: 2024-12-27 | End: 2024-12-27 | Stop reason: HOSPADM

## 2024-12-27 RX ORDER — OXYCODONE AND ACETAMINOPHEN 5; 325 MG/1; MG/1
2 TABLET ORAL ONCE
Status: COMPLETED | OUTPATIENT
Start: 2024-12-27 | End: 2024-12-27

## 2024-12-27 RX ADMIN — SERTRALINE HYDROCHLORIDE 50 MG: 50 TABLET ORAL at 08:12

## 2024-12-27 RX ADMIN — LEVOTHYROXINE SODIUM 25 MCG: 0.03 TABLET ORAL at 06:12

## 2024-12-27 RX ADMIN — FLUCONAZOLE 200 MG: 100 TABLET ORAL at 08:12

## 2024-12-27 RX ADMIN — DAPAGLIFLOZIN 10 MG: 10 TABLET, FILM COATED ORAL at 08:12

## 2024-12-27 RX ADMIN — ASPIRIN 81 MG: 81 TABLET, COATED ORAL at 08:12

## 2024-12-27 RX ADMIN — OXYMETAZOLINE HYDROCHLORIDE 2 SPRAY: 0.5 SPRAY NASAL at 08:12

## 2024-12-27 RX ADMIN — PRIMIDONE 100 MG: 50 TABLET ORAL at 08:12

## 2024-12-27 RX ADMIN — AZITHROMYCIN DIHYDRATE 250 MG: 250 TABLET ORAL at 08:12

## 2024-12-27 RX ADMIN — POTASSIUM BICARBONATE 50 MEQ: 977.5 TABLET, EFFERVESCENT ORAL at 06:12

## 2024-12-27 RX ADMIN — OXYCODONE HYDROCHLORIDE AND ACETAMINOPHEN 2 TABLET: 5; 325 TABLET ORAL at 02:12

## 2024-12-27 RX ADMIN — PIPERACILLIN SODIUM AND TAZOBACTAM SODIUM 4.5 G: 4; .5 INJECTION, POWDER, LYOPHILIZED, FOR SOLUTION INTRAVENOUS at 08:12

## 2024-12-27 RX ADMIN — INSULIN ASPART 2 UNITS: 100 INJECTION, SOLUTION INTRAVENOUS; SUBCUTANEOUS at 08:12

## 2024-12-27 RX ADMIN — PREDNISONE 40 MG: 20 TABLET ORAL at 08:12

## 2024-12-27 RX ADMIN — PANTOPRAZOLE SODIUM 40 MG: 40 TABLET, DELAYED RELEASE ORAL at 08:12

## 2024-12-27 RX ADMIN — FLUTICASONE PROPIONATE 100 MCG: 50 SPRAY, METERED NASAL at 08:12

## 2024-12-27 RX ADMIN — ATORVASTATIN CALCIUM 40 MG: 40 TABLET, FILM COATED ORAL at 08:12

## 2024-12-27 NOTE — ASSESSMENT & PLAN NOTE
Patient's blood pressure range in the last 24 hours was: BP  Min: 106/64  Max: 137/76.The patient's inpatient anti-hypertensive regimen is listed below:  Current Antihypertensives  , Daily, Oral    Plan  - BP is controlled, no changes needed to their regimen

## 2024-12-27 NOTE — ASSESSMENT & PLAN NOTE
"Patient's FSGs are controlled on current medication regimen.  Last A1c reviewed-   Lab Results   Component Value Date    HGBA1C 6.1 12/16/2024     Most recent fingerstick glucose reviewed- No results for input(s): "POCTGLUCOSE" in the last 24 hours.  Current correctional scale  Low  Maintain anti-hyperglycemic dose as follows-   Antihyperglycemics (From admission, onward)    Start     Stop Route Frequency Ordered    12/24/24 2136  insulin aspart U-100 injection 0-5 Units         -- SubQ Before meals & nightly PRN 12/24/24 2037 12/24/24 1500  dapagliflozin propanediol (Farxiga) tablet 10 mg        Question Answer Comment   Does this patient have a diagnosis of heart failure? Yes    Does this patient have type 1 diabetes or diabetic ketoacidosis? No    Does this patient have symptomatic hypotension? No    Is the patient NPO or pending major surgery in next 3 days or less? No        -- Oral Daily 12/24/24 1357        Hold Oral hypoglycemics while patient is in the hospital.  "

## 2024-12-27 NOTE — PLAN OF CARE
Problem: Pneumonia  Goal: Fluid Balance  Outcome: Progressing  Goal: Resolution of Infection Signs and Symptoms  Outcome: Progressing  Goal: Effective Oxygenation and Ventilation  Outcome: Progressing     Problem: Gas Exchange Impaired  Goal: Optimal Gas Exchange  Outcome: Progressing

## 2024-12-27 NOTE — ASSESSMENT & PLAN NOTE
Patient with Hypoxic Respiratory failure which is Acute.  she is on home oxygen at 2 LPM. Supplemental oxygen was provided and noted- Oxygen Concentration (%):  [2-28] 28    .   Signs/symptoms of respiratory failure include- tachypnea, respiratory distress, and wheezing. Contributing diagnoses includes - CHF and Pneumonia Labs and images were reviewed. Patient Has recent ABG, which has been reviewed. Will treat underlying causes and adjust management of respiratory failure as follows-   Treat CHF exacerbation with elevated troponin. Consult Cardiology.   Treat pneumonia with procalcitonin and sputum cultures.  F/u blood cultures.     12/25: hypoxia, but no other concerning features aside from elevated troponin in patient with CKD stage 5.   Continue to treat URI and cardiac issues with diuresis and cardiology consult. Valve disease is present as well.     12/26: improving overall.  However, she stated she has nasal congestion and requested flonase.  I'll increase her prednisone dose to 40 today.      12/27: discharged on augmentin, prednisone 10mg for 5 days.   F/u with PCP.  She declined follow-up with pulmonology until seen by her PCP.   Remote smoking history.

## 2024-12-27 NOTE — ASSESSMENT & PLAN NOTE
"Patient has Diastolic (HFpEF) heart failure that is Acute on chronic. On presentation their CHF was decompensated. Evidence of decompensated CHF on presentation includes: edema, elevated JVD, and dyspnea on exertion (CHAUDHARY). The etiology of their decompensation is likely no lasix at home, per patient . Most recent BNP and echo results are listed below.  No results for input(s): "BNP" in the last 72 hours.  Latest ECHO  Results for orders placed during the hospital encounter of 12/16/24    Echo    Interpretation Summary    Left Ventricle: The left ventricle is normal in size. Mildly increased wall thickness. There is concentric remodeling. Septal motion is consistent with pacing. There is normal systolic function with a visually estimated ejection fraction of 55 - 60%. Quantitated ejection fraction is 58%. There is normal diastolic function.    Right Ventricle: Mild right ventricular enlargement. Systolic function is moderately reduced. Pacemaker lead present in the ventricle.    Right Atrium: Right atrium is mildly dilated.    Aortic Valve: There is a bioprosthetic valve in the aortic position.    Mitral Valve: There is moderate regurgitation with a posterolateral eccentriccally directed jet.    Tricuspid Valve: There is moderate regurgitation.    Pulmonary Artery: The estimated pulmonary artery systolic pressure is 49 mmHg.    IVC/SVC: Elevated venous pressure at 15 mmHg.    Current Heart Failure Medications  , Daily, Oral  dapagliflozin propanediol (Farxiga) tablet 10 mg, Daily, Oral    Plan  - Monitor strict I&Os and daily weights.    - Place on telemetry  - Low sodium diet  - Place on fluid restriction of 1 L.   - Cardiology has been consulted  - The patient's volume status is improving but not at their baseline as indicated by edema, elevated JVD, and shortness of breath    "

## 2024-12-27 NOTE — DISCHARGE SUMMARY
[facility]  [facility] Medicine  Discharge Summary      Patient Name: Karen Renteria  MRN: [id number]  JAMIL: [id number]  Patient Class: IP- Inpatient  Admission Date: 12/24/2024  Hospital Length of Stay: 3 days  Discharge Date and Time:  12/27/2024 10:01 AM  Attending Physician: Gretta Salcedo MD   Discharging Provider: Gretta Salcedo MD  Primary Care Provider: Jt Allison II, MD    Primary Care Team: Networked reference to record PCT     HPI:   Ms. Karen Renteria is a pleasant 68yo woman history of diabetes, CAD s/p CABG 2012, aortic valve replacement 2012, s/p pacemaker placement 2022, diastolic heart failure, obesity, prior tobacco use, CVA in 2022, gastric ulcer, CKD stage 5, HTN, HLD, depression who presents with shortness of breath.  She states she felt great after leaving the hospital on Tuesday - she was admitted to this hospital for pneumonia.  On Sunday, she reports she started feeling weak.  She had been taking levoquin and lasix until Saturday.  She developed dyspnea on exertion.  She reports that she recalls taking levoquin but she does not recall receiving lasix.  She denies lower extremity swelling, difficulty lying flat, chest pain, dizziness.  She endorses nasal drainage and has been using flonase for that.  She reports severe headaches and congestion.  That compromises her ability to breath.  She states she has been coughing up a small amount of mucus.      She was recently admitted for pneumonia wherein she was admitted for one day and discharged on levofloxacin, lasix 40mg daily, and duonebs.        ED course:   Initial Vitals [12/24/24 0728]   BP Pulse Resp Temp SpO2   (!) 112/53 99 (!) 23 98.4 °F (36.9 °C) (!) 87 %         * No surgery found *      Hospital Course:   No notes on file     Goals of Care Treatment Preferences:  Code Status: Full Code      SDOH Screening:  The patient was screened for utility difficulties, food insecurity, transport  difficulties, housing insecurity, and interpersonal safety and there were no concerns identified this admission.     Consults:   Consults (From admission, onward)          Status Ordering Provider     Inpatient consult to Cardiology  Once        Provider:  Hair Mcghee DO    Completed SILVANO NINO     Inpatient consult to Social Work/Case Management  Once        Provider:  (Not yet assigned)    Completed SILVANO NINO     Inpatient consult to Registered Dietitian/Nutritionist  Once        Provider:  (Not yet assigned)    Completed SILVANO NINO            * Acute hypoxemic respiratory failure  Patient with Hypoxic Respiratory failure which is Acute.  she is on home oxygen at 2 LPM. Supplemental oxygen was provided and noted- Oxygen Concentration (%):  [2-28] 28    .   Signs/symptoms of respiratory failure include- tachypnea, respiratory distress, and wheezing. Contributing diagnoses includes - CHF and Pneumonia Labs and images were reviewed. Patient Has recent ABG, which has been reviewed. Will treat underlying causes and adjust management of respiratory failure as follows-   Treat CHF exacerbation with elevated troponin. Consult Cardiology.   Treat pneumonia with procalcitonin and sputum cultures.  F/u blood cultures.     12/25: hypoxia, but no other concerning features aside from elevated troponin in patient with CKD stage 5.   Continue to treat URI and cardiac issues with diuresis and cardiology consult. Valve disease is present as well.     12/26: improving overall.  However, she stated she has nasal congestion and requested flonase.  I'll increase her prednisone dose to 40 today.      12/27: discharged on augmentin, prednisone 10mg for 5 days.   F/u with PCP.  She declined follow-up with pulmonology until seen by her PCP.   Remote smoking history.      CHF (congestive heart failure)  Patient has Diastolic (HFpEF) heart failure that is Acute on chronic. On presentation their CHF was  "decompensated. Evidence of decompensated CHF on presentation includes: edema, elevated JVD, and dyspnea on exertion (CHAUDHARY). The etiology of their decompensation is likely no lasix at home, per patient . Most recent BNP and echo results are listed below.  No results for input(s): "BNP" in the last 72 hours.  Latest ECHO  Results for orders placed during the hospital encounter of 12/16/24    Echo    Interpretation Summary    Left Ventricle: The left ventricle is normal in size. Mildly increased wall thickness. There is concentric remodeling. Septal motion is consistent with pacing. There is normal systolic function with a visually estimated ejection fraction of 55 - 60%. Quantitated ejection fraction is 58%. There is normal diastolic function.    Right Ventricle: Mild right ventricular enlargement. Systolic function is moderately reduced. Pacemaker lead present in the ventricle.    Right Atrium: Right atrium is mildly dilated.    Aortic Valve: There is a bioprosthetic valve in the aortic position.    Mitral Valve: There is moderate regurgitation with a posterolateral eccentriccally directed jet.    Tricuspid Valve: There is moderate regurgitation.    Pulmonary Artery: The estimated pulmonary artery systolic pressure is 49 mmHg.    IVC/SVC: Elevated venous pressure at 15 mmHg.    Current Heart Failure Medications  , Daily, Oral  dapagliflozin propanediol (Farxiga) tablet 10 mg, Daily, Oral    Plan  - Monitor strict I&Os and daily weights.    - Place on telemetry  - Low sodium diet  - Place on fluid restriction of 1 L.   - Cardiology has been consulted  - The patient's volume status is improving but not at their baseline as indicated by edema, elevated JVD, and shortness of breath      Pneumonia  Patient has a diagnosis of pneumonia. The cause of the pneumonia is suspected to be bacterial in etiology but organism is not known. The pneumonia is worsening due to hypoxia . The patient has the following signs/symptoms of " pneumonia: persistent hypoxia , cough, and sputum production. The patient does have a current oxygen requirement and the patient does have a home oxygen requirement. I have reviewed the pertinent imaging. The following cultures have been collected: Blood cultures and Sputum culture The culture results are listed below.     Current antimicrobial regimen consists of the antibiotics listed below. Will monitor patient closely and continue current treatment plan unchanged.    Antibiotics (From admission, onward)      Start     Stop Route Frequency Ordered    12/25/24 0900  azithromycin tablet 250 mg         -- Oral Daily 12/24/24 2009 12/24/24 2100  piperacillin-tazobactam (ZOSYN) 4.5 g in D5W 100 mL IVPB (MB+)         -- IV Every 12 hours (non-standard times) 12/24/24 1959            Microbiology Results (last 7 days)       Procedure Component Value Units Date/Time    Culture, Lower Respiratory [[id number]]  (Abnormal) Collected: 12/25/24 1720    Order Status: Completed Specimen: Respiratory from Sputum Expectorated Updated: 12/27/24 0719     Culture, Lower Respiratory Light Growth Yeast     Comment: For further identification, fungus culture recommended. Isolate will be held for 7 days. Notify Micro department at [phone] if fungus culture ordered.        Gram Stain Result Few Yeast      Moderate WBC observed      Few Epithelial cells    Blood Culture #2 [[id number]] Collected: 12/24/24 0842    Order Status: Completed Specimen: Blood Updated: 12/27/24 0705     Culture, Blood No Growth At 48 Hours    Blood Culture #1 [[id number]] Collected: 12/24/24 0842    Order Status: Completed Specimen: Blood Updated: 12/27/24 0705     Culture, Blood No Growth At 48 Hours            Obesity (BMI 30-39.9)  Body mass index is 30.21 kg/m². Morbid obesity complicates all aspects of disease management from diagnostic modalities to treatment. Weight loss encouraged and health benefits explained to patient.         Type 2 MI  "(myocardial infarction)  Shortness of breath with elevated BNP and troponin.   High risk patient with prior valve disease and CAD s/p CABG.      Depression  Patient has persistent depression which is moderate and is currently controlled. Will Continue anti-depressant medications. We will not consult psychiatry at this time. Patient does not display psychosis at this time. Continue to monitor closely and adjust plan of care as needed.        CKD (chronic kidney disease) stage 5, GFR less than 15 ml/min  Creatine stable for now. BMP reviewed- noted Estimated Creatinine Clearance: 16.7 mL/min (A) (based on SCr of 3.21 mg/dL (H)). according to latest data. Based on current GFR, CKD stage is stage 5 - GFR < 15.  Monitor UOP and serial BMP and adjust therapy as needed. Renally dose meds. Avoid nephrotoxic medications and procedures.    SSS (sick sinus syndrome)  S/p pacemaker placement.       Pure hypercholesterolemia  Continue home statin.       Benign essential hypertension  Patient's blood pressure range in the last 24 hours was: BP  Min: 106/64  Max: 137/76.The patient's inpatient anti-hypertensive regimen is listed below:  Current Antihypertensives  , Daily, Oral    Plan  - BP is controlled, no changes needed to their regimen      DM type 2 with diabetic dyslipidemia  Patient's FSGs are controlled on current medication regimen.  Last A1c reviewed-   Lab Results   Component Value Date    HGBA1C 6.1 12/16/2024     Most recent fingerstick glucose reviewed- No results for input(s): "POCTGLUCOSE" in the last 24 hours.  Current correctional scale  Low  Maintain anti-hyperglycemic dose as follows-   Antihyperglycemics (From admission, onward)      Start     Stop Route Frequency Ordered    12/24/24 2136  insulin aspart U-100 injection 0-5 Units         -- SubQ Before meals & nightly PRN 12/24/24 2037 12/24/24 1500  dapagliflozin propanediol (Farxiga) tablet 10 mg        Question Answer Comment   Does this patient have a " diagnosis of heart failure? Yes    Does this patient have type 1 diabetes or diabetic ketoacidosis? No    Does this patient have symptomatic hypotension? No    Is the patient NPO or pending major surgery in next 3 days or less? No        -- Oral Daily 12/24/24 1357          Hold Oral hypoglycemics while patient is in the hospital.      Final Active Diagnoses:    Diagnosis Date Noted POA    PRINCIPAL PROBLEM:  Acute hypoxemic respiratory failure [J96.01] 12/24/2024 Yes    CHF (congestive heart failure) [I50.9] 12/25/2024 Yes    Pneumonia [J18.9] 12/16/2024 Yes    Type 2 MI (myocardial infarction) [I21.A1] 12/24/2024 Yes    Obesity (BMI 30-39.9) [E66.9] 12/24/2024 Yes    Depression [F32.A] 12/16/2024 Yes    CKD (chronic kidney disease) stage 5, GFR less than 15 ml/min [N18.5] 12/11/2023 Yes    SSS (sick sinus syndrome) [I49.5] 07/12/2022 Yes    DM type 2 with diabetic dyslipidemia [E11.69, E78.5] 01/13/2021 Yes    Benign essential hypertension [I10] 08/28/2018 Yes    Pure hypercholesterolemia [E78.00] 08/28/2018 Yes      Problems Resolved During this Admission:       Discharged Condition: fair    Disposition: Home or Self Care    Follow Up:   Follow-up Information       Jt Allison II, MD Follow up in 1 week(s).    Specialty: Family Medicine  Why: hospital follow-up for pneumonia and CHF exacerbation  Contact information:  [address] [facility]  INTERNAL MEDICINE CLINIC  [facility] [address]  [phone]                           Patient Instructions:      Call MD for:  temperature >100.4     Call MD for:  persistent nausea and vomiting or diarrhea     Call MD for:  severe uncontrolled pain     Call MD for:  redness, tenderness, or signs of infection (pain, swelling, redness, odor or green/yellow discharge around incision site)     Call MD for:  difficulty breathing or increased cough     Call MD for:  severe persistent headache     Call MD for:  worsening rash     Call MD for:  persistent dizziness,  "light-headedness, or visual disturbances     Call MD for:  increased confusion or weakness       Significant Diagnostic Studies: Labs: BMP:   Recent Labs   Lab 12/26/24  0411 12/27/24  0443   * 184*    137   K 3.4* 4.0   CL 97* 98   CO2 27 26   BUN 65* 63*   CREATININE 3.39* 3.21*   CALCIUM 9.2 8.9   MG 2.2 2.4    and CBC No results for input(s): "WBC", "HGB", "HCT", "PLT" in the last 48 hours.    Pending Diagnostic Studies:       None           Medications:  Reconciled Home Medications:      Medication List        START taking these medications      amoxicillin-clavulanate 875-125mg 875-125 mg per tablet  Commonly known as: AUGMENTIN  Take 1 tablet by mouth 2 (two) times daily. for 5 days     predniSONE 10 MG tablet  Commonly known as: DELTASONE  Take 1 tablet (10 mg total) by mouth once daily. for 5 days            CHANGE how you take these medications      sertraline 50 MG tablet  Commonly known as: ZOLOFT  TAKE ONE TABLET BY MOUTH EVERY DAY  What changed:   how much to take  when to take this            CONTINUE taking these medications      albuterol-ipratropium 2.5 mg-0.5 mg/3 mL nebulizer solution  Commonly known as: DUO-NEB  Take 3 mLs by nebulization every 6 (six) hours. Rescue     aspirin 81 MG EC tablet  Commonly known as: ECOTRIN  Take 81 mg by mouth once daily.     furosemide 40 MG tablet  Commonly known as: LASIX  Take 1 tablet (40 mg total) by mouth once daily.     hydroCHLOROthiazide 12.5 MG Tab  Commonly known as: HYDRODIURIL  Take 12.5 mg by mouth once daily.     insulin degludec 200 unit/mL (3 mL) insulin pen  Commonly known as: TRESIBA FLEXTOUCH U-200  52 units in the morning and 32 units in the evening     isosorbide dinitrate 30 MG Tab  Commonly known as: ISORDIL  Take 30 mg by mouth once daily.     levothyroxine 25 MCG tablet  Commonly known as: SYNTHROID  Take 25 mcg by mouth before breakfast.     meclizine 25 mg tablet  Commonly known as: ANTIVERT  Take 1 tablet (25 mg total) by " mouth 3 (three) times daily as needed for Dizziness.     multivitamin per tablet  Commonly known as: THERAGRAN  Take 1 tablet by mouth once daily.     OZEMPIC 0.25 mg or 0.5 mg(2 mg/1.5 mL) pen injector  Generic drug: semaglutide  Inject 0.25 mg into the skin.     pantoprazole 40 MG tablet  Commonly known as: PROTONIX  TAKE 1 TABLET BY MOUTH ONCE DAILY PRIOR TO SUPPER     pregabalin 75 MG capsule  Commonly known as: LYRICA  Take 75 mg by mouth 2 (two) times daily.     primidone 50 MG Tab  Commonly known as: MYSOLINE  Take 2 tablets (100 mg total) by mouth 3 (three) times daily.     rosuvastatin 40 MG Tab  Commonly known as: CRESTOR  Take 40 mg by mouth once daily.              Indwelling Lines/Drains at time of discharge:   Lines/Drains/Airways       None                   Time spent on the discharge of patient: 45 minutes         Gretta Salcedo MD  Department of Hospital Medicine  [facility]

## 2024-12-27 NOTE — DISCHARGE INSTRUCTIONS
Hospitalist Discharge orders  *Notify your healthcare provider if you experience any of the following: temperature >100.4  *Notify your healthcare provider if you experience any of the following: redness, tenderness, or any signs or symptoms of infection (pain, swelling, redness, odor or green/yellow drainage around incision site).  *Notify your healthcare provider if you experience any of the following: difficulty breathing or increased cough.  *Notify your physician if you experience any persistent nausea, vomiting, diarrhea or headache.  *Notify your physician if you experience any of the following: severe uncontrolled pain, worsening rash, increased confusion, weakness, dizziness, lightheadedness, or visual disturbances.

## 2024-12-27 NOTE — ASSESSMENT & PLAN NOTE
Patient has a diagnosis of pneumonia. The cause of the pneumonia is suspected to be bacterial in etiology but organism is not known. The pneumonia is worsening due to hypoxia . The patient has the following signs/symptoms of pneumonia: persistent hypoxia , cough, and sputum production. The patient does have a current oxygen requirement and the patient does have a home oxygen requirement. I have reviewed the pertinent imaging. The following cultures have been collected: Blood cultures and Sputum culture The culture results are listed below.     Current antimicrobial regimen consists of the antibiotics listed below. Will monitor patient closely and continue current treatment plan unchanged.    Antibiotics (From admission, onward)      Start     Stop Route Frequency Ordered    12/25/24 0900  azithromycin tablet 250 mg         -- Oral Daily 12/24/24 2009 12/24/24 2100  piperacillin-tazobactam (ZOSYN) 4.5 g in D5W 100 mL IVPB (MB+)         -- IV Every 12 hours (non-standard times) 12/24/24 1959            Microbiology Results (last 7 days)       Procedure Component Value Units Date/Time    Culture, Lower Respiratory [[id number]]  (Abnormal) Collected: 12/25/24 1720    Order Status: Completed Specimen: Respiratory from Sputum Expectorated Updated: 12/27/24 0719     Culture, Lower Respiratory Light Growth Yeast     Comment: For further identification, fungus culture recommended. Isolate will be held for 7 days. Notify Micro department at [phone] if fungus culture ordered.        Gram Stain Result Few Yeast      Moderate WBC observed      Few Epithelial cells    Blood Culture #2 [[id number]] Collected: 12/24/24 0842    Order Status: Completed Specimen: Blood Updated: 12/27/24 0705     Culture, Blood No Growth At 48 Hours    Blood Culture #1 [[id number]] Collected: 12/24/24 0842    Order Status: Completed Specimen: Blood Updated: 12/27/24 0705     Culture, Blood No Growth At 48 Hours

## 2024-12-27 NOTE — ASSESSMENT & PLAN NOTE
Creatine stable for now. BMP reviewed- noted Estimated Creatinine Clearance: 16.7 mL/min (A) (based on SCr of 3.21 mg/dL (H)). according to latest data. Based on current GFR, CKD stage is stage 5 - GFR < 15.  Monitor UOP and serial BMP and adjust therapy as needed. Renally dose meds. Avoid nephrotoxic medications and procedures.

## 2024-12-27 NOTE — PLAN OF CARE
[facility]  Discharge Final Note    Primary Care Provider: Jt Allison II, MD    Expected Discharge Date: 12/27/2024    Final Discharge Note (most recent)       Final Note - 12/27/24 1054          Final Note    Assessment Type Final Discharge Note     Anticipated Discharge Disposition Home or Self Care     What phone number can be called within the next 1-3 days to see how you are doing after discharge? [phone]        Post-Acute Status    Coverage Medicare     Discharge Delays None known at this time                     Important Message from Medicare  Important Message from Medicare regarding Discharge Appeal Rights: Given to patient/caregiver, Explained to patient/caregiver, Signed/date by patient/caregiver     Date IMM was signed: 12/26/24  Time IMM was signed: 1030    Contact Info       Jt Allison II, MD   Specialty: Family Medicine   Relationship: PCP - General    [address] [place] [address]  INTERNAL MEDICINE CLINIC  [facility] [address]   Phone: [phone]       Next Steps: Follow up in 1 week(s)    Instructions: hospital follow-up for pneumonia and CHF exacerbation    Appt: Monday January 27,2025 @ 2:30pm  the next available appointment that was available          Pt to dc home with family. IM updated. No further needs noted.

## 2024-12-30 LAB
BACTERIA BLD CULT: NORMAL
BACTERIA BLD CULT: NORMAL

## 2025-01-09 NOTE — PHYSICIAN QUERY
Please clarify the Acute hypoxemic respiratory failure diagnosis.    Acute respiratory failure with hypoxia

## 2025-01-13 ENCOUNTER — HOSPITAL ENCOUNTER (OUTPATIENT)
Dept: RADIOLOGY | Facility: HOSPITAL | Age: 70
Discharge: HOME | End: 2025-01-13
Attending: UROLOGY
Payer: MEDICARE

## 2025-01-13 ENCOUNTER — OFFICE VISIT (OUTPATIENT)
Dept: UROLOGY | Facility: CLINIC | Age: 70
End: 2025-01-13
Payer: MEDICARE

## 2025-01-13 VITALS
SYSTOLIC BLOOD PRESSURE: 110 MMHG | OXYGEN SATURATION: 85 % | HEIGHT: 64 IN | BODY MASS INDEX: 30.21 KG/M2 | DIASTOLIC BLOOD PRESSURE: 57 MMHG | HEART RATE: 105 BPM

## 2025-01-13 DIAGNOSIS — R06.02 SHORTNESS OF BREATH: ICD-10-CM

## 2025-01-13 DIAGNOSIS — R06.02 SHORTNESS OF BREATH: Primary | ICD-10-CM

## 2025-01-13 DIAGNOSIS — E11.42 DIABETIC PERIPHERAL NEUROPATHY ASSOCIATED WITH TYPE 2 DIABETES MELLITUS: ICD-10-CM

## 2025-01-13 DIAGNOSIS — R30.9 PAIN WITH URINATION: ICD-10-CM

## 2025-01-13 DIAGNOSIS — R35.0 FREQUENCY OF URINATION: ICD-10-CM

## 2025-01-13 PROCEDURE — 99213 OFFICE O/P EST LOW 20 MIN: CPT | Mod: S$PBB,,, | Performed by: UROLOGY

## 2025-01-13 PROCEDURE — 99214 OFFICE O/P EST MOD 30 MIN: CPT | Mod: PBBFAC,25 | Performed by: UROLOGY

## 2025-01-13 PROCEDURE — 99999 PR PBB SHADOW E&M-EST. PATIENT-LVL IV: CPT | Mod: PBBFAC,,, | Performed by: UROLOGY

## 2025-01-13 PROCEDURE — 71046 X-RAY EXAM CHEST 2 VIEWS: CPT | Mod: TC

## 2025-01-13 NOTE — PROGRESS NOTES
Assessment:   1. Shortness of breath  -     X-Ray Chest PA And Lateral; Future; Expected date: 01/13/2025    2. Frequency of urination    3. Pain with urination    4. Diabetic peripheral neuropathy associated with type 2 diabetes mellitus         Plan:     The patient has a history of chronic pulmonary disease and is not clear to me whether this is an exacerbation or pneumonia.  I have contacted her primary care physician across the street and discussed that she is short of breath and looks worse than previous evaluations and I have asked for her to be seen today to determine if she needs steroids or antibiotics.  In regards to her lower urinary tract symptoms these have improved and we will plan to see her back in.  6-8 weeks to make sure that the lower urinary tract symptoms have not returned.             Jimbo Rangel MD  Urology  01/13/2025          UROLOGY HISTORY AND PHYSICAL EXAM    Subjective:      Patient ID: Karen Renteria is a 69 y.o. female.    Chief Complaint::   Follow-up  Associated symptoms include arthralgias, coughing and weakness. Pertinent negatives include no abdominal pain, fever, joint swelling, numbness, rash or vomiting.     The patient is a 69-year-old female with a history of recurrent irritative lower urinary tract symptoms that presents today for evaluation reporting that after her last evaluation and treatment her lower urinary tract symptoms and dysuria and urgency have significantly improved.  However she is bothered by shortness of breath and a cough and appears not to be recovering since her evaluation in the emergency room on December 24th.  She denies any gross hematuria.     Past Medical History:   Diagnosis Date    Acute gastric ulcer without hemorrhage or perforation 03/03/2022    Age-related osteoporosis with current pathological fracture 04/21/2021    Benign essential hypertension 08/28/2018    Last Assessment & Plan:   Formatting of this note might be different from  the original.  Well controlled    Carpal tunnel syndrome     Cerebral vascular accident     CKD (chronic kidney disease) stage 5, GFR less than 15 ml/min 12/11/2023    Coronary arteriosclerosis     S/P CABG 2011    COVID-19 12/20/2020    Diabetes mellitus, type 2     Diabetic peripheral neuropathy associated with type 2 diabetes mellitus 04/28/2022    Diverticulitis     Essential tremor 01/20/2022    Falls frequently 12/07/2021    Hyperlipidemia     Hypertension     Insomnia secondary to depression with anxiety     Lumbar radiculopathy     Non-rheumatic aortic stenosis 08/28/2018    Formatting of this note might be different from the original.  S/p Tissue AVR July 2011 ([facility]). Echo October 2017 revealed normally functioning bioprosthesis with normal EF 60-65%     Last Assessment & Plan:   Formatting of this note might be different from the original.  Asymptomatic.    Osteoporosis     Pure hypercholesterolemia 08/28/2018    Formatting of this note might be different from the original.  Chronic statin.       Last Assessment & Plan:   Formatting of this note might be different from the original.   Continue statin.    RBBB 09/08/2021    Formatting of this note might be different from the original.  Initially noted on electrocardiogram in September 2021.    Spinal stenosis, cervical region 01/20/2022    SSS (sick sinus syndrome) 07/12/2022    Last Assessment & Plan:   Formatting of this note might be different from the original.  Post placement of [place] scientific dual-chamber permanent pacemaker by Dr. Freeman on 07/12/2022.    Subclavian artery stenosis 08/28/2018    Formatting of this note might be different from the original.  30% stenosis.    Tremor     Type 2 diabetes mellitus, with long-term current use of insulin 01/13/2021    Vitamin D deficiency      Past Surgical History:   Procedure Laterality Date    APPENDECTOMY      ASCENDING AORTIC ANEURYSM REPAIR W/ TISSUE AORTIC VALVE REPLACEMENT      BLADDER  SUSPENSION Right     CHOLECYSTECTOMY      CORONARY ARTERY BYPASS GRAFT (CABG)      DIRECT LARYNGOSCOPY Bilateral 08/12/2022    Procedure: LARYNGOSCOPY, DIRECT;  Surgeon: Iban Silverio MD;  [facility] OR;  Service: ENT;  Laterality: Bilateral;    HYSTERECTOMY      OOPHORECTOMY      TONSILLECTOMY      VOCAL FOLD LESION EXCISION Bilateral 08/12/2022    Procedure: EXCISION, LESION, VOCAL CORD;  Surgeon: Iban Silverio MD;  Location: [facility];  Service: ENT;  Laterality: Bilateral;        Current Outpatient Medications on File Prior to Visit   Medication Sig Dispense Refill    albuterol-ipratropium (DUO-NEB) 2.5 mg-0.5 mg/3 mL nebulizer solution Take 3 mLs by nebulization every 6 (six) hours. Rescue 75 mL 0    aspirin (ECOTRIN) 81 MG EC tablet Take 81 mg by mouth once daily.      furosemide (LASIX) 40 MG tablet Take 1 tablet (40 mg total) by mouth once daily. 30 tablet 11    hydroCHLOROthiazide (HYDRODIURIL) 12.5 MG Tab Take 12.5 mg by mouth once daily.      insulin degludec (TRESIBA FLEXTOUCH U-200) 200 unit/mL (3 mL) insulin pen 52 units in the morning and 32 units in the evening      isosorbide dinitrate (ISORDIL) 30 MG Tab Take 30 mg by mouth once daily.      levothyroxine (SYNTHROID) 25 MCG tablet Take 25 mcg by mouth before breakfast.      meclizine (ANTIVERT) 25 mg tablet Take 1 tablet (25 mg total) by mouth 3 (three) times daily as needed for Dizziness. (Patient taking differently: Take 25 mg by mouth daily as needed for Dizziness.) 90 tablet 11    multivitamin (THERAGRAN) per tablet Take 1 tablet by mouth once daily.      pantoprazole (PROTONIX) 40 MG tablet TAKE 1 TABLET BY MOUTH ONCE DAILY PRIOR TO SUPPER      pregabalin (LYRICA) 75 MG capsule Take 75 mg by mouth 2 (two) times daily.      primidone (MYSOLINE) 50 MG Tab Take 2 tablets (100 mg total) by mouth 3 (three) times daily. (Patient taking differently: Take 100 mg by mouth 2 (two) times a day.) 540 tablet 3    rosuvastatin (CRESTOR) 40  MG Tab Take 40 mg by mouth once daily.      semaglutide (OZEMPIC) 0.25 mg or 0.5 mg(2 mg/1.5 mL) pen injector Inject 0.25 mg into the skin.      sertraline (ZOLOFT) 50 MG tablet TAKE ONE TABLET BY MOUTH EVERY DAY (Patient taking differently: Take 100 mg by mouth once daily.) 90 tablet 3     No current facility-administered medications on file prior to visit.        Review of patient's allergies indicates:   Allergen Reactions    Cephalexin Rash    Bactrim [sulfamethoxazole-trimethoprim]     Zofran [ondansetron hcl] Itching     Vitals:    01/13/25 0823   BP: (!) 110/57   Pulse: 105          Review of Systems   Constitutional:  Positive for activity change. Negative for fever.   Respiratory:  Positive for cough, shortness of breath and wheezing.    Gastrointestinal:  Negative for abdominal pain and vomiting.   Genitourinary:  Positive for urgency. Negative for dysuria and hematuria.   Musculoskeletal:  Positive for arthralgias, back pain and gait problem. Negative for joint swelling.   Skin:  Negative for rash.   Neurological:  Positive for weakness. Negative for numbness.   Psychiatric/Behavioral:  Positive for sleep disturbance.       Objective:     Physical Exam  Vitals reviewed.   Constitutional:       General: She is not in acute distress.     Appearance: She is ill-appearing. She is not diaphoretic.   Eyes:      Conjunctiva/sclera: Conjunctivae normal.   Cardiovascular:      Rate and Rhythm: Tachycardia present.   Pulmonary:      Breath sounds: Wheezing, rhonchi and rales present.   Abdominal:      General: There is no distension.      Palpations: There is no mass.      Tenderness: There is no abdominal tenderness.   Musculoskeletal:         General: No deformity.   Skin:     General: Skin is warm.   Neurological:      General: No focal deficit present.      Mental Status: She is alert. Mental status is at baseline.   Psychiatric:         Behavior: Behavior normal.          CMP  Sodium   Date Value Ref Range  Status   12/27/2024 137 136 - 145 mmol/L Final   05/17/2024 143 136 - 145 mmol/L Final     Potassium   Date Value Ref Range Status   12/27/2024 4.0 3.5 - 5.1 mmol/L Final   05/17/2024 4.4 3.5 - 5.1 mmol/L Final     Chloride   Date Value Ref Range Status   12/27/2024 98 98 - 107 mmol/L Final   05/17/2024 108 100 - 109 mmol/L Final     CO2   Date Value Ref Range Status   12/27/2024 26 23 - 31 mmol/L Final     Carbon Dioxide   Date Value Ref Range Status   05/17/2024 25 22 - 33 mmol/L Final     Glucose   Date Value Ref Range Status   12/27/2024 184 (H) 82 - 115 mg/dL Final     BUN   Date Value Ref Range Status   12/27/2024 63 (H) 10 - 20 mg/dL Final     Blood Urea Nitrogen   Date Value Ref Range Status   05/17/2024 40 (H) 5 - 25 mg/dL Final     Creatinine   Date Value Ref Range Status   12/27/2024 3.21 (H) 0.55 - 1.02 mg/dL Final   05/17/2024 2.53 (H) 0.55 - 1.02 mg/dL Final     Calcium   Date Value Ref Range Status   12/27/2024 8.9 8.4 - 10.2 mg/dL Final   05/17/2024 9.1 8.8 - 10.6 mg/dL Final     Total Protein   Date Value Ref Range Status   12/24/2024 7.3 5.8 - 7.6 g/dL Final     Albumin   Date Value Ref Range Status   12/24/2024 3.1 (L) 3.4 - 4.8 g/dL Final     Bilirubin, Total   Date Value Ref Range Status   12/24/2024 0.7 <=1.5 mg/dL Final     Alk Phos   Date Value Ref Range Status   12/24/2024 92 40 - 150 U/L Final     AST   Date Value Ref Range Status   12/24/2024 33 5 - 34 U/L Final     ALT   Date Value Ref Range Status   12/24/2024 13 <=55 U/L Final     Anion Gap   Date Value Ref Range Status   12/27/2024 17 (H) 7 - 16 mmol/L Final   05/17/2024 10 8 - 16 mmol/L Final     eGFR   Date Value Ref Range Status   12/27/2024 15 (L) >=60 mL/min/1.73m2 Final      BMP  Lab Results   Component Value Date     12/27/2024    K 4.0 12/27/2024    CL 98 12/27/2024    CO2 26 12/27/2024    BUN 63 (H) 12/27/2024    CREATININE 3.21 (H) 12/27/2024    CALCIUM 8.9 12/27/2024    ANIONGAP 17 (H) 12/27/2024    EGFRNORACEVR 15 (L)  12/27/2024

## 2025-02-24 ENCOUNTER — OFFICE VISIT (OUTPATIENT)
Dept: UROLOGY | Facility: CLINIC | Age: 70
End: 2025-02-24
Payer: MEDICARE

## 2025-02-24 VITALS
BODY MASS INDEX: 28.17 KG/M2 | HEIGHT: 64 IN | WEIGHT: 165 LBS | SYSTOLIC BLOOD PRESSURE: 96 MMHG | OXYGEN SATURATION: 88 % | DIASTOLIC BLOOD PRESSURE: 42 MMHG | HEART RATE: 84 BPM

## 2025-02-24 DIAGNOSIS — R30.0 DYSURIA: Primary | ICD-10-CM

## 2025-02-24 DIAGNOSIS — R31.9 URINARY TRACT INFECTION WITH HEMATURIA, SITE UNSPECIFIED: ICD-10-CM

## 2025-02-24 DIAGNOSIS — N39.0 RECURRENT UTI: ICD-10-CM

## 2025-02-24 DIAGNOSIS — R82.81 PYURIA: ICD-10-CM

## 2025-02-24 DIAGNOSIS — N39.0 URINARY TRACT INFECTION WITH HEMATURIA, SITE UNSPECIFIED: ICD-10-CM

## 2025-02-24 PROCEDURE — 99214 OFFICE O/P EST MOD 30 MIN: CPT | Mod: PBBFAC | Performed by: UROLOGY

## 2025-02-24 PROCEDURE — 99999 PR PBB SHADOW E&M-EST. PATIENT-LVL IV: CPT | Mod: PBBFAC,,, | Performed by: UROLOGY

## 2025-02-24 PROCEDURE — 99214 OFFICE O/P EST MOD 30 MIN: CPT | Mod: S$PBB,,, | Performed by: UROLOGY

## 2025-02-24 PROCEDURE — 87086 URINE CULTURE/COLONY COUNT: CPT | Mod: ,,, | Performed by: CLINICAL MEDICAL LABORATORY

## 2025-02-24 RX ORDER — CEFDINIR 300 MG/1
300 CAPSULE ORAL NIGHTLY
Qty: 30 CAPSULE | Refills: 0 | Status: SHIPPED | OUTPATIENT
Start: 2025-02-24 | End: 2025-03-26

## 2025-02-24 NOTE — PROGRESS NOTES
Assessment:   1. Dysuria    2. Pyuria  -     Cystoscopy; Future; Expected date: 04/14/2025  -     Prior authorization Order    3. Recurrent UTI  -     Cystoscopy; Future; Expected date: 04/14/2025  -     Prior authorization Order  -     cefdinir (OMNICEF) 300 MG capsule; Take 1 capsule (300 mg total) by mouth every evening.  Dispense: 30 capsule; Refill: 0         Plan:     We will send today's urine for culture and start her on broad-spectrum therapy with cefdinir 1 capsule 300 mg every evening for the next 30 days with the anticipation of performing diagnostic cystoscopy in 6 weeks.  We discussed potential etiologies for the pyuria and symptoms it could be inflammation of the bladder versus a neoplastic process or simply an infectious process and the plan is to start broad-spectrum antibiotics for 30 days as a mechanism to treat the infection as there is a possibility of cystitis cystica.     Plan:  Submit urine for culture  Start 300 mg cefdinir every night 30 days  Schedule diagnostic cystoscopy in 6 weeks          Jimbo Rangel MD  Urology  02/24/2025          UROLOGY HISTORY AND PHYSICAL EXAM    Subjective:      Patient ID: Karen Renteria is a 69 y.o. female.    Chief Complaint::   Follow-up  Associated symptoms include arthralgias. Pertinent negatives include no fever or vomiting.     The patient is a 69-year-old female that presents today for follow-up.  She was treated for bilateral pneumonia on Levaquin and reports that despite completing Levaquin she continues to have urgency the sensation of incomplete bladder emptying and occasionally in 1 setting goes 2-3 times in the morning.  She reports that she is voiding small amounts without gross hematuria but dysuria.  She reports she is not able to leave work station for bathroom breaks and sometimes has to hold her urine for a longer amount of time.  She denies any fevers or flank pain    Past Medical History:   Diagnosis Date    Acute gastric ulcer  without hemorrhage or perforation 03/03/2022    Age-related osteoporosis with current pathological fracture 04/21/2021    Benign essential hypertension 08/28/2018    Last Assessment & Plan:   Formatting of this note might be different from the original.  Well controlled    Carpal tunnel syndrome     Cerebral vascular accident     CKD (chronic kidney disease) stage 5, GFR less than 15 ml/min 12/11/2023    Coronary arteriosclerosis     S/P CABG 2011    COVID-19 12/20/2020    Diabetes mellitus, type 2     Diabetic peripheral neuropathy associated with type 2 diabetes mellitus 04/28/2022    Diverticulitis     Essential tremor 01/20/2022    Falls frequently 12/07/2021    Hyperlipidemia     Hypertension     Insomnia secondary to depression with anxiety     Lumbar radiculopathy     Non-rheumatic aortic stenosis 08/28/2018    Formatting of this note might be different from the original.  S/p Tissue AVR July 2011 ([facility]). Echo October 2017 revealed normally functioning bioprosthesis with normal EF 60-65%     Last Assessment & Plan:   Formatting of this note might be different from the original.  Asymptomatic.    Osteoporosis     Pure hypercholesterolemia 08/28/2018    Formatting of this note might be different from the original.  Chronic statin.       Last Assessment & Plan:   Formatting of this note might be different from the original.   Continue statin.    RBBB 09/08/2021    Formatting of this note might be different from the original.  Initially noted on electrocardiogram in September 2021.    Spinal stenosis, cervical region 01/20/2022    SSS (sick sinus syndrome) 07/12/2022    Last Assessment & Plan:   Formatting of this note might be different from the original.  Post placement of [place] scientific dual-chamber permanent pacemaker by Dr. Freeman on 07/12/2022.    Subclavian artery stenosis 08/28/2018    Formatting of this note might be different from the original.  30% stenosis.     Tremor     Type 2 diabetes mellitus, with long-term current use of insulin 01/13/2021    Vitamin D deficiency      Past Surgical History:   Procedure Laterality Date    APPENDECTOMY      ASCENDING AORTIC ANEURYSM REPAIR W/ TISSUE AORTIC VALVE REPLACEMENT      BLADDER SUSPENSION Right     CHOLECYSTECTOMY      CORONARY ARTERY BYPASS GRAFT (CABG)      DIRECT LARYNGOSCOPY Bilateral 08/12/2022    Procedure: LARYNGOSCOPY, DIRECT;  Surgeon: Iban Silverio MD;  Location: [facility] OR;  Service: ENT;  Laterality: Bilateral;    HYSTERECTOMY      OOPHORECTOMY      TONSILLECTOMY      VOCAL FOLD LESION EXCISION Bilateral 08/12/2022    Procedure: EXCISION, LESION, VOCAL CORD;  Surgeon: Iban Silverio MD;  Location: [facility] OR;  Service: ENT;  Laterality: Bilateral;        Medications Ordered Prior to Encounter[1]     Review of patient's allergies indicates:   Allergen Reactions    Cephalexin Rash    Bactrim [sulfamethoxazole-trimethoprim]     Zofran [ondansetron hcl] Itching     Vitals:    02/24/25 0757   BP: (!) 96/42   Pulse: 84          Review of Systems   Constitutional:  Negative for fever.   Respiratory:  Negative for shortness of breath.    Cardiovascular:  Negative for leg swelling.   Gastrointestinal:  Negative for abdominal distention and vomiting.   Genitourinary:  Positive for dysuria, frequency and urgency.   Musculoskeletal:  Positive for arthralgias and back pain.   Skin:  Negative for wound.   Neurological:  Negative for seizures and light-headedness.   Psychiatric/Behavioral:  Positive for sleep disturbance.       Objective:     Physical Exam  Vitals reviewed.   Constitutional:       Appearance: Normal appearance. She is not diaphoretic.   HENT:      Head: Normocephalic.   Eyes:      General: No scleral icterus.  Cardiovascular:      Rate and Rhythm: Normal rate.   Pulmonary:      Effort: Pulmonary effort is normal. No respiratory distress.      Breath sounds: No wheezing.   Abdominal:       General: There is no distension.   Musculoskeletal:         General: No deformity.   Skin:     Findings: No erythema.   Neurological:      General: No focal deficit present.      Mental Status: She is alert. Mental status is at baseline.   Psychiatric:         Thought Content: Thought content normal.         Judgment: Judgment normal.       CMP  Sodium   Date Value Ref Range Status   12/27/2024 137 136 - 145 mmol/L Final   05/17/2024 143 136 - 145 mmol/L Final     Potassium   Date Value Ref Range Status   12/27/2024 4.0 3.5 - 5.1 mmol/L Final   05/17/2024 4.4 3.5 - 5.1 mmol/L Final     Chloride   Date Value Ref Range Status   12/27/2024 98 98 - 107 mmol/L Final   05/17/2024 108 100 - 109 mmol/L Final     CO2   Date Value Ref Range Status   12/27/2024 26 23 - 31 mmol/L Final     Carbon Dioxide   Date Value Ref Range Status   05/17/2024 25 22 - 33 mmol/L Final     Glucose   Date Value Ref Range Status   12/27/2024 184 (H) 82 - 115 mg/dL Final     BUN   Date Value Ref Range Status   12/27/2024 63 (H) 10 - 20 mg/dL Final     Blood Urea Nitrogen   Date Value Ref Range Status   05/17/2024 40 (H) 5 - 25 mg/dL Final     Creatinine   Date Value Ref Range Status   12/27/2024 3.21 (H) 0.55 - 1.02 mg/dL Final   05/17/2024 2.53 (H) 0.55 - 1.02 mg/dL Final     Calcium   Date Value Ref Range Status   12/27/2024 8.9 8.4 - 10.2 mg/dL Final   05/17/2024 9.1 8.8 - 10.6 mg/dL Final     Total Protein   Date Value Ref Range Status   12/24/2024 7.3 5.8 - 7.6 g/dL Final     Albumin   Date Value Ref Range Status   12/24/2024 3.1 (L) 3.4 - 4.8 g/dL Final     Bilirubin, Total   Date Value Ref Range Status   12/24/2024 0.7 <=1.5 mg/dL Final     Alk Phos   Date Value Ref Range Status   12/24/2024 92 40 - 150 U/L Final     AST   Date Value Ref Range Status   12/24/2024 33 5 - 34 U/L Final     ALT   Date Value Ref Range Status   12/24/2024 13 <=55 U/L Final     Anion Gap   Date Value Ref Range Status   12/27/2024 17 (H) 7 - 16 mmol/L Final    05/17/2024 10 8 - 16 mmol/L Final     eGFR   Date Value Ref Range Status   12/27/2024 15 (L) >=60 mL/min/1.73m2 Final      BMP  Lab Results   Component Value Date     12/27/2024    K 4.0 12/27/2024    CL 98 12/27/2024    CO2 26 12/27/2024    BUN 63 (H) 12/27/2024    CREATININE 3.21 (H) 12/27/2024    CALCIUM 8.9 12/27/2024    ANIONGAP 17 (H) 12/27/2024    EGFRNORACEVR 15 (L) 12/27/2024                [1]  Current Outpatient Medications on File Prior to Visit   Medication Sig Dispense Refill    albuterol-ipratropium (DUO-NEB) 2.5 mg-0.5 mg/3 mL nebulizer solution Take 3 mLs by nebulization every 6 (six) hours. Rescue 75 mL 0    aspirin (ECOTRIN) 81 MG EC tablet Take 81 mg by mouth once daily.      furosemide (LASIX) 40 MG tablet Take 1 tablet (40 mg total) by mouth once daily. 30 tablet 11    hydroCHLOROthiazide (HYDRODIURIL) 12.5 MG Tab Take 12.5 mg by mouth once daily.      insulin degludec (TRESIBA FLEXTOUCH U-200) 200 unit/mL (3 mL) insulin pen 52 units in the morning and 32 units in the evening      isosorbide dinitrate (ISORDIL) 30 MG Tab Take 30 mg by mouth once daily.      levothyroxine (SYNTHROID) 25 MCG tablet Take 25 mcg by mouth before breakfast.      meclizine (ANTIVERT) 25 mg tablet Take 1 tablet (25 mg total) by mouth 3 (three) times daily as needed for Dizziness. (Patient taking differently: Take 25 mg by mouth 2 (two) times daily as needed for Dizziness.) 90 tablet 11    multivitamin (THERAGRAN) per tablet Take 1 tablet by mouth once daily.      pantoprazole (PROTONIX) 40 MG tablet TAKE 1 TABLET BY MOUTH ONCE DAILY PRIOR TO SUPPER      pregabalin (LYRICA) 75 MG capsule Take 75 mg by mouth 2 (two) times daily.      primidone (MYSOLINE) 50 MG Tab Take 2 tablets (100 mg total) by mouth 3 (three) times daily. (Patient taking differently: Take 100 mg by mouth 2 (two) times a day.) 540 tablet 3    rosuvastatin (CRESTOR) 40 MG Tab Take 40 mg by mouth once daily.      semaglutide  (OZEMPIC) 0.25 mg or 0.5 mg(2 mg/1.5 mL) pen injector Inject 0.25 mg into the skin.      sertraline (ZOLOFT) 50 MG tablet TAKE ONE TABLET BY MOUTH EVERY DAY 90 tablet 3     No current facility-administered medications on file prior to visit.

## 2025-02-27 LAB — UA COMPLETE W REFLEX CULTURE PNL UR: NORMAL

## 2025-04-25 DIAGNOSIS — N39.0 RECURRENT UTI: ICD-10-CM

## 2025-04-25 DIAGNOSIS — R30.0 DYSURIA: Primary | ICD-10-CM

## 2025-05-28 ENCOUNTER — PROCEDURE VISIT (OUTPATIENT)
Dept: UROLOGY | Facility: CLINIC | Age: 70
End: 2025-05-28
Payer: MEDICARE

## 2025-05-28 ENCOUNTER — HOSPITAL ENCOUNTER (OUTPATIENT)
Dept: RADIOLOGY | Facility: HOSPITAL | Age: 70
Discharge: HOME | End: 2025-05-28
Attending: UROLOGY
Payer: MEDICARE

## 2025-05-28 VITALS
SYSTOLIC BLOOD PRESSURE: 133 MMHG | HEART RATE: 80 BPM | OXYGEN SATURATION: 98 % | BODY MASS INDEX: 28.17 KG/M2 | WEIGHT: 165 LBS | HEIGHT: 64 IN | DIASTOLIC BLOOD PRESSURE: 50 MMHG

## 2025-05-28 DIAGNOSIS — D49.4 BLADDER TUMOR: ICD-10-CM

## 2025-05-28 DIAGNOSIS — D49.4 BLADDER TUMOR: Primary | ICD-10-CM

## 2025-05-28 DIAGNOSIS — R31.29 OTHER MICROSCOPIC HEMATURIA: ICD-10-CM

## 2025-05-28 DIAGNOSIS — J96.01 ACUTE HYPOXEMIC RESPIRATORY FAILURE: ICD-10-CM

## 2025-05-28 DIAGNOSIS — N39.0 RECURRENT UTI: ICD-10-CM

## 2025-05-28 DIAGNOSIS — R39.15 URINARY URGENCY: ICD-10-CM

## 2025-05-28 DIAGNOSIS — N39.0 URINARY TRACT INFECTION WITHOUT HEMATURIA, SITE UNSPECIFIED: ICD-10-CM

## 2025-05-28 PROCEDURE — 87086 URINE CULTURE/COLONY COUNT: CPT | Mod: ,,, | Performed by: CLINICAL MEDICAL LABORATORY

## 2025-05-28 PROCEDURE — 71046 X-RAY EXAM CHEST 2 VIEWS: CPT | Mod: TC

## 2025-05-28 PROCEDURE — 52000 CYSTOURETHROSCOPY: CPT | Mod: PBBFAC | Performed by: UROLOGY

## 2025-05-28 PROCEDURE — 52000 CYSTOURETHROSCOPY: CPT | Mod: S$PBB,,, | Performed by: UROLOGY

## 2025-05-28 PROCEDURE — 99499 UNLISTED E&M SERVICE: CPT | Mod: S$PBB,,, | Performed by: UROLOGY

## 2025-05-28 NOTE — PROCEDURES
Assessment:   1. Bladder tumor  -     Basic Metabolic Panel; Future; Expected date: 05/28/2025  -     CBC Auto Differential; Future; Expected date: 05/28/2025  -     CT Abdomen Pelvis  Without Contrast; Future; Expected date: 05/28/2025  -     Case Request Operating Room: TURBT (TRANSURETHRAL RESECTION OF BLADDER TUMOR)  -     EKG 12-lead; Future; Expected date: 05/28/2025  -     X-Ray Chest PA And Lateral; Future; Expected date: 05/28/2025    2. Other microscopic hematuria  -     Basic Metabolic Panel; Future; Expected date: 05/28/2025  -     CBC Auto Differential; Future; Expected date: 05/28/2025    3. Urinary urgency  -     Cystoscopy  -     Basic Metabolic Panel; Future; Expected date: 05/28/2025  -     CBC Auto Differential; Future; Expected date: 05/28/2025    4. Recurrent UTI  -     Cystoscopy  -     Basic Metabolic Panel; Future; Expected date: 05/28/2025  -     CBC Auto Differential; Future; Expected date: 05/28/2025  -     CT Abdomen Pelvis  Without Contrast; Future; Expected date: 05/28/2025    5. Acute hypoxemic respiratory failure         Plan:     Specimens: None                   Complications: None; patient tolerated the procedure well            Disposition: Home after brief observation     Condition: Stable     Plan:  We discussed the multifocal abnormalities in the bladder and concern for bladder cancer and will schedule a CT scan of the abdomen and pelvis without contrast based on history of renal insufficiency check a CBC and a basic metabolic panel and schedule a chest x-ray and EKG in anticipation for cystoscopy under general anesthesia with Transurethral resection of bladder tumors and bladder biopsies.            Jimbo Rangel MD  Urology  10/16/2024         UROLOGY HISTORY AND PHYSICAL EXAM    Subjective:      Patient ID: Karen Renteria is a 69 y.o. female.    Chief Complaint:  HPI    The patient is a 69-year-old female with a history of hematuria and lower urinary tract symptoms.   She reports urgency and frequency and for episode nocturia.  She denies any dysuria or gross hematuria.  She presents today for cystoscopy.  These symptoms have been ongoing for several months..  She reports that recently they have made changes to her routine in regards to oxygen for nighttime use.       Past Medical History:   Diagnosis Date    Acute gastric ulcer without hemorrhage or perforation 03/03/2022    Age-related osteoporosis with current pathological fracture 04/21/2021    Benign essential hypertension 08/28/2018    Last Assessment & Plan:   Formatting of this note might be different from the original.  Well controlled    Carpal tunnel syndrome     Cerebral vascular accident     CKD (chronic kidney disease) stage 5, GFR less than 15 ml/min 12/11/2023    Coronary arteriosclerosis     S/P CABG 2011    COVID-19 12/20/2020    Diabetes mellitus, type 2     Diabetic peripheral neuropathy associated with type 2 diabetes mellitus 04/28/2022    Diverticulitis     Essential tremor 01/20/2022    Falls frequently 12/07/2021    Hyperlipidemia     Hypertension     Insomnia secondary to depression with anxiety     Lumbar radiculopathy     Non-rheumatic aortic stenosis 08/28/2018    Formatting of this note might be different from the original.  S/p Tissue AVR July 2011 ([facility]). Echo October 2017 revealed normally functioning bioprosthesis with normal EF 60-65%     Last Assessment & Plan:   Formatting of this note might be different from the original.  Asymptomatic.    Osteoporosis     Pure hypercholesterolemia 08/28/2018    Formatting of this note might be different from the original.  Chronic statin.       Last Assessment & Plan:   Formatting of this note might be different from the original.   Continue statin.    RBBB 09/08/2021    Formatting of this note might be different from the original.  Initially noted on electrocardiogram in September 2021.    Spinal stenosis, cervical region 01/20/2022    SSS (sick  sinus syndrome) 07/12/2022    Last Assessment & Plan:   Formatting of this note might be different from the original.  Post placement of [place] scientific dual-chamber permanent pacemaker by Dr. Freeman on 07/12/2022.    Subclavian artery stenosis 08/28/2018    Formatting of this note might be different from the original.  30% stenosis.    Tremor     Type 2 diabetes mellitus, with long-term current use of insulin 01/13/2021    Vitamin D deficiency      Past Surgical History:   Procedure Laterality Date    APPENDECTOMY      ASCENDING AORTIC ANEURYSM REPAIR W/ TISSUE AORTIC VALVE REPLACEMENT      BLADDER SUSPENSION Right     CHOLECYSTECTOMY      CORONARY ARTERY BYPASS GRAFT (CABG)      DIRECT LARYNGOSCOPY Bilateral 08/12/2022    Procedure: LARYNGOSCOPY, DIRECT;  Surgeon: Iban Silverio MD;  Location: [email or web address] OR;  Service: ENT;  Laterality: Bilateral;    HYSTERECTOMY      OOPHORECTOMY      TONSILLECTOMY      VOCAL FOLD LESION EXCISION Bilateral 08/12/2022    Procedure: EXCISION, LESION, VOCAL CORD;  Surgeon: Iban Silverio MD;  Location: [facility] [[email or web address] OR;  Service: ENT;  Laterality: Bilateral;         Medications Ordered Prior to Encounter[1]     Review of patient's allergies indicates:   Allergen Reactions    Cephalexin Rash    Bactrim [sulfamethoxazole-trimethoprim]     Zofran [ondansetron hcl] Itching     Vitals:    05/28/25 1407   BP: (!) 133/50   Pulse: 80          Objective:     Physical Exam  Vitals reviewed.   Eyes:      General: No scleral icterus.  Cardiovascular:      Rate and Rhythm: Normal rate.   Pulmonary:      Effort: No respiratory distress.      Breath sounds: No wheezing.   Abdominal:      General: There is no distension.      Palpations: Abdomen is soft.      Tenderness: There is no abdominal tenderness.   Genitourinary:     Vagina: No vaginal discharge.   Musculoskeletal:         General: No deformity.   Skin:     Findings: No bruising or erythema.   Neurological:      General: No focal deficit  present.      Mental Status: She is alert. Mental status is at baseline.      Motor: No weakness.      Gait: Gait normal.          Procedure:     Office Cystoscopy Procedure Note    Date of Procedure: 05/28/2025    Indication:  Hematuria      Informed consent:  The risks, benefits, complications, treatment options, and expected outcomes were discussed with the patient. The patient concurred with the proposed plan and provided informed consent.     Anesthesia: Lidocaine jelly 2%          Procedure:  The patient was placed in the lithotomy position, was prepped and draped in the usual manner using sterile technique, and 2% lidocaine jelly instilled into the urethra.  A 17 F flexible cystoscope was then inserted into the urethra and the urethra and bladder carefully examined.  The following findings were noted:       Findings:   Urethra:  Normal     Bladder:  2 cm erythematous lesion with ulceration left posterior wall; 1 cm lesion posterior wall with hypervascularity; 1 cm lesion left lateral wall with hypervascularity    Ureteral orifices:       -Right: Normal       -Left: Normal                   [1]   Current Outpatient Medications on File Prior to Visit   Medication Sig Dispense Refill    albuterol-ipratropium (DUO-NEB) 2.5 mg-0.5 mg/3 mL nebulizer solution Take 3 mLs by nebulization every 6 (six) hours. Rescue 75 mL 0    aspirin (ECOTRIN) 81 MG EC tablet Take 81 mg by mouth once daily.      furosemide (LASIX) 40 MG tablet Take 1 tablet (40 mg total) by mouth once daily. 30 tablet 11    insulin degludec (TRESIBA FLEXTOUCH U-200) 200 unit/mL (3 mL) insulin pen 52 units in the morning and 32 units in the evening      isosorbide dinitrate (ISORDIL) 30 MG Tab Take 30 mg by mouth once daily.      meclizine (ANTIVERT) 25 mg tablet Take 1 tablet (25 mg total) by mouth 3 (three) times daily as needed for Dizziness. (Patient taking differently: Take 25 mg by mouth 2 (two) times daily as needed for Dizziness.) 90 tablet 11     multivitamin (THERAGRAN) per tablet Take 1 tablet by mouth once daily.      pantoprazole (PROTONIX) 40 MG tablet TAKE 1 TABLET BY MOUTH ONCE DAILY PRIOR TO SUPPER      pregabalin (LYRICA) 75 MG capsule Take 75 mg by mouth 2 (two) times daily.      primidone (MYSOLINE) 50 MG Tab Take 2 tablets (100 mg total) by mouth 3 (three) times daily. (Patient taking differently: Take 100 mg by mouth 2 (two) times a day.) 540 tablet 3    rosuvastatin (CRESTOR) 40 MG Tab Take 40 mg by mouth once daily.      semaglutide (OZEMPIC) 0.25 mg or 0.5 mg(2 mg/1.5 mL) pen injector Inject 0.25 mg into the skin.      sertraline (ZOLOFT) 50 MG tablet TAKE ONE TABLET BY MOUTH EVERY DAY 90 tablet 3    hydroCHLOROthiazide (HYDRODIURIL) 12.5 MG Tab Take 12.5 mg by mouth once daily. (Patient not taking: Reported on 5/28/2025)      levothyroxine (SYNTHROID) 25 MCG tablet Take 25 mcg by mouth before breakfast. (Patient not taking: Reported on 5/28/2025)       No current facility-administered medications on file prior to visit.

## 2025-05-31 LAB — UA COMPLETE W REFLEX CULTURE PNL UR: NO GROWTH

## 2025-06-02 ENCOUNTER — TELEPHONE (OUTPATIENT)
Dept: UROLOGY | Facility: CLINIC | Age: 70
End: 2025-06-02
Payer: MEDICARE

## 2025-06-02 ENCOUNTER — HOSPITAL ENCOUNTER (EMERGENCY)
Facility: HOSPITAL | Age: 70
Discharge: HOME | End: 2025-06-02
Attending: EMERGENCY MEDICINE
Payer: MEDICARE

## 2025-06-02 VITALS
TEMPERATURE: 98 F | WEIGHT: 165 LBS | SYSTOLIC BLOOD PRESSURE: 128 MMHG | DIASTOLIC BLOOD PRESSURE: 49 MMHG | OXYGEN SATURATION: 96 % | RESPIRATION RATE: 17 BRPM | HEART RATE: 63 BPM | BODY MASS INDEX: 28.17 KG/M2 | HEIGHT: 64 IN

## 2025-06-02 DIAGNOSIS — G45.9 TIA (TRANSIENT ISCHEMIC ATTACK): Primary | ICD-10-CM

## 2025-06-02 DIAGNOSIS — R53.1 WEAKNESS: ICD-10-CM

## 2025-06-02 LAB
ALBUMIN SERPL BCP-MCNC: 3.8 G/DL (ref 3.4–4.8)
ALBUMIN/GLOB SERPL: 1.1 {RATIO}
ALP SERPL-CCNC: 89 U/L (ref 40–150)
ALT SERPL W P-5'-P-CCNC: 13 U/L
ANION GAP SERPL CALCULATED.3IONS-SCNC: 14 MMOL/L (ref 7–16)
ANISOCYTOSIS BLD QL SMEAR: ABNORMAL
APTT PPP: 31.3 SECONDS (ref 25.2–37.3)
AST SERPL W P-5'-P-CCNC: 47 U/L (ref 11–45)
BASOPHILS # BLD AUTO: 0.08 K/UL (ref 0–0.2)
BASOPHILS NFR BLD AUTO: 0.7 % (ref 0–1)
BILIRUB SERPL-MCNC: 0.5 MG/DL
BUN SERPL-MCNC: 17 MG/DL (ref 10–20)
BUN/CREAT SERPL: 10 (ref 6–20)
CALCIUM SERPL-MCNC: 8.9 MG/DL (ref 8.4–10.2)
CHLORIDE SERPL-SCNC: 108 MMOL/L (ref 98–107)
CO2 SERPL-SCNC: 21 MMOL/L (ref 23–31)
CREAT SERPL-MCNC: 1.71 MG/DL (ref 0.55–1.02)
DIFFERENTIAL METHOD BLD: ABNORMAL
EGFR (NO RACE VARIABLE) (RUSH/TITUS): 32 ML/MIN/1.73M2
EOSINOPHIL # BLD AUTO: 0.51 K/UL (ref 0–0.5)
EOSINOPHIL NFR BLD AUTO: 4.6 % (ref 1–4)
ERYTHROCYTE [DISTWIDTH] IN BLOOD BY AUTOMATED COUNT: 18.1 % (ref 11.5–14.5)
GLOBULIN SER-MCNC: 3.6 G/DL (ref 2–4)
GLUCOSE SERPL-MCNC: 111 MG/DL (ref 70–105)
GLUCOSE SERPL-MCNC: 60 MG/DL (ref 70–105)
GLUCOSE SERPL-MCNC: 90 MG/DL (ref 82–115)
HCT VFR BLD AUTO: 39.6 % (ref 38–47)
HGB BLD-MCNC: 12.1 G/DL (ref 12–16)
IMM GRANULOCYTES # BLD AUTO: 0.04 K/UL (ref 0–0.04)
IMM GRANULOCYTES NFR BLD: 0.4 % (ref 0–0.4)
INR BLD: 1.12
LYMPHOCYTES # BLD AUTO: 2.15 K/UL (ref 1–4.8)
LYMPHOCYTES NFR BLD AUTO: 19.5 % (ref 27–41)
MAGNESIUM SERPL-MCNC: 2.1 MG/DL (ref 1.6–2.6)
MCH RBC QN AUTO: 23.3 PG (ref 27–31)
MCHC RBC AUTO-ENTMCNC: 30.6 G/DL (ref 32–36)
MCV RBC AUTO: 76.2 FL (ref 80–96)
MONOCYTES # BLD AUTO: 0.78 K/UL (ref 0–0.8)
MONOCYTES NFR BLD AUTO: 7.1 % (ref 2–6)
MPC BLD CALC-MCNC: ABNORMAL G/DL
NEUTROPHILS # BLD AUTO: 7.46 K/UL (ref 1.8–7.7)
NEUTROPHILS NFR BLD AUTO: 67.7 % (ref 53–65)
NRBC # BLD AUTO: 0 X10E3/UL
NRBC, AUTO (.00): 0 %
NT-PROBNP SERPL-MCNC: 2174 PG/ML (ref 1–125)
OHS QRS DURATION: 152 MS
OHS QTC CALCULATION: 454 MS
OVALOCYTES BLD QL SMEAR: ABNORMAL
PLATELET # BLD AUTO: 211 K/UL (ref 150–400)
PLATELET MORPHOLOGY: ABNORMAL
POTASSIUM SERPL-SCNC: 4 MMOL/L (ref 3.5–5.1)
PROT SERPL-MCNC: 7.4 G/DL (ref 5.8–7.6)
PROTHROMBIN TIME: 14.5 SECONDS (ref 11.7–14.7)
RBC # BLD AUTO: 5.2 M/UL (ref 4.2–5.4)
SODIUM SERPL-SCNC: 139 MMOL/L (ref 136–145)
TROPONIN I SERPL HS-MCNC: 11.9 NG/L
WBC # BLD AUTO: 11.02 K/UL (ref 4.5–11)

## 2025-06-02 PROCEDURE — 93005 ELECTROCARDIOGRAM TRACING: CPT

## 2025-06-02 PROCEDURE — 36415 COLL VENOUS BLD VENIPUNCTURE: CPT | Performed by: EMERGENCY MEDICINE

## 2025-06-02 PROCEDURE — 85610 PROTHROMBIN TIME: CPT | Performed by: EMERGENCY MEDICINE

## 2025-06-02 PROCEDURE — 85730 THROMBOPLASTIN TIME PARTIAL: CPT | Performed by: EMERGENCY MEDICINE

## 2025-06-02 PROCEDURE — 82962 GLUCOSE BLOOD TEST: CPT

## 2025-06-02 PROCEDURE — 25000003 PHARM REV CODE 250: Performed by: EMERGENCY MEDICINE

## 2025-06-02 PROCEDURE — 84484 ASSAY OF TROPONIN QUANT: CPT | Performed by: EMERGENCY MEDICINE

## 2025-06-02 PROCEDURE — 85025 COMPLETE CBC W/AUTO DIFF WBC: CPT | Performed by: EMERGENCY MEDICINE

## 2025-06-02 PROCEDURE — 99285 EMERGENCY DEPT VISIT HI MDM: CPT | Mod: 25

## 2025-06-02 PROCEDURE — 83880 ASSAY OF NATRIURETIC PEPTIDE: CPT | Performed by: EMERGENCY MEDICINE

## 2025-06-02 PROCEDURE — 83735 ASSAY OF MAGNESIUM: CPT | Performed by: EMERGENCY MEDICINE

## 2025-06-02 PROCEDURE — 80053 COMPREHEN METABOLIC PANEL: CPT | Performed by: EMERGENCY MEDICINE

## 2025-06-02 RX ORDER — ACETAMINOPHEN 500 MG
1000 TABLET ORAL
Status: COMPLETED | OUTPATIENT
Start: 2025-06-02 | End: 2025-06-02

## 2025-06-02 RX ADMIN — ACETAMINOPHEN 1000 MG: 500 TABLET ORAL at 12:06

## 2025-06-02 NOTE — ED PROVIDER NOTES
Encounter Date: 6/2/2025    SCRIBE #1 NOTE: I, Josephine De Leon, am scribing for, and in the presence of,  Alejandro Reis MD. I have scribed the entire note.         Chief Complaint   Patient presents with    Extremity Weakness     Pt reports being at work this AM when she suddenly had right arm weakness. She reports she was unable to hold anything. During triage pt is able to hold her phone and shows no deficients.  She has a hx of a TIA 10 yrs ago.      Karen Renteria is a 69 y.o. female presenting to the ED with weakness. PT states when she tried to clock into work she was unable to press the numbers. According to a relative, PT sounds groggy and it seems like her memory is impaired. PT denies SOB. PT states that she has surgery schedule for tomorrow.   PT has Hx of Acute gastric ulcer without hemorrhage or perforation, Age-related osteoporosis with current pathological fracture, Benign essential hypertension, Carpal tunnel syndrome, CVA, CKD, Coronary arteriosclerosis, COVID-19, DM, Diabetic peripheral neuropathy, Diverticulitis, Essential tremor,  Falls frequently,  Hyperlipidemia, HTN, Insomnia secondary to depression with anxiety, Lumbar radiculopathy, Non-rheumatic aortic stenosis, Osteoporosis, RBBB, Spinal stenosis, cervical region, SSS, Subclavian artery stenosis, Tremor, and Vitamin D deficiency.     The history is provided by the patient and a relative. No  was used.     Review of patient's allergies indicates:   Allergen Reactions    Cephalexin Rash    Bactrim [sulfamethoxazole-trimethoprim]     Zofran [ondansetron hcl] Itching     Past Medical History:   Diagnosis Date    Acute gastric ulcer without hemorrhage or perforation 03/03/2022    Age-related osteoporosis with current pathological fracture 04/21/2021    Benign essential hypertension 08/28/2018    Last Assessment & Plan:   Formatting of this note might be different from the original.  Well controlled    Carpal tunnel  syndrome     Cerebral vascular accident     CKD (chronic kidney disease) stage 5, GFR less than 15 ml/min 12/11/2023    Coronary arteriosclerosis     S/P CABG 2011    COVID-19 12/20/2020    Diabetes mellitus, type 2     Diabetic peripheral neuropathy associated with type 2 diabetes mellitus 04/28/2022    Diverticulitis     Essential tremor 01/20/2022    Falls frequently 12/07/2021    Hyperlipidemia     Hypertension     Insomnia secondary to depression with anxiety     Lumbar radiculopathy     Non-rheumatic aortic stenosis 08/28/2018    Formatting of this note might be different from the original.  S/p Tissue AVR July 2011 ([facility]). Echo October 2017 revealed normally functioning bioprosthesis with normal EF 60-65%     Last Assessment & Plan:   Formatting of this note might be different from the original.  Asymptomatic.    Osteoporosis     Pure hypercholesterolemia 08/28/2018    Formatting of this note might be different from the original.  Chronic statin.       Last Assessment & Plan:   Formatting of this note might be different from the original.   Continue statin.    RBBB 09/08/2021    Formatting of this note might be different from the original.  Initially noted on electrocardiogram in September 2021.    Spinal stenosis, cervical region 01/20/2022    SSS (sick sinus syndrome) 07/12/2022    Last Assessment & Plan:   Formatting of this note might be different from the original.  Post placement of [employer] dual-chamber permanent pacemaker by Dr. Freeman on 07/12/2022.    Subclavian artery stenosis 08/28/2018    Formatting of this note might be different from the original.  30% stenosis.    Tremor     Type 2 diabetes mellitus, with long-term current use of insulin 01/13/2021    Vitamin D deficiency      Past Surgical History:   Procedure Laterality Date    APPENDECTOMY      ASCENDING AORTIC ANEURYSM REPAIR W/ TISSUE AORTIC VALVE REPLACEMENT      BLADDER SUSPENSION Right     CHOLECYSTECTOMY       CORONARY ARTERY BYPASS GRAFT (CABG)      DIRECT LARYNGOSCOPY Bilateral 08/12/2022    Procedure: LARYNGOSCOPY, DIRECT;  Surgeon: Iban Silverio MD;  Location: [facility] OR;  Service: ENT;  Laterality: Bilateral;    HYSTERECTOMY      OOPHORECTOMY      TONSILLECTOMY      VOCAL FOLD LESION EXCISION Bilateral 08/12/2022    Procedure: EXCISION, LESION, VOCAL CORD;  Surgeon: Iban Silverio MD;  Location: [facility] OR;  Service: ENT;  Laterality: Bilateral;     Family History   Adopted: Yes   Problem Relation Name Age of Onset    Cancer Mother      Breast cancer Mother      Heart disease Father      Breast cancer Sister       Social History[1]  Review of Systems   Respiratory:  Negative for shortness of breath.    Neurological:  Positive for weakness.   All other systems reviewed and are negative.      Physical Exam     Initial Vitals [06/02/25 1013]   BP Pulse Resp Temp SpO2   (!) 151/65 78 20 98 °F (36.7 °C) (!) 92 %      MAP       --         Physical Exam    Vitals reviewed.  Constitutional: She appears well-developed and well-nourished.   HENT:   Head: Normocephalic and atraumatic.   Right Ear: External ear normal.   Left Ear: External ear normal.   Nose: Nose normal. Mouth/Throat: Oropharynx is clear and moist.   Eyes: Conjunctivae and EOM are normal. Pupils are equal, round, and reactive to light.   Neck: Neck supple.   Normal range of motion.  Cardiovascular:  Normal rate, regular rhythm and normal heart sounds.           Pulmonary/Chest: Breath sounds normal.   Abdominal: Abdomen is soft. Bowel sounds are normal.   Musculoskeletal:         General: Normal range of motion.      Cervical back: Normal range of motion and neck supple.     Neurological: She is alert and oriented to person, place, and time. She has normal strength and normal reflexes.   Skin: Skin is warm and dry.   Psychiatric: She has a normal mood and affect. Her behavior is normal. Thought content normal.       ED Course   Procedures  Labs  Reviewed   NT-PRO NATRIURETIC PEPTIDE - Abnormal       Result Value    ProBNP 2,174 (*)    COMPREHENSIVE METABOLIC PANEL - Abnormal    Sodium 139      Potassium 4.0      Chloride 108 (*)     CO2 21 (*)     Anion Gap 14      Glucose 90      BUN 17      Creatinine 1.71 (*)     BUN/Creatinine Ratio 10      Calcium 8.9      Total Protein 7.4      Albumin 3.8      Globulin 3.6      A/G Ratio 1.1      Bilirubin, Total 0.5      Alk Phos 89      ALT 13      AST 47 (*)     eGFR 32 (*)    CBC WITH DIFFERENTIAL - Abnormal    WBC 11.02 (*)     RBC 5.20      Hemoglobin 12.1      Hematocrit 39.6      MCV 76.2 (*)     MCH 23.3 (*)     MCHC 30.6 (*)     RDW 18.1 (*)     Platelet Count 211      MPV        Neutrophils % 67.7 (*)     Lymphocytes % 19.5 (*)     Monocytes % 7.1 (*)     Eosinophils % 4.6 (*)     Basophils % 0.7      Immature Granulocytes % 0.4      nRBC, Auto 0.0      Neutrophils, Abs 7.46      Lymphocytes, Absolute 2.15      Monocytes, Absolute 0.78      Eosinophils, Absolute 0.51 (*)     Basophils, Absolute 0.08      Immature Granulocytes, Absolute 0.04      nRBC, Absolute 0.00      Diff Type Scan Smear     CBC MORPHOLOGY - Abnormal    Platelet Morphology Few Large Platelets (*)     Anisocytosis 1+      Ovalocytes Few     POCT GLUCOSE MONITORING CONTINUOUS - Abnormal    POC Glucose 60 (*)    POCT GLUCOSE MONITORING CONTINUOUS - Abnormal    POC Glucose 111 (*)    APTT - Normal    PTT 31.3     MAGNESIUM - Normal    Magnesium 2.1     PROTIME-INR - Normal    PT 14.5      INR 1.12     TROPONIN I - Normal    Troponin I High Sensitivity 11.9     CBC W/ AUTO DIFFERENTIAL    Narrative:     The following orders were created for panel order CBC auto differential.  Procedure                               Abnormality         Status                     ---------                               -----------         ------                     CBC with Differential[[id number]]       Abnormal            Final result                 Please  view results for these tests on the individual orders.     EKG Readings: (Independently Interpreted)   Sinus arrhythmia with PVC(s) with borderline 1st degree A-V block  Marked right axis deviation  Right bundle branch block  Lateral T wave abnormality is nonspecific  Low QRS voltages in precordial leads  Abnormal ECG        ECG Results              EKG 12-lead (Final result)        Collection Time Result Time QRS Duration OHS QTC Calculation    06/02/25 11:03:44 06/02/25 18:56:40 152 454                     Final result by Interface, Lab In [facility] (06/02/25 18:56:47)                   Narrative:    Test Reason : R53.1,    Vent. Rate :  91 BPM     Atrial Rate :    BPM     P-R Int : 206 ms          QRS Dur : 152 ms      QT Int : 400 ms       P-R-T Axes :  18 164  25 degrees    QTcB Int : 454 ms    Sinus arrhythmia with PVC(s) with borderline 1st degree A-V block  Marked right axis deviation  Right bundle branch block  Lateral T wave abnormality is nonspecific  Low QRS voltages in precordial leads  Abnormal ECG    Confirmed by Sandhya Stokes ([id number]) on 6/2/2025 6:56:38 PM    Referred By: AAAREFERRAL SELF           Confirmed By: Rehmat Kike                                  Imaging Results              X-Ray Chest AP Portable (Final result)  Result time 06/02/25 11:49:30      Final result by Sohail Carter MD (06/02/25 11:49:30)                   Impression:      Stable appearance of the chest compared to 05/28/2025, with no definite evidence of acute cardiopulmonary disease.      Electronically signed by: Sohail Carter  Date:    06/02/2025  Time:    11:49               Narrative:    EXAMINATION:  XR CHEST AP PORTABLE    CLINICAL HISTORY:  Weakness    FINDINGS:  Portable chest radiograph at 10:52 hours compared to prior exams is limited by patient rotation, and shows median sternotomy wires, mediastinal surgical clips, and changes of valvuloplasty.  A dual lead left subclavian CCD has distal lead tips unchanged in  position overlying the right atrium and right ventricle.    The cardiac silhouette is enlarged, stable in size, with the pulmonary vasculature within normal limits.  There are aortic vascular calcifications.  The lungs are symmetrically inflated, with scattered reticulonodular densities throughout both lungs, similar to prior.  No large pleural effusion or pneumothorax.  The bones are diffusely osteopenic.                                       CT Head Without Contrast (Final result)  Result time 06/02/25 11:31:43      Final result by Van Zuñiga MD (06/02/25 11:31:43)                   Impression:      1. No evidence of acute intracranial hemorrhage, mass effect, midline shift.  2. Equivocal small focus of hypoattenuation in the anterior left parietal lobe.  While appearance may be artifactual related to volume averaging of adjacent sulcal CSF, possibility of small infarct not excluded.  Recommend clinical correlation, consideration of MRI for more sensitive and specific assessment, as warranted clinically.      Electronically signed by: Van Zuñiga  Date:    06/02/2025  Time:    11:31               Narrative:    EXAMINATION:  CT HEAD WITHOUT CONTRAST    CLINICAL HISTORY:  Neuro deficit, acute, stroke suspected;    TECHNIQUE:  Low dose axial images were obtained through the head.  Coronal and sagittal reformations were also performed. Contrast was not administered.    COMPARISON:  MRI of the brain performed 02/10/2023. CT head without contrast 04/10/2018.    FINDINGS:  Blood: No acute intracranial hemorrhage.    Parenchyma: Equivocal small focus of hypoattenuation in the anterior left parietal lobe (axial series 2, image 32).  Generalized pattern of age-related parenchymal volume loss noted elsewhere.  Nonspecific areas of white matter hypoattenuation may reflect sequela of chronic small vessel ischemic disease.    Ventricles/Extra-axial spaces: No abnormal extra-axial fluid collection. Basal cisterns are  patent.    Vessels: Moderate to heavy atherosclerotic calcification    Orbits: Status post bilateral lens replacements.    Scalp: Unremarkable.    Skull: There are no depressed skull fractures or destructive bone lesions.    Sinuses and mastoids: Essentially clear.    Other findings: None                                    X-Rays:   Independently Interpreted Readings:   Chest X-Ray: FINDINGS:  Portable chest radiograph at 10:52 hours compared to prior exams is limited by patient rotation, and shows median sternotomy wires, mediastinal surgical clips, and changes of valvuloplasty.  A dual lead left subclavian CCD has distal lead tips unchanged in position overlying the right atrium and right ventricle.     The cardiac silhouette is enlarged, stable in size, with the pulmonary vasculature within normal limits.  There are aortic vascular calcifications.  The lungs are symmetrically inflated, with scattered reticulonodular densities throughout both lungs, similar to prior.  No large pleural effusion or pneumothorax.  The bones are diffusely osteopenic.     Impression:     Stable appearance of the chest compared to 05/28/2025, with no definite evidence of acute cardiopulmonary disease.        Head CT: FINDINGS:  Blood: No acute intracranial hemorrhage.     Parenchyma: Equivocal small focus of hypoattenuation in the anterior left parietal lobe (axial series 2, image 32).  Generalized pattern of age-related parenchymal volume loss noted elsewhere.  Nonspecific areas of white matter hypoattenuation may reflect sequela of chronic small vessel ischemic disease.     Ventricles/Extra-axial spaces: No abnormal extra-axial fluid collection. Basal cisterns are patent.     Vessels: Moderate to heavy atherosclerotic calcification     Orbits: Status post bilateral lens replacements.     Scalp: Unremarkable.     Skull: There are no depressed skull fractures or destructive bone lesions.     Sinuses and mastoids: Essentially clear.      Other findings: None     Impression:     1. No evidence of acute intracranial hemorrhage, mass effect, midline shift.  2. Equivocal small focus of hypoattenuation in the anterior left parietal lobe.  While appearance may be artifactual related to volume averaging of adjacent sulcal CSF, possibility of small infarct not excluded.  Recommend clinical correlation, consideration of MRI for more sensitive and specific assessment, as warranted clinically.       Medications   acetaminophen tablet 1,000 mg (1,000 mg Oral Given 6/2/25 1231)     Medical Decision Making  Karen Renteria is a 69 y.o. female presenting to the ED with weakness. PT states when she tried to clock into work she was unable to press the numbers. According to a relative, PT sounds groggy and it seems like her memory is impaired. PT denies SOB. PT states that she has surgery schedule for tomorrow.   PT has Hx of Acute gastric ulcer without hemorrhage or perforation, Age-related osteoporosis with current pathological fracture, Benign essential hypertension, Carpal tunnel syndrome, CVA, CKD, Coronary arteriosclerosis, COVID-19, DM, Diabetic peripheral neuropathy, Diverticulitis, Essential tremor,  Falls frequently,  Hyperlipidemia, HTN, Insomnia secondary to depression with anxiety, Lumbar radiculopathy, Non-rheumatic aortic stenosis, Osteoporosis, RBBB, Spinal stenosis, cervical region, SSS, Subclavian artery stenosis, Tremor, and Vitamin D deficiency.   DDX:  CVA VS TIA VS OTHER  OUTPATIENT FOLLOW UP.    Amount and/or Complexity of Data Reviewed  Labs: ordered. Decision-making details documented in ED Course.  Radiology: ordered. Decision-making details documented in ED Course.  ECG/medicine tests: ordered. Decision-making details documented in ED Course.    Risk  OTC drugs.              Attending Attestation:           Physician Attestation for Scribe:  Physician Attestation Statement for Scribe #1: I, Alejandro Reis MD, reviewed documentation,  as scribed by Josephine De Leon in my presence, and it is both accurate and complete.                                    Clinical Impression:  Final diagnoses:  [R53.1] Weakness  [G45.9] TIA (transient ischemic attack) (Primary)          ED Disposition Condition    Discharge Stable          ED Prescriptions    None       Follow-up Information       Follow up With Specialties Details Why Contact Info    Jt Allison II, MD Family Medicine  As needed [address] [facility]  INTERNAL MEDICINE CLINIC  [facility] [id number]  [phone]                     [1]   Social History  Tobacco Use    Smoking status: Former     Current packs/day: 0.00     Average packs/day: 0.5 packs/day for 17.3 years (8.7 ttl pk-yrs)     Types: Cigarettes     Start date:      Quit date: 2011     Years since quittin.1    Smokeless tobacco: Never   Substance Use Topics    Alcohol use: Never    Drug use: Never        Alejandro Reis MD  [phone]

## 2025-06-02 NOTE — DISCHARGE INSTRUCTIONS
Make sure you are taking an 81 mg aspirin daily.  Follow up with your primary care provider.  Return to the emergency department as needed.

## 2025-06-03 ENCOUNTER — TELEPHONE (OUTPATIENT)
Dept: EMERGENCY MEDICINE | Facility: HOSPITAL | Age: 70
End: 2025-06-03
Payer: MEDICARE

## 2025-06-04 ENCOUNTER — TELEPHONE (OUTPATIENT)
Dept: UROLOGY | Facility: CLINIC | Age: 70
End: 2025-06-04
Payer: MEDICARE

## 2025-06-05 ENCOUNTER — OFFICE VISIT (OUTPATIENT)
Dept: UROLOGY | Facility: CLINIC | Age: 70
End: 2025-06-05
Payer: MEDICARE

## 2025-06-05 VITALS
HEIGHT: 64 IN | DIASTOLIC BLOOD PRESSURE: 76 MMHG | WEIGHT: 174 LBS | BODY MASS INDEX: 29.71 KG/M2 | SYSTOLIC BLOOD PRESSURE: 153 MMHG | HEART RATE: 76 BPM | TEMPERATURE: 98 F

## 2025-06-05 DIAGNOSIS — R39.15 URINARY URGENCY: ICD-10-CM

## 2025-06-05 DIAGNOSIS — N39.0 RECURRENT UTI: ICD-10-CM

## 2025-06-05 DIAGNOSIS — N30.10 INTERSTITIAL CYSTITIS: Primary | ICD-10-CM

## 2025-06-05 DIAGNOSIS — M48.02 SPINAL STENOSIS, CERVICAL REGION: ICD-10-CM

## 2025-06-05 DIAGNOSIS — R30.0 DYSURIA: ICD-10-CM

## 2025-06-05 DIAGNOSIS — R30.9 PAIN WITH URINATION: ICD-10-CM

## 2025-06-05 DIAGNOSIS — D49.4 BLADDER TUMOR: ICD-10-CM

## 2025-06-05 DIAGNOSIS — R82.81 PYURIA: ICD-10-CM

## 2025-06-05 DIAGNOSIS — E11.42 DIABETIC PERIPHERAL NEUROPATHY ASSOCIATED WITH TYPE 2 DIABETES MELLITUS: ICD-10-CM

## 2025-06-05 PROCEDURE — 99213 OFFICE O/P EST LOW 20 MIN: CPT | Mod: PBBFAC | Performed by: UROLOGY

## 2025-06-05 PROCEDURE — 99999 PR PBB SHADOW E&M-EST. PATIENT-LVL III: CPT | Mod: PBBFAC,,, | Performed by: UROLOGY

## 2025-06-05 RX ORDER — HYDROCODONE BITARTRATE AND ACETAMINOPHEN 5; 325 MG/1; MG/1
1 TABLET ORAL EVERY 8 HOURS PRN
Qty: 15 TABLET | Refills: 0 | Status: SHIPPED | OUTPATIENT
Start: 2025-06-05 | End: 2025-06-10

## 2025-06-05 RX ORDER — DIAZEPAM 5 MG/1
5 TABLET ORAL EVERY 12 HOURS PRN
Qty: 10 TABLET | Refills: 0 | Status: SHIPPED | OUTPATIENT
Start: 2025-06-05 | End: 2025-06-10

## 2025-06-07 LAB — UA COMPLETE W REFLEX CULTURE PNL UR: NO GROWTH

## 2025-06-09 ENCOUNTER — PROCEDURE VISIT (OUTPATIENT)
Dept: UROLOGY | Facility: CLINIC | Age: 70
End: 2025-06-09
Payer: MEDICARE

## 2025-06-09 VITALS
HEIGHT: 64 IN | SYSTOLIC BLOOD PRESSURE: 120 MMHG | DIASTOLIC BLOOD PRESSURE: 50 MMHG | BODY MASS INDEX: 29.71 KG/M2 | HEART RATE: 89 BPM | WEIGHT: 174 LBS | OXYGEN SATURATION: 84 %

## 2025-06-09 DIAGNOSIS — N30.10 INTERSTITIAL CYSTITIS: ICD-10-CM

## 2025-06-09 DIAGNOSIS — I42.9 CARDIOMYOPATHY, UNSPECIFIED TYPE: ICD-10-CM

## 2025-06-09 DIAGNOSIS — D49.4 BLADDER TUMOR: Primary | ICD-10-CM

## 2025-06-09 DIAGNOSIS — E11.42 DIABETIC PERIPHERAL NEUROPATHY ASSOCIATED WITH TYPE 2 DIABETES MELLITUS: ICD-10-CM

## 2025-06-09 PROCEDURE — 99214 OFFICE O/P EST MOD 30 MIN: CPT | Mod: S$PBB,,, | Performed by: UROLOGY

## 2025-06-09 NOTE — PROCEDURES
Assessment:   1. Bladder tumor  -     Case Request Operating Room: TURBT (TRANSURETHRAL RESECTION OF BLADDER TUMOR)    2. Interstitial cystitis  -     Cystoscopy  -     Case Request Operating Room: TURBT (TRANSURETHRAL RESECTION OF BLADDER TUMOR)    3. Diabetic peripheral neuropathy associated with type 2 diabetes mellitus  -     Case Request Operating Room: TURBT (TRANSURETHRAL RESECTION OF BLADDER TUMOR)    4. Cardiomyopathy, unspecified type  Overview:  Formatting of this note might be different from the original.  EF August 2021 with EF 45% at [facility]. PET scan in our office September 2021 with normal EF.    Last Assessment & Plan:   Formatting of this note might be different from the original.  Euvolemic    Orders:  -     Case Request Operating Room: TURBT (TRANSURETHRAL RESECTION OF BLADDER TUMOR)         Plan:     We discussed that her symptoms have not necessarily improve despite augmented therapy with benzodiazepines and pain control and collected her urine for culture and discussed that based on her symptoms she would not be able to tolerate a biopsy under local anesthesia and recommended to perform this under general.  We discussed the procedure of cystoscopy bladder biopsy fulguration and Blakely catheter after the procedure temporarily.  We discussed the risks of the procedure to include bleeding infection and need for more procedures.           Jimbo Rangel MD  Urology  06/09/2025          UROLOGY HISTORY AND PHYSICAL EXAM    Subjective:      Patient ID: Karen Renteria is a 69 y.o. female.    Chief Complaint::   HPI    The patient is a 69-year-old female with a history of chronic interstitial cystitis and diabetic peripheral neuropathy that developed hematuria and underwent a cystoscopy which demonstrated several erythematous lesions..  The patient has cardiac and pulmonary comorbid conditions and she was being considered for an office biopsy based on symptoms that were more predominant of  interstitial cystitis and the likelihood that a limited biopsy could be performed.  However she was started on pain medicines and benzodiazepines to help with the pain and bladder spasm and reports that this has not helped much and not having any dysuria hematuria but continues to have bladder symptoms.  She was cleared for general anesthesia by her cardiology team and presents today for discussion on persistent bladder symptoms of pain and cramping.     Past Medical History:   Diagnosis Date    Acute gastric ulcer without hemorrhage or perforation 03/03/2022    Age-related osteoporosis with current pathological fracture 04/21/2021    Benign essential hypertension 08/28/2018    Last Assessment & Plan:   Formatting of this note might be different from the original.  Well controlled    Carpal tunnel syndrome     Cerebral vascular accident     CKD (chronic kidney disease) stage 5, GFR less than 15 ml/min 12/11/2023    Coronary arteriosclerosis     S/P CABG 2011    COVID-19 12/20/2020    Diabetes mellitus, type 2     Diabetic peripheral neuropathy associated with type 2 diabetes mellitus 04/28/2022    Diverticulitis     Essential tremor 01/20/2022    Falls frequently 12/07/2021    Hyperlipidemia     Hypertension     Insomnia secondary to depression with anxiety     Lumbar radiculopathy     Non-rheumatic aortic stenosis 08/28/2018    Formatting of this note might be different from the original.  S/p Tissue AVR July 2011 ([facility]). Echo October 2017 revealed normally functioning bioprosthesis with normal EF 60-65%     Last Assessment & Plan:   Formatting of this note might be different from the original.  Asymptomatic.    Osteoporosis     Pure hypercholesterolemia 08/28/2018    Formatting of this note might be different from the original.  Chronic statin.       Last Assessment & Plan:   Formatting of this note might be different from the original.   Continue statin.    RBBB 09/08/2021    Formatting of this note  might be different from the original.  Initially noted on electrocardiogram in September 2021.    Spinal stenosis, cervical region 01/20/2022    SSS (sick sinus syndrome) 07/12/2022    Last Assessment & Plan:   Formatting of this note might be different from the original.  Post placement of [employer] dual-chamber permanent pacemaker by Dr. Freeman on 07/12/2022.    Subclavian artery stenosis 08/28/2018    Formatting of this note might be different from the original.  30% stenosis.    Tremor     Type 2 diabetes mellitus, with long-term current use of insulin 01/13/2021    Vitamin D deficiency      Past Surgical History:   Procedure Laterality Date    APPENDECTOMY      ASCENDING AORTIC ANEURYSM REPAIR W/ TISSUE AORTIC VALVE REPLACEMENT      BLADDER SUSPENSION Right     CHOLECYSTECTOMY      CORONARY ARTERY BYPASS GRAFT (CABG)      DIRECT LARYNGOSCOPY Bilateral 08/12/2022    Procedure: LARYNGOSCOPY, DIRECT;  Surgeon: Iban Silverio MD;  Location: [facility], [place];  Service: ENT;  Laterality: Bilateral;    HYSTERECTOMY      OOPHORECTOMY      TONSILLECTOMY      VOCAL FOLD LESION EXCISION Bilateral 08/12/2022    Procedure: EXCISION, LESION, VOCAL CORD;  Surgeon: Iban Silverio MD;  Location: [facility];  Service: ENT;  Laterality: Bilateral;        Medications Ordered Prior to Encounter[1]     Review of patient's allergies indicates:   Allergen Reactions    Cephalexin Rash    Bactrim [sulfamethoxazole-trimethoprim]     Zofran [ondansetron hcl] Itching     Vitals:    06/09/25 0748   BP: (!) 120/50   Pulse: 89          Review of Systems   Constitutional:  Negative for activity change.   Respiratory:  Negative for shortness of breath.    Cardiovascular:  Negative for leg swelling.   Gastrointestinal:  Negative for vomiting.   Endocrine: Positive for polyuria.   Genitourinary:  Positive for frequency, pelvic pain and urgency. Negative for hematuria and vaginal bleeding.   Skin:  Negative for wound.   Hematological:   Negative for adenopathy.   Psychiatric/Behavioral:  Negative for hallucinations.         Objective:     Physical Exam  Vitals and nursing note reviewed.   Constitutional:       General: She is not in acute distress.     Appearance: She is well-developed. She is not diaphoretic.   HENT:      Head: Normocephalic.   Eyes:      Conjunctiva/sclera: Conjunctivae normal.   Cardiovascular:      Rate and Rhythm: Normal rate.   Pulmonary:      Effort: Pulmonary effort is normal. No respiratory distress.      Breath sounds: No wheezing.   Abdominal:      General: There is no distension.      Tenderness: There is no abdominal tenderness.   Musculoskeletal:         General: No tenderness or deformity.   Skin:     General: Skin is warm.      Findings: No erythema or rash.   Neurological:      Mental Status: She is alert and oriented to person, place, and time.      Motor: No abnormal muscle tone.   Psychiatric:         Attention and Perception: Attention normal.         Mood and Affect: Affect normal.         Speech: Speech normal.         Behavior: Behavior normal.         Thought Content: Thought content normal.         Cognition and Memory: Cognition and memory normal.         Judgment: Judgment normal.              CMP  Sodium   Date Value Ref Range Status   06/02/2025 139 136 - 145 mmol/L Final   05/17/2024 143 136 - 145 mmol/L Final     Potassium   Date Value Ref Range Status   06/02/2025 4.0 3.5 - 5.1 mmol/L Final   05/17/2024 4.4 3.5 - 5.1 mmol/L Final     Chloride   Date Value Ref Range Status   06/02/2025 108 (H) 98 - 107 mmol/L Final   05/17/2024 108 100 - 109 mmol/L Final     CO2   Date Value Ref Range Status   06/02/2025 21 (L) 23 - 31 mmol/L Final     Carbon Dioxide   Date Value Ref Range Status   05/17/2024 25 22 - 33 mmol/L Final     Glucose   Date Value Ref Range Status   06/02/2025 90 82 - 115 mg/dL Final     BUN   Date Value Ref Range Status   06/02/2025 17 10 - 20 mg/dL Final     Blood Urea Nitrogen   Date  Value Ref Range Status   05/17/2024 40 (H) 5 - 25 mg/dL Final     Creatinine   Date Value Ref Range Status   06/02/2025 1.71 (H) 0.55 - 1.02 mg/dL Final   05/17/2024 2.53 (H) 0.55 - 1.02 mg/dL Final     Calcium   Date Value Ref Range Status   06/02/2025 8.9 8.4 - 10.2 mg/dL Final   05/17/2024 9.1 8.8 - 10.6 mg/dL Final     Total Protein   Date Value Ref Range Status   06/02/2025 7.4 5.8 - 7.6 g/dL Final     Albumin   Date Value Ref Range Status   06/02/2025 3.8 3.4 - 4.8 g/dL Final     Bilirubin, Total   Date Value Ref Range Status   06/02/2025 0.5 <=1.5 mg/dL Final     Alk Phos   Date Value Ref Range Status   06/02/2025 89 40 - 150 U/L Final     AST   Date Value Ref Range Status   06/02/2025 47 (H) 11 - 45 U/L Final     ALT   Date Value Ref Range Status   06/02/2025 13 <=55 U/L Final     Anion Gap   Date Value Ref Range Status   06/02/2025 14 7 - 16 mmol/L Final   05/17/2024 10 8 - 16 mmol/L Final     eGFR   Date Value Ref Range Status   06/02/2025 32 (L) >=60 mL/min/1.73m2 Final     Comment:     Estimated GFR calculated using the CKD-EPI creatinine (2021) equation.      BMP  Lab Results   Component Value Date     06/02/2025    K 4.0 06/02/2025     (H) 06/02/2025    CO2 21 (L) 06/02/2025    BUN 17 06/02/2025    CREATININE 1.71 (H) 06/02/2025    CALCIUM 8.9 06/02/2025    ANIONGAP 14 06/02/2025    EGFRNORACEVR 32 (L) 06/02/2025              [1]   Current Outpatient Medications on File Prior to Visit   Medication Sig Dispense Refill    albuterol-ipratropium (DUO-NEB) 2.5 mg-0.5 mg/3 mL nebulizer solution Take 3 mLs by nebulization every 6 (six) hours. Rescue 75 mL 0    aspirin (ECOTRIN) 81 MG EC tablet Take 81 mg by mouth once daily.      diazePAM (VALIUM) 5 MG tablet Take 1 tablet (5 mg total) by mouth every 12 (twelve) hours as needed (Take as needed for bladder spasms). 10 tablet 0    furosemide (LASIX) 40 MG tablet Take 1 tablet (40 mg total) by mouth once daily. 30 tablet 11     HYDROcodone-acetaminophen (NORCO) 5-325 mg per tablet Take 1 tablet by mouth every 8 (eight) hours as needed for Pain (breakthrough bladder pain). 15 tablet 0    insulin degludec (TRESIBA FLEXTOUCH U-200) 200 unit/mL (3 mL) insulin pen 52 units in the morning and 32 units in the evening      isosorbide dinitrate (ISORDIL) 30 MG Tab Take 30 mg by mouth once daily.      meclizine (ANTIVERT) 25 mg tablet Take 1 tablet (25 mg total) by mouth 3 (three) times daily as needed for Dizziness. (Patient taking differently: Take 25 mg by mouth 2 (two) times daily as needed for Dizziness.) 90 tablet 11    multivitamin (THERAGRAN) per tablet Take 1 tablet by mouth once daily.      pantoprazole (PROTONIX) 40 MG tablet TAKE 1 TABLET BY MOUTH ONCE DAILY PRIOR TO SUPPER      pregabalin (LYRICA) 75 MG capsule Take 75 mg by mouth 2 (two) times daily.      primidone (MYSOLINE) 50 MG Tab Take 2 tablets (100 mg total) by mouth 3 (three) times daily. (Patient taking differently: Take 100 mg by mouth 2 (two) times a day.) 540 tablet 3    rosuvastatin (CRESTOR) 40 MG Tab Take 40 mg by mouth once daily.      semaglutide (OZEMPIC) 0.25 mg or 0.5 mg(2 mg/1.5 mL) pen injector Inject 0.25 mg into the skin.      sertraline (ZOLOFT) 50 MG tablet TAKE ONE TABLET BY MOUTH EVERY DAY 90 tablet 3    hydroCHLOROthiazide (HYDRODIURIL) 12.5 MG Tab Take 12.5 mg by mouth once daily. (Patient not taking: Reported on 6/9/2025)      levothyroxine (SYNTHROID) 25 MCG tablet Take 25 mcg by mouth before breakfast. (Patient not taking: Reported on 6/9/2025)       No current facility-administered medications on file prior to visit.

## 2025-06-11 DIAGNOSIS — D49.4 BLADDER TUMOR: Primary | ICD-10-CM

## 2025-06-11 RX ORDER — SODIUM CHLORIDE 9 MG/ML
INJECTION, SOLUTION INTRAVENOUS CONTINUOUS
Status: CANCELLED | OUTPATIENT
Start: 2025-06-12

## 2025-06-12 ENCOUNTER — HOSPITAL ENCOUNTER (OUTPATIENT)
Facility: HOSPITAL | Age: 70
Discharge: HOME | End: 2025-06-12
Attending: UROLOGY | Admitting: UROLOGY
Payer: MEDICARE

## 2025-06-12 VITALS
TEMPERATURE: 98 F | HEIGHT: 62 IN | RESPIRATION RATE: 18 BRPM | OXYGEN SATURATION: 93 % | SYSTOLIC BLOOD PRESSURE: 144 MMHG | DIASTOLIC BLOOD PRESSURE: 78 MMHG | HEART RATE: 72 BPM | BODY MASS INDEX: 31.1 KG/M2 | WEIGHT: 169 LBS

## 2025-06-12 DIAGNOSIS — D49.4 BLADDER TUMOR: ICD-10-CM

## 2025-06-12 LAB — GLUCOSE SERPL-MCNC: 129 MG/DL (ref 70–105)

## 2025-06-12 PROCEDURE — 82962 GLUCOSE BLOOD TEST: CPT

## 2025-06-12 PROCEDURE — 51700 IRRIGATION OF BLADDER: CPT | Mod: ,,, | Performed by: UROLOGY

## 2025-06-12 PROCEDURE — 25000003 PHARM REV CODE 250: Performed by: UROLOGY

## 2025-06-12 RX ORDER — SODIUM CHLORIDE 9 MG/ML
INJECTION, SOLUTION INTRAVENOUS CONTINUOUS
Status: DISCONTINUED | OUTPATIENT
Start: 2025-06-12 | End: 2025-06-12 | Stop reason: HOSPADM

## 2025-06-12 RX ORDER — DIPHENHYDRAMINE HYDROCHLORIDE 50 MG/ML
25 INJECTION, SOLUTION INTRAMUSCULAR; INTRAVENOUS EVERY 6 HOURS PRN
Status: CANCELLED | OUTPATIENT
Start: 2025-06-12

## 2025-06-12 RX ORDER — CEFTRIAXONE 1 G/1
1 INJECTION, POWDER, FOR SOLUTION INTRAMUSCULAR; INTRAVENOUS ONCE
Status: DISCONTINUED | OUTPATIENT
Start: 2025-06-12 | End: 2025-06-12 | Stop reason: HOSPADM

## 2025-06-12 RX ORDER — HEPARIN SODIUM 5000 [USP'U]/ML
20000 INJECTION, SOLUTION INTRAVENOUS; SUBCUTANEOUS ONCE
Status: DISCONTINUED | OUTPATIENT
Start: 2025-06-12 | End: 2025-06-12 | Stop reason: HOSPADM

## 2025-06-12 RX ORDER — BUPIVACAINE HYDROCHLORIDE 5 MG/ML
40 INJECTION, SOLUTION PERINEURAL ONCE
Status: DISCONTINUED | OUTPATIENT
Start: 2025-06-12 | End: 2025-06-12 | Stop reason: HOSPADM

## 2025-06-12 RX ORDER — TRIAMCINOLONE ACETONIDE 40 MG/ML
80 INJECTION, SUSPENSION INTRA-ARTICULAR; INTRAMUSCULAR ONCE
Status: DISCONTINUED | OUTPATIENT
Start: 2025-06-12 | End: 2025-06-12 | Stop reason: HOSPADM

## 2025-06-12 RX ORDER — IPRATROPIUM BROMIDE AND ALBUTEROL SULFATE 2.5; .5 MG/3ML; MG/3ML
3 SOLUTION RESPIRATORY (INHALATION) ONCE AS NEEDED
Status: CANCELLED | OUTPATIENT
Start: 2025-06-12 | End: 2036-11-08

## 2025-06-12 RX ORDER — HYDROMORPHONE HYDROCHLORIDE 2 MG/ML
0.5 INJECTION, SOLUTION INTRAMUSCULAR; INTRAVENOUS; SUBCUTANEOUS EVERY 5 MIN PRN
Refills: 0 | Status: CANCELLED | OUTPATIENT
Start: 2025-06-12

## 2025-06-12 RX ORDER — GLUCAGON 1 MG
1 KIT INJECTION
Status: CANCELLED | OUTPATIENT
Start: 2025-06-12

## 2025-06-12 RX ORDER — MORPHINE SULFATE 10 MG/ML
4 INJECTION INTRAMUSCULAR; INTRAVENOUS; SUBCUTANEOUS EVERY 5 MIN PRN
Refills: 0 | Status: CANCELLED | OUTPATIENT
Start: 2025-06-12

## 2025-06-12 RX ADMIN — SODIUM CHLORIDE: 9 INJECTION, SOLUTION INTRAVENOUS at 09:06

## 2025-06-12 NOTE — PLAN OF CARE
"Patient returned from holding @1100 via stretcher by Zeenat, OR nurse. States "procedure not formed. Dr rangel will do a different procedure at bedside. Patient states" Dr. Rangel talk me about putting something in my bladder". @1127 Dr. Rangel and his nurse at bedside. @1138 Dr. Rangel came out and stated " discharge patient in a hour". Patient has voided. NADN. Spouse is at bedside. Patient ready for discharge.   "

## 2025-06-12 NOTE — H&P
Assessment:   Bladder pain and pelvic pain  Dysuria without evidence of a urinary tract infection   Ulcerative lesions in the bladder  Suspected Interstitial Cystitis    Plan:     We discussed her urine culture from last week on June 5th and that there was no growth and the despite broad-spectrum pain control her bladder pain and dysuria have not improved.  We discussed my concern for the manifestation of chronic interstitial cystitis and that I am concerned that she will not tolerate fulguration and a Blakely catheter after a biopsy and that this will result in significant exacerbation of pain.  We discussed that her erythematous lesions could represent transitional cell carcinoma but could also represent ulcerative lesions from interstitial cystitis.  At this time I do not feel comfortable performing bladder biopsy with fulguration under general anesthesia as she may not be able to tolerate the pain postoperatively and also there is an opportunity to see if we can improve her pain and possibly help with disease control if this does represent interstitial cystitis. We discussed intravesical treatment and that recommended a [facility] anesthetic steroid cocktail and that she might have a benefit.  She agreed to postpone the procedure today and see if we can improve her symptoms.     The urethra was prepped with Betadine and then a mixture of 40ml 0.5% bupivacaine and 20ml 2% lidocaine jelly with 80 mg of triamcinolone, 49688 units of heparin and 80 mg of gentamicin was instilled into the bladder and she was observed.                    Jimbo Rangel MD  Urology  06/12/2025          UROLOGY HISTORY AND PHYSICAL EXAM    Subjective:      Patient ID: Karen Renteria is a 69 y.o. female.    Chief Complaint::   HPI    The patient is a 69-year-old female with a history of hematuria and erythematous lesions seen on cystoscopy with a history of diabetic peripheral neuropathy and lumbar radiculopathy that  has chronic pain and has experienced flares and exacerbations of pelvic pain over the last several months.  Most recently I was concerned for the development of infection and tested her urine which was no growth and have started her on broad-spectrum pain control with narcotics as well as benzodiazepines to help with the bladder spasms and the pain.  She reports that despite this she is not having improvement in her pain and reports that she has the sensation that when she urinates it feels like shards of glass coming through the urethra.  She reports urgency frequency and a constant sense and awareness of her bladder.  She reports that today her bladder pain is moderate to severe and when she is not distracted or not engaged in an activity she is aware of her pain.  She denies any hematuria but reports dysuria.       Past Medical History:   Diagnosis Date    Acute gastric ulcer without hemorrhage or perforation 03/03/2022    Age-related osteoporosis with current pathological fracture 04/21/2021    Benign essential hypertension 08/28/2018    Last Assessment & Plan:   Formatting of this note might be different from the original.  Well controlled    Carpal tunnel syndrome     Cerebral vascular accident     CKD (chronic kidney disease) stage 5, GFR less than 15 ml/min 12/11/2023    Coronary arteriosclerosis     S/P CABG 2011    COVID-19 12/20/2020    Diabetes mellitus, type 2     Diabetic peripheral neuropathy associated with type 2 diabetes mellitus 04/28/2022    Diverticulitis     Essential tremor 01/20/2022    Falls frequently 12/07/2021    Hyperlipidemia     Hypertension     Insomnia secondary to depression with anxiety     Lumbar radiculopathy     Non-rheumatic aortic stenosis 08/28/2018    Formatting of this note might be different from the original.  S/p Tissue AVR July 2011 ([facility]). Echo October 2017 revealed normally functioning bioprosthesis with normal EF 60-65%     Last Assessment & Plan:    Formatting of this note might be different from the original.  Asymptomatic.    Osteoporosis     Pure hypercholesterolemia 08/28/2018    Formatting of this note might be different from the original.  Chronic statin.       Last Assessment & Plan:   Formatting of this note might be different from the original.   Continue statin.    RBBB 09/08/2021    Formatting of this note might be different from the original.  Initially noted on electrocardiogram in September 2021.    Spinal stenosis, cervical region 01/20/2022    SSS (sick sinus syndrome) 07/12/2022    Last Assessment & Plan:   Formatting of this note might be different from the original.  Post placement of [employer] scientific dual-chamber permanent pacemaker by Dr. Freeman on 07/12/2022.    Subclavian artery stenosis 08/28/2018    Formatting of this note might be different from the original.  30% stenosis.    Tremor     Type 2 diabetes mellitus, with long-term current use of insulin 01/13/2021    Vitamin D deficiency      Past Surgical History:   Procedure Laterality Date    APPENDECTOMY      ASCENDING AORTIC ANEURYSM REPAIR W/ TISSUE AORTIC VALVE REPLACEMENT      BLADDER SUSPENSION Right     CHOLECYSTECTOMY      CORONARY ARTERY BYPASS GRAFT (CABG)      DIRECT LARYNGOSCOPY Bilateral 08/12/2022    Procedure: LARYNGOSCOPY, DIRECT;  Surgeon: Iban Silverio MD;  Location: [facility] OR;  Service: ENT;  Laterality: Bilateral;    HYSTERECTOMY      OOPHORECTOMY      TONSILLECTOMY      VOCAL FOLD LESION EXCISION Bilateral 08/12/2022    Procedure: EXCISION, LESION, VOCAL CORD;  Surgeon: Iban Silverio MD;  Location: [facility] OR;  Service: ENT;  Laterality: Bilateral;        Medications Ordered Prior to Encounter[1]     Review of patient's allergies indicates:   Allergen Reactions    Cephalexin Rash    Bactrim [sulfamethoxazole-trimethoprim]     Zofran [ondansetron hcl] Itching     Vitals:    06/12/25 0940   BP: (!) 144/78   Pulse: 72   Resp: 18   Temp: 97.9 °F (36.6 °C)           Review of Systems   Constitutional:  Negative for fever.   Respiratory:  Negative for shortness of breath.    Cardiovascular:  Negative for leg swelling.   Gastrointestinal:  Positive for abdominal pain. Negative for vomiting.   Genitourinary:  Positive for frequency, pelvic pain and urgency. Negative for hematuria.   Musculoskeletal:  Positive for arthralgias and back pain.   Skin:  Negative for wound.   Neurological:  Negative for numbness.   Psychiatric/Behavioral:  The patient is nervous/anxious.         Objective:     Physical Exam  Vitals and nursing note reviewed. Exam conducted with a chaperone present.   Constitutional:       General: She is not in acute distress.     Appearance: She is well-developed. She is not diaphoretic.   HENT:      Head: Normocephalic.   Eyes:      Conjunctiva/sclera: Conjunctivae normal.   Cardiovascular:      Rate and Rhythm: Normal rate.   Pulmonary:      Effort: Pulmonary effort is normal. No respiratory distress.      Breath sounds: No wheezing.   Abdominal:      General: There is no distension.      Palpations: Abdomen is soft. There is no mass.      Tenderness: There is abdominal tenderness. There is no rebound.      Hernia: No hernia is present.      Comments: There was tenderness over the suprapubic region of the bladder.    Genitourinary:     Comments: The nurse and I prepped the urethra with Betadine.  And then a mixture of 40ml 0.5% bupivacaine and 20ml 2% lidocaine jelly with 80 mg of triamcinolone, 23602 units of heparin and 80 mg of gentamicin was instilled into the bladder.  The Blakely catheter was removed and the cocktail of was allowed to dwell in the bladder.   Musculoskeletal:         General: No swelling.   Skin:     General: Skin is warm.      Findings: No erythema or rash.   Neurological:      Mental Status: She is alert and oriented to person, place, and time.      Motor: No abnormal muscle tone.   Psychiatric:         Attention and Perception: Attention  normal.         Mood and Affect: Affect normal.         Speech: Speech normal.         Behavior: Behavior normal.         Thought Content: Thought content normal.         Cognition and Memory: Cognition and memory normal.         Judgment: Judgment normal.              CMP  Sodium   Date Value Ref Range Status   06/02/2025 139 136 - 145 mmol/L Final   05/17/2024 143 136 - 145 mmol/L Final     Potassium   Date Value Ref Range Status   06/02/2025 4.0 3.5 - 5.1 mmol/L Final   05/17/2024 4.4 3.5 - 5.1 mmol/L Final     Chloride   Date Value Ref Range Status   06/02/2025 108 (H) 98 - 107 mmol/L Final   05/17/2024 108 100 - 109 mmol/L Final     CO2   Date Value Ref Range Status   06/02/2025 21 (L) 23 - 31 mmol/L Final     Carbon Dioxide   Date Value Ref Range Status   05/17/2024 25 22 - 33 mmol/L Final     Glucose   Date Value Ref Range Status   06/02/2025 90 82 - 115 mg/dL Final     BUN   Date Value Ref Range Status   06/02/2025 17 10 - 20 mg/dL Final     Blood Urea Nitrogen   Date Value Ref Range Status   05/17/2024 40 (H) 5 - 25 mg/dL Final     Creatinine   Date Value Ref Range Status   06/02/2025 1.71 (H) 0.55 - 1.02 mg/dL Final   05/17/2024 2.53 (H) 0.55 - 1.02 mg/dL Final     Calcium   Date Value Ref Range Status   06/02/2025 8.9 8.4 - 10.2 mg/dL Final   05/17/2024 9.1 8.8 - 10.6 mg/dL Final     Total Protein   Date Value Ref Range Status   06/02/2025 7.4 5.8 - 7.6 g/dL Final     Albumin   Date Value Ref Range Status   06/02/2025 3.8 3.4 - 4.8 g/dL Final     Bilirubin, Total   Date Value Ref Range Status   06/02/2025 0.5 <=1.5 mg/dL Final     Alk Phos   Date Value Ref Range Status   06/02/2025 89 40 - 150 U/L Final     AST   Date Value Ref Range Status   06/02/2025 47 (H) 11 - 45 U/L Final     ALT   Date Value Ref Range Status   06/02/2025 13 <=55 U/L Final     Anion Gap   Date Value Ref Range Status   06/02/2025 14 7 - 16 mmol/L Final   05/17/2024 10 8 - 16 mmol/L Final     eGFR   Date Value Ref Range Status    06/02/2025 32 (L) >=60 mL/min/1.73m2 Final     Comment:     Estimated GFR calculated using the CKD-EPI creatinine (2021) equation.      BMP  Lab Results   Component Value Date     06/02/2025    K 4.0 06/02/2025     (H) 06/02/2025    CO2 21 (L) 06/02/2025    BUN 17 06/02/2025    CREATININE 1.71 (H) 06/02/2025    CALCIUM 8.9 06/02/2025    ANIONGAP 14 06/02/2025    EGFRNORACEVR 32 (L) 06/02/2025              [1]   No current facility-administered medications on file prior to encounter.     Current Outpatient Medications on File Prior to Encounter   Medication Sig Dispense Refill    isosorbide dinitrate (ISORDIL) 30 MG Tab Take 30 mg by mouth once daily.      sertraline (ZOLOFT) 50 MG tablet TAKE ONE TABLET BY MOUTH EVERY DAY 90 tablet 3    albuterol-ipratropium (DUO-NEB) 2.5 mg-0.5 mg/3 mL nebulizer solution Take 3 mLs by nebulization every 6 (six) hours. Rescue 75 mL 0    aspirin (ECOTRIN) 81 MG EC tablet Take 81 mg by mouth once daily.      diazePAM (VALIUM) 5 MG tablet Take 1 tablet (5 mg total) by mouth every 12 (twelve) hours as needed (Take as needed for bladder spasms). 10 tablet 0    furosemide (LASIX) 40 MG tablet Take 1 tablet (40 mg total) by mouth once daily. 30 tablet 11    hydroCHLOROthiazide (HYDRODIURIL) 12.5 MG Tab Take 12.5 mg by mouth once daily. (Patient not taking: Reported on 6/9/2025)      insulin degludec (TRESIBA FLEXTOUCH U-200) 200 unit/mL (3 mL) insulin pen 52 units in the morning and 32 units in the evening      levothyroxine (SYNTHROID) 25 MCG tablet Take 25 mcg by mouth before breakfast. (Patient not taking: Reported on 6/9/2025)      meclizine (ANTIVERT) 25 mg tablet Take 1 tablet (25 mg total) by mouth 3 (three) times daily as needed for Dizziness. (Patient taking differently: Take 25 mg by mouth 2 (two) times daily as needed for Dizziness.) 90 tablet 11    multivitamin (THERAGRAN) per tablet Take 1 tablet by mouth once daily.      pantoprazole (PROTONIX) 40 MG tablet TAKE  1 TABLET BY MOUTH ONCE DAILY PRIOR TO SUPPER      pregabalin (LYRICA) 75 MG capsule Take 75 mg by mouth 2 (two) times daily.      primidone (MYSOLINE) 50 MG Tab Take 2 tablets (100 mg total) by mouth 3 (three) times daily. (Patient taking differently: Take 100 mg by mouth 2 (two) times a day.) 540 tablet 3    rosuvastatin (CRESTOR) 40 MG Tab Take 40 mg by mouth once daily.      semaglutide (OZEMPIC) 0.25 mg or 0.5 mg(2 mg/1.5 mL) pen injector Inject 0.25 mg into the skin.

## 2025-06-17 ENCOUNTER — OFFICE VISIT (OUTPATIENT)
Dept: UROLOGY | Facility: CLINIC | Age: 70
End: 2025-06-17
Payer: MEDICARE

## 2025-06-17 VITALS
HEIGHT: 62 IN | WEIGHT: 169 LBS | OXYGEN SATURATION: 77 % | BODY MASS INDEX: 31.1 KG/M2 | SYSTOLIC BLOOD PRESSURE: 155 MMHG | HEART RATE: 67 BPM | DIASTOLIC BLOOD PRESSURE: 53 MMHG

## 2025-06-17 DIAGNOSIS — R35.0 FREQUENCY OF URINATION: ICD-10-CM

## 2025-06-17 DIAGNOSIS — N39.0 URINARY TRACT INFECTION WITH HEMATURIA, SITE UNSPECIFIED: ICD-10-CM

## 2025-06-17 DIAGNOSIS — R39.15 URINARY URGENCY: ICD-10-CM

## 2025-06-17 DIAGNOSIS — R30.9 PAIN WITH URINATION: ICD-10-CM

## 2025-06-17 DIAGNOSIS — E11.42 DIABETIC PERIPHERAL NEUROPATHY ASSOCIATED WITH TYPE 2 DIABETES MELLITUS: ICD-10-CM

## 2025-06-17 DIAGNOSIS — N30.10 INTERSTITIAL CYSTITIS: Primary | ICD-10-CM

## 2025-06-17 DIAGNOSIS — R31.9 URINARY TRACT INFECTION WITH HEMATURIA, SITE UNSPECIFIED: ICD-10-CM

## 2025-06-17 PROCEDURE — 99215 OFFICE O/P EST HI 40 MIN: CPT | Mod: PBBFAC | Performed by: UROLOGY

## 2025-06-17 PROCEDURE — 51702 INSERT TEMP BLADDER CATH: CPT | Mod: PBBFAC | Performed by: UROLOGY

## 2025-06-17 PROCEDURE — 51700 IRRIGATION OF BLADDER: CPT | Mod: PBBFAC | Performed by: UROLOGY

## 2025-06-17 PROCEDURE — 99999 PR PBB SHADOW E&M-EST. PATIENT-LVL V: CPT | Mod: PBBFAC,,, | Performed by: UROLOGY

## 2025-06-17 RX ADMIN — LIDOCAINE HYDROCHLORIDE 10 ML: 20 JELLY TOPICAL at 04:06

## 2025-06-17 NOTE — PROGRESS NOTES
Assessment:   1. Interstitial cystitis    2. Urinary urgency    3. Frequency of urination    4. Pain with urination    5. Diabetic peripheral neuropathy associated with type 2 diabetes mellitus         Plan:     The urethra was prepped with Betadine and then a mixture of 40ml 0.5% bupivacaine and 40ml 2% lidocaine jelly with 80 mg of triamcinolone, 70819 units of heparin and 80 mg of gentamicin was instilled into the bladder and she was observed and then discharged from the clinic to return in 2 weeks for a recheck.             Jimbo Rangel MD  Urology  06/17/2025          UROLOGY HISTORY AND PHYSICAL EXAM    Subjective:      Patient ID: Karen Renteria is a 69 y.o. female.    Chief Complaint::   Follow-up  Pertinent negatives include no abdominal pain.     The patient is a 69-year-old female that presents today for follow-up after undergoing an intravesical cocktail last week she reports that she had significant relief and has noticed that the painful bladder symptoms are much improved and that the urgency and frequency are also improved.  She reports minimal nocturia and that the feeling that she could set her clock to the urinary frequency and urgency has significantly improved.  Her awareness of her bladder and dysuria also improved.  She has no foul smell to the urine and no hematuria.  She would like to proceed with an additional treatment.       Past Medical History:   Diagnosis Date    Acute gastric ulcer without hemorrhage or perforation 03/03/2022    Age-related osteoporosis with current pathological fracture 04/21/2021    Benign essential hypertension 08/28/2018    Last Assessment & Plan:   Formatting of this note might be different from the original.  Well controlled    Carpal tunnel syndrome     Cerebral vascular accident     CKD (chronic kidney disease) stage 5, GFR less than 15 ml/min 12/11/2023    Coronary arteriosclerosis     S/P CABG 2011    COVID-19 12/20/2020    Diabetes mellitus,  type 2     Diabetic peripheral neuropathy associated with type 2 diabetes mellitus 04/28/2022    Diverticulitis     Essential tremor 01/20/2022    Falls frequently 12/07/2021    Hyperlipidemia     Hypertension     Insomnia secondary to depression with anxiety     Lumbar radiculopathy     Non-rheumatic aortic stenosis 08/28/2018    Formatting of this note might be different from the original.  S/p Tissue AVR July 2011 ([facility]). Echo October 2017 revealed normally functioning bioprosthesis with normal EF 60-65%     Last Assessment & Plan:   Formatting of this note might be different from the original.  Asymptomatic.    Osteoporosis     Pure hypercholesterolemia 08/28/2018    Formatting of this note might be different from the original.  Chronic statin.       Last Assessment & Plan:   Formatting of this note might be different from the original.   Continue statin.    RBBB 09/08/2021    Formatting of this note might be different from the original.  Initially noted on electrocardiogram in September 2021.    Spinal stenosis, cervical region 01/20/2022    SSS (sick sinus syndrome) 07/12/2022    Last Assessment & Plan:   Formatting of this note might be different from the original.  Post placement of [employer] scientific dual-chamber permanent pacemaker by Dr. Freeman on 07/12/2022.    Subclavian artery stenosis 08/28/2018    Formatting of this note might be different from the original.  30% stenosis.    Tremor     Type 2 diabetes mellitus, with long-term current use of insulin 01/13/2021    Vitamin D deficiency      Past Surgical History:   Procedure Laterality Date    APPENDECTOMY      ASCENDING AORTIC ANEURYSM REPAIR W/ TISSUE AORTIC VALVE REPLACEMENT      BLADDER SUSPENSION Right     CHOLECYSTECTOMY      CORONARY ARTERY BYPASS GRAFT (CABG)      DIRECT LARYNGOSCOPY Bilateral 08/12/2022    Procedure: LARYNGOSCOPY, DIRECT;  Surgeon: Iban Silverio MD;  Location: [facility] OR;  Service: ENT;   Laterality: Bilateral;    HYSTERECTOMY      OOPHORECTOMY      TONSILLECTOMY      VOCAL FOLD LESION EXCISION Bilateral 08/12/2022    Procedure: EXCISION, LESION, VOCAL CORD;  Surgeon: Iban Silverio MD;  Location: [facility];  Service: ENT;  Laterality: Bilateral;        Medications Ordered Prior to Encounter[1]     Review of patient's allergies indicates:   Allergen Reactions    Cephalexin Rash    Bactrim [sulfamethoxazole-trimethoprim]     Zofran [ondansetron hcl] Itching     Vitals:    06/17/25 1359   BP: (!) 155/53   Pulse: 67          Review of Systems   Constitutional:  Negative for activity change.   Respiratory:  Negative for shortness of breath.    Cardiovascular:  Negative for leg swelling.   Gastrointestinal:  Negative for abdominal pain.   Genitourinary:  Negative for flank pain and hematuria.   Skin:  Negative for wound.      Objective:     Physical Exam  Vitals reviewed.   Eyes:      General: No scleral icterus.  Cardiovascular:      Rate and Rhythm: Normal rate.   Pulmonary:      Effort: Pulmonary effort is normal.      Breath sounds: No wheezing.   Abdominal:      General: There is no distension.      Palpations: Abdomen is soft.      Tenderness: There is no guarding.   Genitourinary:     Vagina: No vaginal discharge.      Comments: The urethra was prepped with Betadine and a 14fr catheter was placed. The bladder was drained. Then a mixture of 40ml 0.5% bupivacaine and 20ml 2% lidocaine jelly with 80 mg of triamcinolone, 27468 units of heparin and 80 mg of gentamicin was instilled into the bladder and she was observed. The catheter was removed.     Musculoskeletal:         General: No deformity.   Neurological:      General: No focal deficit present.      Mental Status: She is alert. Mental status is at baseline.   Psychiatric:         Thought Content: Thought content normal.         Judgment: Judgment normal.            CMP  Sodium   Date Value Ref Range Status   06/02/2025 139 136 - 145  mmol/L Final   05/17/2024 143 136 - 145 mmol/L Final     Potassium   Date Value Ref Range Status   06/02/2025 4.0 3.5 - 5.1 mmol/L Final   05/17/2024 4.4 3.5 - 5.1 mmol/L Final     Chloride   Date Value Ref Range Status   06/02/2025 108 (H) 98 - 107 mmol/L Final   05/17/2024 108 100 - 109 mmol/L Final     CO2   Date Value Ref Range Status   06/02/2025 21 (L) 23 - 31 mmol/L Final     Carbon Dioxide   Date Value Ref Range Status   05/17/2024 25 22 - 33 mmol/L Final     Glucose   Date Value Ref Range Status   06/02/2025 90 82 - 115 mg/dL Final     BUN   Date Value Ref Range Status   06/02/2025 17 10 - 20 mg/dL Final     Blood Urea Nitrogen   Date Value Ref Range Status   05/17/2024 40 (H) 5 - 25 mg/dL Final     Creatinine   Date Value Ref Range Status   06/02/2025 1.71 (H) 0.55 - 1.02 mg/dL Final   05/17/2024 2.53 (H) 0.55 - 1.02 mg/dL Final     Calcium   Date Value Ref Range Status   06/02/2025 8.9 8.4 - 10.2 mg/dL Final   05/17/2024 9.1 8.8 - 10.6 mg/dL Final     Total Protein   Date Value Ref Range Status   06/02/2025 7.4 5.8 - 7.6 g/dL Final     Albumin   Date Value Ref Range Status   06/02/2025 3.8 3.4 - 4.8 g/dL Final     Bilirubin, Total   Date Value Ref Range Status   06/02/2025 0.5 <=1.5 mg/dL Final     Alk Phos   Date Value Ref Range Status   06/02/2025 89 40 - 150 U/L Final     AST   Date Value Ref Range Status   06/02/2025 47 (H) 11 - 45 U/L Final     ALT   Date Value Ref Range Status   06/02/2025 13 <=55 U/L Final     Anion Gap   Date Value Ref Range Status   06/02/2025 14 7 - 16 mmol/L Final   05/17/2024 10 8 - 16 mmol/L Final     eGFR   Date Value Ref Range Status   06/02/2025 32 (L) >=60 mL/min/1.73m2 Final     Comment:     Estimated GFR calculated using the CKD-EPI creatinine (2021) equation.      BMP  Lab Results   Component Value Date     06/02/2025    K 4.0 06/02/2025     (H) 06/02/2025    CO2 21 (L) 06/02/2025    BUN 17 06/02/2025    CREATININE 1.71 (H) 06/02/2025    CALCIUM 8.9  06/02/2025    ANIONGAP 14 06/02/2025    EGFRNORACEVR 32 (L) 06/02/2025                [1]  Current Outpatient Medications on File Prior to Visit   Medication Sig Dispense Refill    albuterol-ipratropium (DUO-NEB) 2.5 mg-0.5 mg/3 mL nebulizer solution Take 3 mLs by nebulization every 6 (six) hours. Rescue 75 mL 0    aspirin (ECOTRIN) 81 MG EC tablet Take 81 mg by mouth once daily.      furosemide (LASIX) 40 MG tablet Take 1 tablet (40 mg total) by mouth once daily. 30 tablet 11    insulin degludec (TRESIBA FLEXTOUCH U-200) 200 unit/mL (3 mL) insulin pen 52 units in the morning and 32 units in the evening      isosorbide dinitrate (ISORDIL) 30 MG Tab Take 30 mg by mouth once daily.      meclizine (ANTIVERT) 25 mg tablet Take 1 tablet (25 mg total) by mouth 3 (three) times daily as needed for Dizziness. 90 tablet 11    multivitamin (THERAGRAN) per tablet Take 1 tablet by mouth once daily.      pantoprazole (PROTONIX) 40 MG tablet TAKE 1 TABLET BY MOUTH ONCE DAILY PRIOR TO SUPPER      pregabalin (LYRICA) 75 MG capsule Take 75 mg by mouth 2 (two) times daily.      primidone (MYSOLINE) 50 MG Tab Take 2 tablets (100 mg total) by mouth 3 (three) times daily. 540 tablet 3    rosuvastatin (CRESTOR) 40 MG Tab Take 40 mg by mouth once daily.      semaglutide (OZEMPIC) 0.25 mg or 0.5 mg(2 mg/1.5 mL) pen injector Inject 0.25 mg into the skin.      sertraline (ZOLOFT) 50 MG tablet TAKE ONE TABLET BY MOUTH EVERY DAY 90 tablet 3    diazePAM (VALIUM) 5 MG tablet Take 1 tablet (5 mg total) by mouth every 12 (twelve) hours as needed (Take as needed for bladder spasms). 10 tablet 0    hydroCHLOROthiazide (HYDRODIURIL) 12.5 MG Tab Take 12.5 mg by mouth once daily. (Patient not taking: Reported on 5/28/2025)      levothyroxine (SYNTHROID) 25 MCG tablet Take 25 mcg by mouth before breakfast. (Patient not taking: Reported on 5/28/2025)       No current facility-administered medications on file prior to visit.

## 2025-06-18 PROCEDURE — 87086 URINE CULTURE/COLONY COUNT: CPT | Mod: ,,, | Performed by: CLINICAL MEDICAL LABORATORY

## 2025-06-18 RX ORDER — LIDOCAINE HYDROCHLORIDE 20 MG/ML
JELLY TOPICAL
Status: COMPLETED | OUTPATIENT
Start: 2025-06-18 | End: 2025-06-17

## 2025-06-20 LAB — UA COMPLETE W REFLEX CULTURE PNL UR: ABNORMAL

## 2025-07-01 ENCOUNTER — OFFICE VISIT (OUTPATIENT)
Dept: UROLOGY | Facility: CLINIC | Age: 70
End: 2025-07-01
Payer: MEDICARE

## 2025-07-01 VITALS
HEART RATE: 89 BPM | DIASTOLIC BLOOD PRESSURE: 38 MMHG | WEIGHT: 166 LBS | OXYGEN SATURATION: 66 % | SYSTOLIC BLOOD PRESSURE: 101 MMHG | HEIGHT: 62 IN | BODY MASS INDEX: 30.55 KG/M2

## 2025-07-01 DIAGNOSIS — N30.10 INTERSTITIAL CYSTITIS: ICD-10-CM

## 2025-07-01 DIAGNOSIS — R82.81 PYURIA: Primary | ICD-10-CM

## 2025-07-01 PROCEDURE — 99214 OFFICE O/P EST MOD 30 MIN: CPT | Mod: PBBFAC | Performed by: UROLOGY

## 2025-07-01 PROCEDURE — 87186 SC STD MICRODIL/AGAR DIL: CPT | Mod: ,,, | Performed by: CLINICAL MEDICAL LABORATORY

## 2025-07-01 PROCEDURE — 87077 CULTURE AEROBIC IDENTIFY: CPT | Mod: ,,, | Performed by: CLINICAL MEDICAL LABORATORY

## 2025-07-01 PROCEDURE — 87086 URINE CULTURE/COLONY COUNT: CPT | Mod: ,,, | Performed by: CLINICAL MEDICAL LABORATORY

## 2025-07-01 PROCEDURE — 99999 PR PBB SHADOW E&M-EST. PATIENT-LVL IV: CPT | Mod: PBBFAC,,, | Performed by: UROLOGY

## 2025-07-01 PROCEDURE — 51700 IRRIGATION OF BLADDER: CPT | Mod: PBBFAC | Performed by: UROLOGY

## 2025-07-01 NOTE — PROGRESS NOTES
Assessment:   1. Pyuria  -     Urine Culture High Risk ($$); Future    2. Interstitial cystitis         Plan:     The urethra was prepped with Betadine and then a mixture of 40ml 0.5% bupivacaine and 40ml 2% lidocaine jelly with 40 mg of triamcinolone, 67337 units of heparin and 80 mg of gentamicin was instilled into the bladder and she was observed and then discharged from the clinic. She is responding well to intravesical therapy and today was a 3rd cocktail. Plan to repeat in 1 week and then perform cystoscopy in 3-4 weeks.         Jimbo Ragnel MD  Urology  07/01/2025          UROLOGY HISTORY AND PHYSICAL EXAM    Subjective:      Patient ID: Karen Renteria is a 69 y.o. female.    Chief Complaint::   Follow-up  Pertinent negatives include no fever.     The patient is a 69-year-old female with a history of painful bladder syndrome and ulceration seen in the bladder who has been started on a cocktail of steroids antibiotics and local anesthetics who presents today and reports she is responding to therapy well.  Her urinary symptoms continue to improve specifically the urgency frequency and the pain.  She is not having any dysuria or hematuria    Past Medical History:   Diagnosis Date    Acute gastric ulcer without hemorrhage or perforation 03/03/2022    Age-related osteoporosis with current pathological fracture 04/21/2021    Benign essential hypertension 08/28/2018    Last Assessment & Plan:   Formatting of this note might be different from the original.  Well controlled    Carpal tunnel syndrome     Cerebral vascular accident     CKD (chronic kidney disease) stage 5, GFR less than 15 ml/min 12/11/2023    Coronary arteriosclerosis     S/P CABG 2011    COVID-19 12/20/2020    Diabetes mellitus, type 2     Diabetic peripheral neuropathy associated with type 2 diabetes mellitus 04/28/2022    Diverticulitis     Essential tremor 01/20/2022    Falls frequently 12/07/2021    Hyperlipidemia     Hypertension      Insomnia secondary to depression with anxiety     Lumbar radiculopathy     Non-rheumatic aortic stenosis 08/28/2018    Formatting of this note might be different from the original.  S/p Tissue AVR July 2011 ([facility]). Echo October 2017 revealed normally functioning bioprosthesis with normal EF 60-65%     Last Assessment & Plan:   Formatting of this note might be different from the original.  Asymptomatic.    Osteoporosis     Pure hypercholesterolemia 08/28/2018    Formatting of this note might be different from the original.  Chronic statin.       Last Assessment & Plan:   Formatting of this note might be different from the original.   Continue statin.    RBBB 09/08/2021    Formatting of this note might be different from the original.  Initially noted on electrocardiogram in September 2021.    Spinal stenosis, cervical region 01/20/2022    SSS (sick sinus syndrome) 07/12/2022    Last Assessment & Plan:   Formatting of this note might be different from the original.  Post placement of [place] scientific dual-chamber permanent pacemaker by Dr. Freeman on 07/12/2022.    Subclavian artery stenosis 08/28/2018    Formatting of this note might be different from the original.  30% stenosis.    Tremor     Type 2 diabetes mellitus, with long-term current use of insulin 01/13/2021    Vitamin D deficiency      Past Surgical History:   Procedure Laterality Date    APPENDECTOMY      ASCENDING AORTIC ANEURYSM REPAIR W/ TISSUE AORTIC VALVE REPLACEMENT      BLADDER SUSPENSION Right     CHOLECYSTECTOMY      CORONARY ARTERY BYPASS GRAFT (CABG)      DIRECT LARYNGOSCOPY Bilateral 08/12/2022    Procedure: LARYNGOSCOPY, DIRECT;  Surgeon: Iban Silverio MD;  Location: [facility] OR;  Service: ENT;  Laterality: Bilateral;    HYSTERECTOMY      OOPHORECTOMY      TONSILLECTOMY      VOCAL FOLD LESION EXCISION Bilateral 08/12/2022    Procedure: EXCISION, LESION, VOCAL CORD;  Surgeon: Iban Silverio MD;  Location: [facility] OR;  Service:  ENT;  Laterality: Bilateral;        Medications Ordered Prior to Encounter[1]     Review of patient's allergies indicates:   Allergen Reactions    Cephalexin Rash    Bactrim [sulfamethoxazole-trimethoprim]     Zofran [ondansetron hcl] Itching     Vitals:    07/01/25 1323   BP: (!) 101/38   Pulse: 89          Review of Systems   Constitutional:  Negative for fever.   Respiratory:  Negative for shortness of breath.    Cardiovascular:  Negative for leg swelling.   Genitourinary:  Negative for dysuria and pelvic pain.      Objective:     Physical Exam  Vitals and nursing note reviewed.   Constitutional:       General: She is not in acute distress.     Appearance: She is well-developed. She is not diaphoretic.   HENT:      Head: Normocephalic.   Eyes:      Conjunctiva/sclera: Conjunctivae normal.   Cardiovascular:      Rate and Rhythm: Normal rate.   Pulmonary:      Effort: Pulmonary effort is normal. No respiratory distress.      Breath sounds: No wheezing.   Abdominal:      General: There is no distension.      Palpations: Abdomen is soft. There is no mass.      Tenderness: There is no abdominal tenderness. There is no rebound.      Hernia: No hernia is present.   Genitourinary:     Vagina: No vaginal discharge.      Comments: The urology nurse placed a catheter and collected a urine specimen and then a instillation of triamcinolone bupivacaine lidocaine heparin and gentamicin was instilled.   Musculoskeletal:         General: No tenderness or deformity.   Skin:     General: Skin is warm.      Findings: No erythema or rash.   Neurological:      Mental Status: She is alert and oriented to person, place, and time.      Motor: No abnormal muscle tone.   Psychiatric:         Attention and Perception: Attention normal.         Mood and Affect: Affect normal.         Speech: Speech normal.         Behavior: Behavior normal.         Thought Content: Thought content normal.         Cognition and Memory: Cognition and memory  normal.         Judgment: Judgment normal.                 [1]   Current Outpatient Medications on File Prior to Visit   Medication Sig Dispense Refill    albuterol-ipratropium (DUO-NEB) 2.5 mg-0.5 mg/3 mL nebulizer solution Take 3 mLs by nebulization every 6 (six) hours. Rescue 75 mL 0    aspirin (ECOTRIN) 81 MG EC tablet Take 81 mg by mouth once daily.      furosemide (LASIX) 40 MG tablet Take 1 tablet (40 mg total) by mouth once daily. 30 tablet 11    insulin degludec (TRESIBA FLEXTOUCH U-200) 200 unit/mL (3 mL) insulin pen 52 units in the morning and 32 units in the evening      isosorbide dinitrate (ISORDIL) 30 MG Tab Take 30 mg by mouth once daily.      meclizine (ANTIVERT) 25 mg tablet Take 1 tablet (25 mg total) by mouth 3 (three) times daily as needed for Dizziness. 90 tablet 11    multivitamin (THERAGRAN) per tablet Take 1 tablet by mouth once daily.      pantoprazole (PROTONIX) 40 MG tablet TAKE 1 TABLET BY MOUTH ONCE DAILY PRIOR TO SUPPER      pregabalin (LYRICA) 75 MG capsule Take 75 mg by mouth 2 (two) times daily.      primidone (MYSOLINE) 50 MG Tab Take 2 tablets (100 mg total) by mouth 3 (three) times daily. 540 tablet 3    rosuvastatin (CRESTOR) 40 MG Tab Take 40 mg by mouth once daily.      semaglutide (OZEMPIC) 0.25 mg or 0.5 mg(2 mg/1.5 mL) pen injector Inject 0.25 mg into the skin.      sertraline (ZOLOFT) 50 MG tablet TAKE ONE TABLET BY MOUTH EVERY DAY 90 tablet 3    diazePAM (VALIUM) 5 MG tablet Take 1 tablet (5 mg total) by mouth every 12 (twelve) hours as needed (Take as needed for bladder spasms). 10 tablet 0    hydroCHLOROthiazide (HYDRODIURIL) 12.5 MG Tab Take 12.5 mg by mouth once daily. (Patient not taking: Reported on 7/1/2025)      levothyroxine (SYNTHROID) 25 MCG tablet Take 25 mcg by mouth before breakfast. (Patient not taking: Reported on 7/1/2025)       No current facility-administered medications on file prior to visit.

## 2025-07-05 LAB — UA COMPLETE W REFLEX CULTURE PNL UR: ABNORMAL

## 2025-07-08 ENCOUNTER — OFFICE VISIT (OUTPATIENT)
Dept: UROLOGY | Facility: CLINIC | Age: 70
End: 2025-07-08
Payer: MEDICARE

## 2025-07-08 VITALS
HEART RATE: 74 BPM | HEIGHT: 62 IN | OXYGEN SATURATION: 84 % | WEIGHT: 166 LBS | BODY MASS INDEX: 30.55 KG/M2 | DIASTOLIC BLOOD PRESSURE: 55 MMHG | SYSTOLIC BLOOD PRESSURE: 140 MMHG

## 2025-07-08 DIAGNOSIS — N39.0 URINARY TRACT INFECTION WITH HEMATURIA, SITE UNSPECIFIED: ICD-10-CM

## 2025-07-08 DIAGNOSIS — R31.9 URINARY TRACT INFECTION WITH HEMATURIA, SITE UNSPECIFIED: ICD-10-CM

## 2025-07-08 DIAGNOSIS — N30.80 BACTERIAL CYSTITIS: Primary | ICD-10-CM

## 2025-07-08 PROCEDURE — 99213 OFFICE O/P EST LOW 20 MIN: CPT | Mod: S$PBB,,, | Performed by: UROLOGY

## 2025-07-08 PROCEDURE — 87086 URINE CULTURE/COLONY COUNT: CPT | Mod: ,,, | Performed by: CLINICAL MEDICAL LABORATORY

## 2025-07-08 PROCEDURE — 99999 PR PBB SHADOW E&M-EST. PATIENT-LVL IV: CPT | Mod: PBBFAC,,, | Performed by: UROLOGY

## 2025-07-08 PROCEDURE — 99214 OFFICE O/P EST MOD 30 MIN: CPT | Mod: PBBFAC | Performed by: UROLOGY

## 2025-07-08 PROCEDURE — 87186 SC STD MICRODIL/AGAR DIL: CPT | Mod: ,,, | Performed by: CLINICAL MEDICAL LABORATORY

## 2025-07-08 RX ORDER — NITROFURANTOIN 25; 75 MG/1; MG/1
100 CAPSULE ORAL 2 TIMES DAILY
Qty: 20 CAPSULE | Refills: 0 | Status: SHIPPED | OUTPATIENT
Start: 2025-07-08 | End: 2025-07-18

## 2025-07-08 NOTE — PROGRESS NOTES
Assessment:   1. Bacterial cystitis  -     nitrofurantoin, macrocrystal-monohydrate, (MACROBID) 100 MG capsule; Take 1 capsule (100 mg total) by mouth 2 (two) times daily. for 10 days  Dispense: 20 capsule; Refill: 0         Plan:     We discussed her symptoms of dysuria and her recent culture and will submit today's urine for culture as well and start her on nitrofurantoin for the next 10 days.   Plan to have her return July 22nd in the afternoon for a recheck of her urine and to consider resuming intravesical cocktails          Jimbo Rangel MD  Urology  07/08/2025          UROLOGY HISTORY AND PHYSICAL EXAM    Subjective:      Patient ID: Karen Renteria is a 69 y.o. female.    Chief Complaint::   Follow-up  Pertinent negatives include no fever.     The patient is a 69-year-old female with a history of chronic interstitial cystitis and diabetes mellitus that presents today for treatment.  The patient has a positive urine culture for Staphylococcus and reports that she is having dysuria.     Past Medical History:   Diagnosis Date    Acute gastric ulcer without hemorrhage or perforation 03/03/2022    Age-related osteoporosis with current pathological fracture 04/21/2021    Benign essential hypertension 08/28/2018    Last Assessment & Plan:   Formatting of this note might be different from the original.  Well controlled    Carpal tunnel syndrome     Cerebral vascular accident     CKD (chronic kidney disease) stage 5, GFR less than 15 ml/min 12/11/2023    Coronary arteriosclerosis     S/P CABG 2011    COVID-19 12/20/2020    Diabetes mellitus, type 2     Diabetic peripheral neuropathy associated with type 2 diabetes mellitus 04/28/2022    Diverticulitis     Essential tremor 01/20/2022    Falls frequently 12/07/2021    Hyperlipidemia     Hypertension     Insomnia secondary to depression with anxiety     Lumbar radiculopathy     Non-rheumatic aortic stenosis 08/28/2018    Formatting of this note  might be different from the original.  S/p Tissue AVR July 2011 ([facility]). Echo October 2017 revealed normally functioning bioprosthesis with normal EF 60-65%     Last Assessment & Plan:   Formatting of this note might be different from the original.  Asymptomatic.    Osteoporosis     Pure hypercholesterolemia 08/28/2018    Formatting of this note might be different from the original.  Chronic statin.       Last Assessment & Plan:   Formatting of this note might be different from the original.   Continue statin.    RBBB 09/08/2021    Formatting of this note might be different from the original.  Initially noted on electrocardiogram in September 2021.    Spinal stenosis, cervical region 01/20/2022    SSS (sick sinus syndrome) 07/12/2022    Last Assessment & Plan:   Formatting of this note might be different from the original.  Post placement of [place] scientific dual-chamber permanent pacemaker by Dr. Freeman on 07/12/2022.    Subclavian artery stenosis 08/28/2018    Formatting of this note might be different from the original.  30% stenosis.    Tremor     Type 2 diabetes mellitus, with long-term current use of insulin 01/13/2021    Vitamin D deficiency      Past Surgical History:   Procedure Laterality Date    APPENDECTOMY      ASCENDING AORTIC ANEURYSM REPAIR W/ TISSUE AORTIC VALVE REPLACEMENT      BLADDER SUSPENSION Right     CHOLECYSTECTOMY      CORONARY ARTERY BYPASS GRAFT (CABG)      DIRECT LARYNGOSCOPY Bilateral 08/12/2022    Procedure: LARYNGOSCOPY, DIRECT;  Surgeon: Iban Silverio MD;  Location: [facility] OR;  Service: ENT;  Laterality: Bilateral;    HYSTERECTOMY      OOPHORECTOMY      TONSILLECTOMY      VOCAL FOLD LESION EXCISION Bilateral 08/12/2022    Procedure: EXCISION, LESION, VOCAL CORD;  Surgeon: Iban Silverio MD;  Location: [facility] OR;  Service: ENT;  Laterality: Bilateral;        Medications Ordered Prior to Encounter[1]     Review of patient's allergies indicates:    Allergen Reactions    Cephalexin Rash    Bactrim [sulfamethoxazole-trimethoprim]     Zofran [ondansetron hcl] Itching     Vitals:    07/08/25 1454   BP: (!) 140/55   Pulse: 74          Review of Systems   Constitutional:  Negative for fever.   Genitourinary:  Positive for dysuria.      Objective:     Physical Exam  Vitals and nursing note reviewed.   Constitutional:       General: She is not in acute distress.     Appearance: She is well-developed. She is not diaphoretic.   HENT:      Head: Normocephalic.   Eyes:      Conjunctiva/sclera: Conjunctivae normal.   Cardiovascular:      Rate and Rhythm: Normal rate.   Pulmonary:      Effort: Pulmonary effort is normal.   Abdominal:      General: There is no distension.   Skin:     General: Skin is warm.   Neurological:      Mental Status: She is alert and oriented to person, place, and time.      Motor: No abnormal muscle tone.   Psychiatric:         Attention and Perception: Attention normal.         Mood and Affect: Affect normal.         Speech: Speech normal.         Behavior: Behavior normal.         Thought Content: Thought content normal.         Cognition and Memory: Cognition and memory normal.         Judgment: Judgment normal.            CMP  Sodium   Date Value Ref Range Status   06/02/2025 139 136 - 145 mmol/L Final   05/17/2024 143 136 - 145 mmol/L Final     Potassium   Date Value Ref Range Status   06/02/2025 4.0 3.5 - 5.1 mmol/L Final   05/17/2024 4.4 3.5 - 5.1 mmol/L Final     Chloride   Date Value Ref Range Status   06/02/2025 108 (H) 98 - 107 mmol/L Final   05/17/2024 108 100 - 109 mmol/L Final     CO2   Date Value Ref Range Status   06/02/2025 21 (L) 23 - 31 mmol/L Final     Carbon Dioxide   Date Value Ref Range Status   05/17/2024 25 22 - 33 mmol/L Final     Glucose   Date Value Ref Range Status   06/02/2025 90 82 - 115 mg/dL Final     BUN   Date Value Ref Range Status   06/02/2025 17 10 - 20 mg/dL Final     Blood Urea Nitrogen   Date Value Ref  Range Status   05/17/2024 40 (H) 5 - 25 mg/dL Final     Creatinine   Date Value Ref Range Status   06/02/2025 1.71 (H) 0.55 - 1.02 mg/dL Final   05/17/2024 2.53 (H) 0.55 - 1.02 mg/dL Final     Calcium   Date Value Ref Range Status   06/02/2025 8.9 8.4 - 10.2 mg/dL Final   05/17/2024 9.1 8.8 - 10.6 mg/dL Final     Total Protein   Date Value Ref Range Status   06/02/2025 7.4 5.8 - 7.6 g/dL Final     Albumin   Date Value Ref Range Status   06/02/2025 3.8 3.4 - 4.8 g/dL Final     Bilirubin, Total   Date Value Ref Range Status   06/02/2025 0.5 <=1.5 mg/dL Final     Alk Phos   Date Value Ref Range Status   06/02/2025 89 40 - 150 U/L Final     AST   Date Value Ref Range Status   06/02/2025 47 (H) 11 - 45 U/L Final     ALT   Date Value Ref Range Status   06/02/2025 13 <=55 U/L Final     Anion Gap   Date Value Ref Range Status   06/02/2025 14 7 - 16 mmol/L Final   05/17/2024 10 8 - 16 mmol/L Final     eGFR   Date Value Ref Range Status   06/02/2025 32 (L) >=60 mL/min/1.73m2 Final     Comment:     Estimated GFR calculated using the CKD-EPI creatinine (2021) equation.      BMP  Lab Results   Component Value Date     06/02/2025    K 4.0 06/02/2025     (H) 06/02/2025    CO2 21 (L) 06/02/2025    BUN 17 06/02/2025    CREATININE 1.71 (H) 06/02/2025    CALCIUM 8.9 06/02/2025    ANIONGAP 14 06/02/2025    EGFRNORACEVR 32 (L) 06/02/2025                [1]  Current Outpatient Medications on File Prior to Visit   Medication Sig Dispense Refill    albuterol-ipratropium (DUO-NEB) 2.5 mg-0.5 mg/3 mL nebulizer solution Take 3 mLs by nebulization every 6 (six) hours. Rescue 75 mL 0    aspirin (ECOTRIN) 81 MG EC tablet Take 81 mg by mouth once daily.      furosemide (LASIX) 40 MG tablet Take 1 tablet (40 mg total) by mouth once daily. 30 tablet 11    insulin degludec (TRESIBA FLEXTOUCH U-200) 200 unit/mL (3 mL) insulin pen 52 units in the morning and 32 units in the evening      isosorbide dinitrate (ISORDIL) 30 MG Tab Take 30  mg by mouth once daily.      levothyroxine (SYNTHROID) 25 MCG tablet Take 25 mcg by mouth before breakfast.      meclizine (ANTIVERT) 25 mg tablet Take 1 tablet (25 mg total) by mouth 3 (three) times daily as needed for Dizziness. 90 tablet 11    multivitamin (THERAGRAN) per tablet Take 1 tablet by mouth once daily.      pantoprazole (PROTONIX) 40 MG tablet TAKE 1 TABLET BY MOUTH ONCE DAILY PRIOR TO SUPPER      pregabalin (LYRICA) 75 MG capsule Take 75 mg by mouth 2 (two) times daily.      primidone (MYSOLINE) 50 MG Tab Take 2 tablets (100 mg total) by mouth 3 (three) times daily. 540 tablet 3    rosuvastatin (CRESTOR) 40 MG Tab Take 40 mg by mouth once daily.      semaglutide (OZEMPIC) 0.25 mg or 0.5 mg(2 mg/1.5 mL) pen injector Inject 0.25 mg into the skin.      sertraline (ZOLOFT) 50 MG tablet TAKE ONE TABLET BY MOUTH EVERY DAY 90 tablet 3    diazePAM (VALIUM) 5 MG tablet Take 1 tablet (5 mg total) by mouth every 12 (twelve) hours as needed (Take as needed for bladder spasms). 10 tablet 0    hydroCHLOROthiazide (HYDRODIURIL) 12.5 MG Tab Take 12.5 mg by mouth once daily. (Patient not taking: Reported on 5/28/2025)       No current facility-administered medications on file prior to visit.

## 2025-07-12 LAB — UA COMPLETE W REFLEX CULTURE PNL UR: ABNORMAL

## 2025-07-22 ENCOUNTER — OFFICE VISIT (OUTPATIENT)
Dept: UROLOGY | Facility: CLINIC | Age: 70
End: 2025-07-22
Payer: MEDICARE

## 2025-07-22 VITALS
OXYGEN SATURATION: 97 % | BODY MASS INDEX: 30.55 KG/M2 | HEART RATE: 117 BPM | HEIGHT: 62 IN | DIASTOLIC BLOOD PRESSURE: 77 MMHG | WEIGHT: 166 LBS | SYSTOLIC BLOOD PRESSURE: 143 MMHG

## 2025-07-22 DIAGNOSIS — N30.10 INTERSTITIAL CYSTITIS: Primary | ICD-10-CM

## 2025-07-22 DIAGNOSIS — E11.42 DIABETIC PERIPHERAL NEUROPATHY ASSOCIATED WITH TYPE 2 DIABETES MELLITUS: ICD-10-CM

## 2025-07-22 PROCEDURE — 51700 IRRIGATION OF BLADDER: CPT | Mod: PBBFAC | Performed by: UROLOGY

## 2025-07-22 PROCEDURE — 51702 INSERT TEMP BLADDER CATH: CPT | Mod: PBBFAC | Performed by: UROLOGY

## 2025-07-22 PROCEDURE — 99499 UNLISTED E&M SERVICE: CPT | Mod: S$PBB,,, | Performed by: UROLOGY

## 2025-07-22 PROCEDURE — 51700 IRRIGATION OF BLADDER: CPT | Mod: S$PBB,,, | Performed by: UROLOGY

## 2025-07-22 PROCEDURE — 99213 OFFICE O/P EST LOW 20 MIN: CPT | Mod: PBBFAC | Performed by: UROLOGY

## 2025-07-22 PROCEDURE — 99999 PR PBB SHADOW E&M-EST. PATIENT-LVL III: CPT | Mod: PBBFAC,,, | Performed by: UROLOGY

## 2025-07-23 NOTE — PROGRESS NOTES
Assessment:   1. Interstitial cystitis    2. Diabetic peripheral neuropathy associated with type 2 diabetes mellitus         Plan:     The urethra was prepped with Betadine and then a mixture of 40ml 0.5% bupivacaine and 40ml 2% lidocaine jelly with 40 mg of triamcinolone, 96200 units of heparin and 80 mg of gentamicin was instilled into the bladder and she was discharged from the clinic. She seems to be responding well to intravesical therapy and so now plan is to observe and have her return September 17th for cystoscopy.                 Jimbo Rangel MD  Urology  07/22/2025          UROLOGY HISTORY AND PHYSICAL EXAM    Subjective:      Patient ID: Karen Renteria is a 69 y.o. female.    Chief Complaint::   Follow-up  Pertinent negatives include no abdominal pain or fever.       The patient is a 69-year-old female with ulcerative interstitial cystitis that has been undergoing intravesical therapy who presents today after treatment for a UTI.  She reports that she has completed therapy with antibiotics and is not having any pain at all with her bladder.  She still has urgency and frequency but is no longer having any pain.  She has no dysuria and no foul smell to her urine.     Past Medical History:   Diagnosis Date    Acute gastric ulcer without hemorrhage or perforation 03/03/2022    Age-related osteoporosis with current pathological fracture 04/21/2021    Benign essential hypertension 08/28/2018    Last Assessment & Plan:   Formatting of this note might be different from the original.  Well controlled    Carpal tunnel syndrome     Cerebral vascular accident     CKD (chronic kidney disease) stage 5, GFR less than 15 ml/min 12/11/2023    Coronary arteriosclerosis     S/P CABG 2011    COVID-19 12/20/2020    Diabetes mellitus, type 2     Diabetic peripheral neuropathy associated with type 2 diabetes mellitus 04/28/2022    Diverticulitis     Essential tremor 01/20/2022    Falls frequently 12/07/2021     Hyperlipidemia     Hypertension     Insomnia secondary to depression with anxiety     Lumbar radiculopathy     Non-rheumatic aortic stenosis 08/28/2018    Formatting of this note might be different from the original.  S/p Tissue AVR July 2011 ([facility]). Echo October 2017 revealed normally functioning bioprosthesis with normal EF 60-65%     Last Assessment & Plan:   Formatting of this note might be different from the original.  Asymptomatic.    Osteoporosis     Pure hypercholesterolemia 08/28/2018    Formatting of this note might be different from the original.  Chronic statin.       Last Assessment & Plan:   Formatting of this note might be different from the original.   Continue statin.    RBBB 09/08/2021    Formatting of this note might be different from the original.  Initially noted on electrocardiogram in September 2021.    Spinal stenosis, cervical region 01/20/2022    SSS (sick sinus syndrome) 07/12/2022    Last Assessment & Plan:   Formatting of this note might be different from the original.  Post placement of [employer] dual-chamber permanent pacemaker by Dr. Freeman on 07/12/2022.    Subclavian artery stenosis 08/28/2018    Formatting of this note might be different from the original.  30% stenosis.    Tremor     Type 2 diabetes mellitus, with long-term current use of insulin 01/13/2021    Vitamin D deficiency      Past Surgical History:   Procedure Laterality Date    APPENDECTOMY      ASCENDING AORTIC ANEURYSM REPAIR W/ TISSUE AORTIC VALVE REPLACEMENT      BLADDER SUSPENSION Right     CHOLECYSTECTOMY      CORONARY ARTERY BYPASS GRAFT (CABG)      DIRECT LARYNGOSCOPY Bilateral 08/12/2022    Procedure: LARYNGOSCOPY, DIRECT;  Surgeon: Iban Silverio MD;  Location: [facility];  Service: ENT;  Laterality: Bilateral;    HYSTERECTOMY      OOPHORECTOMY      TONSILLECTOMY      VOCAL FOLD LESION EXCISION Bilateral 08/12/2022    Procedure: EXCISION, LESION, VOCAL CORD;  Surgeon: Iban Silverio MD;   Location: [facility] OR;  Service: ENT;  Laterality: Bilateral;        Medications Ordered Prior to Encounter[1]     Review of patient's allergies indicates:   Allergen Reactions    Cephalexin Rash    Bactrim [sulfamethoxazole-trimethoprim]     Zofran [ondansetron hcl] Itching     Vitals:    07/22/25 1540   BP: (!) 143/77   Pulse: (!) 117          Review of Systems   Constitutional:  Negative for fever.   Cardiovascular:  Negative for palpitations.   Gastrointestinal:  Negative for abdominal pain.   Genitourinary:  Negative for dysuria and hematuria.        Objective:     Physical Exam  Vitals and nursing note reviewed.   Constitutional:       General: She is not in acute distress.     Appearance: She is well-developed. She is not diaphoretic.   HENT:      Head: Normocephalic.   Eyes:      Conjunctiva/sclera: Conjunctivae normal.   Cardiovascular:      Rate and Rhythm: Normal rate.   Pulmonary:      Effort: Pulmonary effort is normal. No respiratory distress.   Abdominal:      General: There is no distension.      Palpations: Abdomen is soft. There is no mass.      Tenderness: There is no abdominal tenderness. There is no rebound.      Hernia: No hernia is present.   Genitourinary:     Vagina: No vaginal discharge.      Comments: The urinalysis was collected and there was no pyuria or bacteriuria.  Therefore a catheter was placed and an intravesical cocktail was administered.  The catheter was removed and the mixture was left in the bladder to dwell for 1 hour.   Musculoskeletal:         General: No tenderness or deformity.   Skin:     General: Skin is warm.      Findings: No erythema or rash.   Neurological:      Mental Status: She is alert and oriented to person, place, and time.      Motor: No abnormal muscle tone.   Psychiatric:         Attention and Perception: Attention normal.         Mood and Affect: Affect normal.         Speech: Speech normal.         Behavior: Behavior normal.         Thought Content:  Thought content normal.         Cognition and Memory: Cognition and memory normal.         Judgment: Judgment normal.            CMP  Sodium   Date Value Ref Range Status   06/02/2025 139 136 - 145 mmol/L Final   05/17/2024 143 136 - 145 mmol/L Final     Potassium   Date Value Ref Range Status   06/02/2025 4.0 3.5 - 5.1 mmol/L Final   05/17/2024 4.4 3.5 - 5.1 mmol/L Final     Chloride   Date Value Ref Range Status   06/02/2025 108 (H) 98 - 107 mmol/L Final   05/17/2024 108 100 - 109 mmol/L Final     CO2   Date Value Ref Range Status   06/02/2025 21 (L) 23 - 31 mmol/L Final     Carbon Dioxide   Date Value Ref Range Status   05/17/2024 25 22 - 33 mmol/L Final     Glucose   Date Value Ref Range Status   06/02/2025 90 82 - 115 mg/dL Final     BUN   Date Value Ref Range Status   06/02/2025 17 10 - 20 mg/dL Final     Blood Urea Nitrogen   Date Value Ref Range Status   05/17/2024 40 (H) 5 - 25 mg/dL Final     Creatinine   Date Value Ref Range Status   06/02/2025 1.71 (H) 0.55 - 1.02 mg/dL Final   05/17/2024 2.53 (H) 0.55 - 1.02 mg/dL Final     Calcium   Date Value Ref Range Status   06/02/2025 8.9 8.4 - 10.2 mg/dL Final   05/17/2024 9.1 8.8 - 10.6 mg/dL Final     Total Protein   Date Value Ref Range Status   06/02/2025 7.4 5.8 - 7.6 g/dL Final     Albumin   Date Value Ref Range Status   06/02/2025 3.8 3.4 - 4.8 g/dL Final     Bilirubin, Total   Date Value Ref Range Status   06/02/2025 0.5 <=1.5 mg/dL Final     Alk Phos   Date Value Ref Range Status   06/02/2025 89 40 - 150 U/L Final     AST   Date Value Ref Range Status   06/02/2025 47 (H) 11 - 45 U/L Final     ALT   Date Value Ref Range Status   06/02/2025 13 <=55 U/L Final     Anion Gap   Date Value Ref Range Status   06/02/2025 14 7 - 16 mmol/L Final   05/17/2024 10 8 - 16 mmol/L Final     eGFR   Date Value Ref Range Status   06/02/2025 32 (L) >=60 mL/min/1.73m2 Final     Comment:     Estimated GFR calculated using the CKD-EPI creatinine (2021) equation.      BMP  Lab  Results   Component Value Date     06/02/2025    K 4.0 06/02/2025     (H) 06/02/2025    CO2 21 (L) 06/02/2025    BUN 17 06/02/2025    CREATININE 1.71 (H) 06/02/2025    CALCIUM 8.9 06/02/2025    ANIONGAP 14 06/02/2025    EGFRNORACEVR 32 (L) 06/02/2025              [1]   Current Outpatient Medications on File Prior to Visit   Medication Sig Dispense Refill    albuterol-ipratropium (DUO-NEB) 2.5 mg-0.5 mg/3 mL nebulizer solution Take 3 mLs by nebulization every 6 (six) hours. Rescue 75 mL 0    aspirin (ECOTRIN) 81 MG EC tablet Take 81 mg by mouth once daily.      furosemide (LASIX) 40 MG tablet Take 1 tablet (40 mg total) by mouth once daily. 30 tablet 11    insulin degludec (TRESIBA FLEXTOUCH U-200) 200 unit/mL (3 mL) insulin pen 52 units in the morning and 32 units in the evening      isosorbide dinitrate (ISORDIL) 30 MG Tab Take 30 mg by mouth once daily.      levothyroxine (SYNTHROID) 25 MCG tablet Take 25 mcg by mouth before breakfast.      meclizine (ANTIVERT) 25 mg tablet Take 1 tablet (25 mg total) by mouth 3 (three) times daily as needed for Dizziness. 90 tablet 11    multivitamin (THERAGRAN) per tablet Take 1 tablet by mouth once daily.      pantoprazole (PROTONIX) 40 MG tablet TAKE 1 TABLET BY MOUTH ONCE DAILY PRIOR TO SUPPER      pregabalin (LYRICA) 75 MG capsule Take 75 mg by mouth 2 (two) times daily.      primidone (MYSOLINE) 50 MG Tab Take 2 tablets (100 mg total) by mouth 3 (three) times daily. 540 tablet 3    rosuvastatin (CRESTOR) 40 MG Tab Take 40 mg by mouth once daily.      semaglutide (OZEMPIC) 0.25 mg or 0.5 mg(2 mg/1.5 mL) pen injector Inject 0.25 mg into the skin.      sertraline (ZOLOFT) 50 MG tablet TAKE ONE TABLET BY MOUTH EVERY DAY 90 tablet 3    diazePAM (VALIUM) 5 MG tablet Take 1 tablet (5 mg total) by mouth every 12 (twelve) hours as needed (Take as needed for bladder spasms). 10 tablet 0    hydroCHLOROthiazide (HYDRODIURIL) 12.5 MG Tab Take 12.5 mg by mouth once daily.  (Patient not taking: Reported on 7/22/2025)       No current facility-administered medications on file prior to visit.

## 2025-07-31 ENCOUNTER — HOSPITAL ENCOUNTER (INPATIENT)
Facility: HOSPITAL | Age: 70
LOS: 3 days | Discharge: HOME | DRG: 291 | End: 2025-08-04
Attending: EMERGENCY MEDICINE | Admitting: HOSPITALIST
Payer: MEDICARE

## 2025-07-31 DIAGNOSIS — R06.02 SOB (SHORTNESS OF BREATH): ICD-10-CM

## 2025-07-31 DIAGNOSIS — J96.21 ACUTE ON CHRONIC RESPIRATORY FAILURE WITH HYPOXIA: ICD-10-CM

## 2025-07-31 DIAGNOSIS — K21.9 GASTROESOPHAGEAL REFLUX DISEASE WITHOUT ESOPHAGITIS: ICD-10-CM

## 2025-07-31 DIAGNOSIS — G25.0 ESSENTIAL TREMOR: ICD-10-CM

## 2025-07-31 DIAGNOSIS — I48.0 PAROXYSMAL ATRIAL FIBRILLATION: ICD-10-CM

## 2025-07-31 DIAGNOSIS — E66.9 DIABETES MELLITUS TYPE 2 IN OBESE: ICD-10-CM

## 2025-07-31 DIAGNOSIS — N18.4 CHRONIC KIDNEY FAILURE, STAGE 4 (SEVERE): ICD-10-CM

## 2025-07-31 DIAGNOSIS — M79.605 PAIN AND SWELLING OF LEFT LOWER EXTREMITY: ICD-10-CM

## 2025-07-31 DIAGNOSIS — J96.01 ACUTE HYPOXIC RESPIRATORY FAILURE: ICD-10-CM

## 2025-07-31 DIAGNOSIS — I50.33: ICD-10-CM

## 2025-07-31 DIAGNOSIS — I50.9 CHF (CONGESTIVE HEART FAILURE): ICD-10-CM

## 2025-07-31 DIAGNOSIS — R06.02 SHORTNESS OF BREATH: ICD-10-CM

## 2025-07-31 DIAGNOSIS — E11.69 DIABETES MELLITUS TYPE 2 IN OBESE: ICD-10-CM

## 2025-07-31 DIAGNOSIS — I50.9 ACUTE CONGESTIVE HEART FAILURE, UNSPECIFIED HEART FAILURE TYPE: Primary | ICD-10-CM

## 2025-07-31 DIAGNOSIS — M79.89 PAIN AND SWELLING OF LEFT LOWER EXTREMITY: ICD-10-CM

## 2025-07-31 DIAGNOSIS — F32.A DEPRESSION, UNSPECIFIED DEPRESSION TYPE: ICD-10-CM

## 2025-07-31 LAB
ALBUMIN SERPL BCP-MCNC: 3.5 G/DL (ref 3.4–4.8)
ALBUMIN/GLOB SERPL: 0.9 {RATIO}
ALP SERPL-CCNC: 95 U/L (ref 40–150)
ALT SERPL W P-5'-P-CCNC: 20 U/L
ANION GAP SERPL CALCULATED.3IONS-SCNC: 16 MMOL/L (ref 7–16)
ANISOCYTOSIS BLD QL SMEAR: NORMAL
AST SERPL W P-5'-P-CCNC: 60 U/L (ref 11–45)
BASOPHILS # BLD AUTO: 0.14 K/UL (ref 0–0.2)
BASOPHILS NFR BLD AUTO: 1.3 % (ref 0–1)
BILIRUB SERPL-MCNC: 0.5 MG/DL
BUN SERPL-MCNC: 20 MG/DL (ref 10–20)
BUN/CREAT SERPL: 9 (ref 6–20)
CALCIUM SERPL-MCNC: 8.4 MG/DL (ref 8.4–10.2)
CHLORIDE SERPL-SCNC: 106 MMOL/L (ref 98–107)
CO2 SERPL-SCNC: 21 MMOL/L (ref 23–31)
CREAT SERPL-MCNC: 2.12 MG/DL (ref 0.55–1.02)
DIFFERENTIAL METHOD BLD: ABNORMAL
EGFR (NO RACE VARIABLE) (RUSH/TITUS): 25 ML/MIN/1.73M2
EOSINOPHIL # BLD AUTO: 0.81 K/UL (ref 0–0.5)
EOSINOPHIL NFR BLD AUTO: 7.6 % (ref 1–4)
ERYTHROCYTE [DISTWIDTH] IN BLOOD BY AUTOMATED COUNT: 19.7 % (ref 11.5–14.5)
GLOBULIN SER-MCNC: 4.1 G/DL (ref 2–4)
GLUCOSE SERPL-MCNC: 137 MG/DL (ref 82–115)
HCO3 UR-SCNC: 23.5 MMOL/L (ref 21–28)
HCT VFR BLD AUTO: 40.3 % (ref 38–47)
HCT VFR BLD CALC: 39 % (ref 35–51)
HGB BLD-MCNC: 11.9 G/DL (ref 12–16)
IMM GRANULOCYTES # BLD AUTO: 0.05 K/UL (ref 0–0.04)
IMM GRANULOCYTES NFR BLD: 0.5 % (ref 0–0.4)
INR BLD: 1.14
LDH SERPL L TO P-CCNC: 1.3 MMOL/L (ref 0.3–1.2)
LYMPHOCYTES # BLD AUTO: 2.04 K/UL (ref 1–4.8)
LYMPHOCYTES NFR BLD AUTO: 19.1 % (ref 27–41)
MCH RBC QN AUTO: 23 PG (ref 27–31)
MCHC RBC AUTO-ENTMCNC: 29.5 G/DL (ref 32–36)
MCV RBC AUTO: 77.8 FL (ref 80–96)
MONOCYTES # BLD AUTO: 1.02 K/UL (ref 0–0.8)
MONOCYTES NFR BLD AUTO: 9.5 % (ref 2–6)
MPC BLD CALC-MCNC: 11.4 FL (ref 9.4–12.4)
NEUTROPHILS # BLD AUTO: 6.63 K/UL (ref 1.8–7.7)
NEUTROPHILS NFR BLD AUTO: 62 % (ref 53–65)
NRBC # BLD AUTO: 0 X10E3/UL
NRBC, AUTO (.00): 0 %
NT-PROBNP SERPL-MCNC: 4645 PG/ML (ref 1–125)
PCO2 BLDA: 38 MMHG (ref 35–48)
PH SMN: 7.4 [PH] (ref 7.35–7.45)
PLATELET # BLD AUTO: 273 K/UL (ref 150–400)
PLATELET MORPHOLOGY: NORMAL
PO2 BLDA: 99 MMHG (ref 83–108)
POC BASE EXCESS: -1.1 MMOL/L (ref -2–3)
POC CO2: 24.7 MMOL/L
POC IONIZED CALCIUM: 1.04 MMOL/L (ref 1.15–1.35)
POC SATURATED O2: 98 % (ref 95–98)
POCT GLUCOSE: 142 MG/DL (ref 60–95)
POLYCHROMASIA BLD QL SMEAR: NORMAL
POTASSIUM BLD-SCNC: 3.9 MMOL/L (ref 3.4–4.5)
POTASSIUM SERPL-SCNC: 4.4 MMOL/L (ref 3.5–5.1)
PROT SERPL-MCNC: 7.6 G/DL (ref 5.8–7.6)
PROTHROMBIN TIME: 14.7 SECONDS (ref 11.7–14.7)
RBC # BLD AUTO: 5.18 M/UL (ref 4.2–5.4)
SARS-COV-2 RDRP RESP QL NAA+PROBE: NEGATIVE
SODIUM BLD-SCNC: 135 MMOL/L (ref 136–145)
SODIUM SERPL-SCNC: 139 MMOL/L (ref 136–145)
TROPONIN I SERPL HS-MCNC: 14.1 NG/L
TROPONIN I SERPL HS-MCNC: 18.9 NG/L
WBC # BLD AUTO: 10.69 K/UL (ref 4.5–11)

## 2025-07-31 PROCEDURE — 83880 ASSAY OF NATRIURETIC PEPTIDE: CPT | Performed by: EMERGENCY MEDICINE

## 2025-07-31 PROCEDURE — 5A09357 ASSISTANCE WITH RESPIRATORY VENTILATION, LESS THAN 24 CONSECUTIVE HOURS, CONTINUOUS POSITIVE AIRWAY PRESSURE: ICD-10-PCS | Performed by: HOSPITALIST

## 2025-07-31 PROCEDURE — 96375 TX/PRO/DX INJ NEW DRUG ADDON: CPT

## 2025-07-31 PROCEDURE — 85014 HEMATOCRIT: CPT

## 2025-07-31 PROCEDURE — 82803 BLOOD GASES ANY COMBINATION: CPT

## 2025-07-31 PROCEDURE — 36415 COLL VENOUS BLD VENIPUNCTURE: CPT | Performed by: EMERGENCY MEDICINE

## 2025-07-31 PROCEDURE — 96365 THER/PROPH/DIAG IV INF INIT: CPT

## 2025-07-31 PROCEDURE — 25000242 PHARM REV CODE 250 ALT 637 W/ HCPCS: Performed by: EMERGENCY MEDICINE

## 2025-07-31 PROCEDURE — 82330 ASSAY OF CALCIUM: CPT

## 2025-07-31 PROCEDURE — 87635 SARS-COV-2 COVID-19 AMP PRB: CPT | Performed by: EMERGENCY MEDICINE

## 2025-07-31 PROCEDURE — 25000003 PHARM REV CODE 250: Performed by: EMERGENCY MEDICINE

## 2025-07-31 PROCEDURE — 93010 ELECTROCARDIOGRAM REPORT: CPT | Mod: ,,, | Performed by: INTERNAL MEDICINE

## 2025-07-31 PROCEDURE — 82947 ASSAY GLUCOSE BLOOD QUANT: CPT

## 2025-07-31 PROCEDURE — 83036 HEMOGLOBIN GLYCOSYLATED A1C: CPT

## 2025-07-31 PROCEDURE — 63600175 PHARM REV CODE 636 W HCPCS: Performed by: EMERGENCY MEDICINE

## 2025-07-31 PROCEDURE — 85025 COMPLETE CBC W/AUTO DIFF WBC: CPT | Performed by: EMERGENCY MEDICINE

## 2025-07-31 PROCEDURE — 85610 PROTHROMBIN TIME: CPT | Performed by: EMERGENCY MEDICINE

## 2025-07-31 PROCEDURE — 84295 ASSAY OF SERUM SODIUM: CPT

## 2025-07-31 PROCEDURE — 84484 ASSAY OF TROPONIN QUANT: CPT | Performed by: EMERGENCY MEDICINE

## 2025-07-31 PROCEDURE — 83605 ASSAY OF LACTIC ACID: CPT

## 2025-07-31 PROCEDURE — 84132 ASSAY OF SERUM POTASSIUM: CPT

## 2025-07-31 PROCEDURE — 80053 COMPREHEN METABOLIC PANEL: CPT | Performed by: EMERGENCY MEDICINE

## 2025-07-31 PROCEDURE — 93005 ELECTROCARDIOGRAM TRACING: CPT

## 2025-07-31 RX ORDER — ASPIRIN 81 MG/1
81 TABLET ORAL DAILY
COMMUNITY

## 2025-07-31 RX ORDER — BUDESONIDE 0.5 MG/2ML
0.5 INHALANT ORAL
Status: COMPLETED | OUTPATIENT
Start: 2025-07-31 | End: 2025-07-31

## 2025-07-31 RX ORDER — IPRATROPIUM BROMIDE AND ALBUTEROL SULFATE 2.5; .5 MG/3ML; MG/3ML
3 SOLUTION RESPIRATORY (INHALATION)
Status: COMPLETED | OUTPATIENT
Start: 2025-07-31 | End: 2025-07-31

## 2025-07-31 RX ORDER — DEXAMETHASONE SODIUM PHOSPHATE 4 MG/ML
8 INJECTION, SOLUTION INTRA-ARTICULAR; INTRALESIONAL; INTRAMUSCULAR; INTRAVENOUS; SOFT TISSUE
Status: COMPLETED | OUTPATIENT
Start: 2025-07-31 | End: 2025-07-31

## 2025-07-31 RX ORDER — FUROSEMIDE 10 MG/ML
40 INJECTION INTRAMUSCULAR; INTRAVENOUS
Status: COMPLETED | OUTPATIENT
Start: 2025-07-31 | End: 2025-07-31

## 2025-07-31 RX ORDER — MAGNESIUM SULFATE HEPTAHYDRATE 40 MG/ML
2 INJECTION, SOLUTION INTRAVENOUS ONCE
Status: COMPLETED | OUTPATIENT
Start: 2025-07-31 | End: 2025-07-31

## 2025-07-31 RX ADMIN — DEXAMETHASONE SODIUM PHOSPHATE 8 MG: 4 INJECTION INTRA-ARTICULAR; INTRALESIONAL; INTRAMUSCULAR; INTRAVENOUS; SOFT TISSUE at 07:07

## 2025-07-31 RX ADMIN — IPRATROPIUM BROMIDE AND ALBUTEROL SULFATE 3 ML: .5; 3 SOLUTION RESPIRATORY (INHALATION) at 07:07

## 2025-07-31 RX ADMIN — POTASSIUM BICARBONATE 20 MEQ: 782 TABLET, EFFERVESCENT ORAL at 10:07

## 2025-07-31 RX ADMIN — FUROSEMIDE 40 MG: 10 INJECTION, SOLUTION INTRAMUSCULAR; INTRAVENOUS at 10:07

## 2025-07-31 RX ADMIN — MAGNESIUM SULFATE HEPTAHYDRATE 2 G: 40 INJECTION, SOLUTION INTRAVENOUS at 07:07

## 2025-07-31 RX ADMIN — BUDESONIDE INHALATION 0.5 MG: 0.5 SUSPENSION RESPIRATORY (INHALATION) at 07:07

## 2025-07-31 NOTE — ED PROVIDER NOTES
Encounter Date: 7/31/2025    SCRIBE #1 NOTE: I, Millie Arellano, am scribing for, and in the presence of,  Yfn Hwang MD.       History     Chief Complaint   Patient presents with    Shortness of Breath     Patient reports SOB that has gotten worse today     This 69 y.o. female pt presents to the ED with c/o SOB. The pt reports having Mhx of Double PNA in December of 2024, T2DM, and Renal failure  (CKD-chronic kidney disease) stage 5, GFR less than 15 ml/min and Triple Bypass. Pt was put on oxygen and has been using her portable oxygen. Pt said she ran out of oxygen for her portable oxygen and has been severely SOB lately.     The history is provided by the patient and the spouse.     Review of patient's allergies indicates:   Allergen Reactions    Cephalexin Rash    Bactrim [sulfamethoxazole-trimethoprim]     Zofran [ondansetron hcl] Itching     Past Medical History:   Diagnosis Date    Acute gastric ulcer without hemorrhage or perforation 03/03/2022    Age-related osteoporosis with current pathological fracture 04/21/2021    Benign essential hypertension 08/28/2018    Last Assessment & Plan:   Formatting of this note might be different from the original.  Well controlled    Carpal tunnel syndrome     Cerebral vascular accident     Chronic kidney failure, stage 4 (severe)     Coronary arteriosclerosis     S/P CABG 2011    COVID-19 12/20/2020    Diabetes mellitus, type 2     Diabetic peripheral neuropathy associated with type 2 diabetes mellitus 04/28/2022    Diverticulitis     Dyslipidemia, goal LDL below 70     Essential tremor 01/20/2022    Falls frequently 12/07/2021    Insomnia secondary to depression with anxiety     Lumbar radiculopathy     Non-rheumatic aortic stenosis 08/28/2018    Osteoporosis     Paroxysmal atrial fibrillation 08/18/2022    RBBB 09/08/2021    Spinal stenosis, cervical region 01/20/2022    SSS (sick sinus syndrome) 07/12/2022    Subclavian artery stenosis 08/28/2018    Type 2 diabetes  mellitus, with long-term current use of insulin 01/13/2021    Vitamin D deficiency      Past Surgical History:   Procedure Laterality Date    APPENDECTOMY      ASCENDING AORTIC ANEURYSM REPAIR W/ TISSUE AORTIC VALVE REPLACEMENT      BLADDER SUSPENSION Right     CHOLECYSTECTOMY      CORONARY ARTERY BYPASS GRAFT (CABG)      DIRECT LARYNGOSCOPY Bilateral 08/12/2022    Procedure: LARYNGOSCOPY, DIRECT;  Surgeon: Iban Silverio MD;  Location: [facility] OR;  Service: ENT;  Laterality: Bilateral;    HYSTERECTOMY      OOPHORECTOMY      TONSILLECTOMY      VOCAL FOLD LESION EXCISION Bilateral 08/12/2022    Procedure: EXCISION, LESION, VOCAL CORD;  Surgeon: Iban Silverio MD;  Location: [facility] OR;  Service: ENT;  Laterality: Bilateral;     Family History   Adopted: Yes   Problem Relation Name Age of Onset    Cancer Mother      Breast cancer Mother      Heart disease Father      Breast cancer Sister       Social History[1]  Review of Systems   Constitutional:  Negative for activity change, appetite change, chills, diaphoresis and fever.   HENT:  Negative for congestion, drooling, ear pain, mouth sores, rhinorrhea, sinus pain, sore throat and trouble swallowing.    Eyes:  Negative for pain and discharge.   Respiratory:  Positive for shortness of breath. Negative for cough, chest tightness, wheezing and stridor.    Cardiovascular:  Negative for chest pain, palpitations and leg swelling.   Gastrointestinal:  Negative for abdominal distention, abdominal pain, blood in stool, constipation, diarrhea, nausea and vomiting.   Genitourinary:  Negative for difficulty urinating, dysuria, flank pain, frequency, hematuria and urgency.   Musculoskeletal:  Negative for arthralgias, back pain and myalgias.   Skin:  Negative for pallor, rash and wound.   Neurological:  Negative for dizziness, syncope, weakness, light-headedness and numbness.   All other systems reviewed and are negative.      Physical Exam     Initial Vitals   BP Pulse  Resp Temp SpO2   07/31/25 1841 07/31/25 1837 07/31/25 1839 07/31/25 1913 07/31/25 1837   (!) 159/69 84 15 98.8 °F (37.1 °C) (!) 67 %      MAP       --                Physical Exam    Nursing note and vitals reviewed.  Constitutional: She appears well-developed and well-nourished.   HENT:   Head: Normocephalic and atraumatic.   Eyes: EOM are normal. Pupils are equal, round, and reactive to light.   Neck: Neck supple. No thyromegaly present.   Normal range of motion.  Cardiovascular:  Normal rate, regular rhythm, normal heart sounds and intact distal pulses.           No murmur heard.  Pulmonary/Chest: She has no wheezes. She has rales.   Pt has rales on left side lungs.   Abdominal: Abdomen is soft. Bowel sounds are normal. There is no abdominal tenderness.   Musculoskeletal:         General: No tenderness or edema. Normal range of motion.      Cervical back: Normal range of motion and neck supple.     Lymphadenopathy:     She has no cervical adenopathy.   Neurological: She is alert and oriented to person, place, and time. She has normal strength and normal reflexes. No cranial nerve deficit or sensory deficit. GCS score is 15. GCS eye subscore is 4. GCS verbal subscore is 5. GCS motor subscore is 6.   Skin: Skin is warm and dry. Capillary refill takes less than 2 seconds. No rash noted.   Psychiatric: She has a normal mood and affect.         ED Course   Procedures  Labs Reviewed   COMPREHENSIVE METABOLIC PANEL - Abnormal       Result Value    Sodium 139      Potassium 4.4      Chloride 106      CO2 21 (*)     Anion Gap 16      Glucose 137 (*)     BUN 20      Creatinine 2.12 (*)     BUN/Creatinine Ratio 9      Calcium 8.4      Total Protein 7.6      Albumin 3.5      Globulin 4.1 (*)     A/G Ratio 0.9      Bilirubin, Total 0.5      Alk Phos 95      ALT 20      AST 60 (*)     eGFR 25 (*)    CBC WITH DIFFERENTIAL - Abnormal    WBC 10.69      RBC 5.18      Hemoglobin 11.9 (*)     Hematocrit 40.3      MCV 77.8 (*)      MCH 23.0 (*)     MCHC 29.5 (*)     RDW 19.7 (*)     Platelet Count 273      MPV 11.4      Neutrophils % 62.0      Lymphocytes % 19.1 (*)     Monocytes % 9.5 (*)     Eosinophils % 7.6 (*)     Basophils % 1.3 (*)     Immature Granulocytes % 0.5 (*)     nRBC, Auto 0.0      Neutrophils, Abs 6.63      Lymphocytes, Absolute 2.04      Monocytes, Absolute 1.02 (*)     Eosinophils, Absolute 0.81 (*)     Basophils, Absolute 0.14      Immature Granulocytes, Absolute 0.05 (*)     nRBC, Absolute 0.00      Diff Type Auto     TROPONIN I HIGH SENSITIVITY - Abnormal    Troponin I High Sensitivity 14.1 (*)    NT-PRO NATRIURETIC PEPTIDE - Abnormal    ProBNP 4,645 (*)    TROPONIN I HIGH SENSITIVITY - Abnormal    Troponin I High Sensitivity 18.9 (*)    SARS-COV-2 RNA AMPLIFICATION, QUAL - Normal    SARS COV-2 Molecular Negative      Narrative:     Negative SARS-CoV results should not be used as the sole basis for treatment or patient management decisions; negative results should be considered in the context of a patient's recent exposures, history and the presene of clinical signs and symptoms consistent with COVID-19.  Negative results should be treated as presumptive and confirmed by molecular assay, if necessary for patient management.   PROTIME-INR - Normal    PT 14.7      INR 1.14     CBC W/ AUTO DIFFERENTIAL    Narrative:     The following orders were created for panel order CBC Auto Differential.  Procedure                               Abnormality         Status                     ---------                               -----------         ------                     CBC with Differential[[id number]]       Abnormal            Final result                 Please view results for these tests on the individual orders.   TROPONIN I    Narrative:     The following orders were created for panel order Troponin I.  Procedure                               Abnormality         Status                     ---------                                -----------         ------                     Troponin I High Sensiti...[[id number]]  Abnormal            Final result                 Please view results for these tests on the individual orders.   CBC MORPHOLOGY    Platelet Morphology Normal      Anisocytosis 3+      Polychromasia Few     TROPONIN I    Narrative:     The following orders were created for panel order Troponin I.  Procedure                               Abnormality         Status                     ---------                               -----------         ------                     Troponin I High Sensiti...[[id number]]  Abnormal            Final result                 Please view results for these tests on the individual orders.   EXTRA TUBES    Narrative:     The following orders were created for panel order EXTRA TUBES.  Procedure                               Abnormality         Status                     ---------                               -----------         ------                     Gold Top Hold[[id number]]                                   In process                   Please view results for these tests on the individual orders.   GOLD TOP HOLD          Imaging Results              US Lower Extremity Veins Bilateral (In process)                      X-Ray Chest AP Portable (In process)                      Medications   sodium chloride 0.9% flush 10 mL (has no administration in time range)   furosemide injection 40 mg (has no administration in time range)   aspirin EC tablet 81 mg (has no administration in time range)   isosorbide dinitrate tablet 30 mg (has no administration in time range)   pantoprazole EC tablet 40 mg (has no administration in time range)   pregabalin capsule 75 mg (has no administration in time range)   primidone tablet 100 mg (has no administration in time range)   sertraline tablet 100 mg (has no administration in time range)   heparin (porcine) injection 5,000 Units (has no administration in time range)    albuterol-ipratropium 2.5 mg-0.5 mg/3 mL nebulizer solution 3 mL (3 mLs Nebulization Given 25)   budesonide nebulizer solution 0.5 mg (0.5 mg Nebulization Given 25)   dexAMETHasone injection 8 mg (8 mg Intravenous Given 25)   magnesium sulfate 2g in water 50mL IVPB (premix) (0 g Intravenous Stopped 25)   furosemide injection 40 mg (40 mg Intravenous Given 25)   potassium bicarbonate disintegrating tablet 20 mEq (20 mEq Oral Given 25)     Medical Decision Making  69 year old female patient came to the emergency department with shortness of breath her oxygen saturation at triage was 67%. Her chest Xray showed cardiomegaly with pulmonary congestion. Her proBNP is elevated. The patient symptoms improved on oxygen and diuresis with Lasix. I feel the patient needs to be evaluated for congestive heart failure and possibly pulmonary hypertension so we are going to admit the patient to the hospitalist service.          Attending Attestation:           Physician Attestation for Scribe:  Physician Attestation Statement for Scribe #1: I, Sussy Hwang MD, reviewed documentation, as scribed by Millie Arellano in my presence, and it is both accurate and complete.                                        Clinical Impression:  Final diagnoses:  [R06.02] SOB (shortness of breath)  [R06.02] Shortness of breath  [M79.605, M79.89] Pain and swelling of left lower extremity  [I50.9] Acute congestive heart failure, unspecified heart failure type (Primary)  [J96.21] Acute on chronic respiratory failure with hypoxia          ED Disposition Condition    Admit                       [1]   Social History  Tobacco Use    Smoking status: Former     Current packs/day: 0.00     Average packs/day: 0.5 packs/day for 17.3 years (8.7 ttl pk-yrs)     Types: Cigarettes     Start date:      Quit date: 2011     Years since quittin.2    Smokeless tobacco: Never   Substance Use  Topics    Alcohol use: Never    Drug use: Never        Yfn Hwang MD  08/01/25 0229     Consent (Lip)/Introductory Paragraph: The rationale for Mohs was explained to the patient and consent was obtained. The risks, benefits and alternatives to therapy were discussed in detail. Specifically, the risks of lip deformity, changes in the oral aperture, infection, scarring, bleeding, prolonged wound healing, incomplete removal, allergy to anesthesia, nerve injury and recurrence were addressed. Prior to the procedure, the treatment site was clearly identified and confirmed by the patient. All components of Universal Protocol/PAUSE Rule completed.

## 2025-07-31 NOTE — LETTER
08/04/2025                    [address] [place] [id number]  Phone: [phone]  Fax: [phone]   08/04/2025    Patient: Karen Renteria   YOB: 1955   Date of Visit: 7/31/2025       To Whom it May Concern:    Karen Renteria was admitted on 7/31/2025, and discharged on 8/4/2025. She may return to work on 8/5/2025.    If you have any questions or concerns, please don't hesitate to call.    Sincerely,         Carlee Washington RN

## 2025-08-01 PROBLEM — I50.33: Status: ACTIVE | Noted: 2025-08-01

## 2025-08-01 PROBLEM — J96.01 ACUTE HYPOXIC RESPIRATORY FAILURE: Status: ACTIVE | Noted: 2025-08-01

## 2025-08-01 PROBLEM — K21.9 GASTROESOPHAGEAL REFLUX DISEASE WITHOUT ESOPHAGITIS: Status: ACTIVE | Noted: 2025-08-01

## 2025-08-01 PROBLEM — M62.838 MUSCLE SPASMS OF BOTH LOWER EXTREMITIES: Status: ACTIVE | Noted: 2025-08-01

## 2025-08-01 PROBLEM — F32.A DEPRESSION: Status: ACTIVE | Noted: 2025-08-01

## 2025-08-01 PROBLEM — I48.0 PAROXYSMAL ATRIAL FIBRILLATION: Status: ACTIVE | Noted: 2022-08-18

## 2025-08-01 PROBLEM — G25.0 ESSENTIAL TREMOR: Status: ACTIVE | Noted: 2025-08-01

## 2025-08-01 LAB
AORTIC ROOT ANNULUS: 2.7 CM
AORTIC VALVE CUSP SEPERATION: 1.07 CM
AV INDEX (PROSTH): 0.39
AV MEAN GRADIENT: 10 MMHG
AV PEAK GRADIENT: 18 MMHG
AV VALVE AREA BY VELOCITY RATIO: 1.1 CM²
AV VALVE AREA: 1.1 CM²
AV VELOCITY RATIO: 0.38
BSA FOR ECHO PROCEDURE: 1.81 M2
CV ECHO LV RWT: 0.33 CM
DOP CALC AO PEAK VEL: 2.1 M/S
DOP CALC AO VTI: 54.1 CM
DOP CALC LVOT AREA: 2.8 CM2
DOP CALC LVOT DIAMETER: 1.9 CM
DOP CALC LVOT PEAK VEL: 0.8 M/S
DOP CALCLVOT PEAK VEL VTI: 21.1 CM
E WAVE DECELERATION TIME: 229 MSEC
E/A RATIO: 1.04
E/E' RATIO: 16 M/S
ECHO LV POSTERIOR WALL: 0.8 CM (ref 0.6–1.1)
EST. AVERAGE GLUCOSE BLD GHB EST-MCNC: 137 MG/DL
FRACTIONAL SHORTENING: 22.4 % (ref 28–44)
GLUCOSE SERPL-MCNC: 114 MG/DL (ref 70–105)
GLUCOSE SERPL-MCNC: 117 MG/DL (ref 70–105)
GLUCOSE SERPL-MCNC: 137 MG/DL (ref 70–105)
HBA1C MFR BLD HPLC: 6.4 %
INFERIOR VENA CAVA SIZE SNIFF: 0.8 CM
INTERVENTRICULAR SEPTUM: 0.9 CM (ref 0.6–1.1)
IVC DIAMETER: 1.57 CM
LEFT ATRIUM SIZE: 4.5 CM
LEFT INTERNAL DIMENSION IN SYSTOLE: 3.8 CM (ref 2.1–4)
LEFT VENTRICLE DIASTOLIC VOLUME INDEX: 62.71 ML/M2
LEFT VENTRICLE DIASTOLIC VOLUME: 111 ML
LEFT VENTRICLE MASS INDEX: 80.2 G/M2
LEFT VENTRICLE SYSTOLIC VOLUME INDEX: 35.6 ML/M2
LEFT VENTRICLE SYSTOLIC VOLUME: 63 ML
LEFT VENTRICULAR INTERNAL DIMENSION IN DIASTOLE: 4.9 CM (ref 3.5–6)
LEFT VENTRICULAR MASS: 141.9 G
LV LATERAL E/E' RATIO: 14.1 M/S
LV SEPTAL E/E' RATIO: 18.1 M/S
LVED V (TEICH): 110.96 ML
LVES V (TEICH): 63.37 ML
LVOT MG: 1.62 MMHG
LVOT MV: 0.6 CM/S
MV PEAK A VEL: 1.22 M/S
MV PEAK E VEL: 1.27 M/S
MV STENOSIS PRESSURE HALF TIME: 66.41 MS
MV VALVE AREA P 1/2 METHOD: 3.31 CM2
OHS CV CPX PATIENT HEIGHT IN: 62
OHS CV RV/LV RATIO: 0.63 CM
PISA TR MAX VEL: 2.9 M/S
PV PEAK GRADIENT: 1 MMHG
PV PEAK VELOCITY: 0.6 M/S
RA VOL SYS: 53.1 ML
RIGHT ATRIAL AREA: 21.2 CM2
RIGHT ATRIUM END SYSTOLIC VOLUME APICAL 4 CHAMBER INDEX BSA: 29.99 ML/M2
RIGHT ATRIUM VOLUME AREA LENGTH APICAL 4 CHAMBER: 53.09 ML
RIGHT VENTRICLE DIASTOLIC BASEL DIMENSION: 3.1 CM
RIGHT VENTRICLE DIASTOLIC MID DIMENSION: 3.1 CM
RV MID DIAMA: 3.12 CM
TDI LATERAL: 0.09 M/S
TDI SEPTAL: 0.07 M/S
TDI: 0.08 M/S
TR MAX PG: 35 MMHG
TRICUSPID ANNULAR PLANE SYSTOLIC EXCURSION: 1.7 CM
TROPONIN I SERPL HS-MCNC: 14 NG/L
Z-SCORE OF LEFT VENTRICULAR DIMENSION IN END DIASTOLE: 0.01
Z-SCORE OF LEFT VENTRICULAR DIMENSION IN END SYSTOLE: 1.8

## 2025-08-01 PROCEDURE — 36415 COLL VENOUS BLD VENIPUNCTURE: CPT | Performed by: HOSPITALIST

## 2025-08-01 PROCEDURE — 94660 CPAP INITIATION&MGMT: CPT

## 2025-08-01 PROCEDURE — 25000003 PHARM REV CODE 250: Performed by: HOSPITALIST

## 2025-08-01 PROCEDURE — 27000190 HC CPAP FULL FACE MASK W/VALVE

## 2025-08-01 PROCEDURE — 94761 N-INVAS EAR/PLS OXIMETRY MLT: CPT

## 2025-08-01 PROCEDURE — 84484 ASSAY OF TROPONIN QUANT: CPT | Performed by: HOSPITALIST

## 2025-08-01 PROCEDURE — 63600175 PHARM REV CODE 636 W HCPCS

## 2025-08-01 PROCEDURE — 63600175 PHARM REV CODE 636 W HCPCS: Performed by: HOSPITALIST

## 2025-08-01 PROCEDURE — 99285 EMERGENCY DEPT VISIT HI MDM: CPT | Mod: 25

## 2025-08-01 PROCEDURE — 99900035 HC TECH TIME PER 15 MIN (STAT)

## 2025-08-01 PROCEDURE — 11000001 HC ACUTE MED/SURG PRIVATE ROOM

## 2025-08-01 PROCEDURE — 82962 GLUCOSE BLOOD TEST: CPT

## 2025-08-01 PROCEDURE — 27000221 HC OXYGEN, UP TO 24 HOURS

## 2025-08-01 PROCEDURE — 94799 UNLISTED PULMONARY SVC/PX: CPT

## 2025-08-01 PROCEDURE — 99900031 HC PATIENT EDUCATION (STAT)

## 2025-08-01 PROCEDURE — 99223 1ST HOSP IP/OBS HIGH 75: CPT | Mod: ,,, | Performed by: HOSPITALIST

## 2025-08-01 RX ORDER — SERTRALINE HYDROCHLORIDE 50 MG/1
100 TABLET, FILM COATED ORAL DAILY
Status: DISCONTINUED | OUTPATIENT
Start: 2025-08-01 | End: 2025-08-04 | Stop reason: HOSPADM

## 2025-08-01 RX ORDER — PANTOPRAZOLE SODIUM 40 MG/1
40 TABLET, DELAYED RELEASE ORAL DAILY
Status: DISCONTINUED | OUTPATIENT
Start: 2025-08-01 | End: 2025-08-04 | Stop reason: HOSPADM

## 2025-08-01 RX ORDER — PREGABALIN 75 MG/1
75 CAPSULE ORAL 2 TIMES DAILY
Status: DISCONTINUED | OUTPATIENT
Start: 2025-08-01 | End: 2025-08-04 | Stop reason: HOSPADM

## 2025-08-01 RX ORDER — INSULIN GLARGINE 100 [IU]/ML
25 INJECTION, SOLUTION SUBCUTANEOUS DAILY
Status: DISCONTINUED | OUTPATIENT
Start: 2025-08-01 | End: 2025-08-04 | Stop reason: HOSPADM

## 2025-08-01 RX ORDER — INSULIN ASPART 100 [IU]/ML
0-10 INJECTION, SOLUTION INTRAVENOUS; SUBCUTANEOUS
Status: DISCONTINUED | OUTPATIENT
Start: 2025-08-01 | End: 2025-08-04 | Stop reason: HOSPADM

## 2025-08-01 RX ORDER — GLUCAGON 1 MG
1 KIT INJECTION
Status: DISCONTINUED | OUTPATIENT
Start: 2025-08-01 | End: 2025-08-04 | Stop reason: HOSPADM

## 2025-08-01 RX ORDER — PRIMIDONE 50 MG/1
100 TABLET ORAL DAILY
Status: DISCONTINUED | OUTPATIENT
Start: 2025-08-01 | End: 2025-08-04 | Stop reason: HOSPADM

## 2025-08-01 RX ORDER — FUROSEMIDE 10 MG/ML
40 INJECTION INTRAMUSCULAR; INTRAVENOUS EVERY 12 HOURS
Status: DISCONTINUED | OUTPATIENT
Start: 2025-08-01 | End: 2025-08-04 | Stop reason: HOSPADM

## 2025-08-01 RX ORDER — IBUPROFEN 200 MG
24 TABLET ORAL
Status: DISCONTINUED | OUTPATIENT
Start: 2025-08-01 | End: 2025-08-04 | Stop reason: HOSPADM

## 2025-08-01 RX ORDER — CYCLOBENZAPRINE HCL 10 MG
10 TABLET ORAL 2 TIMES DAILY PRN
Status: DISCONTINUED | OUTPATIENT
Start: 2025-08-01 | End: 2025-08-04 | Stop reason: HOSPADM

## 2025-08-01 RX ORDER — CYCLOBENZAPRINE HCL 10 MG
10 TABLET ORAL ONCE
Status: COMPLETED | OUTPATIENT
Start: 2025-08-01 | End: 2025-08-01

## 2025-08-01 RX ORDER — IBUPROFEN 200 MG
16 TABLET ORAL
Status: DISCONTINUED | OUTPATIENT
Start: 2025-08-01 | End: 2025-08-04 | Stop reason: HOSPADM

## 2025-08-01 RX ORDER — SODIUM CHLORIDE 0.9 % (FLUSH) 0.9 %
10 SYRINGE (ML) INJECTION
Status: DISCONTINUED | OUTPATIENT
Start: 2025-08-01 | End: 2025-08-04 | Stop reason: HOSPADM

## 2025-08-01 RX ORDER — ASPIRIN 81 MG/1
81 TABLET ORAL DAILY
Status: DISCONTINUED | OUTPATIENT
Start: 2025-08-01 | End: 2025-08-04 | Stop reason: HOSPADM

## 2025-08-01 RX ORDER — HEPARIN SODIUM 5000 [USP'U]/ML
5000 INJECTION, SOLUTION INTRAVENOUS; SUBCUTANEOUS EVERY 12 HOURS
Status: DISCONTINUED | OUTPATIENT
Start: 2025-08-01 | End: 2025-08-04 | Stop reason: HOSPADM

## 2025-08-01 RX ADMIN — INSULIN GLARGINE 25 UNITS: 100 INJECTION, SOLUTION SUBCUTANEOUS at 08:08

## 2025-08-01 RX ADMIN — ISOSORBIDE DINITRATE 30 MG: 10 TABLET ORAL at 10:08

## 2025-08-01 RX ADMIN — HEPARIN SODIUM 5000 UNITS: 5000 INJECTION, SOLUTION INTRAVENOUS; SUBCUTANEOUS at 09:08

## 2025-08-01 RX ADMIN — CYCLOBENZAPRINE 10 MG: 10 TABLET, FILM COATED ORAL at 09:08

## 2025-08-01 RX ADMIN — FUROSEMIDE 40 MG: 10 INJECTION, SOLUTION INTRAVENOUS at 09:08

## 2025-08-01 RX ADMIN — PREGABALIN 75 MG: 75 CAPSULE ORAL at 09:08

## 2025-08-01 RX ADMIN — PREGABALIN 75 MG: 75 CAPSULE ORAL at 08:08

## 2025-08-01 RX ADMIN — FUROSEMIDE 40 MG: 10 INJECTION, SOLUTION INTRAVENOUS at 06:08

## 2025-08-01 RX ADMIN — PANTOPRAZOLE SODIUM 40 MG: 40 TABLET, DELAYED RELEASE ORAL at 08:08

## 2025-08-01 RX ADMIN — HEPARIN SODIUM 5000 UNITS: 5000 INJECTION, SOLUTION INTRAVENOUS; SUBCUTANEOUS at 08:08

## 2025-08-01 RX ADMIN — PRIMIDONE 100 MG: 50 TABLET ORAL at 08:08

## 2025-08-01 RX ADMIN — SERTRALINE 100 MG: 50 TABLET, FILM COATED ORAL at 08:08

## 2025-08-01 RX ADMIN — CYCLOBENZAPRINE 10 MG: 10 TABLET, FILM COATED ORAL at 05:08

## 2025-08-01 RX ADMIN — ASPIRIN 81 MG: 81 TABLET, COATED ORAL at 08:08

## 2025-08-01 NOTE — ASSESSMENT & PLAN NOTE
Patient reports taking insulin 52 units every morning 32 units nightly and Ozempic weekly on Fridays. Most recent hemoglobin A1C and blood glucose on admission 142. Elevated BM I 30.36.   A1C  Recent Labs   Lab 10/31/24  0828 12/16/24  1100 04/25/25  0804   Hemoglobin A1C 6.1 6.1 6.3 H      Fingerstick glucose  Recent Labs     07/31/25  1903   POCTGLUCOSE 142*      Inpatient insulin regimen  insulin glargine U-100 (Lantus) injection 25 Units, Daily, Subcutaneous  insulin aspart U-100 injection 0-10 Units, Before meals & nightly PRN, Subcutaneous    Plan  - Hold oral hypoglycemics while patient is in the hospital  - Current inpatient insulin regimen is listed above  - Patient's glucose is uncontrolled. Will start moderate correctional scale with basal insulin  - Continue home lyrica for peripheral neuropathy

## 2025-08-01 NOTE — ASSESSMENT & PLAN NOTE
Patient reports taking insulin 52 units every morning 32 units nightly and Ozempic weekly on Fridays. Most recent hemoglobin A1c and blood glucsoe on admission below.     A1C  Recent Labs   Lab 12/16/24  1100 04/25/25  0804 07/31/25  1854   Hemoglobin A1C 6.1 6.3 H 6.4      Fingerstick glucose  Recent Labs     07/31/25  1903   POCTGLUCOSE 142*      Inpatient insulin regimen  insulin glargine U-100 (Lantus) injection 25 Units, Daily, Subcutaneous  insulin aspart U-100 injection 0-10 Units, Before meals & nightly PRN, Subcutaneous    Plan  - Hold oral hypoglycemics while patient is in the hospital  - Current inpatient insulin regimen is listed above  - Patient's glucose is uncontrolled. Will continue current treatment  - Continue lyrica for peripheral neuropathy

## 2025-08-01 NOTE — HPI
70 yo female with a PMH of CHF, Pulmonary HTN, CAD s/p CABG 2012, AV replacement 2012, pacemaker placement 2022, essential hypertension, type 2 diabetes mellitus, and CKD stage 4. Presenting to [facility] with c/o shortness of breath. The patient admitted in December 2024 for pneumonia and discharged home following a 3 day stay with home oxygen. Patient reports she has been compliant with supplemental oxygen and medications. Reports wearing 3L at night and as needed during the day. Recently, the patient experienced gradual worsening shortness of breath, particularly on exertion, with oxygen saturation levels dropping to around 50% when attempting activities. Today at work patient became severely short of breath and was advised to go to the emergency room. Reports lightheadedness and non-productive cough, and fever. Denies any orthopnea, PND, or chest pain on exertion. No lower extremity edema. However, patient reports bilateral lower extremity cramping at night. Onset of dyspnea was gradual. In the ED, patient found to be severely hypoxic and hypertensive with a normal respiratory rate and afebrile. Patient placed on BiPAP with an improvement in O2 sats then deescalated to supplemental oxygen. Workup significant for BNP 4645, Trop 18. On review CXR shows congestion vasculature with hazing. Awaiting official read. Patient admitted to Hospital Medicine under the direct supervision of Dr. Tapia

## 2025-08-01 NOTE — ASSESSMENT & PLAN NOTE
Creatinine stable for now. Based on current GFR, CKD stage is stage 4 - GFR 15-29. Most recent creatinine and eGFR are listed below.  Recent Labs     07/31/25  1854   CREATININE 2.12*   EGFRNORACEVR 25*       Plan  - Monitor urine output and serial BMP  - Renally dose medications  - Avoid nephrotoxic medications and procedures  - Patient at baseline

## 2025-08-01 NOTE — H&P
[facility] - Emergency Department  Hospital Medicine  History & Physical    Patient Name: Karen Renteria  MRN: [id number]  Patient Class: IP- Inpatient  Admission Date: 7/31/2025  Attending Physician: Gretta Salcedo MD   Primary Care Provider: Jt Allison II, MD         Patient information was obtained from patient and ER records.     Subjective:     Principal Problem:Acute on chronic heart failure with preserved ejection fraction (HFpEF, >= 50%)    Chief Complaint:   Chief Complaint   Patient presents with    Shortness of Breath     Patient reports SOB that has gotten worse today        HPI: 70 yo female with a PMH of CHF, Pulmonary HTN, CAD s/p CABG 2012, AV replacement 2012, pacemaker placement 2022, essential hypertension, type 2 diabetes mellitus, and CKD stage 4. Presenting to [facility] with c/o shortness of breath. The patient admitted in December 2024 for pneumonia and discharged home following a 3 day stay with home oxygen. Patient reports she has been compliant with supplemental oxygen and medications. Reports wearing 3L at night and as needed during the day. Recently, the patient experienced gradual worsening shortness of breath, particularly on exertion, with oxygen saturation levels dropping to around 50% when attempting activities. Today at work patient became severely short of breath and was advised to go to the emergency room. Reports lightheadedness and non-productive cough, and fever. Denies any orthopnea, PND, or chest pain on exertion. No lower extremity edema. However, patient reports bilateral lower extremity cramping at night. Onset of dyspnea was gradual. In the ED, patient found to be severely hypoxic and hypertensive with a normal respiratory rate and afebrile. Patient placed on BiPAP with an improvement in O2 sats then deescalated to supplemental oxygen. Workup significant for BNP 4645, Trop 18. On review CXR shows congestion vasculature with hazing. Awaiting official  read. Patient admitted to Hospital Medicine under the direct supervision of Dr. Tapia     No new subjective & objective note has been filed under this hospital service since the last note was generated.    Assessment/Plan:     Assessment & Plan  Acute on chronic heart failure with preserved ejection fraction (HFpEF, >= 50%)  Presenting with gradual onset of dyspnea with increased utilization of supplemental oxygen. On admission found to be hypoxic. On admission BNP 4645 up from 2174 two months ago. History of chronic heart failure with a preserved ejection fraction. Last echo EF 58% with an elevated PA pressure.   Diastolic (HFpEF) heart failure that is Acute on chronic. On presentation their CHF was decompensated. Evidence of decompensated CHF on presentation includes: dyspnea on exertion (CHAUDHARY) and shortness of breath. The etiology of their decompensation is likely pulmonary hypertension. Patient on the CHF pathway.     Most recent echo results are listed below.  Latest ECHO  Results for orders placed during the hospital encounter of 12/16/24    Echo    Interpretation Summary    Left Ventricle: The left ventricle is normal in size. Mildly increased wall thickness. There is concentric remodeling. Septal motion is consistent with pacing. There is normal systolic function with a visually estimated ejection fraction of 55 - 60%. Quantitated ejection fraction is 58%. There is normal diastolic function.    Right Ventricle: Mild right ventricular enlargement. Systolic function is moderately reduced. Pacemaker lead present in the ventricle.    Right Atrium: Right atrium is mildly dilated.    Aortic Valve: There is a bioprosthetic valve in the aortic position.    Mitral Valve: There is moderate regurgitation with a posterolateral eccentriccally directed jet.    Tricuspid Valve: There is moderate regurgitation.    Pulmonary Artery: The estimated pulmonary artery systolic pressure is 49 mmHg.    IVC/SVC: Elevated venous  pressure at 15 mmHg.    Current Heart Failure Medications  furosemide injection 40 mg, Every 12 hours, Intravenous  isosorbide dinitrate tablet 30 mg, Daily, Oral    Plan  - Monitor strict I&Os and daily weights.    - Place on telemetry  - Low sodium diet  - Place on fluid restriction of 1.5 L.   - Cardiology has not been consulted  - The patient's volume status is at their baseline  - Lasix 40 mg po daily   - Repeat echo   - Continue home isosorbide mononitrate  - Supplemental oxygen; on 3L  - Cardiac monitoring   - Daily weights          Diabetes mellitus type 2 in obese  Patient reports taking insulin 52 units every morning 32 units nightly and Ozempic weekly on Fridays. Most recent hemoglobin A1C and blood glucose on admission 142. Elevated BM I 30.36.   A1C  Recent Labs   Lab 10/31/24  0828 12/16/24  1100 04/25/25  0804   Hemoglobin A1C 6.1 6.1 6.3 H      Fingerstick glucose  Recent Labs     07/31/25  1903   POCTGLUCOSE 142*      Inpatient insulin regimen  insulin glargine U-100 (Lantus) injection 25 Units, Daily, Subcutaneous  insulin aspart U-100 injection 0-10 Units, Before meals & nightly PRN, Subcutaneous    Plan  - Hold oral hypoglycemics while patient is in the hospital  - Current inpatient insulin regimen is listed above  - Patient's glucose is uncontrolled. Will start moderate correctional scale with basal insulin  - Continue home lyrica for peripheral neuropathy  Chronic kidney failure, stage 4 (severe)  Creatinine stable for now. Based on current GFR, CKD stage is stage 4 - GFR 15-29. Most recent creatinine and eGFR are listed below.  Recent Labs     07/31/25  1854   CREATININE 2.12*   EGFRNORACEVR 25*       Plan  - Monitor urine output and serial BMP  - Renally dose medications  - Avoid nephrotoxic medications and procedures  - Patient is at baseline  Paroxysmal atrial fibrillation  Patient has paroxysmal (terminates spontaneously or with treatment within 7 days) atrial fibrillation. Patient is  currently in sinus arhythmia with occasional PVCs. [id number] Score: 4. The patient's heart rate in the last 24 hours is as follows: Pulse  Min: 72  Max: 84. Patient not on antiarrhythmic at home.        Anticoagulants  heparin (porcine) injection 5,000 Units, Every 12 hours, Subcutaneous    Plan  - Replete lytes with a goal of K>4, Mg >2  - Patient is anticoagulated, see medications listed above.  - Patient's afib is currently controlled  - Monitor on telemetry        Gastroesophageal reflux disease without esophagitis  Denies any symptoms at the time of admission. Will continue home Protonix dose and monitor.     Essential tremor  Continue home Primidone.    Depression  Patient has  depression which is mild and is currently controlled. Will Continue anti-depressant medications. We will not consult psychiatry at this time. Patient does not display psychosis at this time. Continue to monitor closely and adjust plan of care as needed.      VTE Risk Mitigation (From admission, onward)           Ordered     heparin (porcine) injection 5,000 Units  Every 12 hours         08/01/25 0147     Place SHELDON hose  Until discontinued         08/01/25 0136     IP VTE HIGH RISK PATIENT  Once         08/01/25 0136                                                Lizzeth Gibbons MD  Department of Hospital Medicine  [facility] - Emergency Department

## 2025-08-01 NOTE — ASSESSMENT & PLAN NOTE
Presenting with gradual onset of dyspnea with increased utilization of supplemental oxygen. On admission found to be hypoxic. On admission BNP 4645 up from 2174 two months ago. History of chronic heart failure with a preserved ejection fraction. Last echo EF 58% with an elevated PA pressure.   Diastolic (HFpEF) heart failure that is Acute on chronic. On presentation their CHF was decompensated. Evidence of decompensated CHF on presentation includes: dyspnea on exertion (CHAUDHARY) and shortness of breath. The etiology of their decompensation is likely pulmonary hypertension. Patient on the CHF pathway.     Most recent echo results are listed below.  Latest ECHO  Results for orders placed during the hospital encounter of 12/16/24    Echo    Interpretation Summary    Left Ventricle: The left ventricle is normal in size. Mildly increased wall thickness. There is concentric remodeling. Septal motion is consistent with pacing. There is normal systolic function with a visually estimated ejection fraction of 55 - 60%. Quantitated ejection fraction is 58%. There is normal diastolic function.    Right Ventricle: Mild right ventricular enlargement. Systolic function is moderately reduced. Pacemaker lead present in the ventricle.    Right Atrium: Right atrium is mildly dilated.    Aortic Valve: There is a bioprosthetic valve in the aortic position.    Mitral Valve: There is moderate regurgitation with a posterolateral eccentriccally directed jet.    Tricuspid Valve: There is moderate regurgitation.    Pulmonary Artery: The estimated pulmonary artery systolic pressure is 49 mmHg.    IVC/SVC: Elevated venous pressure at 15 mmHg.    Current Heart Failure Medications  furosemide injection 40 mg, Every 12 hours, Intravenous  isosorbide dinitrate tablet 30 mg, Daily, Oral    Plan  - Monitor strict I&Os and daily weights.    - Place on telemetry  - Low sodium diet  - Place on fluid restriction of 1.5 L.   - Cardiology has not been  consulted  - The patient's volume status is at their baseline  - Lasix 40 mg po daily   - Repeat echo   - Continue home isosorbide mononitrate  - Supplemental oxygen; on 3L  - Cardiac monitoring   - Daily weights

## 2025-08-01 NOTE — ASSESSMENT & PLAN NOTE
Creatinine stable for now. Based on current GFR, CKD stage is stage 4 - GFR 15-29. Most recent creatinine and eGFR are listed below.  Recent Labs     07/31/25  1854   CREATININE 2.12*   EGFRNORACEVR 25*       Plan  - Monitor urine output and serial BMP  - Renally dose medications  - Avoid nephrotoxic medications and procedures  - Patient is at baseline

## 2025-08-01 NOTE — H&P
[facility] - Emergency Department  Hospital Medicine  History & Physical    Patient Name: Karen Renteria  MRN: [id number]  Patient Class: IP- Inpatient  Admission Date: 7/31/2025  Attending Physician: Gretta Salcedo MD   Primary Care Provider: Jt Allison II, MD         Patient information was obtained from patient and ER records.     Subjective:     Principal Problem:Acute on chronic heart failure with preserved ejection fraction (HFpEF, >= 50%)    Chief Complaint:   Chief Complaint   Patient presents with    Shortness of Breath     Patient reports SOB that has gotten worse today        HPI: 70 yo female with a PMH of CHF, Pulmonary HTN, CAD s/p CABG 2012, AV replacement 2012, pacemaker placement 2022, essential hypertension, type 2 diabetes mellitus, and CKD stage 4. Presenting to [facility] with c/o shortness of breath. The patient admitted in December 2024 for pneumonia and discharged home following a 3 day stay with home oxygen. Patient reports she has been compliant with supplemental oxygen and medications. Reports wearing 3L at night and as needed during the day. Recently, the patient experienced gradual worsening shortness of breath, particularly on exertion, with oxygen saturation levels dropping to around 50% when attempting activities. Today at work patient became severely short of breath and was advised to go to the emergency room. Reports lightheadedness and non-productive cough, and fever. Denies any orthopnea, PND, or chest pain on exertion. No lower extremity edema. However, patient reports bilateral lower extremity cramping at night. Onset of dyspnea was gradual. In the ED, patient found to be severely hypoxic and hypertensive with a normal respiratory rate and afebrile. Patient placed on BiPAP with an improvement in O2 sats then deescalated to supplemental oxygen. Workup significant for BNP 4645, Trop 18. On review CXR shows congestion vasculature with hazing. Awaiting official  read. Patient admitted to Hospital Medicine under the direct supervision of Dr. Tapia.    Past Medical History:   Diagnosis Date    Acute gastric ulcer without hemorrhage or perforation 03/03/2022    Age-related osteoporosis with current pathological fracture 04/21/2021    Benign essential hypertension 08/28/2018    Last Assessment & Plan:   Formatting of this note might be different from the original.  Well controlled    Carpal tunnel syndrome     Cerebral vascular accident     Chronic kidney failure, stage 4 (severe)     Coronary arteriosclerosis     S/P CABG 2011    COVID-19 12/20/2020    Diabetes mellitus type 2 in obese     Diabetic peripheral neuropathy associated with type 2 diabetes mellitus 04/28/2022    Diverticulitis     Dyslipidemia, goal LDL below 70     Essential tremor 01/20/2022    Falls frequently 12/07/2021    Insomnia secondary to depression with anxiety     Lumbar radiculopathy     Non-rheumatic aortic stenosis 08/28/2018    Osteoporosis     Paroxysmal atrial fibrillation 08/18/2022    RBBB 09/08/2021    Spinal stenosis, cervical region 01/20/2022    SSS (sick sinus syndrome) 07/12/2022    Subclavian artery stenosis 08/28/2018    Vitamin D deficiency        Past Surgical History:   Procedure Laterality Date    APPENDECTOMY      ASCENDING AORTIC ANEURYSM REPAIR W/ TISSUE AORTIC VALVE REPLACEMENT      BLADDER SUSPENSION Right     CHOLECYSTECTOMY      CORONARY ARTERY BYPASS GRAFT (CABG)      DIRECT LARYNGOSCOPY Bilateral 08/12/2022    Procedure: LARYNGOSCOPY, DIRECT;  Surgeon: Iban Silverio MD;  Location: [facility] OR;  Service: ENT;  Laterality: Bilateral;    HYSTERECTOMY      OOPHORECTOMY      TONSILLECTOMY      VOCAL FOLD LESION EXCISION Bilateral 08/12/2022    Procedure: EXCISION, LESION, VOCAL CORD;  Surgeon: Iban Silverio MD;  Location: [facility] OR;  Service: ENT;  Laterality: Bilateral;       Review of patient's allergies indicates:   Allergen Reactions    Cephalexin Rash    Bactrim  [sulfamethoxazole-trimethoprim]     Zofran [ondansetron hcl] Itching       No current facility-administered medications on file prior to encounter.     Current Outpatient Medications on File Prior to Encounter   Medication Sig    aspirin (ECOTRIN) 81 MG EC tablet Take 81 mg by mouth once daily.    furosemide (LASIX) 40 MG tablet Take 1 tablet (40 mg total) by mouth once daily.    isosorbide dinitrate (ISORDIL) 30 MG Tab Take 30 mg by mouth once daily.    multivitamin (THERAGRAN) per tablet Take 1 tablet by mouth once daily.    pantoprazole (PROTONIX) 40 MG tablet TAKE 1 TABLET BY MOUTH ONCE DAILY PRIOR TO SUPPER    pregabalin (LYRICA) 75 MG capsule Take 75 mg by mouth 2 (two) times daily.    primidone (MYSOLINE) 50 MG Tab Take 2 tablets (100 mg total) by mouth 3 (three) times daily. (Patient taking differently: Take 100 mg by mouth once daily.)    rosuvastatin (CRESTOR) 40 MG Tab Take 40 mg by mouth once daily.    sertraline (ZOLOFT) 50 MG tablet TAKE ONE TABLET BY MOUTH EVERY DAY (Patient taking differently: Take 100 mg by mouth once daily.)     Family History       Problem Relation (Age of Onset)    Breast cancer Mother, Sister    Cancer Mother    Heart disease Father          Tobacco Use    Smoking status: Former     Current packs/day: 0.00     Average packs/day: 0.5 packs/day for 17.3 years (8.7 ttl pk-yrs)     Types: Cigarettes     Start date:      Quit date: 2011     Years since quittin.2    Smokeless tobacco: Never   Substance and Sexual Activity    Alcohol use: Never    Drug use: Never    Sexual activity: Not Currently     Review of Systems   Constitutional:  Negative for chills and fever.   HENT:  Negative for congestion, rhinorrhea and sore throat.    Eyes:  Negative for photophobia and visual disturbance.   Respiratory:  Positive for shortness of breath. Negative for wheezing.    Cardiovascular:  Negative for chest pain, palpitations and leg swelling.   Gastrointestinal:  Negative for  abdominal pain, nausea and vomiting.   Endocrine: Negative for polydipsia, polyphagia and polyuria.   Genitourinary:  Negative for dysuria, flank pain and urgency.   Musculoskeletal:  Negative for back pain and neck pain.   Neurological:  Positive for tremors. Negative for weakness, light-headedness, numbness and headaches.   Hematological:  Bruises/bleeds easily.   Objective:     Vital Signs (Most Recent):  Temp: 98.8 °F (37.1 °C) (07/31/25 1913)  Pulse: 72 (08/01/25 0419)  Resp: 14 (08/01/25 0419)  BP: (!) 163/73 (08/01/25 0415)  SpO2: 97 % (08/01/25 0419) Vital Signs (24h Range):  Temp:  [98.8 °F (37.1 °C)] 98.8 °F (37.1 °C)  Pulse:  [72-84] 72  Resp:  [12-23] 14  SpO2:  [67 %-100 %] 97 %  BP: (117-168)/(42-78) 163/73     Weight: 75.3 kg (166 lb)  Body mass index is 30.36 kg/m².     Physical Exam  Vitals and nursing note reviewed.   Constitutional:       General: She is not in acute distress.     Appearance: Normal appearance. She is ill-appearing.   HENT:      Head: Normocephalic and atraumatic.      Right Ear: Tympanic membrane normal.      Left Ear: Tympanic membrane normal.      Nose: Nose normal. No congestion or rhinorrhea.      Mouth/Throat:      Mouth: Mucous membranes are moist.      Pharynx: Oropharynx is clear.   Eyes:      Conjunctiva/sclera: Conjunctivae normal.   Cardiovascular:      Rate and Rhythm: Normal rate and regular rhythm.      Pulses: Normal pulses.      Heart sounds: Normal heart sounds. No murmur heard.     No friction rub. No gallop.      Comments: No JVD  Pulmonary:      Effort: Pulmonary effort is normal.      Breath sounds: Normal breath sounds. No wheezing, rhonchi or rales.   Abdominal:      General: Bowel sounds are normal.      Palpations: Abdomen is soft.      Tenderness: There is no abdominal tenderness. There is no guarding or rebound.   Musculoskeletal:      Cervical back: Normal range of motion and neck supple.      Right lower leg: No edema.      Left lower leg: No edema.    Skin:     General: Skin is warm and dry.      Capillary Refill: Capillary refill takes less than 2 seconds.   Neurological:      General: No focal deficit present.      Mental Status: She is alert and oriented to person, place, and time.            Significant Labs: All pertinent labs within the past 24 hours have been reviewed.    Significant Imaging: I have reviewed all pertinent imaging results/findings within the past 24 hours.   Assessment/Plan:     Assessment & Plan  Acute on chronic heart failure with preserved ejection fraction (HFpEF, >= 50%)  Presenting with gradual onset of dyspnea with increased utilization of supplemental oxygen. On admission found to be hypoxic. BNP 4645 up from 2174 two months ago. History of chronic heart failure with a preserved ejection fraction. Last echo EF 58%. History of pulmonary hypertension as well. HFpEF with acute on chronic exacerbation with associated dyspnea at rest and increase utilization of home oxygen. On presentation her CHF was decompensated. The etiology of her decompensation is likely pulmonary hypertension. Patient on the CHF pathway.     Latest ECHO  Results for orders placed during the hospital encounter of 12/16/24    Echo    Interpretation Summary    Left Ventricle: The left ventricle is normal in size. Mildly increased wall thickness. There is concentric remodeling. Septal motion is consistent with pacing. There is normal systolic function with a visually estimated ejection fraction of 55 - 60%. Quantitated ejection fraction is 58%. There is normal diastolic function.    Right Ventricle: Mild right ventricular enlargement. Systolic function is moderately reduced. Pacemaker lead present in the ventricle.    Right Atrium: Right atrium is mildly dilated.    Aortic Valve: There is a bioprosthetic valve in the aortic position.    Mitral Valve: There is moderate regurgitation with a posterolateral eccentriccally directed jet.    Tricuspid Valve: There is  moderate regurgitation.    Pulmonary Artery: The estimated pulmonary artery systolic pressure is 49 mmHg.    IVC/SVC: Elevated venous pressure at 15 mmHg.    Current Heart Failure Medications  furosemide injection 40 mg, Every 12 hours, Intravenous  isosorbide dinitrate tablet 30 mg, Daily, Oral    Plan  - Monitor strict I&Os and daily weights.    - Place on telemetry  - Low sodium diet  - Place on fluid restriction of 1.5 L.   - Cardiology has not been consulted  - The patient's volume status is worsening as indicated by shortness of breath. Will continue current treatment  Diabetes mellitus type 2 in obese  Patient reports taking insulin 52 units every morning 32 units nightly and Ozempic weekly on Fridays. Most recent hemoglobin A1c and blood glucsoe on admission below.     A1C  Recent Labs   Lab 12/16/24  1100 04/25/25  0804 07/31/25  1854   Hemoglobin A1C 6.1 6.3 H 6.4      Fingerstick glucose  Recent Labs     07/31/25  1903   POCTGLUCOSE 142*      Inpatient insulin regimen  insulin glargine U-100 (Lantus) injection 25 Units, Daily, Subcutaneous  insulin aspart U-100 injection 0-10 Units, Before meals & nightly PRN, Subcutaneous    Plan  - Hold oral hypoglycemics while patient is in the hospital  - Current inpatient insulin regimen is listed above  - Patient's glucose is uncontrolled. Will continue current treatment  - Continue lyrica for peripheral neuropathy   Chronic kidney failure, stage 4 (severe)  Creatinine stable for now. Based on current GFR, CKD stage is stage 4 - GFR 15-29. Most recent creatinine and eGFR are listed below.  Recent Labs     07/31/25  1854   CREATININE 2.12*   EGFRNORACEVR 25*       Plan  - Monitor urine output and serial BMP  - Renally dose medications  - Avoid nephrotoxic medications and procedures  - Patient at baseline    Paroxysmal atrial fibrillation  Patient has paroxysmal (terminates spontaneously or with treatment within 7 days) atrial fibrillation. Patient is currently in  sinus tachycardia with occasional PVCs. [id number] Score: 4. The patient's heart rate in the last 24 hours is as follows: Pulse  Min: 72  Max: 84. Patient does not take home antiarrhythmic.    Anticoagulants  heparin (porcine) injection 5,000 Units, Every 12 hours, Subcutaneous    Plan  - Replete lytes with a goal of K>4, Mg >2  - Patient is anticoagulated, see medications listed above.  - Monitor on telemetry   Gastroesophageal reflux disease without esophagitis  Denies any symptoms at this time. Will continue home Protonix dose and monitor.   Essential tremor  Continue home Primidone.   Depression  Patient has  depression which is mild and is currently controlled. Will Continue anti-depressant medications. We will not consult psychiatry at this time. Patient does not display psychosis at this time. Continue to monitor closely and adjust plan of care as needed.  VTE Risk Mitigation (From admission, onward)           Ordered     heparin (porcine) injection 5,000 Units  Every 12 hours         08/01/25 0147     Place SHELDON hose  Until discontinued         08/01/25 0136     IP VTE HIGH RISK PATIENT  Once         08/01/25 0136                                                Lizzeth Gibbons MD  Department of Hospital Medicine  [facility] - Emergency Department

## 2025-08-01 NOTE — ASSESSMENT & PLAN NOTE
Patient has paroxysmal (terminates spontaneously or with treatment within 7 days) atrial fibrillation. Patient is currently in sinus arhythmia with occasional PVCs. [id number] Score: 4. The patient's heart rate in the last 24 hours is as follows: Pulse  Min: 72  Max: 84. Patient not on antiarrhythmic at home.        Anticoagulants  heparin (porcine) injection 5,000 Units, Every 12 hours, Subcutaneous    Plan  - Replete lytes with a goal of K>4, Mg >2  - Patient is anticoagulated, see medications listed above.  - Patient's afib is currently controlled  - Monitor on telemetry

## 2025-08-01 NOTE — SUBJECTIVE & OBJECTIVE
Past Medical History:   Diagnosis Date    Acute gastric ulcer without hemorrhage or perforation 03/03/2022    Age-related osteoporosis with current pathological fracture 04/21/2021    Benign essential hypertension 08/28/2018    Last Assessment & Plan:   Formatting of this note might be different from the original.  Well controlled    Carpal tunnel syndrome     Cerebral vascular accident     Chronic kidney failure, stage 4 (severe)     Coronary arteriosclerosis     S/P CABG 2011    COVID-19 12/20/2020    Diabetes mellitus type 2 in obese     Diabetic peripheral neuropathy associated with type 2 diabetes mellitus 04/28/2022    Diverticulitis     Dyslipidemia, goal LDL below 70     Essential tremor 01/20/2022    Falls frequently 12/07/2021    Insomnia secondary to depression with anxiety     Lumbar radiculopathy     Non-rheumatic aortic stenosis 08/28/2018    Osteoporosis     Paroxysmal atrial fibrillation 08/18/2022    RBBB 09/08/2021    Spinal stenosis, cervical region 01/20/2022    SSS (sick sinus syndrome) 07/12/2022    Subclavian artery stenosis 08/28/2018    Vitamin D deficiency        Past Surgical History:   Procedure Laterality Date    APPENDECTOMY      ASCENDING AORTIC ANEURYSM REPAIR W/ TISSUE AORTIC VALVE REPLACEMENT      BLADDER SUSPENSION Right     CHOLECYSTECTOMY      CORONARY ARTERY BYPASS GRAFT (CABG)      DIRECT LARYNGOSCOPY Bilateral 08/12/2022    Procedure: LARYNGOSCOPY, DIRECT;  Surgeon: Iban Silverio MD;  Location: [facility] [place] OR;  Service: ENT;  Laterality: Bilateral;    HYSTERECTOMY      OOPHORECTOMY      TONSILLECTOMY      VOCAL FOLD LESION EXCISION Bilateral 08/12/2022    Procedure: EXCISION, LESION, VOCAL CORD;  Surgeon: Iban Silverio MD;  Location: [facility] [place] OR;  Service: ENT;  Laterality: Bilateral;       Review of patient's allergies indicates:   Allergen Reactions    Cephalexin Rash    Bactrim [sulfamethoxazole-trimethoprim]     Zofran [ondansetron  hcl] Itching       No current facility-administered medications on file prior to encounter.     Current Outpatient Medications on File Prior to Encounter   Medication Sig    aspirin (ECOTRIN) 81 MG EC tablet Take 81 mg by mouth once daily.    furosemide (LASIX) 40 MG tablet Take 1 tablet (40 mg total) by mouth once daily.    isosorbide dinitrate (ISORDIL) 30 MG Tab Take 30 mg by mouth once daily.    multivitamin (THERAGRAN) per tablet Take 1 tablet by mouth once daily.    pantoprazole (PROTONIX) 40 MG tablet TAKE 1 TABLET BY MOUTH ONCE DAILY PRIOR TO SUPPER    pregabalin (LYRICA) 75 MG capsule Take 75 mg by mouth 2 (two) times daily.    primidone (MYSOLINE) 50 MG Tab Take 2 tablets (100 mg total) by mouth 3 (three) times daily. (Patient taking differently: Take 100 mg by mouth once daily.)    rosuvastatin (CRESTOR) 40 MG Tab Take 40 mg by mouth once daily.    sertraline (ZOLOFT) 50 MG tablet TAKE ONE TABLET BY MOUTH EVERY DAY (Patient taking differently: Take 100 mg by mouth once daily.)     Family History       Problem Relation (Age of Onset)    Breast cancer Mother, Sister    Cancer Mother    Heart disease Father          Tobacco Use    Smoking status: Former     Current packs/day: 0.00     Average packs/day: 0.5 packs/day for 17.3 years (8.7 ttl pk-yrs)     Types: Cigarettes     Start date:      Quit date: 2011     Years since quittin.2    Smokeless tobacco: Never   Substance and Sexual Activity    Alcohol use: Never    Drug use: Never    Sexual activity: Not Currently     Review of Systems   Constitutional:  Negative for chills and fever.   HENT:  Negative for congestion, rhinorrhea and sore throat.    Eyes:  Negative for photophobia and visual disturbance.   Respiratory:  Positive for shortness of breath. Negative for wheezing.    Cardiovascular:  Negative for chest pain, palpitations and leg swelling.   Gastrointestinal:  Negative for abdominal pain, nausea and vomiting.    Endocrine: Negative for polydipsia, polyphagia and polyuria.   Genitourinary:  Negative for dysuria, flank pain and urgency.   Musculoskeletal:  Negative for back pain and neck pain.   Neurological:  Positive for tremors. Negative for weakness, light-headedness, numbness and headaches.   Hematological:  Bruises/bleeds easily.   Objective:     Vital Signs (Most Recent):  Temp: 98.8 °F (37.1 °C) (07/31/25 1913)  Pulse: 72 (08/01/25 0419)  Resp: 14 (08/01/25 0419)  BP: (!) 163/73 (08/01/25 0415)  SpO2: 97 % (08/01/25 0419) Vital Signs (24h Range):  Temp:  [98.8 °F (37.1 °C)] 98.8 °F (37.1 °C)  Pulse:  [72-84] 72  Resp:  [12-23] 14  SpO2:  [67 %-100 %] 97 %  BP: (117-168)/(42-78) 163/73     Weight: 75.3 kg (166 lb)  Body mass index is 30.36 kg/m².     Physical Exam  Vitals and nursing note reviewed.   Constitutional:       General: She is not in acute distress.     Appearance: Normal appearance. She is ill-appearing.   HENT:      Head: Normocephalic and atraumatic.      Right Ear: Tympanic membrane normal.      Left Ear: Tympanic membrane normal.      Nose: Nose normal. No congestion or rhinorrhea.      Mouth/Throat:      Mouth: Mucous membranes are moist.      Pharynx: Oropharynx is clear.   Eyes:      Conjunctiva/sclera: Conjunctivae normal.   Cardiovascular:      Rate and Rhythm: Normal rate and regular rhythm.      Pulses: Normal pulses.      Heart sounds: Normal heart sounds. No murmur heard.     No friction rub. No gallop.      Comments: No JVD  Pulmonary:      Effort: Pulmonary effort is normal.      Breath sounds: Normal breath sounds. No wheezing, rhonchi or rales.   Abdominal:      General: Bowel sounds are normal.      Palpations: Abdomen is soft.      Tenderness: There is no abdominal tenderness. There is no guarding or rebound.   Musculoskeletal:      Cervical back: Normal range of motion and neck supple.      Right lower leg: No edema.      Left lower leg: No edema.   Skin:     General: Skin is warm and  dry.      Capillary Refill: Capillary refill takes less than 2 seconds.   Neurological:      General: No focal deficit present.      Mental Status: She is alert and oriented to person, place, and time.            Significant Labs: All pertinent labs within the past 24 hours have been reviewed.    Significant Imaging: I have reviewed all pertinent imaging results/findings within the past 24 hours.

## 2025-08-01 NOTE — CONSULTS
Diet education consult received. Nutrition education will be provided as clinically indicated and as resources allow.

## 2025-08-01 NOTE — ASSESSMENT & PLAN NOTE
Patient has  depression which is mild and is currently controlled. Will Continue anti-depressant medications. We will not consult psychiatry at this time. Patient does not display psychosis at this time. Continue to monitor closely and adjust plan of care as needed.

## 2025-08-01 NOTE — PLAN OF CARE
Problem: Hospitalized Older Adult  Goal: Optimal Cognitive Function  Outcome: Ongoing  Goal: Effective Bowel Elimination  Outcome: Ongoing  Goal: Optimal Coping  Outcome: Ongoing  Goal: Fluid and Electrolyte Balance  Outcome: Ongoing  Goal: Optimal Functional Ability  Outcome: Ongoing  Goal: Improved Oral Intake  Outcome: Ongoing  Goal: Adequate Sleep/Rest  Outcome: Ongoing  Goal: Effective Urinary Elimination  Outcome: Ongoing     Problem: Adult Inpatient Plan of Care  Goal: Plan of Care Review  Outcome: Ongoing  Goal: Patient-Specific Goal (Individualized)  Outcome: Ongoing  Goal: Absence of Hospital-Acquired Illness or Injury  Outcome: Ongoing  Goal: Optimal Comfort and Wellbeing  Outcome: Ongoing  Goal: Readiness for Transition of Care  Outcome: Ongoing     Problem: Diabetes Comorbidity  Goal: Blood Glucose Level Within Targeted Range  Outcome: Ongoing

## 2025-08-01 NOTE — PHARMACY MED REC
"Admission Medication History     The home medication history was taken by Helen Galo.    You may go to "Admission" then "Reconcile Home Medications" tabs to review and/or act upon these items.     The home medication list has been updated by the Pharmacy department.   Please read ALL comments highlighted in yellow.   Please address this information as you see fit.    Feel free to contact us if you have any questions or require assistance.    Primidone 50 mg was updated to 100 mg daily (from 100 mg TID)    Sertraline 50 mg was updated to 100 mg daily (from 50 mg daily)      The medications listed below were removed from the home medication list. Please reorder if appropriate:  Diazepam 5 mg  Hydrochlorothiazide 12.5 mg  Duo-Neb 2.5 mg-0.5 mg/3 mL  Levothyroxine 25 mcg  Meclizine 25 mg    Medications listed below were obtained from: Patient/family, Analytic software- [employer], and Medical records  PTA Medications   Medication Sig    aspirin (ECOTRIN) 81 MG EC tablet Take 81 mg by mouth once daily.    furosemide (LASIX) 40 MG tablet Take 1 tablet (40 mg total) by mouth once daily.    insulin degludec (TRESIBA FLEXTOUCH U-200) 200 unit/mL (3 mL) insulin pen Inject into the skin once daily. Inject 52 units QAM and 32 units QHS subcutaneously daily    isosorbide dinitrate (ISORDIL) 30 MG Tab Take 30 mg by mouth once daily.    multivitamin (THERAGRAN) per tablet Take 1 tablet by mouth once daily.    pantoprazole (PROTONIX) 40 MG tablet TAKE 1 TABLET BY MOUTH ONCE DAILY PRIOR TO SUPPER    pregabalin (LYRICA) 75 MG capsule Take 75 mg by mouth 2 (two) times daily.    primidone (MYSOLINE) 50 MG Tab Take 2 tablets (100 mg total) by mouth 3 (three) times daily. (Patient taking differently: Take 100 mg by mouth once daily.)    rosuvastatin (CRESTOR) 40 MG Tab Take 40 mg by mouth once daily.    semaglutide (OZEMPIC) 0.25 mg or 0.5 mg(2 mg/1.5 mL) pen injector Inject 0.25 mg into the skin every 7 days.    sertraline " (ZOLOFT) 50 MG tablet TAKE ONE TABLET BY MOUTH EVERY DAY (Patient taking differently: Take 100 mg by mouth once daily.)         Current Outpatient Medications on File Prior to Encounter   Medication Sig Dispense Refill Last Dose/Taking    aspirin (ECOTRIN) 81 MG EC tablet Take 81 mg by mouth once daily.   7/31/2025    furosemide (LASIX) 40 MG tablet Take 1 tablet (40 mg total) by mouth once daily. 30 tablet 11 7/31/2025    insulin degludec (TRESIBA FLEXTOUCH U-200) 200 unit/mL (3 mL) insulin pen Inject into the skin once daily. Inject 52 units QAM and 32 units QHS subcutaneously daily   Taking    isosorbide dinitrate (ISORDIL) 30 MG Tab Take 30 mg by mouth once daily.   7/31/2025    multivitamin (THERAGRAN) per tablet Take 1 tablet by mouth once daily.   7/31/2025    pantoprazole (PROTONIX) 40 MG tablet TAKE 1 TABLET BY MOUTH ONCE DAILY PRIOR TO SUPPER   7/30/2025    pregabalin (LYRICA) 75 MG capsule Take 75 mg by mouth 2 (two) times daily.   7/31/2025    primidone (MYSOLINE) 50 MG Tab Take 2 tablets (100 mg total) by mouth 3 (three) times daily. (Patient taking differently: Take 100 mg by mouth once daily.) 540 tablet 3 7/31/2025    rosuvastatin (CRESTOR) 40 MG Tab Take 40 mg by mouth once daily.   7/30/2025    semaglutide (OZEMPIC) 0.25 mg or 0.5 mg(2 mg/1.5 mL) pen injector Inject 0.25 mg into the skin every 7 days.   Taking    sertraline (ZOLOFT) 50 MG tablet TAKE ONE TABLET BY MOUTH EVERY DAY (Patient taking differently: Take 100 mg by mouth once daily.) 90 tablet 3 7/31/2025       Potential issues to be addressed PRIOR TO DISCHARGE  N/A    Helen aGlo  EXT [id number]                 .

## 2025-08-01 NOTE — ASSESSMENT & PLAN NOTE
Presenting with gradual onset of dyspnea with increased utilization of supplemental oxygen. On admission found to be hypoxic. BNP 4645 up from 2174 two months ago. History of chronic heart failure with a preserved ejection fraction. Last echo EF 58%. History of pulmonary hypertension as well. HFpEF with acute on chronic exacerbation with associated dyspnea at rest and increase utilization of home oxygen. On presentation her CHF was decompensated. The etiology of her decompensation is likely pulmonary hypertension. Patient on the CHF pathway.     Latest ECHO  Results for orders placed during the hospital encounter of 12/16/24    Echo    Interpretation Summary    Left Ventricle: The left ventricle is normal in size. Mildly increased wall thickness. There is concentric remodeling. Septal motion is consistent with pacing. There is normal systolic function with a visually estimated ejection fraction of 55 - 60%. Quantitated ejection fraction is 58%. There is normal diastolic function.    Right Ventricle: Mild right ventricular enlargement. Systolic function is moderately reduced. Pacemaker lead present in the ventricle.    Right Atrium: Right atrium is mildly dilated.    Aortic Valve: There is a bioprosthetic valve in the aortic position.    Mitral Valve: There is moderate regurgitation with a posterolateral eccentriccally directed jet.    Tricuspid Valve: There is moderate regurgitation.    Pulmonary Artery: The estimated pulmonary artery systolic pressure is 49 mmHg.    IVC/SVC: Elevated venous pressure at 15 mmHg.    Current Heart Failure Medications  furosemide injection 40 mg, Every 12 hours, Intravenous  isosorbide dinitrate tablet 30 mg, Daily, Oral    Plan  - Monitor strict I&Os and daily weights.    - Place on telemetry  - Low sodium diet  - Place on fluid restriction of 1.5 L.   - Cardiology has not been consulted  - The patient's volume status is worsening as indicated by shortness of breath. Will continue  current treatment

## 2025-08-01 NOTE — PLAN OF CARE
[facility] - Emergency Department  Initial Discharge Assessment       Primary Care Provider: Jt Allison II, MD    Admission Diagnosis: Acute congestive heart failure, unspecified heart failure type [I50.9]    Admission Date: 7/31/2025  Expected Discharge Date: 8/4/2025    Transition of Care Barriers: None    Payor: MEDICARE / Plan: MEDICARE PART A & B / Product Type: Government /     Extended Emergency Contact Information  Primary Emergency Contact: Cristo Renteria  Address: [address] [place] [address] [place]  Mobile Phone: [phone]  Relation: Spouse  Preferred language: English   needed? No    Discharge Plan A: Home with family  Discharge Plan B: Home with family, Home Health      [address] [address]  Phone: [phone] Fax: [phone]    [facility]  [address] [place] [id number]  Phone: [phone] Fax: [phone]      Initial Assessment (most recent)       Adult Discharge Assessment - 08/01/25 1346          Discharge Assessment    Assessment Type Discharge Planning Assessment     Confirmed/corrected address, phone number and insurance Yes     Confirmed Demographics Correct on Facesheet     Source of Information patient;family     People in Home spouse     Name(s) of People in Home katelynn Zafar, [phone]     Do you expect to return to your current living situation? Yes     Do you have help at home or someone to help you manage your care at home? Yes     Who are your caregiver(s) and their phone number(s)? Cristo Renteria , [phone]     Prior to hospitilization cognitive status: Unable to Assess     Current cognitive status: Alert/Oriented     Walking or Climbing Stairs Difficulty no     Dressing/Bathing Difficulty no     Home Accessibility not wheelchair accessible;stairs to enter home      Number of Stairs, Main Entrance three     Stair Railings, Main Entrance railings safe and in good condition;railings on both sides of stairs     Equipment Currently Used at Home oxygen     Readmission within 30 days? No     Patient currently being followed by outpatient case management? No     Do you currently have service(s) that help you manage your care at home? No     Do you take prescription medications? Yes     Do you have prescription coverage? Yes     Do you have any problems affording any of your prescribed medications? No     Is the patient taking medications as prescribed? yes     Who is going to help you get home at discharge? Cristo Renteria, , [phone]     How do you get to doctors appointments? car, drives self;family or friend will provide     Are you on dialysis? No     Do you take coumadin? No     Discharge Plan A Home with family     Discharge Plan B Home with family;Home Health     DME Needed Upon Discharge  none     Discharge Plan discussed with: Patient;Spouse/sig other     Name(s) and Number(s) Cristo Renteria, , [phone]     Transition of Care Barriers None        Physical Activity    On average, how many days per week do you engage in moderate to strenuous exercise (like a brisk walk)? 5 days     On average, how many minutes do you engage in exercise at this level? 10 min        Financial Resource Strain    How hard is it for you to pay for the very basics like food, housing, medical care, and heating? Not hard at all        Housing Stability    In the last 12 months, was there a time when you were not able to pay the mortgage or rent on time? No     At any time in the past 12 months, were you homeless or living in a shelter (including now)? No        Transportation Needs    In the past 12 months, has lack of transportation kept you from medical appointments or from getting medications? No     In the past 12 months, has lack of transportation kept you from meetings, work,  or from getting things needed for daily living? No        Food Insecurity    Within the past 12 months, you worried that your food would run out before you got the money to buy more. Never true     Within the past 12 months, the food you bought just didn't last and you didn't have money to get more. Never true        Stress    Do you feel stress - tense, restless, nervous, or anxious, or unable to sleep at night because your mind is troubled all the time - these days? Not at all        Social Isolation    How often do you feel lonely or isolated from those around you?  Never        Alcohol Use    Q1: How often do you have a drink containing alcohol? Never     Q2: How many drinks containing alcohol do you have on a typical day when you are drinking? Patient does not drink     Q3: How often do you have six or more drinks on one occasion? Never        Utilities    In the past 12 months has the electric, gas, oil, or water company threatened to shut off services in your home? No        Health Literacy    How often do you need to have someone help you when you read instructions, pamphlets, or other written material from your doctor or pharmacy? Rarely                   Ss spoke with pt and spouse at bedside. Pt lives at home with her spouse and plans to return when medically ready. Pt has required dme. Pt not current with hh. SDOH completed. IM obtained. Ss consulted to assist with cardiac rehab when ordered, ss provided and explained cardiac rehab packet to pt and spouse. Ss following

## 2025-08-01 NOTE — ASSESSMENT & PLAN NOTE
Patient has paroxysmal (terminates spontaneously or with treatment within 7 days) atrial fibrillation. Patient is currently in sinus tachycardia with occasional PVCs. [id number] Score: 4. The patient's heart rate in the last 24 hours is as follows: Pulse  Min: 72  Max: 84. Patient does not take home antiarrhythmic.    Anticoagulants  heparin (porcine) injection 5,000 Units, Every 12 hours, Subcutaneous    Plan  - Replete lytes with a goal of K>4, Mg >2  - Patient is anticoagulated, see medications listed above.  - Monitor on telemetry

## 2025-08-02 LAB
ANION GAP SERPL CALCULATED.3IONS-SCNC: 13 MMOL/L (ref 7–16)
BUN SERPL-MCNC: 37 MG/DL (ref 10–20)
BUN/CREAT SERPL: 18 (ref 6–20)
CALCIUM SERPL-MCNC: 8.7 MG/DL (ref 8.4–10.2)
CHLORIDE SERPL-SCNC: 100 MMOL/L (ref 98–107)
CO2 SERPL-SCNC: 29 MMOL/L (ref 23–31)
CREAT SERPL-MCNC: 2.09 MG/DL (ref 0.55–1.02)
EGFR (NO RACE VARIABLE) (RUSH/TITUS): 25 ML/MIN/1.73M2
GLUCOSE SERPL-MCNC: 102 MG/DL (ref 82–115)
GLUCOSE SERPL-MCNC: 140 MG/DL (ref 70–105)
GLUCOSE SERPL-MCNC: 214 MG/DL (ref 70–105)
GLUCOSE SERPL-MCNC: 380 MG/DL (ref 70–105)
GLUCOSE SERPL-MCNC: 65 MG/DL (ref 70–105)
MAGNESIUM SERPL-MCNC: 2.2 MG/DL (ref 1.6–2.6)
OHS QRS DURATION: 146 MS
OHS QTC CALCULATION: 465 MS
PHOSPHATE SERPL-MCNC: 4.8 MG/DL (ref 2.3–4.7)
POTASSIUM SERPL-SCNC: 4.1 MMOL/L (ref 3.5–5.1)
SODIUM SERPL-SCNC: 138 MMOL/L (ref 136–145)

## 2025-08-02 PROCEDURE — 27000221 HC OXYGEN, UP TO 24 HOURS

## 2025-08-02 PROCEDURE — 99233 SBSQ HOSP IP/OBS HIGH 50: CPT | Mod: ,,, | Performed by: HOSPITALIST

## 2025-08-02 PROCEDURE — 25000242 PHARM REV CODE 250 ALT 637 W/ HCPCS: Performed by: HOSPITALIST

## 2025-08-02 PROCEDURE — 63600175 PHARM REV CODE 636 W HCPCS: Performed by: HOSPITALIST

## 2025-08-02 PROCEDURE — 82962 GLUCOSE BLOOD TEST: CPT

## 2025-08-02 PROCEDURE — 80048 BASIC METABOLIC PNL TOTAL CA: CPT | Performed by: HOSPITALIST

## 2025-08-02 PROCEDURE — 11000001 HC ACUTE MED/SURG PRIVATE ROOM

## 2025-08-02 PROCEDURE — 84100 ASSAY OF PHOSPHORUS: CPT | Performed by: HOSPITALIST

## 2025-08-02 PROCEDURE — 36415 COLL VENOUS BLD VENIPUNCTURE: CPT | Performed by: HOSPITALIST

## 2025-08-02 PROCEDURE — 25000003 PHARM REV CODE 250

## 2025-08-02 PROCEDURE — 83735 ASSAY OF MAGNESIUM: CPT | Performed by: HOSPITALIST

## 2025-08-02 PROCEDURE — 63600175 PHARM REV CODE 636 W HCPCS

## 2025-08-02 PROCEDURE — 25000003 PHARM REV CODE 250: Performed by: HOSPITALIST

## 2025-08-02 PROCEDURE — 94640 AIRWAY INHALATION TREATMENT: CPT

## 2025-08-02 PROCEDURE — 99900035 HC TECH TIME PER 15 MIN (STAT)

## 2025-08-02 PROCEDURE — 94761 N-INVAS EAR/PLS OXIMETRY MLT: CPT

## 2025-08-02 RX ORDER — IPRATROPIUM BROMIDE AND ALBUTEROL SULFATE 2.5; .5 MG/3ML; MG/3ML
3 SOLUTION RESPIRATORY (INHALATION)
Status: DISCONTINUED | OUTPATIENT
Start: 2025-08-02 | End: 2025-08-04 | Stop reason: HOSPADM

## 2025-08-02 RX ORDER — HYDROCODONE BITARTRATE AND ACETAMINOPHEN 7.5; 325 MG/1; MG/1
1 TABLET ORAL EVERY 6 HOURS PRN
Status: DISCONTINUED | OUTPATIENT
Start: 2025-08-02 | End: 2025-08-04 | Stop reason: HOSPADM

## 2025-08-02 RX ORDER — PREDNISONE 20 MG/1
20 TABLET ORAL DAILY
Status: DISCONTINUED | OUTPATIENT
Start: 2025-08-02 | End: 2025-08-04 | Stop reason: HOSPADM

## 2025-08-02 RX ADMIN — SERTRALINE 100 MG: 50 TABLET, FILM COATED ORAL at 09:08

## 2025-08-02 RX ADMIN — PREGABALIN 75 MG: 75 CAPSULE ORAL at 08:08

## 2025-08-02 RX ADMIN — PREGABALIN 75 MG: 75 CAPSULE ORAL at 09:08

## 2025-08-02 RX ADMIN — INSULIN GLARGINE 25 UNITS: 100 INJECTION, SOLUTION SUBCUTANEOUS at 09:08

## 2025-08-02 RX ADMIN — PRIMIDONE 100 MG: 50 TABLET ORAL at 09:08

## 2025-08-02 RX ADMIN — HEPARIN SODIUM 5000 UNITS: 5000 INJECTION, SOLUTION INTRAVENOUS; SUBCUTANEOUS at 09:08

## 2025-08-02 RX ADMIN — IPRATROPIUM BROMIDE AND ALBUTEROL SULFATE 3 ML: .5; 2.5 SOLUTION RESPIRATORY (INHALATION) at 08:08

## 2025-08-02 RX ADMIN — PANTOPRAZOLE SODIUM 40 MG: 40 TABLET, DELAYED RELEASE ORAL at 09:08

## 2025-08-02 RX ADMIN — ASPIRIN 81 MG: 81 TABLET, COATED ORAL at 09:08

## 2025-08-02 RX ADMIN — FUROSEMIDE 40 MG: 10 INJECTION, SOLUTION INTRAVENOUS at 08:08

## 2025-08-02 RX ADMIN — IPRATROPIUM BROMIDE AND ALBUTEROL SULFATE 3 ML: .5; 2.5 SOLUTION RESPIRATORY (INHALATION) at 02:08

## 2025-08-02 RX ADMIN — HYDROCODONE BITARTRATE AND ACETAMINOPHEN 1 TABLET: 7.5; 325 TABLET ORAL at 05:08

## 2025-08-02 RX ADMIN — HEPARIN SODIUM 5000 UNITS: 5000 INJECTION, SOLUTION INTRAVENOUS; SUBCUTANEOUS at 08:08

## 2025-08-02 RX ADMIN — FUROSEMIDE 40 MG: 10 INJECTION, SOLUTION INTRAVENOUS at 09:08

## 2025-08-02 RX ADMIN — PREDNISONE 20 MG: 20 TABLET ORAL at 10:08

## 2025-08-02 RX ADMIN — INSULIN ASPART 4 UNITS: 100 INJECTION, SOLUTION INTRAVENOUS; SUBCUTANEOUS at 08:08

## 2025-08-02 RX ADMIN — ISOSORBIDE DINITRATE 30 MG: 10 TABLET ORAL at 09:08

## 2025-08-02 NOTE — PLAN OF CARE
Problem: Hospitalized Older Adult  Goal: Optimal Cognitive Function  Outcome: Progressing  Goal: Effective Bowel Elimination  Outcome: Progressing  Goal: Optimal Coping  Outcome: Progressing  Goal: Fluid and Electrolyte Balance  Outcome: Progressing  Goal: Optimal Functional Ability  Outcome: Progressing  Goal: Improved Oral Intake  Outcome: Progressing  Goal: Adequate Sleep/Rest  Outcome: Progressing  Goal: Effective Urinary Elimination  Outcome: Progressing     Problem: Adult Inpatient Plan of Care  Goal: Plan of Care Review  Outcome: Progressing  Goal: Patient-Specific Goal (Individualized)  Outcome: Progressing  Goal: Absence of Hospital-Acquired Illness or Injury  Outcome: Progressing

## 2025-08-02 NOTE — ASSESSMENT & PLAN NOTE
Patient has paroxysmal (terminates spontaneously or with treatment within 7 days) atrial fibrillation. Patient is currently in sinus tachycardia with occasional PVCs. [id number] Score: 4. The patient's heart rate in the last 24 hours is as follows: Pulse  Min: 62  Max: 80. Patient does not take home antiarrhythmic.    Anticoagulants  heparin (porcine) injection 5,000 Units, Every 12 hours, Subcutaneous    Plan  - Replete lytes with a goal of K>4, Mg >2  - Patient is anticoagulated, see medications listed above.  - Monitor on telemetry

## 2025-08-02 NOTE — PLAN OF CARE
Problem: Hospitalized Older Adult  Goal: Effective Bowel Elimination  Outcome: Progressing  Goal: Optimal Coping  Outcome: Progressing  Goal: Optimal Functional Ability  Outcome: Progressing  Goal: Effective Urinary Elimination  Outcome: Progressing     Problem: Adult Inpatient Plan of Care  Goal: Patient-Specific Goal (Individualized)  Outcome: Progressing  Goal: Absence of Hospital-Acquired Illness or Injury  Outcome: Progressing

## 2025-08-02 NOTE — SUBJECTIVE & OBJECTIVE
Interval History:     Review of Systems   Constitutional:  Negative for chills and fever.   HENT:  Negative for congestion, rhinorrhea and sore throat.    Eyes:  Negative for photophobia and visual disturbance.   Respiratory:  Positive for shortness of breath. Negative for wheezing.    Cardiovascular:  Negative for chest pain, palpitations and leg swelling.   Gastrointestinal:  Negative for abdominal pain, nausea and vomiting.   Endocrine: Negative for polydipsia, polyphagia and polyuria.   Genitourinary:  Negative for dysuria, flank pain and urgency.   Musculoskeletal:  Negative for back pain and neck pain.   Neurological:  Positive for tremors. Negative for weakness, light-headedness, numbness and headaches.   Hematological:  Bruises/bleeds easily.     Objective:     Vital Signs (Most Recent):  Temp: 97.8 °F (36.6 °C) (08/01/25 1610)  Pulse: 74 (08/01/25 1452)  Resp: 19 (08/01/25 1452)  BP: (!) 163/82 (08/01/25 1452)  SpO2: (!) 92 % (after increase of O2 Sats was 96% on 3lpm) (08/01/25 1530) Vital Signs (24h Range):  Temp:  [97.8 °F (36.6 °C)] 97.8 °F (36.6 °C)  Pulse:  [62-80] 74  Resp:  [12-23] 19  SpO2:  [92 %-100 %] 92 %  BP: (117-165)/(42-84) 163/82     Weight: 75.3 kg (166 lb)  Body mass index is 30.36 kg/m².    Intake/Output Summary (Last 24 hours) at 8/1/2025 1915  Last data filed at 8/1/2025 1429  Gross per 24 hour   Intake --   Output 5550 ml   Net -5550 ml         Physical Exam  Vitals and nursing note reviewed.   Constitutional:       General: She is not in acute distress.     Appearance: Normal appearance. She is ill-appearing.   HENT:      Head: Normocephalic and atraumatic.      Right Ear: Tympanic membrane normal.      Left Ear: Tympanic membrane normal.      Nose: Nose normal. No congestion or rhinorrhea.      Mouth/Throat:      Mouth: Mucous membranes are moist.      Pharynx: Oropharynx is clear.   Eyes:      Conjunctiva/sclera: Conjunctivae normal.   Cardiovascular:      Rate and Rhythm: Normal  rate and regular rhythm.      Pulses: Normal pulses.      Heart sounds: Normal heart sounds. No murmur heard.     No friction rub. No gallop.      Comments: No JVD  Pulmonary:      Effort: Pulmonary effort is normal.      Breath sounds: Normal breath sounds. No wheezing, rhonchi or rales.   Abdominal:      General: Bowel sounds are normal.      Palpations: Abdomen is soft.      Tenderness: There is no abdominal tenderness. There is no guarding or rebound.   Musculoskeletal:      Cervical back: Normal range of motion and neck supple.      Right lower leg: No edema.      Left lower leg: No edema.   Skin:     General: Skin is warm and dry.      Capillary Refill: Capillary refill takes less than 2 seconds.   Neurological:      General: No focal deficit present.      Mental Status: She is alert and oriented to person, place, and time.               Significant Labs: All pertinent labs within the past 24 hours have been reviewed.    Significant Imaging: I have reviewed all pertinent imaging results/findings within the past 24 hours.

## 2025-08-02 NOTE — ASSESSMENT & PLAN NOTE
Presenting with gradual onset of dyspnea with increased utilization of supplemental oxygen. On admission found to be hypoxic. BNP 4645 up from 2174 two months ago. History of chronic heart failure with a preserved ejection fraction. Last echo EF 58%. History of pulmonary hypertension as well. HFpEF with acute on chronic exacerbation with associated dyspnea at rest and increase utilization of home oxygen. On presentation her CHF was decompensated. The etiology of her decompensation is likely pulmonary hypertension. Patient on the CHF pathway.     Latest ECHO  Results for orders placed during the hospital encounter of 12/16/24    Echo    Interpretation Summary    Left Ventricle: The left ventricle is normal in size. Mildly increased wall thickness. There is concentric remodeling. Septal motion is consistent with pacing. There is normal systolic function with a visually estimated ejection fraction of 55 - 60%. Quantitated ejection fraction is 58%. There is normal diastolic function.    Right Ventricle: Mild right ventricular enlargement. Systolic function is moderately reduced. Pacemaker lead present in the ventricle.    Right Atrium: Right atrium is mildly dilated.    Aortic Valve: There is a bioprosthetic valve in the aortic position.    Mitral Valve: There is moderate regurgitation with a posterolateral eccentriccally directed jet.    Tricuspid Valve: There is moderate regurgitation.    Pulmonary Artery: The estimated pulmonary artery systolic pressure is 49 mmHg.    IVC/SVC: Elevated venous pressure at 15 mmHg.    Current Heart Failure Medications  furosemide injection 40 mg, Every 12 hours, Intravenous  isosorbide dinitrate tablet 30 mg, Daily, Oral    Plan  - Monitor strict I&Os and daily weights.    - Place on telemetry  - Low sodium diet  - Place on fluid restriction of 1.5 L.   - Cardiology has not been consulted  - The patient's volume status is worsening as indicated by shortness of breath. Will continue  current treatment    Continue with IV diuresis.

## 2025-08-02 NOTE — ASSESSMENT & PLAN NOTE
Patient states that she frequently has muscle spasms.    This is not always been associated with electrolyte abnormalities.    Flexeril seems to help.

## 2025-08-02 NOTE — PROGRESS NOTES
[facility] - [address] [place] [address] Medicine  Progress Note    Patient Name: Karen Renteria  MRN: [id number]  Patient Class: IP- Inpatient   Admission Date: 7/31/2025  Length of Stay: 0 days  Attending Physician: Gretta Salcedo MD  Primary Care Provider: Jt Allison II, MD        Subjective     Principal Problem:Acute on chronic heart failure with preserved ejection fraction (HFpEF, >= 50%)        HPI:  68 yo female with a PMH of CHF, Pulmonary HTN, CAD s/p CABG 2012, AV replacement 2012, pacemaker placement 2022, essential hypertension, type 2 diabetes mellitus, and CKD stage 4. Presenting to [facility] with c/o shortness of breath. The patient admitted in December 2024 for pneumonia and discharged home following a 3 day stay with home oxygen. Patient reports she has been compliant with supplemental oxygen and medications. Reports wearing 3L at night and as needed during the day. Recently, the patient experienced gradual worsening shortness of breath, particularly on exertion, with oxygen saturation levels dropping to around 50% when attempting activities. Today at work patient became severely short of breath and was advised to go to the emergency room. Reports lightheadedness and non-productive cough, and fever. Denies any orthopnea, PND, or chest pain on exertion. No lower extremity edema. However, patient reports bilateral lower extremity cramping at night. Onset of dyspnea was gradual. In the ED, patient found to be severely hypoxic and hypertensive with a normal respiratory rate and afebrile. Patient placed on BiPAP with an improvement in O2 sats then deescalated to supplemental oxygen. Workup significant for BNP 4645, Trop 18. On review CXR shows congestion vasculature with hazing. Awaiting official read. Patient admitted to Hospital Medicine under the direct supervision of Dr. Tapia     Overview/Hospital Course:  No notes on file    Interval History:     Review of Systems    Constitutional:  Negative for chills and fever.   HENT:  Negative for congestion, rhinorrhea and sore throat.    Eyes:  Negative for photophobia and visual disturbance.   Respiratory:  Positive for shortness of breath. Negative for wheezing.    Cardiovascular:  Negative for chest pain, palpitations and leg swelling.   Gastrointestinal:  Negative for abdominal pain, nausea and vomiting.   Endocrine: Negative for polydipsia, polyphagia and polyuria.   Genitourinary:  Negative for dysuria, flank pain and urgency.   Musculoskeletal:  Negative for back pain and neck pain.   Neurological:  Positive for tremors. Negative for weakness, light-headedness, numbness and headaches.   Hematological:  Bruises/bleeds easily.     Objective:     Vital Signs (Most Recent):  Temp: 97.8 °F (36.6 °C) (08/01/25 1610)  Pulse: 74 (08/01/25 1452)  Resp: 19 (08/01/25 1452)  BP: (!) 163/82 (08/01/25 1452)  SpO2: (!) 92 % (after increase of O2 Sats was 96% on 3lpm) (08/01/25 1530) Vital Signs (24h Range):  Temp:  [97.8 °F (36.6 °C)] 97.8 °F (36.6 °C)  Pulse:  [62-80] 74  Resp:  [12-23] 19  SpO2:  [92 %-100 %] 92 %  BP: (117-165)/(42-84) 163/82     Weight: 75.3 kg (166 lb)  Body mass index is 30.36 kg/m².    Intake/Output Summary (Last 24 hours) at 8/1/2025 1915  Last data filed at 8/1/2025 1429  Gross per 24 hour   Intake --   Output 5550 ml   Net -5550 ml         Physical Exam  Vitals and nursing note reviewed.   Constitutional:       General: She is not in acute distress.     Appearance: Normal appearance. She is ill-appearing.   HENT:      Head: Normocephalic and atraumatic.      Right Ear: Tympanic membrane normal.      Left Ear: Tympanic membrane normal.      Nose: Nose normal. No congestion or rhinorrhea.      Mouth/Throat:      Mouth: Mucous membranes are moist.      Pharynx: Oropharynx is clear.   Eyes:      Conjunctiva/sclera: Conjunctivae normal.   Cardiovascular:      Rate and Rhythm: Normal rate and regular rhythm.      Pulses:  Normal pulses.      Heart sounds: Normal heart sounds. No murmur heard.     No friction rub. No gallop.      Comments: No JVD  Pulmonary:      Effort: Pulmonary effort is normal.      Breath sounds: Normal breath sounds. No wheezing, rhonchi or rales.   Abdominal:      General: Bowel sounds are normal.      Palpations: Abdomen is soft.      Tenderness: There is no abdominal tenderness. There is no guarding or rebound.   Musculoskeletal:      Cervical back: Normal range of motion and neck supple.      Right lower leg: No edema.      Left lower leg: No edema.   Skin:     General: Skin is warm and dry.      Capillary Refill: Capillary refill takes less than 2 seconds.   Neurological:      General: No focal deficit present.      Mental Status: She is alert and oriented to person, place, and time.               Significant Labs: All pertinent labs within the past 24 hours have been reviewed.    Significant Imaging: I have reviewed all pertinent imaging results/findings within the past 24 hours.      Assessment & Plan  Acute on chronic heart failure with preserved ejection fraction (HFpEF, >= 50%)  Presenting with gradual onset of dyspnea with increased utilization of supplemental oxygen. On admission found to be hypoxic. BNP 4645 up from 2174 two months ago. History of chronic heart failure with a preserved ejection fraction. Last echo EF 58%. History of pulmonary hypertension as well. HFpEF with acute on chronic exacerbation with associated dyspnea at rest and increase utilization of home oxygen. On presentation her CHF was decompensated. The etiology of her decompensation is likely pulmonary hypertension. Patient on the CHF pathway.     Latest ECHO  Results for orders placed during the hospital encounter of 12/16/24    Echo    Interpretation Summary    Left Ventricle: The left ventricle is normal in size. Mildly increased wall thickness. There is concentric remodeling. Septal motion is consistent with pacing. There is  normal systolic function with a visually estimated ejection fraction of 55 - 60%. Quantitated ejection fraction is 58%. There is normal diastolic function.    Right Ventricle: Mild right ventricular enlargement. Systolic function is moderately reduced. Pacemaker lead present in the ventricle.    Right Atrium: Right atrium is mildly dilated.    Aortic Valve: There is a bioprosthetic valve in the aortic position.    Mitral Valve: There is moderate regurgitation with a posterolateral eccentriccally directed jet.    Tricuspid Valve: There is moderate regurgitation.    Pulmonary Artery: The estimated pulmonary artery systolic pressure is 49 mmHg.    IVC/SVC: Elevated venous pressure at 15 mmHg.    Current Heart Failure Medications  furosemide injection 40 mg, Every 12 hours, Intravenous  isosorbide dinitrate tablet 30 mg, Daily, Oral    Plan  - Monitor strict I&Os and daily weights.    - Place on telemetry  - Low sodium diet  - Place on fluid restriction of 1.5 L.   - Cardiology has not been consulted  - The patient's volume status is worsening as indicated by shortness of breath. Will continue current treatment    Continue with IV diuresis.  Diabetes mellitus type 2 in obese  Patient reports taking insulin 52 units every morning 32 units nightly and Ozempic weekly on Fridays. Most recent hemoglobin A1c and blood glucsoe on admission below.     A1C  Recent Labs   Lab 12/16/24  1100 04/25/25  0804 07/31/25  1854   Hemoglobin A1C 6.1 6.3 H 6.4      Fingerstick glucose  Recent Labs     07/31/25  1903   POCTGLUCOSE 142*      Inpatient insulin regimen  insulin glargine U-100 (Lantus) injection 25 Units, Daily, Subcutaneous  insulin aspart U-100 injection 0-10 Units, Before meals & nightly PRN, Subcutaneous    Plan  - Hold oral hypoglycemics while patient is in the hospital  - Current inpatient insulin regimen is listed above  - Patient's glucose is uncontrolled. Will continue current treatment  - Continue lyrica for  peripheral neuropathy   Chronic kidney failure, stage 4 (severe)  Creatinine stable for now. Based on current GFR, CKD stage is stage 4 - GFR 15-29. Most recent creatinine and eGFR are listed below.  Recent Labs     07/31/25  1854   CREATININE 2.12*   EGFRNORACEVR 25*       Plan  - Monitor urine output and serial BMP  - Renally dose medications  - Avoid nephrotoxic medications and procedures  - Patient at baseline    Paroxysmal atrial fibrillation  Patient has paroxysmal (terminates spontaneously or with treatment within 7 days) atrial fibrillation. Patient is currently in sinus tachycardia with occasional PVCs. [id number] Score: 4. The patient's heart rate in the last 24 hours is as follows: Pulse  Min: 62  Max: 80. Patient does not take home antiarrhythmic.    Anticoagulants  heparin (porcine) injection 5,000 Units, Every 12 hours, Subcutaneous    Plan  - Replete lytes with a goal of K>4, Mg >2  - Patient is anticoagulated, see medications listed above.  - Monitor on telemetry   Gastroesophageal reflux disease without esophagitis  Denies any symptoms at this time. Will continue home Protonix dose and monitor.   Essential tremor  Continue home Primidone.   Depression  Patient has  depression which is mild and is currently controlled. Will Continue anti-depressant medications. We will not consult psychiatry at this time. Patient does not display psychosis at this time. Continue to monitor closely and adjust plan of care as needed.  Acute hypoxic respiratory failure    Muscle spasms of both lower extremities  Patient states that she frequently has muscle spasms.    This is not always been associated with electrolyte abnormalities.    Flexeril seems to help.    VTE Risk Mitigation (From admission, onward)           Ordered     heparin (porcine) injection 5,000 Units  Every 12 hours         08/01/25 0147     Place SHELDON hose  Until discontinued         08/01/25 0136     IP VTE HIGH RISK PATIENT  Once         08/01/25  0136                    Discharge Planning   VALERY:      Code Status: Full Code   Medical Readiness for Discharge Date:   Discharge Plan A: Home with family                Gretta Salcedo MD  Department of Hospital Medicine   [facility] - [address] [facility]

## 2025-08-03 LAB
BILIRUB UR QL STRIP: NEGATIVE
CLARITY UR: CLEAR
COLOR UR: COLORLESS
GLUCOSE SERPL-MCNC: 192 MG/DL (ref 70–105)
GLUCOSE SERPL-MCNC: 257 MG/DL (ref 70–105)
GLUCOSE SERPL-MCNC: 83 MG/DL (ref 70–105)
GLUCOSE SERPL-MCNC: 92 MG/DL (ref 70–105)
GLUCOSE UR STRIP-MCNC: NORMAL MG/DL
KETONES UR STRIP-SCNC: NEGATIVE MG/DL
LEUKOCYTE ESTERASE UR QL STRIP: NEGATIVE
NITRITE UR QL STRIP: NEGATIVE
PH UR STRIP: 6 PH UNITS
PROT UR QL STRIP: NEGATIVE
RBC # UR STRIP: NEGATIVE /UL
SP GR UR STRIP: 1.01
UROBILINOGEN UR STRIP-ACNC: NORMAL MG/DL

## 2025-08-03 PROCEDURE — 99900035 HC TECH TIME PER 15 MIN (STAT)

## 2025-08-03 PROCEDURE — 11000001 HC ACUTE MED/SURG PRIVATE ROOM

## 2025-08-03 PROCEDURE — 63600175 PHARM REV CODE 636 W HCPCS: Performed by: HOSPITALIST

## 2025-08-03 PROCEDURE — 94640 AIRWAY INHALATION TREATMENT: CPT

## 2025-08-03 PROCEDURE — 25000242 PHARM REV CODE 250 ALT 637 W/ HCPCS: Performed by: HOSPITALIST

## 2025-08-03 PROCEDURE — 96372 THER/PROPH/DIAG INJ SC/IM: CPT

## 2025-08-03 PROCEDURE — 94761 N-INVAS EAR/PLS OXIMETRY MLT: CPT

## 2025-08-03 PROCEDURE — 63600175 PHARM REV CODE 636 W HCPCS

## 2025-08-03 PROCEDURE — 81003 URINALYSIS AUTO W/O SCOPE: CPT | Performed by: HOSPITALIST

## 2025-08-03 PROCEDURE — 25000003 PHARM REV CODE 250: Performed by: HOSPITALIST

## 2025-08-03 PROCEDURE — 82962 GLUCOSE BLOOD TEST: CPT

## 2025-08-03 PROCEDURE — 99233 SBSQ HOSP IP/OBS HIGH 50: CPT | Mod: ,,, | Performed by: HOSPITALIST

## 2025-08-03 PROCEDURE — 27000221 HC OXYGEN, UP TO 24 HOURS

## 2025-08-03 RX ADMIN — ISOSORBIDE DINITRATE 30 MG: 10 TABLET ORAL at 08:08

## 2025-08-03 RX ADMIN — IPRATROPIUM BROMIDE AND ALBUTEROL SULFATE 3 ML: .5; 2.5 SOLUTION RESPIRATORY (INHALATION) at 07:08

## 2025-08-03 RX ADMIN — FUROSEMIDE 40 MG: 10 INJECTION, SOLUTION INTRAVENOUS at 08:08

## 2025-08-03 RX ADMIN — HEPARIN SODIUM 5000 UNITS: 5000 INJECTION, SOLUTION INTRAVENOUS; SUBCUTANEOUS at 08:08

## 2025-08-03 RX ADMIN — ASPIRIN 81 MG: 81 TABLET, COATED ORAL at 08:08

## 2025-08-03 RX ADMIN — PREDNISONE 20 MG: 20 TABLET ORAL at 08:08

## 2025-08-03 RX ADMIN — PRIMIDONE 100 MG: 50 TABLET ORAL at 08:08

## 2025-08-03 RX ADMIN — PANTOPRAZOLE SODIUM 40 MG: 40 TABLET, DELAYED RELEASE ORAL at 08:08

## 2025-08-03 RX ADMIN — PREGABALIN 75 MG: 75 CAPSULE ORAL at 08:08

## 2025-08-03 RX ADMIN — IPRATROPIUM BROMIDE AND ALBUTEROL SULFATE 3 ML: .5; 2.5 SOLUTION RESPIRATORY (INHALATION) at 01:08

## 2025-08-03 RX ADMIN — INSULIN ASPART 3 UNITS: 100 INJECTION, SOLUTION INTRAVENOUS; SUBCUTANEOUS at 08:08

## 2025-08-03 RX ADMIN — INSULIN GLARGINE 25 UNITS: 100 INJECTION, SOLUTION SUBCUTANEOUS at 08:08

## 2025-08-03 RX ADMIN — SERTRALINE 100 MG: 50 TABLET, FILM COATED ORAL at 08:08

## 2025-08-03 NOTE — ASSESSMENT & PLAN NOTE
Oxygen Requirements in the last 24 hours  Rate of Resp  Min: 16  Max: 20  SpO2  Min: 90 %  Max: 98 %   Flow (L/min) (Oxygen Therapy)  Min: 3  Max: 4  Device (Oxygen Therapy): nasal cannula with humidification    Plan

## 2025-08-03 NOTE — SUBJECTIVE & OBJECTIVE
Interval History:     Review of Systems   Constitutional:  Negative for chills and fever.   HENT:  Negative for congestion, rhinorrhea and sore throat.    Eyes:  Negative for photophobia and visual disturbance.   Respiratory:  Positive for shortness of breath. Negative for wheezing.    Cardiovascular:  Negative for chest pain, palpitations and leg swelling.   Gastrointestinal:  Negative for abdominal pain, nausea and vomiting.   Endocrine: Negative for polydipsia, polyphagia and polyuria.   Genitourinary:  Negative for dysuria, flank pain and urgency.   Musculoskeletal:  Negative for back pain and neck pain.   Neurological:  Positive for tremors. Negative for weakness, light-headedness, numbness and headaches.   Hematological:  Bruises/bleeds easily.     Objective:     Vital Signs (Most Recent):  Temp: 98.1 °F (36.7 °C) (08/02/25 1911)  Pulse: 87 (08/02/25 1911)  Resp: 16 (08/02/25 1911)  BP: (!) 118/46 (08/02/25 1911)  SpO2: (!) 93 % (08/02/25 1911) Vital Signs (24h Range):  Temp:  [97.5 °F (36.4 °C)-98.5 °F (36.9 °C)] 98.1 °F (36.7 °C)  Pulse:  [] 87  Resp:  [16-20] 16  SpO2:  [90 %-98 %] 93 %  BP: (107-162)/(46-78) 118/46     Weight: 78.4 kg (172 lb 12.8 oz)  Body mass index is 31.61 kg/m².  No intake or output data in the 24 hours ending 08/02/25 1929      Physical Exam  Vitals and nursing note reviewed.   Constitutional:       General: She is not in acute distress.     Appearance: Normal appearance. She is ill-appearing.   HENT:      Head: Normocephalic and atraumatic.      Right Ear: Tympanic membrane normal.      Left Ear: Tympanic membrane normal.      Nose: Nose normal. No congestion or rhinorrhea.      Mouth/Throat:      Mouth: Mucous membranes are moist.      Pharynx: Oropharynx is clear.   Eyes:      Conjunctiva/sclera: Conjunctivae normal.   Cardiovascular:      Rate and Rhythm: Normal rate and regular rhythm.      Pulses: Normal pulses.      Heart sounds: Normal heart sounds. No murmur heard.      No friction rub. No gallop.      Comments: No JVD  Pulmonary:      Effort: Pulmonary effort is normal.      Breath sounds: Normal breath sounds. No wheezing, rhonchi or rales.   Abdominal:      General: Bowel sounds are normal.      Palpations: Abdomen is soft.      Tenderness: There is no abdominal tenderness. There is no guarding or rebound.   Musculoskeletal:      Cervical back: Normal range of motion and neck supple.      Right lower leg: No edema.      Left lower leg: No edema.   Skin:     General: Skin is warm and dry.      Capillary Refill: Capillary refill takes less than 2 seconds.   Neurological:      General: No focal deficit present.      Mental Status: She is alert and oriented to person, place, and time.               Significant Labs: All pertinent labs within the past 24 hours have been reviewed.    Significant Imaging: I have reviewed all pertinent imaging results/findings within the past 24 hours.

## 2025-08-03 NOTE — ASSESSMENT & PLAN NOTE
Patient states that she frequently has muscle spasms.    This is not always been associated with electrolyte abnormalities.    Flexeril seems to help.    Patient requesting pickle juice as that is what she uses at home.  Will attempt to secure this.

## 2025-08-03 NOTE — PROGRESS NOTES
[facility] - [address] [place] [address] Medicine  Progress Note    Patient Name: Karen Renteria  MRN: [id number]  Patient Class: IP- Inpatient   Admission Date: 7/31/2025  Length of Stay: 1 days  Attending Physician: Gretta Salcedo MD  Primary Care Provider: Jt Allison II, MD        Subjective     Principal Problem:Acute on chronic heart failure with preserved ejection fraction (HFpEF, >= 50%)        HPI:  68 yo female with a PMH of CHF, Pulmonary HTN, CAD s/p CABG 2012, AV replacement 2012, pacemaker placement 2022, essential hypertension, type 2 diabetes mellitus, and CKD stage 4. Presenting to [facility] with c/o shortness of breath. The patient admitted in December 2024 for pneumonia and discharged home following a 3 day stay with home oxygen. Patient reports she has been compliant with supplemental oxygen and medications. Reports wearing 3L at night and as needed during the day. Recently, the patient experienced gradual worsening shortness of breath, particularly on exertion, with oxygen saturation levels dropping to around 50% when attempting activities. Today at work patient became severely short of breath and was advised to go to the emergency room. Reports lightheadedness and non-productive cough, and fever. Denies any orthopnea, PND, or chest pain on exertion. No lower extremity edema. However, patient reports bilateral lower extremity cramping at night. Onset of dyspnea was gradual. In the ED, patient found to be severely hypoxic and hypertensive with a normal respiratory rate and afebrile. Patient placed on BiPAP with an improvement in O2 sats then deescalated to supplemental oxygen. Workup significant for BNP 4645, Trop 18. On review CXR shows congestion vasculature with hazing. Awaiting official read. Patient admitted to Hospital Medicine under the direct supervision of Dr. Taipa     Overview/Hospital Course:  No notes on file    Interval History:     Review of Systems    Constitutional:  Negative for chills and fever.   HENT:  Negative for congestion, rhinorrhea and sore throat.    Eyes:  Negative for photophobia and visual disturbance.   Respiratory:  Positive for shortness of breath. Negative for wheezing.    Cardiovascular:  Negative for chest pain, palpitations and leg swelling.   Gastrointestinal:  Negative for abdominal pain, nausea and vomiting.   Endocrine: Negative for polydipsia, polyphagia and polyuria.   Genitourinary:  Negative for dysuria, flank pain and urgency.   Musculoskeletal:  Negative for back pain and neck pain.   Neurological:  Positive for tremors. Negative for weakness, light-headedness, numbness and headaches.   Hematological:  Bruises/bleeds easily.     Objective:     Vital Signs (Most Recent):  Temp: 98.1 °F (36.7 °C) (08/02/25 1911)  Pulse: 87 (08/02/25 1911)  Resp: 16 (08/02/25 1911)  BP: (!) 118/46 (08/02/25 1911)  SpO2: (!) 93 % (08/02/25 1911) Vital Signs (24h Range):  Temp:  [97.5 °F (36.4 °C)-98.5 °F (36.9 °C)] 98.1 °F (36.7 °C)  Pulse:  [] 87  Resp:  [16-20] 16  SpO2:  [90 %-98 %] 93 %  BP: (107-162)/(46-78) 118/46     Weight: 78.4 kg (172 lb 12.8 oz)  Body mass index is 31.61 kg/m².  No intake or output data in the 24 hours ending 08/02/25 1929      Physical Exam  Vitals and nursing note reviewed.   Constitutional:       General: She is not in acute distress.     Appearance: Normal appearance. She is ill-appearing.   HENT:      Head: Normocephalic and atraumatic.      Right Ear: Tympanic membrane normal.      Left Ear: Tympanic membrane normal.      Nose: Nose normal. No congestion or rhinorrhea.      Mouth/Throat:      Mouth: Mucous membranes are moist.      Pharynx: Oropharynx is clear.   Eyes:      Conjunctiva/sclera: Conjunctivae normal.   Cardiovascular:      Rate and Rhythm: Normal rate and regular rhythm.      Pulses: Normal pulses.      Heart sounds: Normal heart sounds. No murmur heard.     No friction rub. No gallop.       Comments: No JVD  Pulmonary:      Effort: Pulmonary effort is normal.      Breath sounds: Normal breath sounds. No wheezing, rhonchi or rales.   Abdominal:      General: Bowel sounds are normal.      Palpations: Abdomen is soft.      Tenderness: There is no abdominal tenderness. There is no guarding or rebound.   Musculoskeletal:      Cervical back: Normal range of motion and neck supple.      Right lower leg: No edema.      Left lower leg: No edema.   Skin:     General: Skin is warm and dry.      Capillary Refill: Capillary refill takes less than 2 seconds.   Neurological:      General: No focal deficit present.      Mental Status: She is alert and oriented to person, place, and time.               Significant Labs: All pertinent labs within the past 24 hours have been reviewed.    Significant Imaging: I have reviewed all pertinent imaging results/findings within the past 24 hours.      Assessment & Plan  Acute on chronic heart failure with preserved ejection fraction (HFpEF, >= 50%)  Presenting with gradual onset of dyspnea with increased utilization of supplemental oxygen. On admission found to be hypoxic. BNP 4645 up from 2174 two months ago. History of chronic heart failure with a preserved ejection fraction. Last echo EF 58%. History of pulmonary hypertension as well. HFpEF with acute on chronic exacerbation with associated dyspnea at rest and increase utilization of home oxygen. On presentation her CHF was decompensated. The etiology of her decompensation is likely pulmonary hypertension. Patient on the CHF pathway.     Latest ECHO  Results for orders placed during the hospital encounter of 12/16/24    Echo    Interpretation Summary    Left Ventricle: The left ventricle is normal in size. Mildly increased wall thickness. There is concentric remodeling. Septal motion is consistent with pacing. There is normal systolic function with a visually estimated ejection fraction of 55 - 60%. Quantitated ejection  fraction is 58%. There is normal diastolic function.    Right Ventricle: Mild right ventricular enlargement. Systolic function is moderately reduced. Pacemaker lead present in the ventricle.    Right Atrium: Right atrium is mildly dilated.    Aortic Valve: There is a bioprosthetic valve in the aortic position.    Mitral Valve: There is moderate regurgitation with a posterolateral eccentriccally directed jet.    Tricuspid Valve: There is moderate regurgitation.    Pulmonary Artery: The estimated pulmonary artery systolic pressure is 49 mmHg.    IVC/SVC: Elevated venous pressure at 15 mmHg.    Current Heart Failure Medications  furosemide injection 40 mg, Every 12 hours, Intravenous  isosorbide dinitrate tablet 30 mg, Daily, Oral    Plan  - Monitor strict I&Os and daily weights.    - Place on telemetry  - Low sodium diet  - Place on fluid restriction of 1.5 L.   - Cardiology has not been consulted  - The patient's volume status is worsening as indicated by shortness of breath. Will continue current treatment    Continue with IV diuresis.  Diabetes mellitus type 2 in obese  Patient reports taking insulin 52 units every morning 32 units nightly and Ozempic weekly on Fridays. Most recent hemoglobin A1c and blood glucsoe on admission below.     A1C  Recent Labs   Lab 12/16/24  1100 04/25/25  0804 07/31/25  1854   Hemoglobin A1C 6.1 6.3 H 6.4      Fingerstick glucose  Recent Labs     07/31/25  1903   POCTGLUCOSE 142*      Inpatient insulin regimen  insulin glargine U-100 (Lantus) injection 25 Units, Daily, Subcutaneous  insulin aspart U-100 injection 0-10 Units, Before meals & nightly PRN, Subcutaneous    Plan  - Hold oral hypoglycemics while patient is in the hospital  - Current inpatient insulin regimen is listed above  - Patient's glucose is uncontrolled. Will continue current treatment  - Continue lyrica for peripheral neuropathy   Chronic kidney failure, stage 4 (severe)  Creatinine stable for now. Based on current  GFR, CKD stage is stage 4 - GFR 15-29. Most recent creatinine and eGFR are listed below.  Recent Labs     07/31/25  1854 08/02/25  0750   CREATININE 2.12* 2.09*   EGFRNORACEVR 25* 25*       Plan  - Monitor urine output and serial BMP  - Renally dose medications  - Avoid nephrotoxic medications and procedures  - Patient at baseline    Paroxysmal atrial fibrillation  Patient has paroxysmal (terminates spontaneously or with treatment within 7 days) atrial fibrillation. Patient is currently in sinus tachycardia with occasional PVCs. [id number] Score: 4. The patient's heart rate in the last 24 hours is as follows: Pulse  Min: 63  Max: 106. Patient does not take home antiarrhythmic.    Anticoagulants  heparin (porcine) injection 5,000 Units, Every 12 hours, Subcutaneous    Plan  - Replete lytes with a goal of K>4, Mg >2  - Patient is anticoagulated, see medications listed above.  - Monitor on telemetry   Gastroesophageal reflux disease without esophagitis  Denies any symptoms at this time. Will continue home Protonix dose and monitor.   Essential tremor  Continue home Primidone.   Depression  Patient has  depression which is mild and is currently controlled. Will Continue anti-depressant medications. We will not consult psychiatry at this time. Patient does not display psychosis at this time. Continue to monitor closely and adjust plan of care as needed.  Acute hypoxic respiratory failure      Oxygen Requirements in the last 24 hours  Rate of Resp  Min: 16  Max: 20  SpO2  Min: 90 %  Max: 98 %   Flow (L/min) (Oxygen Therapy)  Min: 3  Max: 4  Device (Oxygen Therapy): nasal cannula with humidification    Plan    Muscle spasms of both lower extremities  Patient states that she frequently has muscle spasms.    This is not always been associated with electrolyte abnormalities.    Flexeril seems to help.    Patient requesting pickle juice as that is what she uses at home.  Will attempt to secure this.    VTE Risk Mitigation  (From admission, onward)           Ordered     heparin (porcine) injection 5,000 Units  Every 12 hours         08/01/25 0147     Place SHELDON hose  Until discontinued         08/01/25 0136     IP VTE HIGH RISK PATIENT  Once         08/01/25 0136                    Discharge Planning   VALERY:      Code Status: Full Code   Medical Readiness for Discharge Date:   Discharge Plan A: Home with family                        Gretta Salcedo MD  Department of Hospital Medicine   [facility] - [facility] [address] [facility]

## 2025-08-03 NOTE — PROGRESS NOTES
[facility] - [address] [place] [address] Medicine  Progress Note    Patient Name: Karen Renteria  MRN: [id number]  Patient Class: IP- Inpatient   Admission Date: 7/31/2025  Length of Stay: 2 days  Attending Physician: Gretta Salcedo MD  Primary Care Provider: Jt Allison II, MD        Subjective     Principal Problem:Acute on chronic heart failure with preserved ejection fraction (HFpEF, >= 50%)        HPI:  70 yo female with a PMH of CHF, Pulmonary HTN, CAD s/p CABG 2012, AV replacement 2012, pacemaker placement 2022, essential hypertension, type 2 diabetes mellitus, and CKD stage 4. Presenting to [facility] with c/o shortness of breath. The patient admitted in December 2024 for pneumonia and discharged home following a 3 day stay with home oxygen. Patient reports she has been compliant with supplemental oxygen and medications. Reports wearing 3L at night and as needed during the day. Recently, the patient experienced gradual worsening shortness of breath, particularly on exertion, with oxygen saturation levels dropping to around 50% when attempting activities. Today at work patient became severely short of breath and was advised to go to the emergency room. Reports lightheadedness and non-productive cough, and fever. Denies any orthopnea, PND, or chest pain on exertion. No lower extremity edema. However, patient reports bilateral lower extremity cramping at night. Onset of dyspnea was gradual. In the ED, patient found to be severely hypoxic and hypertensive with a normal respiratory rate and afebrile. Patient placed on BiPAP with an improvement in O2 sats then deescalated to supplemental oxygen. Workup significant for BNP 4645, Trop 18. On review CXR shows congestion vasculature with hazing. Awaiting official read. Patient admitted to Hospital Medicine under the direct supervision of Dr. Tapia     Overview/Hospital Course:  No notes on file    Interval History:     Review of Systems    Constitutional:  Negative for chills and fever.   HENT:  Negative for congestion, rhinorrhea and sore throat.    Eyes:  Negative for photophobia and visual disturbance.   Respiratory:  Positive for shortness of breath. Negative for wheezing.    Cardiovascular:  Negative for chest pain, palpitations and leg swelling.   Gastrointestinal:  Negative for abdominal pain, nausea and vomiting.   Endocrine: Negative for polydipsia, polyphagia and polyuria.   Genitourinary:  Negative for dysuria, flank pain and urgency.   Musculoskeletal:  Negative for back pain and neck pain.   Neurological:  Positive for tremors. Negative for weakness, light-headedness, numbness and headaches.   Hematological:  Bruises/bleeds easily.     Objective:     Vital Signs (Most Recent):  Temp: 98.1 °F (36.7 °C) (08/03/25 1611)  Pulse: 77 (08/03/25 1611)  Resp: 18 (08/03/25 1611)  BP: (!) 141/76 (08/03/25 1611)  SpO2: 98 % (08/03/25 1611) Vital Signs (24h Range):  Temp:  [97.5 °F (36.4 °C)-98.1 °F (36.7 °C)] 98.1 °F (36.7 °C)  Pulse:  [65-87] 77  Resp:  [16-20] 18  SpO2:  [89 %-98 %] 98 %  BP: (116-141)/(46-76) 141/76     Weight: 78.5 kg (173 lb 1.6 oz)  Body mass index is 31.66 kg/m².    Intake/Output Summary (Last 24 hours) at 8/3/2025 1717  Last data filed at 8/2/2025 2059  Gross per 24 hour   Intake 240 ml   Output --   Net 240 ml         Physical Exam  Vitals and nursing note reviewed.   Constitutional:       General: She is not in acute distress.     Appearance: Normal appearance. She is ill-appearing.   HENT:      Head: Normocephalic and atraumatic.      Right Ear: Tympanic membrane normal.      Left Ear: Tympanic membrane normal.      Nose: Nose normal. No congestion or rhinorrhea.      Mouth/Throat:      Mouth: Mucous membranes are moist.      Pharynx: Oropharynx is clear.   Eyes:      Conjunctiva/sclera: Conjunctivae normal.   Cardiovascular:      Rate and Rhythm: Normal rate and regular rhythm.      Pulses: Normal pulses.      Heart  sounds: Normal heart sounds. No murmur heard.     No friction rub. No gallop.      Comments: No JVD  Pulmonary:      Effort: Pulmonary effort is normal.      Breath sounds: Normal breath sounds. No wheezing, rhonchi or rales.   Abdominal:      General: Bowel sounds are normal.      Palpations: Abdomen is soft.      Tenderness: There is no abdominal tenderness. There is no guarding or rebound.   Musculoskeletal:      Cervical back: Normal range of motion and neck supple.      Right lower leg: No edema.      Left lower leg: No edema.   Skin:     General: Skin is warm and dry.      Capillary Refill: Capillary refill takes less than 2 seconds.   Neurological:      General: No focal deficit present.      Mental Status: She is alert and oriented to person, place, and time.               Significant Labs: All pertinent labs within the past 24 hours have been reviewed.    Significant Imaging: I have reviewed all pertinent imaging results/findings within the past 24 hours.      Assessment & Plan  Acute on chronic heart failure with preserved ejection fraction (HFpEF, >= 50%)  Presenting with gradual onset of dyspnea with increased utilization of supplemental oxygen. On admission found to be hypoxic. BNP 4645 up from 2174 two months ago. History of chronic heart failure with a preserved ejection fraction. Last echo EF 58%. History of pulmonary hypertension as well. HFpEF with acute on chronic exacerbation with associated dyspnea at rest and increase utilization of home oxygen. On presentation her CHF was decompensated. The etiology of her decompensation is likely pulmonary hypertension. Patient on the CHF pathway.     Latest ECHO  Results for orders placed during the hospital encounter of 12/16/24    Echo    Interpretation Summary    Left Ventricle: The left ventricle is normal in size. Mildly increased wall thickness. There is concentric remodeling. Septal motion is consistent with pacing. There is normal systolic function  with a visually estimated ejection fraction of 55 - 60%. Quantitated ejection fraction is 58%. There is normal diastolic function.    Right Ventricle: Mild right ventricular enlargement. Systolic function is moderately reduced. Pacemaker lead present in the ventricle.    Right Atrium: Right atrium is mildly dilated.    Aortic Valve: There is a bioprosthetic valve in the aortic position.    Mitral Valve: There is moderate regurgitation with a posterolateral eccentriccally directed jet.    Tricuspid Valve: There is moderate regurgitation.    Pulmonary Artery: The estimated pulmonary artery systolic pressure is 49 mmHg.    IVC/SVC: Elevated venous pressure at 15 mmHg.    Current Heart Failure Medications  furosemide injection 40 mg, Every 12 hours, Intravenous  isosorbide dinitrate tablet 30 mg, Daily, Oral    Plan  - Monitor strict I&Os and daily weights.    - Place on telemetry  - Low sodium diet  - Place on fluid restriction of 1.5 L.   - Cardiology has not been consulted  - The patient's volume status is worsening as indicated by shortness of breath. Will continue current treatment    Continue with IV diuresis.  Diabetes mellitus type 2 in obese  Patient reports taking insulin 52 units every morning 32 units nightly and Ozempic weekly on Fridays. Most recent hemoglobin A1c and blood glucsoe on admission below.     A1C  Recent Labs   Lab 12/16/24  1100 04/25/25  0804 07/31/25  1854   Hemoglobin A1C 6.1 6.3 H 6.4      Fingerstick glucose  Recent Labs     07/31/25  1903   POCTGLUCOSE 142*      Inpatient insulin regimen  insulin glargine U-100 (Lantus) injection 25 Units, Daily, Subcutaneous  insulin aspart U-100 injection 0-10 Units, Before meals & nightly PRN, Subcutaneous    Plan  - Hold oral hypoglycemics while patient is in the hospital  - Current inpatient insulin regimen is listed above  - Patient's glucose is uncontrolled. Will continue current treatment  - Continue lyrica for peripheral neuropathy   Chronic  kidney failure, stage 4 (severe)  Creatinine stable for now. Based on current GFR, CKD stage is stage 4 - GFR 15-29. Most recent creatinine and eGFR are listed below.  Recent Labs     07/31/25  1854 08/02/25  0750   CREATININE 2.12* 2.09*   EGFRNORACEVR 25* 25*       Plan  - Monitor urine output and serial BMP  - Renally dose medications  - Avoid nephrotoxic medications and procedures  - Patient at baseline    Paroxysmal atrial fibrillation  Patient has paroxysmal (terminates spontaneously or with treatment within 7 days) atrial fibrillation. Patient is currently in sinus tachycardia with occasional PVCs. [id number] Score: 4. The patient's heart rate in the last 24 hours is as follows: Pulse  Min: 65  Max: 87. Patient does not take home antiarrhythmic.    Anticoagulants  heparin (porcine) injection 5,000 Units, Every 12 hours, Subcutaneous    Plan  - Replete lytes with a goal of K>4, Mg >2  - Patient is anticoagulated, see medications listed above.  - Monitor on telemetry   Gastroesophageal reflux disease without esophagitis  Denies any symptoms at this time. Will continue home Protonix dose and monitor.   Essential tremor  Continue home Primidone.   Depression  Patient has  depression which is mild and is currently controlled. Will Continue anti-depressant medications. We will not consult psychiatry at this time. Patient does not display psychosis at this time. Continue to monitor closely and adjust plan of care as needed.  Acute hypoxic respiratory failure      Oxygen Requirements in the last 24 hours  Rate of Resp  Min: 16  Max: 20  SpO2  Min: 89 %  Max: 98 %   Flow (L/min) (Oxygen Therapy)  Min: 3  Max: 3  Device (Oxygen Therapy): nasal cannula with humidification    Plan    Muscle spasms of both lower extremities  Patient states that she frequently has muscle spasms.    This is not always been associated with electrolyte abnormalities.    Flexeril seems to help.    Patient requesting pickle juice as that is  what she uses at home.  Will attempt to secure this.    VTE Risk Mitigation (From admission, onward)           Ordered     heparin (porcine) injection 5,000 Units  Every 12 hours         08/01/25 0147     Place SHELDON hose  Until discontinued         08/01/25 0136     IP VTE HIGH RISK PATIENT  Once         08/01/25 0136                    Discharge Planning   VALERY:      Code Status: Full Code   Medical Readiness for Discharge Date:   Discharge Plan A: Home with family              Gretta Salcedo MD  Department of Hospital Medicine   [facility] - [address] [facility] Tele[facility]

## 2025-08-03 NOTE — SUBJECTIVE & OBJECTIVE
Interval History:     Review of Systems   Constitutional:  Negative for chills and fever.   HENT:  Negative for congestion, rhinorrhea and sore throat.    Eyes:  Negative for photophobia and visual disturbance.   Respiratory:  Positive for shortness of breath. Negative for wheezing.    Cardiovascular:  Negative for chest pain, palpitations and leg swelling.   Gastrointestinal:  Negative for abdominal pain, nausea and vomiting.   Endocrine: Negative for polydipsia, polyphagia and polyuria.   Genitourinary:  Negative for dysuria, flank pain and urgency.   Musculoskeletal:  Negative for back pain and neck pain.   Neurological:  Positive for tremors. Negative for weakness, light-headedness, numbness and headaches.   Hematological:  Bruises/bleeds easily.     Objective:     Vital Signs (Most Recent):  Temp: 98.1 °F (36.7 °C) (08/03/25 1611)  Pulse: 77 (08/03/25 1611)  Resp: 18 (08/03/25 1611)  BP: (!) 141/76 (08/03/25 1611)  SpO2: 98 % (08/03/25 1611) Vital Signs (24h Range):  Temp:  [97.5 °F (36.4 °C)-98.1 °F (36.7 °C)] 98.1 °F (36.7 °C)  Pulse:  [65-87] 77  Resp:  [16-20] 18  SpO2:  [89 %-98 %] 98 %  BP: (116-141)/(46-76) 141/76     Weight: 78.5 kg (173 lb 1.6 oz)  Body mass index is 31.66 kg/m².    Intake/Output Summary (Last 24 hours) at 8/3/2025 1717  Last data filed at 8/2/2025 2059  Gross per 24 hour   Intake 240 ml   Output --   Net 240 ml         Physical Exam  Vitals and nursing note reviewed.   Constitutional:       General: She is not in acute distress.     Appearance: Normal appearance. She is ill-appearing.   HENT:      Head: Normocephalic and atraumatic.      Right Ear: Tympanic membrane normal.      Left Ear: Tympanic membrane normal.      Nose: Nose normal. No congestion or rhinorrhea.      Mouth/Throat:      Mouth: Mucous membranes are moist.      Pharynx: Oropharynx is clear.   Eyes:      Conjunctiva/sclera: Conjunctivae normal.   Cardiovascular:      Rate and Rhythm: Normal rate and regular rhythm.       Pulses: Normal pulses.      Heart sounds: Normal heart sounds. No murmur heard.     No friction rub. No gallop.      Comments: No JVD  Pulmonary:      Effort: Pulmonary effort is normal.      Breath sounds: Normal breath sounds. No wheezing, rhonchi or rales.   Abdominal:      General: Bowel sounds are normal.      Palpations: Abdomen is soft.      Tenderness: There is no abdominal tenderness. There is no guarding or rebound.   Musculoskeletal:      Cervical back: Normal range of motion and neck supple.      Right lower leg: No edema.      Left lower leg: No edema.   Skin:     General: Skin is warm and dry.      Capillary Refill: Capillary refill takes less than 2 seconds.   Neurological:      General: No focal deficit present.      Mental Status: She is alert and oriented to person, place, and time.               Significant Labs: All pertinent labs within the past 24 hours have been reviewed.    Significant Imaging: I have reviewed all pertinent imaging results/findings within the past 24 hours.

## 2025-08-03 NOTE — ASSESSMENT & PLAN NOTE
Patient has paroxysmal (terminates spontaneously or with treatment within 7 days) atrial fibrillation. Patient is currently in sinus tachycardia with occasional PVCs. [id number] Score: 4. The patient's heart rate in the last 24 hours is as follows: Pulse  Min: 63  Max: 106. Patient does not take home antiarrhythmic.    Anticoagulants  heparin (porcine) injection 5,000 Units, Every 12 hours, Subcutaneous    Plan  - Replete lytes with a goal of K>4, Mg >2  - Patient is anticoagulated, see medications listed above.  - Monitor on telemetry

## 2025-08-03 NOTE — PLAN OF CARE
Problem: Adult Inpatient Plan of Care  Goal: Plan of Care Review  Outcome: Progressing     Problem: Adult Inpatient Plan of Care  Goal: Patient-Specific Goal (Individualized)  Outcome: Progressing     Problem: Adult Inpatient Plan of Care  Goal: Optimal Comfort and Wellbeing  Outcome: Progressing     Problem: Diabetes Comorbidity  Goal: Blood Glucose Level Within Targeted Range  Outcome: Progressing     Problem: Gas Exchange Impaired  Goal: Optimal Gas Exchange  Outcome: Progressing

## 2025-08-03 NOTE — PLAN OF CARE
Problem: Hospitalized Older Adult  Goal: Optimal Cognitive Function  Outcome: Progressing  Goal: Effective Bowel Elimination  Outcome: Progressing  Goal: Optimal Coping  Outcome: Progressing  Goal: Fluid and Electrolyte Balance  Outcome: Progressing  Goal: Optimal Functional Ability  Outcome: Progressing  Goal: Improved Oral Intake  Outcome: Progressing  Goal: Adequate Sleep/Rest  Outcome: Progressing  Goal: Effective Urinary Elimination  Outcome: Progressing     Problem: Adult Inpatient Plan of Care  Goal: Plan of Care Review  Outcome: Progressing  Goal: Patient-Specific Goal (Individualized)  Outcome: Progressing  Goal: Absence of Hospital-Acquired Illness or Injury  Outcome: Progressing     Problem: Hospitalized Older Adult  Goal: Optimal Cognitive Function  Outcome: Progressing  Goal: Effective Bowel Elimination  Outcome: Progressing  Goal: Optimal Coping  Outcome: Progressing  Goal: Fluid and Electrolyte Balance  Outcome: Progressing  Goal: Optimal Functional Ability  Outcome: Progressing  Goal: Improved Oral Intake  Outcome: Progressing  Goal: Adequate Sleep/Rest  Outcome: Progressing  Goal: Effective Urinary Elimination  Outcome: Progressing     Problem: Adult Inpatient Plan of Care  Goal: Plan of Care Review  Outcome: Progressing  Goal: Patient-Specific Goal (Individualized)  Outcome: Progressing  Goal: Absence of Hospital-Acquired Illness or Injury  Outcome: Progressing

## 2025-08-03 NOTE — ASSESSMENT & PLAN NOTE
Oxygen Requirements in the last 24 hours  Rate of Resp  Min: 16  Max: 20  SpO2  Min: 89 %  Max: 98 %   Flow (L/min) (Oxygen Therapy)  Min: 3  Max: 3  Device (Oxygen Therapy): nasal cannula with humidification    Plan

## 2025-08-03 NOTE — ASSESSMENT & PLAN NOTE
Patient has paroxysmal (terminates spontaneously or with treatment within 7 days) atrial fibrillation. Patient is currently in sinus tachycardia with occasional PVCs. [id number] Score: 4. The patient's heart rate in the last 24 hours is as follows: Pulse  Min: 65  Max: 87. Patient does not take home antiarrhythmic.    Anticoagulants  heparin (porcine) injection 5,000 Units, Every 12 hours, Subcutaneous    Plan  - Replete lytes with a goal of K>4, Mg >2  - Patient is anticoagulated, see medications listed above.  - Monitor on telemetry

## 2025-08-03 NOTE — ASSESSMENT & PLAN NOTE
Creatinine stable for now. Based on current GFR, CKD stage is stage 4 - GFR 15-29. Most recent creatinine and eGFR are listed below.  Recent Labs     07/31/25  1854 08/02/25  0750   CREATININE 2.12* 2.09*   EGFRNORACEVR 25* 25*       Plan  - Monitor urine output and serial BMP  - Renally dose medications  - Avoid nephrotoxic medications and procedures  - Patient at baseline

## 2025-08-04 VITALS
RESPIRATION RATE: 16 BRPM | DIASTOLIC BLOOD PRESSURE: 69 MMHG | HEART RATE: 74 BPM | BODY MASS INDEX: 31.86 KG/M2 | TEMPERATURE: 98 F | SYSTOLIC BLOOD PRESSURE: 125 MMHG | HEIGHT: 62 IN | WEIGHT: 173.13 LBS | OXYGEN SATURATION: 96 %

## 2025-08-04 LAB
ANION GAP SERPL CALCULATED.3IONS-SCNC: 13 MMOL/L (ref 7–16)
BASOPHILS # BLD AUTO: 0.11 K/UL (ref 0–0.2)
BASOPHILS NFR BLD AUTO: 0.9 % (ref 0–1)
BUN SERPL-MCNC: 41 MG/DL (ref 10–20)
BUN/CREAT SERPL: 23 (ref 6–20)
CALCIUM SERPL-MCNC: 9.1 MG/DL (ref 8.4–10.2)
CHLORIDE SERPL-SCNC: 98 MMOL/L (ref 98–107)
CO2 SERPL-SCNC: 28 MMOL/L (ref 23–31)
CREAT SERPL-MCNC: 1.76 MG/DL (ref 0.55–1.02)
DIFFERENTIAL METHOD BLD: ABNORMAL
EGFR (NO RACE VARIABLE) (RUSH/TITUS): 31 ML/MIN/1.73M2
EOSINOPHIL # BLD AUTO: 0.49 K/UL (ref 0–0.5)
EOSINOPHIL NFR BLD AUTO: 3.9 % (ref 1–4)
ERYTHROCYTE [DISTWIDTH] IN BLOOD BY AUTOMATED COUNT: 19.8 % (ref 11.5–14.5)
GLUCOSE SERPL-MCNC: 107 MG/DL (ref 82–115)
GLUCOSE SERPL-MCNC: 143 MG/DL (ref 70–105)
GLUCOSE SERPL-MCNC: 92 MG/DL (ref 70–105)
HCT VFR BLD AUTO: 44.5 % (ref 38–47)
HGB BLD-MCNC: 13.3 G/DL (ref 12–16)
IMM GRANULOCYTES # BLD AUTO: 0.05 K/UL (ref 0–0.04)
IMM GRANULOCYTES NFR BLD: 0.4 % (ref 0–0.4)
LYMPHOCYTES # BLD AUTO: 2.32 K/UL (ref 1–4.8)
LYMPHOCYTES NFR BLD AUTO: 18.7 % (ref 27–41)
MAGNESIUM SERPL-MCNC: 2.2 MG/DL (ref 1.6–2.6)
MCH RBC QN AUTO: 22.9 PG (ref 27–31)
MCHC RBC AUTO-ENTMCNC: 29.9 G/DL (ref 32–36)
MCV RBC AUTO: 76.5 FL (ref 80–96)
MONOCYTES # BLD AUTO: 1.02 K/UL (ref 0–0.8)
MONOCYTES NFR BLD AUTO: 8.2 % (ref 2–6)
MPC BLD CALC-MCNC: 10.9 FL (ref 9.4–12.4)
NEUTROPHILS # BLD AUTO: 8.43 K/UL (ref 1.8–7.7)
NEUTROPHILS NFR BLD AUTO: 67.9 % (ref 53–65)
NRBC # BLD AUTO: 0 X10E3/UL
NRBC, AUTO (.00): 0 %
PLATELET # BLD AUTO: 290 K/UL (ref 150–400)
POTASSIUM SERPL-SCNC: 3.7 MMOL/L (ref 3.5–5.1)
RBC # BLD AUTO: 5.82 M/UL (ref 4.2–5.4)
SODIUM SERPL-SCNC: 135 MMOL/L (ref 136–145)
WBC # BLD AUTO: 12.42 K/UL (ref 4.5–11)

## 2025-08-04 PROCEDURE — 94640 AIRWAY INHALATION TREATMENT: CPT

## 2025-08-04 PROCEDURE — 63600175 PHARM REV CODE 636 W HCPCS: Performed by: HOSPITALIST

## 2025-08-04 PROCEDURE — 25000242 PHARM REV CODE 250 ALT 637 W/ HCPCS: Performed by: HOSPITALIST

## 2025-08-04 PROCEDURE — 99900035 HC TECH TIME PER 15 MIN (STAT)

## 2025-08-04 PROCEDURE — 83735 ASSAY OF MAGNESIUM: CPT | Performed by: HOSPITALIST

## 2025-08-04 PROCEDURE — 25000003 PHARM REV CODE 250: Performed by: HOSPITALIST

## 2025-08-04 PROCEDURE — 63600175 PHARM REV CODE 636 W HCPCS

## 2025-08-04 PROCEDURE — 94761 N-INVAS EAR/PLS OXIMETRY MLT: CPT

## 2025-08-04 PROCEDURE — 99239 HOSP IP/OBS DSCHRG MGMT >30: CPT | Mod: ,,, | Performed by: HOSPITALIST

## 2025-08-04 PROCEDURE — 27000221 HC OXYGEN, UP TO 24 HOURS

## 2025-08-04 PROCEDURE — 82962 GLUCOSE BLOOD TEST: CPT

## 2025-08-04 PROCEDURE — 36415 COLL VENOUS BLD VENIPUNCTURE: CPT | Performed by: HOSPITALIST

## 2025-08-04 PROCEDURE — 85025 COMPLETE CBC W/AUTO DIFF WBC: CPT | Performed by: HOSPITALIST

## 2025-08-04 PROCEDURE — 80048 BASIC METABOLIC PNL TOTAL CA: CPT | Performed by: HOSPITALIST

## 2025-08-04 RX ORDER — INSULIN DEGLUDEC 200 U/ML
INJECTION, SOLUTION SUBCUTANEOUS DAILY
Status: ON HOLD | COMMUNITY
End: 2025-08-04

## 2025-08-04 RX ORDER — INSULIN DEGLUDEC 200 U/ML
25 INJECTION, SOLUTION SUBCUTANEOUS DAILY
Start: 2025-08-04

## 2025-08-04 RX ORDER — SEMAGLUTIDE 1.34 MG/ML
0.25 INJECTION, SOLUTION SUBCUTANEOUS
COMMUNITY

## 2025-08-04 RX ADMIN — ISOSORBIDE DINITRATE 30 MG: 10 TABLET ORAL at 09:08

## 2025-08-04 RX ADMIN — IPRATROPIUM BROMIDE AND ALBUTEROL SULFATE 3 ML: .5; 2.5 SOLUTION RESPIRATORY (INHALATION) at 07:08

## 2025-08-04 RX ADMIN — SERTRALINE 100 MG: 50 TABLET, FILM COATED ORAL at 09:08

## 2025-08-04 RX ADMIN — INSULIN GLARGINE 25 UNITS: 100 INJECTION, SOLUTION SUBCUTANEOUS at 09:08

## 2025-08-04 RX ADMIN — PREDNISONE 20 MG: 20 TABLET ORAL at 09:08

## 2025-08-04 RX ADMIN — PREGABALIN 75 MG: 75 CAPSULE ORAL at 09:08

## 2025-08-04 RX ADMIN — ASPIRIN 81 MG: 81 TABLET, COATED ORAL at 09:08

## 2025-08-04 RX ADMIN — HEPARIN SODIUM 5000 UNITS: 5000 INJECTION, SOLUTION INTRAVENOUS; SUBCUTANEOUS at 09:08

## 2025-08-04 RX ADMIN — PRIMIDONE 100 MG: 50 TABLET ORAL at 09:08

## 2025-08-04 RX ADMIN — IPRATROPIUM BROMIDE AND ALBUTEROL SULFATE 3 ML: .5; 2.5 SOLUTION RESPIRATORY (INHALATION) at 12:08

## 2025-08-04 RX ADMIN — PANTOPRAZOLE SODIUM 40 MG: 40 TABLET, DELAYED RELEASE ORAL at 09:08

## 2025-08-04 RX ADMIN — FUROSEMIDE 40 MG: 10 INJECTION, SOLUTION INTRAVENOUS at 09:08

## 2025-08-04 NOTE — ASSESSMENT & PLAN NOTE
Creatinine stable for now. Based on current GFR, CKD stage is stage 4 - GFR 15-29. Most recent creatinine and eGFR are listed below.  Recent Labs     08/02/25  0750 08/04/25  0827   CREATININE 2.09* 1.76*   EGFRNORACEVR 25* 31*       Plan  - Monitor urine output and serial BMP  - Renally dose medications  - Avoid nephrotoxic medications and procedures  - Patient at baseline

## 2025-08-04 NOTE — PLAN OF CARE
Problem: Gas Exchange Impaired  Goal: Optimal Gas Exchange  Outcome: Progressing     Problem: Skin Injury Risk Increased  Goal: Skin Health and Integrity  Outcome: Progressing      06-Jun-2021 10:33

## 2025-08-04 NOTE — ASSESSMENT & PLAN NOTE
Patient has paroxysmal (terminates spontaneously or with treatment within 7 days) atrial fibrillation. Patient is currently in sinus tachycardia with occasional PVCs. [id number] Score: 4. The patient's heart rate in the last 24 hours is as follows: Pulse  Min: 60  Max: 78. Patient does not take home antiarrhythmic.    Anticoagulants  heparin (porcine) injection 5,000 Units, Every 12 hours, Subcutaneous    Plan  - Replete lytes with a goal of K>4, Mg >2  - Patient is anticoagulated, see medications listed above.  - Monitor on telemetry

## 2025-08-04 NOTE — NURSING
Discharge instructions reviewed with patient; copy given to patient. Patient voiced understanding regarding: diet, daily weights and how to keep track of weight, meds, appt., signs and symptoms to report to physician.     Several attempts made per Moira Quiles, [facility] [place], to call and scheduled hospital follow up appointment with Dr. Jt Allison's office; still unavailable at time of discharge. Carlee Washington RN, states she will call patient with follow up appointment, once obtained.

## 2025-08-04 NOTE — DISCHARGE INSTRUCTIONS
Noted co-morbidities/PMH of PAF, HLD, Hx CVA, DM2, CKD, GERD, Essential tremor, and Depression; care management, treatment compliance, medication compliance, and diet reviewed.     Hospitalist Discharge orders  *Notify your healthcare provider if you experience any of the following: temperature >100.4  *Notify your healthcare provider if you experience any of the following: redness, tenderness, or any signs or symptoms of infection (pain, swelling, redness, odor or green/yellow drainage around incision site).  *Notify your healthcare provider if you experience any of the following: difficulty breathing or increased cough.  *Notify your physician if you experience any persistent nausea, vomiting, diarrhea or headache.  *Notify your physician if you experience any of the following: severe uncontrolled pain, worsening rash, increased confusion, weakness, dizziness, lightheadedness, or visual disturbances.

## 2025-08-04 NOTE — ASSESSMENT & PLAN NOTE
Oxygen Requirements in the last 24 hours  Rate of Resp  Min: 15  Max: 20  SpO2  Min: 89 %  Max: 98 %   Flow (L/min) (Oxygen Therapy)  Min: 3  Max: 3  Device (Oxygen Therapy): nasal cannula with humidification    Plan

## 2025-08-04 NOTE — DISCHARGE SUMMARY
[facility] Tele[facility] Hospital Medicine  Discharge Summary      Patient Name: Karen Renteria  MRN: [id number]  JAMIL: [id number]  Patient Class: IP- Inpatient  Admission Date: 7/31/2025  Hospital Length of Stay: 3 days  Discharge Date and Time: 08/04/2025 12:09 PM  Attending Physician: Gretta Salcedo MD   Discharging Provider: Gretta Salcedo MD  Primary Care Provider: Jt Allison II, MD    Primary Care Team: Networked reference to record PCT     HPI:   68 yo female with a PMH of CHF, Pulmonary HTN, CAD s/p CABG 2012, AV replacement 2012, pacemaker placement 2022, essential hypertension, type 2 diabetes mellitus, and CKD stage 4. Presenting to [facility] with c/o shortness of breath. The patient admitted in December 2024 for pneumonia and discharged home following a 3 day stay with home oxygen. Patient reports she has been compliant with supplemental oxygen and medications. Reports wearing 3L at night and as needed during the day. Recently, the patient experienced gradual worsening shortness of breath, particularly on exertion, with oxygen saturation levels dropping to around 50% when attempting activities. Today at work patient became severely short of breath and was advised to go to the emergency room. Reports lightheadedness and non-productive cough, and fever. Denies any orthopnea, PND, or chest pain on exertion. No lower extremity edema. However, patient reports bilateral lower extremity cramping at night. Onset of dyspnea was gradual. In the ED, patient found to be severely hypoxic and hypertensive with a normal respiratory rate and afebrile. Patient placed on BiPAP with an improvement in O2 sats then deescalated to supplemental oxygen. Workup significant for BNP 4645, Trop 18. On review CXR shows congestion vasculature with hazing. Awaiting official read. Patient admitted to Hospital Medicine under the direct supervision of Dr. Tapia     * No surgery found *       Hospital Course:   No notes on file     Goals of Care Treatment Preferences:  Code Status: Full Code         Consults:   Consults (From admission, onward)          Status Ordering Provider     Inpatient consult to Social Work/Case Management  Once        Provider:  (Not yet assigned)    Completed VINCENT SHABAZZ     Inpatient consult to Registered Dietitian/Nutritionist  Once        Provider:  (Not yet assigned)    Completed VINCENT SHABAZZ            Assessment & Plan  Acute on chronic heart failure with preserved ejection fraction (HFpEF, >= 50%)  Presenting with gradual onset of dyspnea with increased utilization of supplemental oxygen. On admission found to be hypoxic. BNP 4645 up from 2174 two months ago. History of chronic heart failure with a preserved ejection fraction. Last echo EF 58%. History of pulmonary hypertension as well. HFpEF with acute on chronic exacerbation with associated dyspnea at rest and increase utilization of home oxygen. On presentation her CHF was decompensated. The etiology of her decompensation is likely pulmonary hypertension. Patient on the CHF pathway.     Latest ECHO  Results for orders placed during the hospital encounter of 12/16/24    Echo    Interpretation Summary    Left Ventricle: The left ventricle is normal in size. Mildly increased wall thickness. There is concentric remodeling. Septal motion is consistent with pacing. There is normal systolic function with a visually estimated ejection fraction of 55 - 60%. Quantitated ejection fraction is 58%. There is normal diastolic function.    Right Ventricle: Mild right ventricular enlargement. Systolic function is moderately reduced. Pacemaker lead present in the ventricle.    Right Atrium: Right atrium is mildly dilated.    Aortic Valve: There is a bioprosthetic valve in the aortic position.    Mitral Valve: There is moderate regurgitation with a posterolateral eccentriccally directed jet.    Tricuspid Valve: There  "is moderate regurgitation.    Pulmonary Artery: The estimated pulmonary artery systolic pressure is 49 mmHg.    IVC/SVC: Elevated venous pressure at 15 mmHg.    Current Heart Failure Medications  furosemide injection 40 mg, Every 12 hours, Intravenous  isosorbide dinitrate tablet 30 mg, Daily, Oral    Plan  - Monitor strict I&Os and daily weights.    - Place on telemetry  - Low sodium diet  - Place on fluid restriction of 1.5 L.   - Cardiology has not been consulted  - The patient's volume status is worsening as indicated by shortness of breath. Will continue current treatment    Continue with IV diuresis.  Diabetes mellitus type 2 in obese  Patient reports taking insulin 52 units every morning 32 units nightly and Ozempic weekly on Fridays. Most recent hemoglobin A1c and blood glucsoe on admission below.     A1C  Recent Labs   Lab 12/16/24  1100 04/25/25  0804 07/31/25  1854   Hemoglobin A1C 6.1 6.3 H 6.4      Fingerstick glucose  No results for input(s): "POCTGLUCOSE" in the last 72 hours.     Inpatient insulin regimen  insulin glargine U-100 (Lantus) injection 25 Units, Daily, Subcutaneous  insulin aspart U-100 injection 0-10 Units, Before meals & nightly PRN, Subcutaneous    Plan  - Hold oral hypoglycemics while patient is in the hospital  - Current inpatient insulin regimen is listed above  - Patient's glucose is uncontrolled. Will continue current treatment  - Continue lyrica for peripheral neuropathy   Chronic kidney failure, stage 4 (severe)  Creatinine stable for now. Based on current GFR, CKD stage is stage 4 - GFR 15-29. Most recent creatinine and eGFR are listed below.  Recent Labs     08/02/25  0750 08/04/25  0827   CREATININE 2.09* 1.76*   EGFRNORACEVR 25* 31*       Plan  - Monitor urine output and serial BMP  - Renally dose medications  - Avoid nephrotoxic medications and procedures  - Patient at baseline    Paroxysmal atrial fibrillation  Patient has paroxysmal (terminates spontaneously or with " treatment within 7 days) atrial fibrillation. Patient is currently in sinus tachycardia with occasional PVCs. [id number] Score: 4. The patient's heart rate in the last 24 hours is as follows: Pulse  Min: 60  Max: 78. Patient does not take home antiarrhythmic.    Anticoagulants  heparin (porcine) injection 5,000 Units, Every 12 hours, Subcutaneous    Plan  - Replete lytes with a goal of K>4, Mg >2  - Patient is anticoagulated, see medications listed above.  - Monitor on telemetry   Gastroesophageal reflux disease without esophagitis  Denies any symptoms at this time. Will continue home Protonix dose and monitor.   Essential tremor  Continue home Primidone.   Depression  Patient has  depression which is mild and is currently controlled. Will Continue anti-depressant medications. We will not consult psychiatry at this time. Patient does not display psychosis at this time. Continue to monitor closely and adjust plan of care as needed.  Acute hypoxic respiratory failure      Oxygen Requirements in the last 24 hours  Rate of Resp  Min: 15  Max: 20  SpO2  Min: 89 %  Max: 98 %   Flow (L/min) (Oxygen Therapy)  Min: 3  Max: 3  Device (Oxygen Therapy): nasal cannula with humidification    Plan    Muscle spasms of both lower extremities  Patient states that she frequently has muscle spasms.    This is not always been associated with electrolyte abnormalities.    Flexeril seems to help.    Patient requesting pickle juice as that is what she uses at home.  Will attempt to secure this.    Final Active Diagnoses:    Diagnosis Date Noted POA    PRINCIPAL PROBLEM:  Acute on chronic heart failure with preserved ejection fraction (HFpEF, >= 50%) [I50.33] 08/01/2025 Yes    Muscle spasms of both lower extremities [M62.838] 08/01/2025 Yes    Gastroesophageal reflux disease without esophagitis [K21.9] 08/01/2025 Yes    Essential tremor [G25.0] 08/01/2025 Yes    Depression [F32.A] 08/01/2025 Yes    Acute hypoxic respiratory failure [J96.01]  08/01/2025 Yes    Diabetes mellitus type 2 in obese [E11.69, E66.9]  Yes    Chronic kidney failure, stage 4 (severe) [N18.4]  Yes    Paroxysmal atrial fibrillation [I48.0] 08/18/2022 Yes      Problems Resolved During this Admission:       Discharged Condition: fair    Disposition:     Follow Up:   Follow-up Information       Jt Allison II, MD Follow up in 1 week(s).    Specialty: Family Medicine  Why: hospital f/u  Contact information:  [address] [facility]  INTERNAL MEDICINE CLINIC  [facility] [address]  [phone]                           Patient Instructions:      Call MD for:  temperature >100.4     Call MD for:  persistent nausea and vomiting or diarrhea     Call MD for:  severe uncontrolled pain     Call MD for:  redness, tenderness, or signs of infection (pain, swelling, redness, odor or green/yellow discharge around incision site)     Call MD for:  difficulty breathing or increased cough     Call MD for:  severe persistent headache     Call MD for:  worsening rash     Call MD for:  persistent dizziness, light-headedness, or visual disturbances     Call MD for:  increased confusion or weakness       Significant Diagnostic Studies: Labs: BMP:   Recent Labs   Lab 08/04/25 0827      *   K 3.7   CL 98   CO2 28   BUN 41*   CREATININE 1.76*   CALCIUM 9.1   MG 2.2    and CBC   Recent Labs   Lab 08/04/25 0827   WBC 12.42*   HGB 13.3   HCT 44.5          Pending Diagnostic Studies:       Procedure Component Value Units Date/Time    EXTRA TUBES [[id number]] Collected: 07/31/25 1854    Order Status: Sent Lab Status: In process Updated: 07/31/25 1858    Specimen: Blood, Venous     Narrative:      The following orders were created for panel order EXTRA TUBES.  Procedure                               Abnormality         Status                     ---------                               -----------         ------                     Gold Top Hold[[id number]]                                    In process                   Please view results for these tests on the individual orders.    US Lower Extrem Arteries Bilat with YASMIN (xpd) [[id number]] Resulted: 08/04/25 1038    Order Status: Sent Lab Status: In process Updated: 08/04/25 1131           Medications:  Reconciled Home Medications:      Medication List        CHANGE how you take these medications      sertraline 50 MG tablet  Commonly known as: ZOLOFT  TAKE ONE TABLET BY MOUTH EVERY DAY  What changed:   how much to take  when to take this            CONTINUE taking these medications      aspirin 81 MG EC tablet  Commonly known as: ECOTRIN  Take 81 mg by mouth once daily.     furosemide 40 MG tablet  Commonly known as: LASIX  Take 1 tablet (40 mg total) by mouth once daily.     isosorbide dinitrate 30 MG Tab  Commonly known as: ISORDIL  Take 30 mg by mouth once daily.     multivitamin per tablet  Commonly known as: THERAGRAN  Take 1 tablet by mouth once daily.     pantoprazole 40 MG tablet  Commonly known as: PROTONIX  TAKE 1 TABLET BY MOUTH ONCE DAILY PRIOR TO SUPPER     pregabalin 75 MG capsule  Commonly known as: LYRICA  Take 75 mg by mouth 2 (two) times daily.     primidone 50 MG Tab  Commonly known as: MYSOLINE  Take 2 tablets (100 mg total) by mouth 3 (three) times daily.     rosuvastatin 40 MG Tab  Commonly known as: CRESTOR  Take 40 mg by mouth once daily.              Indwelling Lines/Drains at time of discharge:   Lines/Drains/Airways       None                       Time spent on the discharge of patient: 45 minutes         Gretta Salcedo MD  Department of Hospital Medicine  [facility]

## 2025-08-04 NOTE — PLAN OF CARE
Problem: Hospitalized Older Adult  Goal: Optimal Cognitive Function  Outcome: Progressing  Goal: Effective Bowel Elimination  Outcome: Progressing  Goal: Optimal Coping  Outcome: Progressing  Goal: Fluid and Electrolyte Balance  Outcome: Progressing  Goal: Optimal Functional Ability  Outcome: Progressing  Goal: Improved Oral Intake  Outcome: Progressing  Goal: Adequate Sleep/Rest  Outcome: Progressing  Goal: Effective Urinary Elimination  Outcome: Progressing     Problem: Adult Inpatient Plan of Care  Goal: Plan of Care Review  Outcome: Progressing  Goal: Patient-Specific Goal (Individualized)  Outcome: Progressing  Goal: Absence of Hospital-Acquired Illness or Injury  Outcome: Progressing  Goal: Optimal Comfort and Wellbeing  Outcome: Progressing  Goal: Readiness for Transition of Care  Outcome: Progressing     Problem: Diabetes Comorbidity  Goal: Blood Glucose Level Within Targeted Range  Outcome: Progressing     Problem: Gas Exchange Impaired  Goal: Optimal Gas Exchange  Outcome: Progressing     Problem: Skin Injury Risk Increased  Goal: Skin Health and Integrity  Outcome: Progressing

## 2025-08-04 NOTE — PROGRESS NOTES
Pharmacist Discharge Note     Patient is a 69 y.o. female who presented to hospital with Acute on chronic heart failure with preserved ejection fraction (HFpEF, >= 50%).     Admission medication history done on 7/31/25 by Helen Galo    Discharge Medication List:   Reconciled Home Medications:      Medication List        CHANGE how you take these medications      sertraline 50 MG tablet  Commonly known as: ZOLOFT  TAKE ONE TABLET BY MOUTH EVERY DAY  What changed:   how much to take  when to take this            CONTINUE taking these medications      aspirin 81 MG EC tablet  Commonly known as: ECOTRIN  Take 81 mg by mouth once daily.     furosemide 40 MG tablet  Commonly known as: LASIX  Take 1 tablet (40 mg total) by mouth once daily.     isosorbide dinitrate 30 MG Tab  Commonly known as: ISORDIL  Take 30 mg by mouth once daily.     multivitamin per tablet  Commonly known as: THERAGRAN  Take 1 tablet by mouth once daily.     pantoprazole 40 MG tablet  Commonly known as: PROTONIX  TAKE 1 TABLET BY MOUTH ONCE DAILY PRIOR TO SUPPER     pregabalin 75 MG capsule  Commonly known as: LYRICA  Take 75 mg by mouth 2 (two) times daily.     primidone 50 MG Tab  Commonly known as: MYSOLINE  Take 2 tablets (100 mg total) by mouth 3 (three) times daily.     rosuvastatin 40 MG Tab  Commonly known as: CRESTOR  Take 40 mg by mouth once daily.            ASK your doctor about these medications      OZEMPIC 0.25 mg or 0.5 mg(2 mg/1.5 mL) pen injector  Generic drug: semaglutide  Inject 0.25 mg into the skin every 7 days.  Ask about: Which instructions should I use?     TRESIBA FLEXTOUCH U-200 200 unit/mL (3 mL) insulin pen  Generic drug: insulin degludec  Inject into the skin once daily. Inject 52 units QAM and 32 units QHS subcutaneously daily  Ask about: Which instructions should I use?              Medication Changes Made: See above    Follow-up Recommendations:     The discharge counseling and medication review were performed by  Magali Parnell on 8/4/25    The following prescriptions were sent to (name of pharmacy): N/A    Thank you for allowing me to participate in this patient's care,  Magali Parnell  Pharmacist  EXT. [id number]

## 2025-08-04 NOTE — PLAN OF CARE
[facility] - [facility] Telemetry  Discharge Final Note    Primary Care Provider: Jt Allison II, MD    Expected Discharge Date: 8/4/2025    Final Discharge Note (most recent)       Final Note - 08/04/25 1320          Final Note    Assessment Type Final Discharge Note     Anticipated Discharge Disposition Home or Self Care                     Important Message from Medicare  Important Message from Medicare regarding Discharge Appeal Rights: Explained to patient/caregiver, Signed/date by patient/caregiver     Date IMM was signed: 08/04/25  Time IMM was signed: 0830    Contact Info       Jt Allison II, MD   Specialty: Family Medicine   Relationship: PCP - General    [address] [facility]  INTERNAL MEDICINE CLINIC  [facility] [address]   Phone: [phone]       Next Steps: Follow up in 1 week(s)    Instructions: hospital f/u          Dc home with . IM completed. Patient has cardiac packet but states that she has an appt to see her cardiologist this week.

## 2025-08-04 NOTE — ASSESSMENT & PLAN NOTE
"Patient reports taking insulin 52 units every morning 32 units nightly and Ozempic weekly on Fridays. Most recent hemoglobin A1c and blood glucsoe on admission below.     A1C  Recent Labs   Lab 12/16/24  1100 04/25/25  0804 07/31/25  1854   Hemoglobin A1C 6.1 6.3 H 6.4      Fingerstick glucose  No results for input(s): "POCTGLUCOSE" in the last 72 hours.     Inpatient insulin regimen  insulin glargine U-100 (Lantus) injection 25 Units, Daily, Subcutaneous  insulin aspart U-100 injection 0-10 Units, Before meals & nightly PRN, Subcutaneous    Plan  - Hold oral hypoglycemics while patient is in the hospital  - Current inpatient insulin regimen is listed above  - Patient's glucose is uncontrolled. Will continue current treatment  - Continue lyrica for peripheral neuropathy   "

## 2025-08-05 ENCOUNTER — PATIENT OUTREACH (OUTPATIENT)
Dept: ADMINISTRATIVE | Facility: CLINIC | Age: 70
End: 2025-08-05
Payer: MEDICARE

## 2025-08-05 NOTE — PROGRESS NOTES
C3 nurse attempted to reach Karen Renteria for a follow up call. Left message with . [facility]

## 2025-08-06 NOTE — PROGRESS NOTES
C3 nurse attempted to contact Karen Renteria. No answer. LVM requesting a callback to [phone]. Patient does not have an [facility] PCP.

## 2025-08-21 ENCOUNTER — HOSPITAL ENCOUNTER (EMERGENCY)
Facility: HOSPITAL | Age: 70
Discharge: HOME | End: 2025-08-21
Attending: EMERGENCY MEDICINE
Payer: MEDICARE

## 2025-08-21 VITALS
TEMPERATURE: 98 F | DIASTOLIC BLOOD PRESSURE: 58 MMHG | SYSTOLIC BLOOD PRESSURE: 116 MMHG | OXYGEN SATURATION: 94 % | BODY MASS INDEX: 31.83 KG/M2 | RESPIRATION RATE: 17 BRPM | HEART RATE: 75 BPM | WEIGHT: 173 LBS | HEIGHT: 62 IN

## 2025-08-21 DIAGNOSIS — R06.02 SHORTNESS OF BREATH: ICD-10-CM

## 2025-08-21 LAB
ALBUMIN SERPL BCP-MCNC: 3.1 G/DL (ref 3.4–4.8)
ALBUMIN/GLOB SERPL: 0.7 {RATIO}
ALP SERPL-CCNC: 79 U/L (ref 40–150)
ALT SERPL W P-5'-P-CCNC: 13 U/L
ANION GAP SERPL CALCULATED.3IONS-SCNC: 15 MMOL/L (ref 7–16)
AST SERPL W P-5'-P-CCNC: 27 U/L (ref 11–45)
BASOPHILS # BLD AUTO: 0.11 K/UL (ref 0–0.2)
BASOPHILS NFR BLD AUTO: 0.8 % (ref 0–1)
BILIRUB SERPL-MCNC: 0.5 MG/DL
BUN SERPL-MCNC: 24 MG/DL (ref 10–20)
BUN/CREAT SERPL: 12 (ref 6–20)
CALCIUM SERPL-MCNC: 8.7 MG/DL (ref 8.4–10.2)
CHLORIDE SERPL-SCNC: 105 MMOL/L (ref 98–107)
CO2 SERPL-SCNC: 23 MMOL/L (ref 23–31)
CREAT SERPL-MCNC: 2 MG/DL (ref 0.55–1.02)
D DIMER PPP FEU-MCNC: 0.41 ΜG/ML (ref 0–0.47)
DIFFERENTIAL METHOD BLD: ABNORMAL
EGFR (NO RACE VARIABLE) (RUSH/TITUS): 26 ML/MIN/1.73M2
EOSINOPHIL # BLD AUTO: 0.5 K/UL (ref 0–0.5)
EOSINOPHIL NFR BLD AUTO: 3.9 % (ref 1–4)
ERYTHROCYTE [DISTWIDTH] IN BLOOD BY AUTOMATED COUNT: 19.9 % (ref 11.5–14.5)
GLOBULIN SER-MCNC: 4.4 G/DL (ref 2–4)
GLUCOSE SERPL-MCNC: 232 MG/DL (ref 82–115)
HCT VFR BLD AUTO: 40.9 % (ref 38–47)
HGB BLD-MCNC: 12.1 G/DL (ref 12–16)
IMM GRANULOCYTES # BLD AUTO: 0.08 K/UL (ref 0–0.04)
IMM GRANULOCYTES NFR BLD: 0.6 % (ref 0–0.4)
LYMPHOCYTES # BLD AUTO: 1.1 K/UL (ref 1–4.8)
LYMPHOCYTES NFR BLD AUTO: 8.5 % (ref 27–41)
MCH RBC QN AUTO: 23.6 PG (ref 27–31)
MCHC RBC AUTO-ENTMCNC: 29.6 G/DL (ref 32–36)
MCV RBC AUTO: 79.7 FL (ref 80–96)
MONOCYTES # BLD AUTO: 0.77 K/UL (ref 0–0.8)
MONOCYTES NFR BLD AUTO: 5.9 % (ref 2–6)
MPC BLD CALC-MCNC: 11.3 FL (ref 9.4–12.4)
NEUTROPHILS # BLD AUTO: 10.39 K/UL (ref 1.8–7.7)
NEUTROPHILS NFR BLD AUTO: 80.3 % (ref 53–65)
NRBC # BLD AUTO: 0 X10E3/UL
NRBC, AUTO (.00): 0 %
NT-PROBNP SERPL-MCNC: 4532 PG/ML (ref 1–125)
PLATELET # BLD AUTO: 305 K/UL (ref 150–400)
POTASSIUM SERPL-SCNC: 4.1 MMOL/L (ref 3.5–5.1)
PROT SERPL-MCNC: 7.5 G/DL (ref 5.8–7.6)
RBC # BLD AUTO: 5.13 M/UL (ref 4.2–5.4)
SODIUM SERPL-SCNC: 139 MMOL/L (ref 136–145)
TROPONIN I SERPL HS-MCNC: 17.9 NG/L
TROPONIN I SERPL HS-MCNC: 19.3 NG/L
WBC # BLD AUTO: 12.95 K/UL (ref 4.5–11)

## 2025-08-21 PROCEDURE — 80053 COMPREHEN METABOLIC PANEL: CPT | Performed by: EMERGENCY MEDICINE

## 2025-08-21 PROCEDURE — 84484 ASSAY OF TROPONIN QUANT: CPT | Performed by: EMERGENCY MEDICINE

## 2025-08-21 PROCEDURE — 85025 COMPLETE CBC W/AUTO DIFF WBC: CPT | Performed by: EMERGENCY MEDICINE

## 2025-08-21 PROCEDURE — 93005 ELECTROCARDIOGRAM TRACING: CPT

## 2025-08-21 PROCEDURE — 99285 EMERGENCY DEPT VISIT HI MDM: CPT | Mod: 25

## 2025-08-21 PROCEDURE — 85379 FIBRIN DEGRADATION QUANT: CPT | Performed by: EMERGENCY MEDICINE

## 2025-08-21 PROCEDURE — 36415 COLL VENOUS BLD VENIPUNCTURE: CPT | Performed by: EMERGENCY MEDICINE

## 2025-08-21 PROCEDURE — 93010 ELECTROCARDIOGRAM REPORT: CPT | Mod: ,,, | Performed by: INTERNAL MEDICINE

## 2025-08-21 PROCEDURE — 83880 ASSAY OF NATRIURETIC PEPTIDE: CPT | Performed by: EMERGENCY MEDICINE

## 2025-08-23 LAB
OHS QRS DURATION: 150 MS
OHS QTC CALCULATION: 462 MS

## 2025-08-29 ENCOUNTER — OFFICE VISIT (OUTPATIENT)
Dept: PULMONOLOGY | Facility: CLINIC | Age: 70
End: 2025-08-29
Payer: MEDICARE

## 2025-08-29 VITALS
WEIGHT: 173.06 LBS | BODY MASS INDEX: 31.85 KG/M2 | OXYGEN SATURATION: 95 % | HEART RATE: 69 BPM | DIASTOLIC BLOOD PRESSURE: 64 MMHG | HEIGHT: 62 IN | SYSTOLIC BLOOD PRESSURE: 118 MMHG | RESPIRATION RATE: 18 BRPM

## 2025-08-29 DIAGNOSIS — R06.02 SOB (SHORTNESS OF BREATH): Primary | ICD-10-CM

## 2025-08-29 PROCEDURE — 99999 PR PBB SHADOW E&M-EST. PATIENT-LVL V: CPT | Mod: PBBFAC,,, | Performed by: INTERNAL MEDICINE

## 2025-08-29 PROCEDURE — 99204 OFFICE O/P NEW MOD 45 MIN: CPT | Mod: S$PBB,,, | Performed by: INTERNAL MEDICINE

## 2025-08-29 PROCEDURE — 99215 OFFICE O/P EST HI 40 MIN: CPT | Mod: PBBFAC | Performed by: INTERNAL MEDICINE

## 2025-08-29 RX ORDER — FLUTICASONE FUROATE, UMECLIDINIUM BROMIDE AND VILANTEROL TRIFENATATE 200; 62.5; 25 UG/1; UG/1; UG/1
1 POWDER RESPIRATORY (INHALATION) DAILY
Qty: 60 EACH | Refills: 5 | Status: SHIPPED | OUTPATIENT
Start: 2025-08-29

## 2025-08-29 RX ORDER — METOPROLOL SUCCINATE 25 MG/1
25 TABLET, EXTENDED RELEASE ORAL
COMMUNITY
Start: 2025-08-11 | End: 2026-08-11

## (undated) DEVICE — PACK ECLIPSE BASIC III SURG

## (undated) DEVICE — GLOVE PROTEXIS PI SYN SURG 7.5

## (undated) DEVICE — SOL NACL IRR 1000ML BTL

## (undated) DEVICE — BAG DRAIN ANTI REFLUX 2000ML

## (undated) DEVICE — Device

## (undated) DEVICE — BAG LINGEMAN DRAIN UROLOGY

## (undated) DEVICE — SYR 30CC LUER LOCK

## (undated) DEVICE — GLOVE SENSICARE PI SURG 8

## (undated) DEVICE — GLOVE SENSICARE PI SURG 7.5

## (undated) DEVICE — GOWN NONREINF SET-IN SLV 2XL

## (undated) DEVICE — TUBE SUCTION MEDI-VAC STERILE

## (undated) DEVICE — GAUZE SPONGE XRAY 4X4

## (undated) DEVICE — SOL NACL IRR 3000ML

## (undated) DEVICE — PAD CURAD NONADH 3X4IN

## (undated) DEVICE — LOOP CUTTING RESECT 12 D 24F

## (undated) DEVICE — NDL HYPO STD REG BVL 18GX1IN

## (undated) DEVICE — SYR 10CC LUER LOCK